# Patient Record
Sex: FEMALE | Employment: OTHER | URBAN - METROPOLITAN AREA
[De-identification: names, ages, dates, MRNs, and addresses within clinical notes are randomized per-mention and may not be internally consistent; named-entity substitution may affect disease eponyms.]

---

## 2018-01-27 ENCOUNTER — HOSPITAL ENCOUNTER (INPATIENT)
Age: 48
LOS: 3 days | Discharge: NURSING, REHAB OR LONG-TERM CARE | DRG: 133 | End: 2018-02-01
Attending: EMERGENCY MEDICINE | Admitting: INTERNAL MEDICINE
Payer: MEDICAID

## 2018-01-27 ENCOUNTER — APPOINTMENT (OUTPATIENT)
Dept: GENERAL RADIOLOGY | Age: 48
DRG: 133 | End: 2018-01-27
Payer: MEDICAID

## 2018-01-27 DIAGNOSIS — R06.89 HYPERCAPNEMIA: ICD-10-CM

## 2018-01-27 DIAGNOSIS — R06.00 DYSPNEA, UNSPECIFIED TYPE: ICD-10-CM

## 2018-01-27 DIAGNOSIS — I50.9 CONGESTIVE HEART FAILURE, UNSPECIFIED CONGESTIVE HEART FAILURE CHRONICITY, UNSPECIFIED CONGESTIVE HEART FAILURE TYPE: Primary | ICD-10-CM

## 2018-01-27 LAB
ALBUMIN SERPL-MCNC: 3.7 G/DL (ref 3.5–5.2)
ALBUMIN/GLOBULIN RATIO: ABNORMAL (ref 1–2.5)
ALLEN TEST: ABNORMAL
ALP BLD-CCNC: 51 U/L (ref 35–104)
ALT SERPL-CCNC: 13 U/L (ref 5–33)
ANION GAP SERPL CALCULATED.3IONS-SCNC: 8 MMOL/L (ref 9–17)
AST SERPL-CCNC: 16 U/L
BILIRUB SERPL-MCNC: 0.36 MG/DL (ref 0.3–1.2)
BNP INTERPRETATION: ABNORMAL
BUN BLDV-MCNC: 14 MG/DL (ref 6–20)
BUN/CREAT BLD: ABNORMAL (ref 9–20)
CALCIUM SERPL-MCNC: 9.4 MG/DL (ref 8.6–10.4)
CARBOXYHEMOGLOBIN: 3.1 % (ref 0–5)
CHLORIDE BLD-SCNC: 95 MMOL/L (ref 98–107)
CO2: 39 MMOL/L (ref 20–31)
CREAT SERPL-MCNC: 1.14 MG/DL (ref 0.5–0.9)
EKG ATRIAL RATE: 90 BPM
EKG P AXIS: 36 DEGREES
EKG P-R INTERVAL: 164 MS
EKG Q-T INTERVAL: 378 MS
EKG QRS DURATION: 98 MS
EKG QTC CALCULATION (BAZETT): 462 MS
EKG R AXIS: 62 DEGREES
EKG T AXIS: 69 DEGREES
EKG VENTRICULAR RATE: 90 BPM
FIO2: 50
GFR AFRICAN AMERICAN: >60 ML/MIN
GFR NON-AFRICAN AMERICAN: 52 ML/MIN
GFR SERPL CREATININE-BSD FRML MDRD: ABNORMAL ML/MIN/{1.73_M2}
GFR SERPL CREATININE-BSD FRML MDRD: ABNORMAL ML/MIN/{1.73_M2}
GLUCOSE BLD-MCNC: 103 MG/DL (ref 70–99)
HCO3 VENOUS: 41.9 MMOL/L (ref 24–30)
INR BLD: 1
LACTIC ACID: 0.8 MMOL/L (ref 0.5–2.2)
METHEMOGLOBIN: 0.6 % (ref 0–1.9)
MODE: ABNORMAL
NEGATIVE BASE EXCESS, VEN: ABNORMAL MMOL/L (ref 0–2)
NOTIFICATION TIME: ABNORMAL
NOTIFICATION: ABNORMAL
O2 DEVICE/FLOW/%: ABNORMAL
O2 SAT, VEN: 56.8 % (ref 60–85)
OXYHEMOGLOBIN: ABNORMAL % (ref 95–98)
PARTIAL THROMBOPLASTIN TIME: 25 SEC (ref 23–31)
PATIENT TEMP: 37
PCO2, VEN, TEMP ADJ: ABNORMAL MMHG (ref 39–55)
PCO2, VEN: 80 (ref 39–55)
PEEP/CPAP: ABNORMAL
PH VENOUS: 7.33 (ref 7.32–7.42)
PH, VEN, TEMP ADJ: ABNORMAL (ref 7.32–7.42)
PO2, VEN, TEMP ADJ: ABNORMAL MMHG (ref 30–50)
PO2, VEN: 34.3 (ref 30–50)
POSITIVE BASE EXCESS, VEN: 16 MMOL/L (ref 0–2)
POTASSIUM SERPL-SCNC: 4 MMOL/L (ref 3.7–5.3)
PRO-BNP: 342 PG/ML
PROTHROMBIN TIME: 10.4 SEC (ref 9.7–12)
PSV: ABNORMAL
PT. POSITION: ABNORMAL
RESPIRATORY RATE: 22
SAMPLE SITE: ABNORMAL
SET RATE: ABNORMAL
SODIUM BLD-SCNC: 142 MMOL/L (ref 135–144)
TEXT FOR RESPIRATORY: ABNORMAL
TOTAL HB: ABNORMAL G/DL (ref 12–16)
TOTAL PROTEIN: 9.5 G/DL (ref 6.4–8.3)
TOTAL RATE: ABNORMAL
TROPONIN INTERP: NORMAL
TROPONIN T: <0.03 NG/ML
VT: ABNORMAL

## 2018-01-27 PROCEDURE — 82800 BLOOD PH: CPT

## 2018-01-27 PROCEDURE — 6370000000 HC RX 637 (ALT 250 FOR IP): Performed by: EMERGENCY MEDICINE

## 2018-01-27 PROCEDURE — 85610 PROTHROMBIN TIME: CPT

## 2018-01-27 PROCEDURE — 36415 COLL VENOUS BLD VENIPUNCTURE: CPT

## 2018-01-27 PROCEDURE — 96374 THER/PROPH/DIAG INJ IV PUSH: CPT

## 2018-01-27 PROCEDURE — 80053 COMPREHEN METABOLIC PANEL: CPT

## 2018-01-27 PROCEDURE — 83605 ASSAY OF LACTIC ACID: CPT

## 2018-01-27 PROCEDURE — 83880 ASSAY OF NATRIURETIC PEPTIDE: CPT

## 2018-01-27 PROCEDURE — 71045 X-RAY EXAM CHEST 1 VIEW: CPT

## 2018-01-27 PROCEDURE — G0378 HOSPITAL OBSERVATION PER HR: HCPCS

## 2018-01-27 PROCEDURE — 94640 AIRWAY INHALATION TREATMENT: CPT

## 2018-01-27 PROCEDURE — 82805 BLOOD GASES W/O2 SATURATION: CPT

## 2018-01-27 PROCEDURE — 93005 ELECTROCARDIOGRAM TRACING: CPT

## 2018-01-27 PROCEDURE — 85730 THROMBOPLASTIN TIME PARTIAL: CPT

## 2018-01-27 PROCEDURE — 99285 EMERGENCY DEPT VISIT HI MDM: CPT

## 2018-01-27 PROCEDURE — 6360000002 HC RX W HCPCS: Performed by: EMERGENCY MEDICINE

## 2018-01-27 PROCEDURE — 94664 DEMO&/EVAL PT USE INHALER: CPT

## 2018-01-27 PROCEDURE — 84484 ASSAY OF TROPONIN QUANT: CPT

## 2018-01-27 PROCEDURE — 85025 COMPLETE CBC W/AUTO DIFF WBC: CPT

## 2018-01-27 PROCEDURE — 94762 N-INVAS EAR/PLS OXIMTRY CONT: CPT

## 2018-01-27 PROCEDURE — 2580000003 HC RX 258: Performed by: EMERGENCY MEDICINE

## 2018-01-27 PROCEDURE — 87493 C DIFF AMPLIFIED PROBE: CPT

## 2018-01-27 RX ORDER — AZITHROMYCIN 250 MG/1
500 TABLET, FILM COATED ORAL DAILY
Status: COMPLETED | OUTPATIENT
Start: 2018-01-28 | End: 2018-01-28

## 2018-01-27 RX ORDER — LORAZEPAM 1 MG/1
1 TABLET ORAL 3 TIMES DAILY PRN
Status: ON HOLD | COMMUNITY
End: 2018-01-31

## 2018-01-27 RX ORDER — GUAIFENESIN 600 MG/1
1200 TABLET, EXTENDED RELEASE ORAL 2 TIMES DAILY
COMMUNITY
End: 2018-11-17

## 2018-01-27 RX ORDER — IPRATROPIUM BROMIDE AND ALBUTEROL SULFATE 2.5; .5 MG/3ML; MG/3ML
1 SOLUTION RESPIRATORY (INHALATION)
Status: DISCONTINUED | OUTPATIENT
Start: 2018-01-28 | End: 2018-02-01 | Stop reason: HOSPADM

## 2018-01-27 RX ORDER — FERROUS SULFATE 325(65) MG
325 TABLET ORAL
COMMUNITY

## 2018-01-27 RX ORDER — CHLORHEXIDINE GLUCONATE 0.12 MG/ML
15 RINSE ORAL 3 TIMES DAILY
Status: ON HOLD | COMMUNITY
End: 2018-11-17 | Stop reason: ALTCHOICE

## 2018-01-27 RX ORDER — MORPHINE SULFATE 2 MG/ML
2 INJECTION, SOLUTION INTRAMUSCULAR; INTRAVENOUS
Status: DISCONTINUED | OUTPATIENT
Start: 2018-01-27 | End: 2018-01-29

## 2018-01-27 RX ORDER — OXYCODONE HYDROCHLORIDE AND ACETAMINOPHEN 5; 325 MG/1; MG/1
2 TABLET ORAL EVERY 6 HOURS PRN
Status: ON HOLD | COMMUNITY
End: 2018-01-31

## 2018-01-27 RX ORDER — NICOTINE 21 MG/24HR
1 PATCH, TRANSDERMAL 24 HOURS TRANSDERMAL DAILY PRN
Status: DISCONTINUED | OUTPATIENT
Start: 2018-01-27 | End: 2018-02-01 | Stop reason: HOSPADM

## 2018-01-27 RX ORDER — M-VIT,TX,IRON,MINS/CALC/FOLIC 27MG-0.4MG
1 TABLET ORAL
COMMUNITY

## 2018-01-27 RX ORDER — ONDANSETRON 2 MG/ML
4 INJECTION INTRAMUSCULAR; INTRAVENOUS EVERY 6 HOURS PRN
Status: DISCONTINUED | OUTPATIENT
Start: 2018-01-27 | End: 2018-02-01 | Stop reason: HOSPADM

## 2018-01-27 RX ORDER — SODIUM CHLORIDE 0.9 % (FLUSH) 0.9 %
10 SYRINGE (ML) INJECTION PRN
Status: DISCONTINUED | OUTPATIENT
Start: 2018-01-27 | End: 2018-01-27 | Stop reason: SDUPTHER

## 2018-01-27 RX ORDER — IPRATROPIUM BROMIDE AND ALBUTEROL SULFATE 2.5; .5 MG/3ML; MG/3ML
1 SOLUTION RESPIRATORY (INHALATION) EVERY 4 HOURS PRN
COMMUNITY

## 2018-01-27 RX ORDER — ALBUTEROL SULFATE 2.5 MG/3ML
2.5 SOLUTION RESPIRATORY (INHALATION)
Status: DISCONTINUED | OUTPATIENT
Start: 2018-01-27 | End: 2018-02-01 | Stop reason: HOSPADM

## 2018-01-27 RX ORDER — PANTOPRAZOLE SODIUM 40 MG/1
40 TABLET, DELAYED RELEASE ORAL DAILY
Status: ON HOLD | COMMUNITY
End: 2018-01-31 | Stop reason: HOSPADM

## 2018-01-27 RX ORDER — SODIUM CHLORIDE 0.9 % (FLUSH) 0.9 %
10 SYRINGE (ML) INJECTION EVERY 12 HOURS SCHEDULED
Status: DISCONTINUED | OUTPATIENT
Start: 2018-01-27 | End: 2018-01-27 | Stop reason: SDUPTHER

## 2018-01-27 RX ORDER — METHYLPREDNISOLONE SODIUM SUCCINATE 125 MG/2ML
80 INJECTION, POWDER, LYOPHILIZED, FOR SOLUTION INTRAMUSCULAR; INTRAVENOUS EVERY 8 HOURS
Status: DISCONTINUED | OUTPATIENT
Start: 2018-01-27 | End: 2018-01-28

## 2018-01-27 RX ORDER — ACETAMINOPHEN 325 MG/1
650 TABLET ORAL EVERY 6 HOURS PRN
COMMUNITY

## 2018-01-27 RX ORDER — FUROSEMIDE 80 MG
80 TABLET ORAL DAILY
Status: ON HOLD | COMMUNITY
End: 2018-01-31 | Stop reason: HOSPADM

## 2018-01-27 RX ORDER — AZITHROMYCIN 250 MG/1
250 TABLET, FILM COATED ORAL DAILY
Status: DISCONTINUED | OUTPATIENT
Start: 2018-01-29 | End: 2018-01-28

## 2018-01-27 RX ORDER — ACETAMINOPHEN 325 MG/1
650 TABLET ORAL EVERY 4 HOURS PRN
Status: DISCONTINUED | OUTPATIENT
Start: 2018-01-27 | End: 2018-02-01 | Stop reason: HOSPADM

## 2018-01-27 RX ORDER — MORPHINE SULFATE 4 MG/ML
4 INJECTION, SOLUTION INTRAMUSCULAR; INTRAVENOUS
Status: DISCONTINUED | OUTPATIENT
Start: 2018-01-27 | End: 2018-01-29

## 2018-01-27 RX ORDER — SODIUM CHLORIDE 0.9 % (FLUSH) 0.9 %
10 SYRINGE (ML) INJECTION PRN
Status: DISCONTINUED | OUTPATIENT
Start: 2018-01-27 | End: 2018-02-01 | Stop reason: HOSPADM

## 2018-01-27 RX ORDER — BISACODYL 10 MG
10 SUPPOSITORY, RECTAL RECTAL DAILY PRN
Status: DISCONTINUED | OUTPATIENT
Start: 2018-01-27 | End: 2018-02-01 | Stop reason: HOSPADM

## 2018-01-27 RX ORDER — SODIUM CHLORIDE 9 MG/ML
INJECTION, SOLUTION INTRAVENOUS CONTINUOUS
Status: DISCONTINUED | OUTPATIENT
Start: 2018-01-27 | End: 2018-01-28

## 2018-01-27 RX ORDER — LEVOTHYROXINE SODIUM 0.03 MG/1
25 TABLET ORAL DAILY
Status: ON HOLD | COMMUNITY
End: 2018-05-17 | Stop reason: HOSPADM

## 2018-01-27 RX ORDER — FAMOTIDINE 20 MG/1
20 TABLET, FILM COATED ORAL 2 TIMES DAILY
Status: DISCONTINUED | OUTPATIENT
Start: 2018-01-27 | End: 2018-02-01 | Stop reason: HOSPADM

## 2018-01-27 RX ORDER — ACETAMINOPHEN 325 MG/1
650 TABLET ORAL EVERY 4 HOURS PRN
Status: DISCONTINUED | OUTPATIENT
Start: 2018-01-27 | End: 2018-01-27 | Stop reason: SDUPTHER

## 2018-01-27 RX ORDER — SODIUM CHLORIDE 0.9 % (FLUSH) 0.9 %
10 SYRINGE (ML) INJECTION EVERY 12 HOURS SCHEDULED
Status: DISCONTINUED | OUTPATIENT
Start: 2018-01-27 | End: 2018-02-01 | Stop reason: HOSPADM

## 2018-01-27 RX ORDER — FUROSEMIDE 10 MG/ML
40 INJECTION INTRAMUSCULAR; INTRAVENOUS ONCE
Status: COMPLETED | OUTPATIENT
Start: 2018-01-27 | End: 2018-01-27

## 2018-01-27 RX ORDER — ASPIRIN 81 MG/1
324 TABLET, CHEWABLE ORAL ONCE
Status: COMPLETED | OUTPATIENT
Start: 2018-01-27 | End: 2018-01-27

## 2018-01-27 RX ADMIN — FUROSEMIDE 40 MG: 10 INJECTION, SOLUTION INTRAVENOUS at 17:03

## 2018-01-27 RX ADMIN — WATER 2.2 ML: 1 INJECTION INTRAMUSCULAR; INTRAVENOUS; SUBCUTANEOUS at 18:08

## 2018-01-27 RX ADMIN — ALBUTEROL SULFATE 2.5 MG: 2.5 SOLUTION RESPIRATORY (INHALATION) at 16:54

## 2018-01-27 RX ADMIN — ASPIRIN 81 MG 324 MG: 81 TABLET ORAL at 17:03

## 2018-01-27 RX ADMIN — ALTEPLASE 2 MG: 2.2 INJECTION, POWDER, LYOPHILIZED, FOR SOLUTION INTRAVENOUS at 18:08

## 2018-01-27 NOTE — ED PROVIDER NOTES
Notable for the following:     Glucose 103 (*)     CREATININE 1.14 (*)     Chloride 95 (*)     CO2 39 (*)     Anion Gap 8 (*)     Total Protein 9.5 (*)     GFR Non- 52 (*)     All other components within normal limits   C DIFF TOXIN B BY RT PCR   GRAM STAIN   RESPIRATORY CULTURE   LACTIC ACID   TROPONIN   APTT   PROTIME-INR   PREVIOUS SPECIMEN   CBC WITH AUTO DIFFERENTIAL   PREVIOUS SPECIMEN   BASIC METABOLIC PANEL W/ REFLEX TO MG FOR LOW K    CBC     EMERGENCY DEPARTMENT COURSE:     Vitals:    Vitals:    01/27/18 1354 01/27/18 1654 01/27/18 1832 01/27/18 2045   BP:   (!) 149/81 (!) 144/88   Pulse:   85 86   Resp: 22 18 18 18   Temp:   97.9 °F (36.6 °C) 98.4 °F (36.9 °C)   TempSrc:   Oral Oral   SpO2: 92% 97% 100% 93%   Weight:       Height:         The patient was given the following medications while in the emergency department:  Orders Placed This Encounter   Medications    DISCONTD: albuterol (PROVENTIL) nebulizer solution 2.5 mg    aspirin chewable tablet 324 mg    furosemide (LASIX) injection 40 mg    sodium chloride flush 0.9 % injection 10 mL    sodium chloride flush 0.9 % injection 10 mL    acetaminophen (TYLENOL) tablet 650 mg    DISCONTD: enoxaparin (LOVENOX) injection 40 mg    AND Linked Order Group     alteplase (CATHFLO) injection 2 mg     sterile water injection 2.2 mL    0.9 % sodium chloride infusion    DISCONTD: sodium chloride flush 0.9 % injection 10 mL    DISCONTD: sodium chloride flush 0.9 % injection 10 mL    DISCONTD: acetaminophen (TYLENOL) tablet 650 mg    OR Linked Order Group     morphine injection 2 mg     morphine (PF) injection 4 mg    magnesium hydroxide (MILK OF MAGNESIA) 400 MG/5ML suspension 30 mL    bisacodyl (DULCOLAX) suppository 10 mg    ondansetron (ZOFRAN) injection 4 mg    famotidine (PEPCID) tablet 20 mg    nicotine (NICODERM CQ) 21 MG/24HR 1 patch     If indicated/pateint smokes    enoxaparin (LOVENOX) injection 40 mg    albuterol (PROVENTIL) nebulizer solution 2.5 mg    ipratropium-albuterol (DUONEB) nebulizer solution 1 ampule    methylPREDNISolone sodium (SOLU-MEDROL) injection 80 mg    FOLLOWED BY Linked Order Group     azithromycin (ZITHROMAX) tablet 500 mg     azithromycin (ZITHROMAX) tablet 250 mg    cefTRIAXone (ROCEPHIN) 1 g in sterile water 10 mL IV syringe    DISCONTD: albuterol (PROVENTIL) nebulizer solution 2.5 mg     CONSULTS:  IP CONSULT TO INTERNAL MEDICINE  IP CONSULT TO INTERVENTIONAL RADIOLOGY  IP CONSULT TO PULMONOLOGY    FINAL IMPRESSION      1. Congestive heart failure, unspecified congestive heart failure chronicity, unspecified congestive heart failure type ([address]    2. Dyspnea, unspecified type    3. Hypercapnemia          DISPOSITION/PLAN   DISPOSITION Admitted 01/27/2018 05:11:29 PM      PATIENT REFERRED TO:  DO Malissa [address]   [facility] [address]  [phone]          DISCHARGE MEDICATIONS:  Current Discharge Medication List        Elías Rosenbaum MD  Attending Emergency Physician                     Elías Rosenbaum MD  01/27/18 8885

## 2018-01-28 ENCOUNTER — APPOINTMENT (OUTPATIENT)
Dept: GENERAL RADIOLOGY | Age: 48
DRG: 133 | End: 2018-01-28
Payer: MEDICAID

## 2018-01-28 PROBLEM — J96.21 ACUTE ON CHRONIC RESPIRATORY FAILURE WITH HYPOXIA AND HYPERCAPNIA (HCC): Status: ACTIVE | Noted: 2018-01-28

## 2018-01-28 PROBLEM — J96.22 ACUTE ON CHRONIC RESPIRATORY FAILURE WITH HYPOXIA AND HYPERCAPNIA (HCC): Status: ACTIVE | Noted: 2018-01-28

## 2018-01-28 LAB
ANION GAP SERPL CALCULATED.3IONS-SCNC: 9 MMOL/L (ref 9–17)
BUN BLDV-MCNC: 13 MG/DL (ref 6–20)
BUN/CREAT BLD: ABNORMAL (ref 9–20)
C DIFFICILE TOXINS, PCR: ABNORMAL
CALCIUM SERPL-MCNC: 9.2 MG/DL (ref 8.6–10.4)
CHLORIDE BLD-SCNC: 97 MMOL/L (ref 98–107)
CO2: 34 MMOL/L (ref 20–31)
CREAT SERPL-MCNC: 1.22 MG/DL (ref 0.5–0.9)
GFR AFRICAN AMERICAN: 58 ML/MIN
GFR NON-AFRICAN AMERICAN: 48 ML/MIN
GFR SERPL CREATININE-BSD FRML MDRD: ABNORMAL ML/MIN/{1.73_M2}
GFR SERPL CREATININE-BSD FRML MDRD: ABNORMAL ML/MIN/{1.73_M2}
GLUCOSE BLD-MCNC: 167 MG/DL (ref 70–99)
HCT VFR BLD CALC: 29.1 % (ref 36–46)
HEMOGLOBIN: 9 G/DL (ref 12–16)
MCH RBC QN AUTO: 26.6 PG (ref 26–34)
MCHC RBC AUTO-ENTMCNC: 30.8 G/DL (ref 31–37)
MCV RBC AUTO: 86.4 FL (ref 80–100)
NRBC AUTOMATED: ABNORMAL PER 100 WBC
PDW BLD-RTO: 17 % (ref 11.5–14.9)
PLATELET # BLD: 200 K/UL (ref 150–450)
PMV BLD AUTO: 8 FL (ref 6–12)
POTASSIUM SERPL-SCNC: 4.7 MMOL/L (ref 3.7–5.3)
RBC # BLD: 3.37 M/UL (ref 4–5.2)
SODIUM BLD-SCNC: 140 MMOL/L (ref 135–144)
SPECIMEN DESCRIPTION: ABNORMAL
WBC # BLD: 5.2 K/UL (ref 3.5–11)

## 2018-01-28 PROCEDURE — 6370000000 HC RX 637 (ALT 250 FOR IP): Performed by: INTERNAL MEDICINE

## 2018-01-28 PROCEDURE — 94002 VENT MGMT INPAT INIT DAY: CPT

## 2018-01-28 PROCEDURE — 99223 1ST HOSP IP/OBS HIGH 75: CPT | Performed by: INTERNAL MEDICINE

## 2018-01-28 PROCEDURE — 80048 BASIC METABOLIC PNL TOTAL CA: CPT

## 2018-01-28 PROCEDURE — 5A1945Z RESPIRATORY VENTILATION, 24-96 CONSECUTIVE HOURS: ICD-10-PCS | Performed by: INTERNAL MEDICINE

## 2018-01-28 PROCEDURE — 96376 TX/PRO/DX INJ SAME DRUG ADON: CPT

## 2018-01-28 PROCEDURE — 6360000002 HC RX W HCPCS: Performed by: INTERNAL MEDICINE

## 2018-01-28 PROCEDURE — 71045 X-RAY EXAM CHEST 1 VIEW: CPT

## 2018-01-28 PROCEDURE — G0378 HOSPITAL OBSERVATION PER HR: HCPCS

## 2018-01-28 PROCEDURE — 94640 AIRWAY INHALATION TREATMENT: CPT

## 2018-01-28 PROCEDURE — 2580000003 HC RX 258: Performed by: INTERNAL MEDICINE

## 2018-01-28 PROCEDURE — 94762 N-INVAS EAR/PLS OXIMTRY CONT: CPT

## 2018-01-28 PROCEDURE — 94003 VENT MGMT INPAT SUBQ DAY: CPT

## 2018-01-28 PROCEDURE — 36415 COLL VENOUS BLD VENIPUNCTURE: CPT

## 2018-01-28 PROCEDURE — 85027 COMPLETE CBC AUTOMATED: CPT

## 2018-01-28 PROCEDURE — 96375 TX/PRO/DX INJ NEW DRUG ADDON: CPT

## 2018-01-28 RX ORDER — FUROSEMIDE 40 MG/1
80 TABLET ORAL DAILY
Status: DISCONTINUED | OUTPATIENT
Start: 2018-01-28 | End: 2018-01-28 | Stop reason: SDUPTHER

## 2018-01-28 RX ORDER — FERROUS SULFATE 325(65) MG
325 TABLET ORAL
Status: DISCONTINUED | OUTPATIENT
Start: 2018-01-28 | End: 2018-02-01 | Stop reason: HOSPADM

## 2018-01-28 RX ORDER — IPRATROPIUM BROMIDE AND ALBUTEROL SULFATE 2.5; .5 MG/3ML; MG/3ML
1 SOLUTION RESPIRATORY (INHALATION) EVERY 4 HOURS PRN
Status: DISCONTINUED | OUTPATIENT
Start: 2018-01-28 | End: 2018-02-01 | Stop reason: HOSPADM

## 2018-01-28 RX ORDER — METHYLPREDNISOLONE SODIUM SUCCINATE 40 MG/ML
40 INJECTION, POWDER, LYOPHILIZED, FOR SOLUTION INTRAMUSCULAR; INTRAVENOUS EVERY 12 HOURS
Status: DISCONTINUED | OUTPATIENT
Start: 2018-01-29 | End: 2018-01-30

## 2018-01-28 RX ORDER — METRONIDAZOLE 500 MG/1
250 TABLET ORAL EVERY 6 HOURS SCHEDULED
Status: DISCONTINUED | OUTPATIENT
Start: 2018-01-28 | End: 2018-01-29 | Stop reason: DRUGHIGH

## 2018-01-28 RX ORDER — FUROSEMIDE 10 MG/ML
20 INJECTION INTRAMUSCULAR; INTRAVENOUS 2 TIMES DAILY
Status: DISCONTINUED | OUTPATIENT
Start: 2018-01-28 | End: 2018-01-30

## 2018-01-28 RX ORDER — LORAZEPAM 1 MG/1
1 TABLET ORAL 3 TIMES DAILY PRN
Status: DISCONTINUED | OUTPATIENT
Start: 2018-01-28 | End: 2018-02-01 | Stop reason: HOSPADM

## 2018-01-28 RX ORDER — OXYCODONE HYDROCHLORIDE AND ACETAMINOPHEN 5; 325 MG/1; MG/1
2 TABLET ORAL EVERY 6 HOURS PRN
Status: DISCONTINUED | OUTPATIENT
Start: 2018-01-28 | End: 2018-02-01 | Stop reason: HOSPADM

## 2018-01-28 RX ORDER — LEVOTHYROXINE SODIUM 0.1 MG/1
200 TABLET ORAL DAILY
Status: DISCONTINUED | OUTPATIENT
Start: 2018-01-28 | End: 2018-02-01 | Stop reason: HOSPADM

## 2018-01-28 RX ORDER — LEVOTHYROXINE SODIUM 0.03 MG/1
25 TABLET ORAL DAILY
Status: DISCONTINUED | OUTPATIENT
Start: 2018-01-28 | End: 2018-02-01 | Stop reason: HOSPADM

## 2018-01-28 RX ORDER — PANTOPRAZOLE SODIUM 40 MG/1
40 TABLET, DELAYED RELEASE ORAL DAILY
Status: DISCONTINUED | OUTPATIENT
Start: 2018-01-28 | End: 2018-02-01 | Stop reason: HOSPADM

## 2018-01-28 RX ORDER — FUROSEMIDE 10 MG/ML
40 INJECTION INTRAMUSCULAR; INTRAVENOUS 2 TIMES DAILY
Status: DISCONTINUED | OUTPATIENT
Start: 2018-01-28 | End: 2018-01-28

## 2018-01-28 RX ORDER — ACETAMINOPHEN 325 MG/1
650 TABLET ORAL EVERY 6 HOURS PRN
Status: DISCONTINUED | OUTPATIENT
Start: 2018-01-28 | End: 2018-01-28 | Stop reason: SDUPTHER

## 2018-01-28 RX ORDER — GUAIFENESIN 600 MG/1
1200 TABLET, EXTENDED RELEASE ORAL 2 TIMES DAILY
Status: DISCONTINUED | OUTPATIENT
Start: 2018-01-28 | End: 2018-02-01 | Stop reason: HOSPADM

## 2018-01-28 RX ADMIN — LORAZEPAM 1 MG: 1 TABLET ORAL at 13:34

## 2018-01-28 RX ADMIN — AZITHROMYCIN 500 MG: 250 TABLET, FILM COATED ORAL at 09:08

## 2018-01-28 RX ADMIN — FUROSEMIDE 20 MG: 10 INJECTION, SOLUTION INTRAVENOUS at 18:09

## 2018-01-28 RX ADMIN — PANTOPRAZOLE SODIUM 40 MG: 40 TABLET, DELAYED RELEASE ORAL at 09:08

## 2018-01-28 RX ADMIN — GUAIFENESIN 1200 MG: 600 TABLET, EXTENDED RELEASE ORAL at 20:18

## 2018-01-28 RX ADMIN — FUROSEMIDE 40 MG: 10 INJECTION, SOLUTION INTRAVENOUS at 09:09

## 2018-01-28 RX ADMIN — FERROUS SULFATE TAB 325 MG (65 MG ELEMENTAL FE) 325 MG: 325 (65 FE) TAB at 09:08

## 2018-01-28 RX ADMIN — CEFTRIAXONE SODIUM 1 G: 1 INJECTION, POWDER, FOR SOLUTION INTRAMUSCULAR; INTRAVENOUS at 01:56

## 2018-01-28 RX ADMIN — IPRATROPIUM BROMIDE AND ALBUTEROL SULFATE 1 AMPULE: .5; 3 SOLUTION RESPIRATORY (INHALATION) at 21:03

## 2018-01-28 RX ADMIN — METHYLPREDNISOLONE SODIUM SUCCINATE 80 MG: 125 INJECTION, POWDER, FOR SOLUTION INTRAMUSCULAR; INTRAVENOUS at 09:09

## 2018-01-28 RX ADMIN — Medication 10 ML: at 20:19

## 2018-01-28 RX ADMIN — LEVOTHYROXINE SODIUM 25 MCG: 25 TABLET ORAL at 09:12

## 2018-01-28 RX ADMIN — IPRATROPIUM BROMIDE AND ALBUTEROL SULFATE 3 ML: .5; 3 SOLUTION RESPIRATORY (INHALATION) at 15:58

## 2018-01-28 RX ADMIN — LORAZEPAM 1 MG: 1 TABLET ORAL at 23:55

## 2018-01-28 RX ADMIN — METHYLPREDNISOLONE SODIUM SUCCINATE 80 MG: 125 INJECTION, POWDER, FOR SOLUTION INTRAMUSCULAR; INTRAVENOUS at 13:34

## 2018-01-28 RX ADMIN — SODIUM CHLORIDE: 9 INJECTION, SOLUTION INTRAVENOUS at 01:56

## 2018-01-28 RX ADMIN — LEVOTHYROXINE SODIUM 200 MCG: 25 TABLET ORAL at 09:11

## 2018-01-28 RX ADMIN — METRONIDAZOLE 250 MG: 500 TABLET ORAL at 18:09

## 2018-01-28 RX ADMIN — FERROUS SULFATE TAB 325 MG (65 MG ELEMENTAL FE) 325 MG: 325 (65 FE) TAB at 13:12

## 2018-01-28 RX ADMIN — FAMOTIDINE 20 MG: 20 TABLET, FILM COATED ORAL at 20:18

## 2018-01-28 RX ADMIN — FERROUS SULFATE TAB 325 MG (65 MG ELEMENTAL FE) 325 MG: 325 (65 FE) TAB at 18:09

## 2018-01-28 RX ADMIN — GUAIFENESIN 1200 MG: 600 TABLET, EXTENDED RELEASE ORAL at 09:08

## 2018-01-28 RX ADMIN — IPRATROPIUM BROMIDE AND ALBUTEROL SULFATE 1 AMPULE: .5; 3 SOLUTION RESPIRATORY (INHALATION) at 11:46

## 2018-01-28 RX ADMIN — IPRATROPIUM BROMIDE AND ALBUTEROL SULFATE 1 AMPULE: .5; 3 SOLUTION RESPIRATORY (INHALATION) at 08:45

## 2018-01-28 RX ADMIN — METHYLPREDNISOLONE SODIUM SUCCINATE 80 MG: 125 INJECTION, POWDER, FOR SOLUTION INTRAMUSCULAR; INTRAVENOUS at 01:57

## 2018-01-28 ASSESSMENT — PAIN SCALES - GENERAL: PAINLEVEL_OUTOF10: 0

## 2018-01-28 NOTE — FLOWSHEET NOTE
01/28/18 0511   C-Difficile Admission Screening and Protocol   Admitted with diarrhea? Yes   Had at least 3 unformed stools in the past 24 hours? Yes   Have you had an unformed stool that conforms to the shape of the container? Yes   Is this an abnormal bowel pattern for you? No   Prior history of C-Difficile in last 3 months No   Antibiotic use in the past 6-8 weeks? No   Prior hospitalization or nursing home in the last month?  Yes  [facility] [facility])     Per ED stool was sent for C-Diff sample prior to admission to floor

## 2018-01-28 NOTE — CONSULTS
Current Facility-Administered Medications   Medication Dose Route Frequency Provider Last Rate Last Dose    ferrous sulfate tablet 325 mg  325 mg Oral TID  Carolina Barba MD   325 mg at 01/28/18 1312    guaiFENesin (MUCINEX) extended release tablet 1,200 mg  1,200 mg Oral BID Carolina Barba MD   1,200 mg at 01/28/18 0908    ipratropium-albuterol (DUONEB) nebulizer solution 3 mL  1 vial Inhalation Q4H PRN Carolina Barba MD   3 mL at 01/28/18 1558    levothyroxine (SYNTHROID) tablet 200 mcg  200 mcg Oral Daily Carolina Barba MD   200 mcg at 01/28/18 0911    levothyroxine (SYNTHROID) tablet 25 mcg  25 mcg Oral Daily Carolina Barba MD   25 mcg at 01/28/18 0912    LORazepam (ATIVAN) tablet 1 mg  1 mg Oral TID PRN Carolina Barba MD   1 mg at 01/28/18 1334    oxyCODONE-acetaminophen (PERCOCET) 5-325 MG per tablet 2 tablet  2 tablet Oral Q6H PRN Carolina Barba MD        pantoprazole (PROTONIX) tablet 40 mg  40 mg Oral Daily Carolina Barab MD   40 mg at 01/28/18 0908    furosemide (LASIX) injection 40 mg  40 mg Intravenous BID Carolina Barba MD   40 mg at 01/28/18 0909    sodium chloride flush 0.9 % injection 10 mL  10 mL Intravenous 2 times per day Carolina Barba MD        sodium chloride flush 0.9 % injection 10 mL  10 mL Intravenous PRN Carolina Barba MD        acetaminophen (TYLENOL) tablet 650 mg  650 mg Oral Q4H PRN Carolina Barba MD        morphine injection 2 mg  2 mg Intravenous Q2H PRN Carolina Barba MD        Or    morphine (PF) injection 4 mg  4 mg Intravenous Q2H PRN Carolina Barba MD        magnesium hydroxide (MILK OF MAGNESIA) 400 MG/5ML suspension 30 mL  30 mL Oral Daily PRN Carolina Barba MD        bisacodyl (DULCOLAX) suppository 10 mg  10 mg Rectal Daily PRN Carolina Barba MD        ondansetron (ZOFRAN) injection 4 mg  4 mg Intravenous Q6H PRN Baldev Bingham MD        famotidine (PEPCID) tablet 20 mg

## 2018-01-28 NOTE — H&P
Value Ref Range    Ventricular Rate 90 BPM    Atrial Rate 90 BPM    P-R Interval 164 ms    QRS Duration 98 ms    Q-T Interval 378 ms    QTc Calculation (Bazett) 462 ms    P Axis 36 degrees    R Axis 62 degrees    T Axis 69 degrees   Lactic Acid    Collection Time: 01/27/18  3:40 PM   Result Value Ref Range    Lactic Acid 0.8 0.5 - 2.2 mmol/L   Brain Natriuretic Peptide    Collection Time: 01/27/18  3:40 PM   Result Value Ref Range    Pro- (H) <300 pg/mL    BNP Interpretation         Comprehensive Metabolic Panel w/ Reflex to MG    Collection Time: 01/27/18  3:40 PM   Result Value Ref Range    Glucose 103 (H) 70 - 99 mg/dL    BUN 14 6 - 20 mg/dL    CREATININE 1.14 (H) 0.50 - 0.90 mg/dL    Bun/Cre Ratio NOT REPORTED 9 - 20    Calcium 9.4 8.6 - 10.4 mg/dL    Sodium 142 135 - 144 mmol/L    Potassium 4.0 3.7 - 5.3 mmol/L    Chloride 95 (L) 98 - 107 mmol/L    CO2 39 (H) 20 - 31 mmol/L    Anion Gap 8 (L) 9 - 17 mmol/L    Alkaline Phosphatase 51 35 - 104 U/L    ALT 13 5 - 33 U/L    AST 16 <32 U/L    Total Bilirubin 0.36 0.3 - 1.2 mg/dL    Total Protein 9.5 (H) 6.4 - 8.3 g/dL    Alb 3.7 3.5 - 5.2 g/dL    Albumin/Globulin Ratio NOT REPORTED 1.0 - 2.5    GFR Non-African American 52 (L) >60 mL/min    GFR African American >60 >60 mL/min    GFR Comment          GFR Staging NOT REPORTED    Troponin    Collection Time: 01/27/18  3:40 PM   Result Value Ref Range    Troponin T <0.03 <0.03 ng/mL    Troponin Interp         APTT    Collection Time: 01/27/18  3:40 PM   Result Value Ref Range    PTT 25.0 23.0 - 31.0 sec   PT    Collection Time: 01/27/18  3:40 PM   Result Value Ref Range    Protime 10.4 9.7 - 12.0 sec    INR 1.0    Blood Gas, Venous    Collection Time: 01/27/18  3:45 PM   Result Value Ref Range    pH, Arthur 7.328 7.320 - 7.420    pCO2, Arthur 80.0 (H) 39.0 - 55.0    pO2, Arthur 34.3 30.0 - 50.0    HCO3, Venous 41.9 (H) 24.0 - 30.0 mmol/L    Positive Base Excess, Arthur 16.0 (H) 0.0 - 2.0 mmol/L    Negative Base Excess, Arthur NOT

## 2018-01-28 NOTE — CONSULTS
[facility])            Consult        Date of Admission:  1/27/2018  Date of Consultation:  1/28/2018      PCP:  Graeme Limon DO      Reason for the consult:  Shortness of breath, rule out congestive heart failure    History of Present Illness:  Debbie Zaragoza is a 41 y.o. female  morbidly obese( 660 pounds), who came recently from [place] to [facility] nursing home for weight loss. After arriving at [facility] she started to have more shortness of breath she relates that that she did not have humidified air through her tracheostomy. Patient have severe sleep apnea and she has permanent tracheostomy. She denies any prior heart disease. She denies any history of hypertension or diabetes. PMH:   has a past medical history of Anemia; Disease of blood and blood forming organ; Hypothyroidism; Lymphedema; Respiratory failure ([address] and Tracheostomy present ([address] PSH:   has a past surgical history that includes tracheostomy. Allergies: Allergies   Allergen Reactions    Vancomycin Rash        Home Meds:    Prior to Admission medications    Medication Sig Start Date End Date Taking? Authorizing Provider   LORazepam (ATIVAN) 1 MG tablet Take 1 mg by mouth 3 times daily as needed for Anxiety. Yes Historical Provider, MD   oxyCODONE-acetaminophen (PERCOCET) 5-325 MG per tablet Take 2 tablets by mouth every 6 hours as needed for Pain.    Yes Historical Provider, MD   acetaminophen (TYLENOL) 325 MG tablet Take 650 mg by mouth every 6 hours as needed for Pain   Yes Historical Provider, MD   levothyroxine (SYNTHROID) 200 MCG tablet Take 200 mcg by mouth daily   Yes Historical Provider, MD   levothyroxine (SYNTHROID) 25 MCG tablet Take 25 mcg by mouth daily   Yes Historical Provider, MD   furosemide (LASIX) 80 MG tablet Take 80 mg by mouth daily   Yes Historical Provider, MD   pantoprazole (PROTONIX) 40 MG tablet Take 40 mg by mouth daily   Yes Historical Provider, MD

## 2018-01-28 NOTE — ED NOTES
Spoke with RN  and made aware pt has a patent IV line to Left shoulder. Also, RN made aware that cathflo was given to port on right chest and reassessment for line patency is due at 2015. Pt took all personal items.       Annita Leung RN  01/27/18 5313

## 2018-01-29 PROCEDURE — 6370000000 HC RX 637 (ALT 250 FOR IP): Performed by: INTERNAL MEDICINE

## 2018-01-29 PROCEDURE — 6360000002 HC RX W HCPCS: Performed by: INTERNAL MEDICINE

## 2018-01-29 PROCEDURE — 96376 TX/PRO/DX INJ SAME DRUG ADON: CPT

## 2018-01-29 PROCEDURE — 94640 AIRWAY INHALATION TREATMENT: CPT

## 2018-01-29 PROCEDURE — 94762 N-INVAS EAR/PLS OXIMTRY CONT: CPT

## 2018-01-29 PROCEDURE — 94003 VENT MGMT INPAT SUBQ DAY: CPT

## 2018-01-29 PROCEDURE — 2060000000 HC ICU INTERMEDIATE R&B

## 2018-01-29 PROCEDURE — 97110 THERAPEUTIC EXERCISES: CPT

## 2018-01-29 PROCEDURE — 2580000003 HC RX 258: Performed by: INTERNAL MEDICINE

## 2018-01-29 PROCEDURE — 99232 SBSQ HOSP IP/OBS MODERATE 35: CPT | Performed by: INTERNAL MEDICINE

## 2018-01-29 PROCEDURE — 97166 OT EVAL MOD COMPLEX 45 MIN: CPT

## 2018-01-29 RX ORDER — METRONIDAZOLE 500 MG/1
500 TABLET ORAL EVERY 8 HOURS SCHEDULED
Status: DISCONTINUED | OUTPATIENT
Start: 2018-01-29 | End: 2018-02-01 | Stop reason: HOSPADM

## 2018-01-29 RX ORDER — MORPHINE SULFATE 2 MG/ML
2 INJECTION, SOLUTION INTRAMUSCULAR; INTRAVENOUS
Status: DISCONTINUED | OUTPATIENT
Start: 2018-01-29 | End: 2018-02-01 | Stop reason: HOSPADM

## 2018-01-29 RX ORDER — MORPHINE SULFATE 2 MG/ML
4 INJECTION, SOLUTION INTRAMUSCULAR; INTRAVENOUS
Status: DISCONTINUED | OUTPATIENT
Start: 2018-01-29 | End: 2018-02-01 | Stop reason: HOSPADM

## 2018-01-29 RX ADMIN — PANTOPRAZOLE SODIUM 40 MG: 40 TABLET, DELAYED RELEASE ORAL at 09:53

## 2018-01-29 RX ADMIN — METRONIDAZOLE 250 MG: 500 TABLET ORAL at 01:56

## 2018-01-29 RX ADMIN — METHYLPREDNISOLONE SODIUM SUCCINATE 40 MG: 40 INJECTION, POWDER, FOR SOLUTION INTRAMUSCULAR; INTRAVENOUS at 13:41

## 2018-01-29 RX ADMIN — FUROSEMIDE 20 MG: 10 INJECTION, SOLUTION INTRAVENOUS at 18:07

## 2018-01-29 RX ADMIN — FERROUS SULFATE TAB 325 MG (65 MG ELEMENTAL FE) 325 MG: 325 (65 FE) TAB at 18:07

## 2018-01-29 RX ADMIN — IPRATROPIUM BROMIDE AND ALBUTEROL SULFATE 1 AMPULE: .5; 3 SOLUTION RESPIRATORY (INHALATION) at 11:00

## 2018-01-29 RX ADMIN — GUAIFENESIN 1200 MG: 600 TABLET, EXTENDED RELEASE ORAL at 09:53

## 2018-01-29 RX ADMIN — FERROUS SULFATE TAB 325 MG (65 MG ELEMENTAL FE) 325 MG: 325 (65 FE) TAB at 13:41

## 2018-01-29 RX ADMIN — Medication 10 ML: at 20:47

## 2018-01-29 RX ADMIN — IPRATROPIUM BROMIDE AND ALBUTEROL SULFATE 1 AMPULE: .5; 3 SOLUTION RESPIRATORY (INHALATION) at 07:39

## 2018-01-29 RX ADMIN — IPRATROPIUM BROMIDE AND ALBUTEROL SULFATE 1 AMPULE: .5; 3 SOLUTION RESPIRATORY (INHALATION) at 15:16

## 2018-01-29 RX ADMIN — FAMOTIDINE 20 MG: 20 TABLET, FILM COATED ORAL at 20:47

## 2018-01-29 RX ADMIN — FAMOTIDINE 20 MG: 20 TABLET, FILM COATED ORAL at 09:54

## 2018-01-29 RX ADMIN — FUROSEMIDE 20 MG: 10 INJECTION, SOLUTION INTRAVENOUS at 09:50

## 2018-01-29 RX ADMIN — GUAIFENESIN 1200 MG: 600 TABLET, EXTENDED RELEASE ORAL at 20:46

## 2018-01-29 RX ADMIN — LEVOTHYROXINE SODIUM 25 MCG: 25 TABLET ORAL at 09:50

## 2018-01-29 RX ADMIN — METRONIDAZOLE 500 MG: 500 TABLET ORAL at 13:41

## 2018-01-29 RX ADMIN — IPRATROPIUM BROMIDE AND ALBUTEROL SULFATE 1 AMPULE: .5; 3 SOLUTION RESPIRATORY (INHALATION) at 20:06

## 2018-01-29 RX ADMIN — Medication 10 ML: at 10:01

## 2018-01-29 RX ADMIN — ACETAMINOPHEN 650 MG: 325 TABLET, FILM COATED ORAL at 08:03

## 2018-01-29 RX ADMIN — METHYLPREDNISOLONE SODIUM SUCCINATE 40 MG: 40 INJECTION, POWDER, FOR SOLUTION INTRAMUSCULAR; INTRAVENOUS at 01:56

## 2018-01-29 RX ADMIN — FERROUS SULFATE TAB 325 MG (65 MG ELEMENTAL FE) 325 MG: 325 (65 FE) TAB at 09:54

## 2018-01-29 RX ADMIN — METRONIDAZOLE 500 MG: 500 TABLET ORAL at 20:47

## 2018-01-29 RX ADMIN — METRONIDAZOLE 500 MG: 500 TABLET ORAL at 09:54

## 2018-01-29 RX ADMIN — LEVOTHYROXINE SODIUM 200 MCG: 25 TABLET ORAL at 09:55

## 2018-01-29 RX ADMIN — Medication 10 ML: at 13:41

## 2018-01-29 ASSESSMENT — PAIN SCALES - GENERAL
PAINLEVEL_OUTOF10: 2
PAINLEVEL_OUTOF10: 2

## 2018-01-29 NOTE — PROGRESS NOTES
Patient resting quietly with eyes closed. Patient remains on nocturnal vent with oxygen saturation level at 100%. Patient is NSR to SB on telemonitor. No distress noted at this time. Continue to monitor.

## 2018-01-30 LAB
ABSOLUTE EOS #: 0 K/UL (ref 0–0.4)
ABSOLUTE IMMATURE GRANULOCYTE: ABNORMAL K/UL (ref 0–0.3)
ABSOLUTE LYMPH #: 0.8 K/UL (ref 1–4.8)
ABSOLUTE MONO #: 0.4 K/UL (ref 0.1–1.3)
ANION GAP SERPL CALCULATED.3IONS-SCNC: 9 MMOL/L (ref 9–17)
BASOPHILS # BLD: 0 % (ref 0–2)
BASOPHILS ABSOLUTE: 0 K/UL (ref 0–0.2)
BUN BLDV-MCNC: 24 MG/DL (ref 6–20)
BUN/CREAT BLD: ABNORMAL (ref 9–20)
CALCIUM SERPL-MCNC: 9 MG/DL (ref 8.6–10.4)
CHLORIDE BLD-SCNC: 95 MMOL/L (ref 98–107)
CO2: 36 MMOL/L (ref 20–31)
CREAT SERPL-MCNC: 1.22 MG/DL (ref 0.5–0.9)
DIFFERENTIAL TYPE: ABNORMAL
EOSINOPHILS RELATIVE PERCENT: 0 % (ref 0–4)
GFR AFRICAN AMERICAN: 58 ML/MIN
GFR NON-AFRICAN AMERICAN: 48 ML/MIN
GFR SERPL CREATININE-BSD FRML MDRD: ABNORMAL ML/MIN/{1.73_M2}
GFR SERPL CREATININE-BSD FRML MDRD: ABNORMAL ML/MIN/{1.73_M2}
GLUCOSE BLD-MCNC: 117 MG/DL (ref 70–99)
HCT VFR BLD CALC: 26 % (ref 36–46)
HEMOGLOBIN: 8.2 G/DL (ref 12–16)
IMMATURE GRANULOCYTES: ABNORMAL %
LV EF: 65 %
LVEF MODALITY: NORMAL
LYMPHOCYTES # BLD: 16 % (ref 24–44)
MCH RBC QN AUTO: 27.7 PG (ref 26–34)
MCHC RBC AUTO-ENTMCNC: 31.6 G/DL (ref 31–37)
MCV RBC AUTO: 87.6 FL (ref 80–100)
MONOCYTES # BLD: 7 % (ref 1–7)
NRBC AUTOMATED: ABNORMAL PER 100 WBC
PDW BLD-RTO: 17 % (ref 11.5–14.9)
PLATELET # BLD: 205 K/UL (ref 150–450)
PLATELET ESTIMATE: ABNORMAL
PMV BLD AUTO: 8 FL (ref 6–12)
POTASSIUM SERPL-SCNC: 4.3 MMOL/L (ref 3.7–5.3)
RBC # BLD: 2.97 M/UL (ref 4–5.2)
RBC # BLD: ABNORMAL 10*6/UL
SEG NEUTROPHILS: 77 % (ref 36–66)
SEGMENTED NEUTROPHILS ABSOLUTE COUNT: 4 K/UL (ref 1.3–9.1)
SODIUM BLD-SCNC: 140 MMOL/L (ref 135–144)
WBC # BLD: 5.2 K/UL (ref 3.5–11)
WBC # BLD: ABNORMAL 10*3/UL

## 2018-01-30 PROCEDURE — 94762 N-INVAS EAR/PLS OXIMTRY CONT: CPT

## 2018-01-30 PROCEDURE — 2060000000 HC ICU INTERMEDIATE R&B

## 2018-01-30 PROCEDURE — 6370000000 HC RX 637 (ALT 250 FOR IP): Performed by: INTERNAL MEDICINE

## 2018-01-30 PROCEDURE — C8929 TTE W OR WO FOL WCON,DOPPLER: HCPCS

## 2018-01-30 PROCEDURE — 97110 THERAPEUTIC EXERCISES: CPT

## 2018-01-30 PROCEDURE — 85025 COMPLETE CBC W/AUTO DIFF WBC: CPT

## 2018-01-30 PROCEDURE — 2500000003 HC RX 250 WO HCPCS: Performed by: INTERNAL MEDICINE

## 2018-01-30 PROCEDURE — 94003 VENT MGMT INPAT SUBQ DAY: CPT

## 2018-01-30 PROCEDURE — 6360000004 HC RX CONTRAST MEDICATION: Performed by: INTERNAL MEDICINE

## 2018-01-30 PROCEDURE — 87205 SMEAR GRAM STAIN: CPT

## 2018-01-30 PROCEDURE — 94640 AIRWAY INHALATION TREATMENT: CPT

## 2018-01-30 PROCEDURE — 97162 PT EVAL MOD COMPLEX 30 MIN: CPT

## 2018-01-30 PROCEDURE — 99233 SBSQ HOSP IP/OBS HIGH 50: CPT | Performed by: INTERNAL MEDICINE

## 2018-01-30 PROCEDURE — 6360000002 HC RX W HCPCS: Performed by: INTERNAL MEDICINE

## 2018-01-30 PROCEDURE — G8978 MOBILITY CURRENT STATUS: HCPCS

## 2018-01-30 PROCEDURE — G8979 MOBILITY GOAL STATUS: HCPCS

## 2018-01-30 PROCEDURE — 87070 CULTURE OTHR SPECIMN AEROBIC: CPT

## 2018-01-30 PROCEDURE — 36591 DRAW BLOOD OFF VENOUS DEVICE: CPT

## 2018-01-30 PROCEDURE — 2580000003 HC RX 258: Performed by: NURSE PRACTITIONER

## 2018-01-30 PROCEDURE — 80048 BASIC METABOLIC PNL TOTAL CA: CPT

## 2018-01-30 PROCEDURE — 87077 CULTURE AEROBIC IDENTIFY: CPT

## 2018-01-30 RX ORDER — HEPARIN SODIUM (PORCINE) LOCK FLUSH IV SOLN 100 UNIT/ML 100 UNIT/ML
300 SOLUTION INTRAVENOUS PRN
Status: DISCONTINUED | OUTPATIENT
Start: 2018-01-30 | End: 2018-02-01 | Stop reason: HOSPADM

## 2018-01-30 RX ORDER — SODIUM CHLORIDE 9 MG/ML
INJECTION, SOLUTION INTRAVENOUS CONTINUOUS
Status: DISCONTINUED | OUTPATIENT
Start: 2018-01-30 | End: 2018-02-01 | Stop reason: HOSPADM

## 2018-01-30 RX ORDER — BUMETANIDE 0.25 MG/ML
2 INJECTION, SOLUTION INTRAMUSCULAR; INTRAVENOUS 2 TIMES DAILY
Status: DISCONTINUED | OUTPATIENT
Start: 2018-01-30 | End: 2018-01-31

## 2018-01-30 RX ADMIN — FUROSEMIDE 20 MG: 10 INJECTION, SOLUTION INTRAVENOUS at 08:40

## 2018-01-30 RX ADMIN — FERROUS SULFATE TAB 325 MG (65 MG ELEMENTAL FE) 325 MG: 325 (65 FE) TAB at 08:41

## 2018-01-30 RX ADMIN — SODIUM CHLORIDE: 9 INJECTION, SOLUTION INTRAVENOUS at 04:36

## 2018-01-30 RX ADMIN — BUMETANIDE 2 MG: 0.25 INJECTION INTRAMUSCULAR; INTRAVENOUS at 15:27

## 2018-01-30 RX ADMIN — FERROUS SULFATE TAB 325 MG (65 MG ELEMENTAL FE) 325 MG: 325 (65 FE) TAB at 12:35

## 2018-01-30 RX ADMIN — METRONIDAZOLE 500 MG: 500 TABLET ORAL at 06:13

## 2018-01-30 RX ADMIN — METRONIDAZOLE 500 MG: 500 TABLET ORAL at 15:32

## 2018-01-30 RX ADMIN — LEVOTHYROXINE SODIUM 25 MCG: 25 TABLET ORAL at 06:15

## 2018-01-30 RX ADMIN — PANTOPRAZOLE SODIUM 40 MG: 40 TABLET, DELAYED RELEASE ORAL at 08:40

## 2018-01-30 RX ADMIN — IPRATROPIUM BROMIDE AND ALBUTEROL SULFATE 1 AMPULE: .5; 3 SOLUTION RESPIRATORY (INHALATION) at 07:21

## 2018-01-30 RX ADMIN — FERROUS SULFATE TAB 325 MG (65 MG ELEMENTAL FE) 325 MG: 325 (65 FE) TAB at 18:38

## 2018-01-30 RX ADMIN — IPRATROPIUM BROMIDE AND ALBUTEROL SULFATE 1 AMPULE: .5; 3 SOLUTION RESPIRATORY (INHALATION) at 15:50

## 2018-01-30 RX ADMIN — METHYLPREDNISOLONE SODIUM SUCCINATE 40 MG: 40 INJECTION, POWDER, FOR SOLUTION INTRAMUSCULAR; INTRAVENOUS at 04:35

## 2018-01-30 RX ADMIN — IPRATROPIUM BROMIDE AND ALBUTEROL SULFATE 1 AMPULE: .5; 3 SOLUTION RESPIRATORY (INHALATION) at 12:25

## 2018-01-30 RX ADMIN — PERFLUTREN 2.2 MG: 6.52 INJECTION, SUSPENSION INTRAVENOUS at 14:41

## 2018-01-30 RX ADMIN — GUAIFENESIN 1200 MG: 600 TABLET, EXTENDED RELEASE ORAL at 08:41

## 2018-01-30 RX ADMIN — IPRATROPIUM BROMIDE AND ALBUTEROL SULFATE 1 AMPULE: .5; 3 SOLUTION RESPIRATORY (INHALATION) at 20:46

## 2018-01-30 RX ADMIN — BUMETANIDE 2 MG: 0.25 INJECTION INTRAMUSCULAR; INTRAVENOUS at 21:59

## 2018-01-30 RX ADMIN — GUAIFENESIN 1200 MG: 600 TABLET, EXTENDED RELEASE ORAL at 21:59

## 2018-01-30 RX ADMIN — LEVOTHYROXINE SODIUM 200 MCG: 25 TABLET ORAL at 06:14

## 2018-01-30 RX ADMIN — METRONIDAZOLE 500 MG: 500 TABLET ORAL at 21:59

## 2018-01-30 ASSESSMENT — PAIN SCALES - GENERAL: PAINLEVEL_OUTOF10: 0

## 2018-01-30 NOTE — PROGRESS NOTES
[facility].    Date:   1/29/2018  Patient name:  Alexei Ruiz  Date of admission:  1/27/2018  1:26 PM  MRN:   [id number]  YOB: 1970    CC- sob     HPI-  Pt has been stable on trach   No loose stools + c diff on flagyl   Stopped iv antb     REVIEW OF SYSTEMS:    · General----negative for fatigue, weight loss  · GI negative for nausea and vomiting, no dysphagia       EXAM-  BP (!) 168/96   Pulse 83   Temp 97.9 °F (36.6 °C) (Oral)   Resp 18   Ht 5' 5\" (1.651 m)   Wt (!) 836 lb (379.2 kg)   LMP  (LMP Unknown)   SpO2 97%   Breastfeeding? No   .12 kg/m²      · General appearance: NAD conversant  · Trach site healthy  · Lungs: normal effort, clear to auscultation bilaterally,no wheeze. · Heart: regular rate and rhythm, S1, S2 normal, no murmur  · Abdomen: soft, non-tender; no masses, no organomegaly  · Extremities: no cyanosis, chronic edema no clubbing no synovitis      Laboratory Testing:  CBC:   Recent Labs      01/28/18   1036   WBC  5.2   HGB  9.0*   PLT  200     BMP:    Recent Labs      01/27/18   1540  01/28/18   1036   NA  142  140   K  4.0  4.7   CL  95*  97*   CO2  39*  34*   BUN  14  13   CREATININE  1.14*  1.22*   GLUCOSE  103*  167*         ASSESSMENT:    Patient Active Problem List   Diagnosis    Dyspnea    Acute on chronic respiratory failure with hypoxia and hypercapnia (HCC)       PLAN:  Chronic trach   Chronic resp failure   c diff colonisation ? Cont flagyl   Cont lasix   Check lytes at am     MD MANAN Alvarez [address], [address] [place] [address].    Phone [phone]   Fax: [phone]  Answering Service: [phone]

## 2018-01-30 NOTE — CARE COORDINATION
SW faxed the referral to [facility] so that they can obtain pre- cert for this patient to return to their facility. KEM will continue to follow.

## 2018-01-30 NOTE — PROGRESS NOTES
Provider, MD   guaiFENesin (MUCINEX) 600 MG extended release tablet Take 1,200 mg by mouth 2 times daily    Historical Provider, MD   ipratropium-albuterol (DUONEB) 0.5-2.5 (3) MG/3ML SOLN nebulizer solution Inhale 1 vial into the lungs every 4 hours as needed for Shortness of Breath    Historical Provider, MD   chlorhexidine (PERIDEX) 0.12 % solution Take 15 mLs by mouth 3 times daily    Historical Provider, MD   enoxaparin (LOVENOX) 40 MG/0.4ML injection Inject into the skin daily    Historical Provider, MD       ALLERGIES      Vancomycin    SOCIAL HISTORY       reports that she has never smoked. She has never used smokeless tobacco. She reports that she does not drink alcohol or use drugs. FAMILY HISTORY      family history is not on file. REVIEW OF SYSTEMS      · Constitutional: Negative for weight loss  · Eyes: Negative for visual changes, diplopia, scleral icterus. · ENT: Negative for Headaches, hearing loss, vertigo, mouth sores, sore throat. · Cardiovascular: ppoos for lightheadedness/orthostatic symptoms ,chest pain, dyspnea on exertion, palpitations or loss of consciousness. · Respiratory: Negative for cough or wheezing, sputum production, hemoptysis, pleuritic pain. pos sob  · Gastrointestinal: Negative for nausea/vomiting,pos diarrhea change in bowel habits, abdominal pain, dysphagia/appetite loss, hematemesis, blood in stools. · Genitourinary:Negative for change in bladder habits, dysuria, trouble voiding, hematuria. · Musculoskeletal: Negative for gait disturbance, weakness, joint complaints. · Integumentary: Negative for rash, pruritis. · Neurological: Negative for headache, dizziness, change in muscle strength, numbness/tingling, change in gait, balance, coordination,   · Psychiatric: negative for change in mood, affect, memory, mentation, behavior. · Endocrine: negative for temperature intolerance, excessive thirst, fluid intake, or urination, tremor.   · Hematologic/Lymphatic: pos for 9.5*   BILITOT  0.36   LABALBU  3.7     Pancreatic functions:  Recent Labs      01/27/18   1540   LACTA  0.8     S. Lactic Acid:   Recent Labs      01/27/18   1540   LACTA  0.8     Cardiac enzymes:No results for input(s): CKTOTAL, CKMB, CKMBINDEX, TROPONINI in the last 72 hours. BNP:No results for input(s): BNP in the last 72 hours. Lipid profile: No results for input(s): CHOL, TRIG, HDL, LDLCALC in the last 72 hours. Invalid input(s): LDL  Blood Gases: No results found for: PH, PCO2, PO2, HCO3, O2SAT  Thyroid functions: No results found for: TSH     Imaging/Diagonstics:      CXR: No acute cardiopulmonary findings. ASSESSMENT     Patient Active Problem List   Diagnosis    Dyspnea    Acute on chronic respiratory failure with hypoxia and hypercapnia (HCC)        c diff collitis    improving    On flagyl     overlap syndrome with chf     ckd    mils chanel   cr 1.22   on iv lASIX         PLAN         CONT IV FALGYL     FOLLOW CR     BIPAP         MD MANAN Ramírez [address], [address] [place] [address].    Phone [phone]   Fax: [phone]  Answering Service: [phone]

## 2018-01-30 NOTE — PROGRESS NOTES
demonstrate rolling  Patient Education: POC  Barriers to Learning: none  REQUIRES PT FOLLOW UP: Yes  Activity Tolerance  Activity Tolerance: Patient limited by fatigue;Patient limited by endurance  PT Equipment Recommendations  Equipment Needed: No     Discharge Recommendations:  [address] [facility])      Plan   Plan  Times per week: 3 x week/ 2 weeks  Times per day:  (3 x week/ 2 weeks)  Specific instructions for Next Treatment: 1-30-18 eval completed, A/AAROM x 3-5 reps bilateral LEs, O2 mask to trach, morbidly obese 770# 5'5\"  Current Treatment Recommendations: Strengthening, ROM, Safety Education & Training, Patient/Caregiver Education & Training, Positioning  Safety Devices  Type of devices: Call light within reach, Left in bed    G-Code  PT G-Codes  Functional Assessment Tool Used: [place] Functional Outcomes  Score: 0  Functional Limitation: Mobility: Walking and moving around  Mobility: Walking and Moving Around Current Status (): 100 percent impaired, limited or restricted  Mobility: Walking and Moving Around Goal Status ():  At least 80 percent but less than 100 percent impaired, limited or restricted  OutComes Score                                           AM-PAC Score             Goals  Short term goals  Time Frame for Short term goals: 3 x week/ 2 weeks  Short term goal 1: pt to complete exercise program A/AAROM bilateral LEs and AROM bilateral UEs completing x 10 reps  Short term goal 2: pt to tolerate 15-25 minutes of therapuetic exercise  Short term goal 3: pt to assist rolling in bed w/ max x 2 using UEs to assist  Patient Goals   Patient goals : return to [address]   Time In [facility]         Time Out 0935         Minutes [address], PT

## 2018-01-30 NOTE — PROGRESS NOTES
PULMONARY PROGRESS NOTE:    REASON FOR VISIT: resp failure, morbid obesity  Interval History:    Shortness of Breath: +  Cough: no  Sputum: no          Hemoptysis: no  Chest Pain: no  Fever: no                   Swelling Feet: no  Headache: no                                           Nausea, Emesis, Abdominal Pain: no  Diarrhea: no         Constipation: no    Events since last visit: none    PAST MEDICAL HISTORY:      Scheduled Meds:   metroNIDAZOLE  500 mg Oral 3 times per day    ferrous sulfate  325 mg Oral TID WC    guaiFENesin  1,200 mg Oral BID    levothyroxine  200 mcg Oral Daily    levothyroxine  25 mcg Oral Daily    pantoprazole  40 mg Oral Daily    methylPREDNISolone  40 mg Intravenous Q12H    furosemide  20 mg Intravenous BID    sodium chloride flush  10 mL Intravenous 2 times per day    famotidine  20 mg Oral BID    enoxaparin  40 mg Subcutaneous BID    ipratropium-albuterol  1 ampule Inhalation Q4H WA     Continuous Infusions:   PRN Meds:morphine **OR** morphine, ipratropium-albuterol, LORazepam, oxyCODONE-acetaminophen, sodium chloride flush, acetaminophen, magnesium hydroxide, bisacodyl, ondansetron, nicotine, albuterol        PHYSICAL EXAMINATION:  BP (!) 168/96   Pulse 83   Temp 97.9 °F (36.6 °C) (Oral)   Resp 20   Ht 5' 5\" (1.651 m)   Wt (!) 836 lb (379.2 kg)   LMP  (LMP Unknown)   SpO2 91%   Breastfeeding?  No   .12 kg/m²     General : Awake, alert, oriented to time, place, and person  Neck  supple, no lymphadenopathy, JVD not raised; trach site healthy  Heart  regular rhythm, S1 and S2 normal; no additional sounds heard  Lungs  Air Entry- fair bilaterally; breath sounds : vesicular;   rales/crackles - absent  Abdomen  soft, no tenderness  Upper Extremities  - no cyanosis, mottling; edema : absent  Lower Extremities: no cyanosis, mottling; edema : absent; chronic stasis +    Current Laboratory, Radiologic, Microbiologic, and Diagnostic studies reviewed  Data ReviewCBC:   Recent Labs      01/28/18   1036   WBC  5.2   RBC  3.37*   HGB  9.0*   HCT  29.1*   PLT  200     BMP:   Recent Labs      01/27/18   1540  01/28/18   1036   GLUCOSE  103*  167*   NA  142  140   K  4.0  4.7   BUN  14  13   CREATININE  1.14*  1.22*   CALCIUM  9.4  9.2     ABGs: No results for input(s): PHART, PO2ART, EQW9ZME, SLM3XXS, BEART, Y7KGFGRS, CJA9XJG in the last 72 hours. PT/INR:  No results found for: PTINR    ASSESSMENT / PLAN:  Morbid obesity - plans for ECF at [place]  Chronic trach / hypercapnic resp failure - NIPPV - has AVAPS at [facility] diuresis  This is a late note on patient seen earlier today.   Electronically signed by Mercy Valdovinos on 01/29/18

## 2018-01-30 NOTE — PLAN OF CARE
Problem: Falls - Risk of  Goal: Absence of falls  Outcome: Ongoing  The patient remained free from falls this shift, call light within reach, bed in locked and lowest position. Side rails up x2. Continue to monitor closely. Problem: Risk for Impaired Skin Integrity  Goal: Tissue integrity - skin and mucous membranes  Structural intactness and normal physiological function of skin and  mucous membranes. Outcome: Ongoing  Patient's skin integrity remains intact this shift. RN used barrier wipes for hygiene. Problem: Activity:  Goal: Fatigue will decrease  Fatigue will decrease   Outcome: Ongoing  Patient did not c/o fatigued this shift.

## 2018-01-31 PROCEDURE — 94640 AIRWAY INHALATION TREATMENT: CPT

## 2018-01-31 PROCEDURE — 6370000000 HC RX 637 (ALT 250 FOR IP): Performed by: INTERNAL MEDICINE

## 2018-01-31 PROCEDURE — 2500000003 HC RX 250 WO HCPCS: Performed by: INTERNAL MEDICINE

## 2018-01-31 PROCEDURE — 2060000000 HC ICU INTERMEDIATE R&B

## 2018-01-31 PROCEDURE — 6360000002 HC RX W HCPCS: Performed by: INTERNAL MEDICINE

## 2018-01-31 PROCEDURE — 94003 VENT MGMT INPAT SUBQ DAY: CPT

## 2018-01-31 PROCEDURE — 97110 THERAPEUTIC EXERCISES: CPT

## 2018-01-31 PROCEDURE — 94762 N-INVAS EAR/PLS OXIMTRY CONT: CPT

## 2018-01-31 PROCEDURE — 97535 SELF CARE MNGMENT TRAINING: CPT

## 2018-01-31 PROCEDURE — 99239 HOSP IP/OBS DSCHRG MGMT >30: CPT | Performed by: INTERNAL MEDICINE

## 2018-01-31 RX ORDER — LORAZEPAM 1 MG/1
1 TABLET ORAL 3 TIMES DAILY PRN
Qty: 30 TABLET | Refills: 0 | Status: SHIPPED | OUTPATIENT
Start: 2018-01-31 | End: 2018-02-07

## 2018-01-31 RX ORDER — OXYCODONE HYDROCHLORIDE AND ACETAMINOPHEN 5; 325 MG/1; MG/1
2 TABLET ORAL EVERY 6 HOURS PRN
Qty: 30 TABLET | Refills: 0 | Status: SHIPPED | OUTPATIENT
Start: 2018-01-31 | End: 2018-02-07

## 2018-01-31 RX ORDER — BUMETANIDE 2 MG/1
2 TABLET ORAL 2 TIMES DAILY
Qty: 30 TABLET | Refills: 3 | Status: ON HOLD | OUTPATIENT
Start: 2018-01-31 | End: 2018-05-17 | Stop reason: HOSPADM

## 2018-01-31 RX ORDER — BUMETANIDE 0.25 MG/ML
1 INJECTION, SOLUTION INTRAMUSCULAR; INTRAVENOUS ONCE
Status: COMPLETED | OUTPATIENT
Start: 2018-01-31 | End: 2018-01-31

## 2018-01-31 RX ORDER — METRONIDAZOLE 500 MG/1
500 TABLET ORAL EVERY 8 HOURS SCHEDULED
Qty: 30 TABLET | Refills: 0 | Status: SHIPPED | OUTPATIENT
Start: 2018-01-31 | End: 2018-02-10

## 2018-01-31 RX ORDER — BUMETANIDE 1 MG/1
2 TABLET ORAL 2 TIMES DAILY
Status: DISCONTINUED | OUTPATIENT
Start: 2018-01-31 | End: 2018-02-01 | Stop reason: HOSPADM

## 2018-01-31 RX ADMIN — METRONIDAZOLE 500 MG: 500 TABLET ORAL at 21:36

## 2018-01-31 RX ADMIN — BUMETANIDE 2 MG: 1 TABLET ORAL at 15:08

## 2018-01-31 RX ADMIN — METRONIDAZOLE 500 MG: 500 TABLET ORAL at 08:30

## 2018-01-31 RX ADMIN — LEVOTHYROXINE SODIUM 200 MCG: 25 TABLET ORAL at 08:29

## 2018-01-31 RX ADMIN — FERROUS SULFATE TAB 325 MG (65 MG ELEMENTAL FE) 325 MG: 325 (65 FE) TAB at 08:29

## 2018-01-31 RX ADMIN — IPRATROPIUM BROMIDE AND ALBUTEROL SULFATE 1 AMPULE: .5; 3 SOLUTION RESPIRATORY (INHALATION) at 20:21

## 2018-01-31 RX ADMIN — FERROUS SULFATE TAB 325 MG (65 MG ELEMENTAL FE) 325 MG: 325 (65 FE) TAB at 18:00

## 2018-01-31 RX ADMIN — FAMOTIDINE 20 MG: 20 TABLET, FILM COATED ORAL at 21:36

## 2018-01-31 RX ADMIN — IPRATROPIUM BROMIDE AND ALBUTEROL SULFATE 1 AMPULE: .5; 3 SOLUTION RESPIRATORY (INHALATION) at 16:27

## 2018-01-31 RX ADMIN — FERROUS SULFATE TAB 325 MG (65 MG ELEMENTAL FE) 325 MG: 325 (65 FE) TAB at 13:16

## 2018-01-31 RX ADMIN — METRONIDAZOLE 500 MG: 500 TABLET ORAL at 15:50

## 2018-01-31 RX ADMIN — LEVOTHYROXINE SODIUM 25 MCG: 25 TABLET ORAL at 08:39

## 2018-01-31 RX ADMIN — IPRATROPIUM BROMIDE AND ALBUTEROL SULFATE 1 AMPULE: .5; 3 SOLUTION RESPIRATORY (INHALATION) at 08:47

## 2018-01-31 RX ADMIN — IPRATROPIUM BROMIDE AND ALBUTEROL SULFATE 1 AMPULE: .5; 3 SOLUTION RESPIRATORY (INHALATION) at 12:00

## 2018-01-31 RX ADMIN — BUMETANIDE 2 MG: 1 TABLET ORAL at 21:36

## 2018-01-31 RX ADMIN — BUMETANIDE 2 MG: 0.25 INJECTION INTRAMUSCULAR; INTRAVENOUS at 08:41

## 2018-01-31 RX ADMIN — ENOXAPARIN SODIUM 40 MG: 40 INJECTION SUBCUTANEOUS at 08:30

## 2018-01-31 RX ADMIN — BUMETANIDE 1 MG: 0.25 INJECTION INTRAMUSCULAR; INTRAVENOUS at 18:08

## 2018-01-31 RX ADMIN — GUAIFENESIN 1200 MG: 600 TABLET, EXTENDED RELEASE ORAL at 08:28

## 2018-01-31 RX ADMIN — GUAIFENESIN 1200 MG: 600 TABLET, EXTENDED RELEASE ORAL at 21:36

## 2018-01-31 NOTE — CARE COORDINATION
ONGOING DISCHARGE PLAN:    Spoke with patient regarding discharge plan and patient confirms that plan is still to return to [facility]. LSW continues to follow for this, Awaiting precert, hopeful to get today. Per Cardiology Notes:  Impression/  1. Acute HFPEF, Pulmonary hypertension  2. LVH  3. Super morbid obesity  4. Chronic resp failure     Diuresis makes her feel better, however does not affect O2 requirement. Give bumex IV now and switch to PO. Okay to discharge from our [facility]    Per Pulmonary Notes:       ASSESSMENT / PLAN:  Morbid obesity - plans for ECF at [facility]  Chronic trach / hypercapnic resp failure - NIPPV - use in daytime as well;  - has AVAPS at [facility] diuresis  OK for winter freed from Pulmonary - discussed with primary          Will continue to follow for additional discharge needs.     Electronically signed by Cassondra Romberg, RN on 1/31/2018 at 2:13 PM

## 2018-01-31 NOTE — PROGRESS NOTES
memory, mentation, behavior. · Endocrine: negative for temperature intolerance, excessive thirst, fluid intake, or urination, tremor. · Hematologic/Lymphatic: pos for abnormal bruising or bleeding, blood clots, swollen lymph nodes. · Allergic/Immunologic: negative for nasal congestion, pruritis, hives. PHYSICAL EXAM      /74   Pulse 66   Temp 97.4 °F (36.3 °C) (Axillary)   Resp 18   Ht 5' 5\" (1.651 m)   Wt (!) 770 lb 1.6 oz (349.3 kg)   LMP  (LMP Unknown)   SpO2 100%   Breastfeeding? No   .15 kg/m²      · General appearance: well nourished  · HEENT: Head: Normocephalic, no lesions, without obvious abnormality. · Lungs: clear to auscultation bilaterally  · Heart: regular rate and rhythm, S1, S2 normal, no murmur, click, rub or gallop  · Abdomen: soft, non-tender; bowel sounds normal; no masses,  no organomegaly  · Extremities: extremities normal, atraumatic, no cyanosis or edema  · Neurological: Gait normal. Reflexes normal and symmetric. Sensation grossly normal  · Skin - no rash, no lump   · Eye no icterus no redness  · Psych-normal affect   · NEURO-no limb weakness  No facial droop  · Lymphatic system-no lymphadenopathy no splenomegaly     DIAGNOSTICS      Laboratory Testing:  CBC:   Recent Labs      01/30/18   0618   WBC  5.2   HGB  8.2*   PLT  205     BMP:    Recent Labs      01/28/18   1036  01/30/18   0618   NA  140  140   K  4.7  4.3   CL  97*  95*   CO2  34*  36*   BUN  13  24*   CREATININE  1.22*  1.22*   GLUCOSE  167*  117*     S. Calcium:  Recent Labs      01/30/18 0618   CALCIUM  9.0     S. Ionized Calcium:No results for input(s): IONCA in the last 72 hours. S. Magnesium:No results for input(s): MG in the last 72 hours. S. Phosphorus:No results for input(s): PHOS in the last 72 hours. S. Glucose:No results for input(s): POCGLU in the last 72 hours. Glycosylated hemoglobin A1C: No results for input(s): LABA1C in the last 72 hours.   INR:   No results for input(s): INR in

## 2018-01-31 NOTE — DISCHARGE SUMMARY
MULTIVITAMIN-MINERALS) tablet  Take 1 tablet by mouth daily (with breakfast)             oxyCODONE-acetaminophen (PERCOCET) 5-325 MG per tablet  Take 2 tablets by mouth every 6 hours as needed for Pain for up to 7 days. DISPOSITION AND FOLLOW-UP     Disposition:  ecf Condition: Stable     Diet:  Regular diet     Activity: As tolerated     Follow-up:   with Fabiana Tinajero DO,    Discharge time spent on pt and paperworki more than [address]NAN [address]    Phone [phone]   Fax: [phone]  Answering Service: [phone]

## 2018-01-31 NOTE — PROGRESS NOTES
Pt taken off ventilator trach cuff deflated passe jaylen valve placed on pt along with CAT all kem well spo2 100% RR 16

## 2018-01-31 NOTE — PROGRESS NOTES
Diagnostic studies reviewed  Data ReviewCBC:   Recent Labs      01/28/18   1036  01/30/18   0618   WBC  5.2  5.2   RBC  3.37*  2.97*   HGB  9.0*  8.2*   HCT  29.1*  26.0*   PLT  200  205     BMP:   Recent Labs      01/28/18   1036  01/30/18   0618   GLUCOSE  167*  117*   NA  140  140   K  4.7  4.3   BUN  13  24*   CREATININE  1.22*  1.22*   CALCIUM  9.2  9.0     ABGs: No results for input(s): PHART, PO2ART, QMG7DKO, FXK3EZG, BEART, F0KIGFQC, MMN8DTO in the last 72 hours.    PT/INR:  No results found for: PTINR    ASSESSMENT / PLAN:  Morbid obesity - plans for E[facility] at [facility]  Chronic trach / hypercapnic resp failure - NIPPV - use in daytime as well;  - has AVAPS at [facility] diuresis  OK for winter freed from Pulmonary - discussed with primary    Plan of care discussed with Dr Jose Rafael Foster  Electronically signed by Claudia Girard on 01/31/18

## 2018-02-01 VITALS
HEIGHT: 65 IN | RESPIRATION RATE: 18 BRPM | BODY MASS INDEX: 48.82 KG/M2 | TEMPERATURE: 97.3 F | DIASTOLIC BLOOD PRESSURE: 68 MMHG | HEART RATE: 72 BPM | OXYGEN SATURATION: 100 % | SYSTOLIC BLOOD PRESSURE: 125 MMHG | WEIGHT: 293 LBS

## 2018-02-01 PROCEDURE — 6360000002 HC RX W HCPCS: Performed by: NURSE PRACTITIONER

## 2018-02-01 PROCEDURE — 6370000000 HC RX 637 (ALT 250 FOR IP): Performed by: INTERNAL MEDICINE

## 2018-02-01 PROCEDURE — 94003 VENT MGMT INPAT SUBQ DAY: CPT

## 2018-02-01 PROCEDURE — 94762 N-INVAS EAR/PLS OXIMTRY CONT: CPT

## 2018-02-01 PROCEDURE — 94640 AIRWAY INHALATION TREATMENT: CPT

## 2018-02-01 RX ADMIN — GUAIFENESIN 1200 MG: 600 TABLET, EXTENDED RELEASE ORAL at 08:39

## 2018-02-01 RX ADMIN — METRONIDAZOLE 500 MG: 500 TABLET ORAL at 06:40

## 2018-02-01 RX ADMIN — HEPARIN 300 UNITS: 100 SYRINGE at 13:09

## 2018-02-01 RX ADMIN — LEVOTHYROXINE SODIUM 200 MCG: 25 TABLET ORAL at 06:40

## 2018-02-01 RX ADMIN — IPRATROPIUM BROMIDE AND ALBUTEROL SULFATE 1 AMPULE: .5; 3 SOLUTION RESPIRATORY (INHALATION) at 07:39

## 2018-02-01 RX ADMIN — PANTOPRAZOLE SODIUM 40 MG: 40 TABLET, DELAYED RELEASE ORAL at 08:39

## 2018-02-01 RX ADMIN — LEVOTHYROXINE SODIUM 25 MCG: 25 TABLET ORAL at 06:40

## 2018-02-01 RX ADMIN — FERROUS SULFATE TAB 325 MG (65 MG ELEMENTAL FE) 325 MG: 325 (65 FE) TAB at 08:39

## 2018-02-01 ASSESSMENT — PAIN SCALES - GENERAL: PAINLEVEL_OUTOF10: 0

## 2018-02-01 ASSESSMENT — PULMONARY FUNCTION TESTS: PIF_VALUE: 29

## 2018-02-03 LAB
CULTURE: ABNORMAL
DIRECT EXAM: ABNORMAL
Lab: ABNORMAL
SPECIMEN DESCRIPTION: ABNORMAL
SPECIMEN DESCRIPTION: ABNORMAL
STATUS: ABNORMAL

## 2018-05-14 ENCOUNTER — APPOINTMENT (OUTPATIENT)
Dept: GENERAL RADIOLOGY | Age: 48
DRG: 133 | End: 2018-05-14
Payer: MEDICAID

## 2018-05-14 ENCOUNTER — HOSPITAL ENCOUNTER (INPATIENT)
Age: 48
LOS: 3 days | Discharge: NURSING, REHAB OR LONG-TERM CARE | DRG: 133 | End: 2018-05-18
Attending: EMERGENCY MEDICINE | Admitting: INTERNAL MEDICINE
Payer: MEDICAID

## 2018-05-14 DIAGNOSIS — Z95.828 PORT CATHETER IN PLACE: ICD-10-CM

## 2018-05-14 DIAGNOSIS — J18.9 PNEUMONIA DUE TO ORGANISM: Primary | ICD-10-CM

## 2018-05-14 DIAGNOSIS — K02.9 DENTAL CARIES: ICD-10-CM

## 2018-05-14 DIAGNOSIS — I50.9 CONGESTIVE HEART FAILURE, UNSPECIFIED CONGESTIVE HEART FAILURE CHRONICITY, UNSPECIFIED CONGESTIVE HEART FAILURE TYPE: ICD-10-CM

## 2018-05-14 PROBLEM — J40 BRONCHITIS: Status: ACTIVE | Noted: 2018-05-14

## 2018-05-14 PROBLEM — J96.21 ACUTE ON CHRONIC RESPIRATORY FAILURE WITH HYPOXIA (HCC): Status: ACTIVE | Noted: 2018-05-14

## 2018-05-14 PROBLEM — J96.00 ACUTE RESPIRATORY FAILURE (HCC): Status: ACTIVE | Noted: 2018-01-28

## 2018-05-14 PROBLEM — E03.9 HYPOTHYROIDISM: Status: ACTIVE | Noted: 2018-05-14

## 2018-05-14 PROBLEM — R06.02 SHORTNESS OF BREATH: Status: ACTIVE | Noted: 2018-05-14

## 2018-05-14 LAB
ABSOLUTE EOS #: 0.1 K/UL (ref 0–0.4)
ABSOLUTE IMMATURE GRANULOCYTE: ABNORMAL K/UL (ref 0–0.3)
ABSOLUTE LYMPH #: 1.2 K/UL (ref 1–4.8)
ABSOLUTE MONO #: 0.3 K/UL (ref 0.1–1.3)
ANION GAP SERPL CALCULATED.3IONS-SCNC: 10 MMOL/L (ref 9–17)
BASOPHILS # BLD: 0 % (ref 0–2)
BASOPHILS ABSOLUTE: 0 K/UL (ref 0–0.2)
BNP INTERPRETATION: NORMAL
BUN BLDV-MCNC: 34 MG/DL (ref 6–20)
BUN/CREAT BLD: ABNORMAL (ref 9–20)
CALCIUM SERPL-MCNC: 8.9 MG/DL (ref 8.6–10.4)
CHLORIDE BLD-SCNC: 97 MMOL/L (ref 98–107)
CO2: 34 MMOL/L (ref 20–31)
CREAT SERPL-MCNC: 1.25 MG/DL (ref 0.5–0.9)
DIFFERENTIAL TYPE: ABNORMAL
EOSINOPHILS RELATIVE PERCENT: 3 % (ref 0–4)
GFR AFRICAN AMERICAN: 56 ML/MIN
GFR NON-AFRICAN AMERICAN: 46 ML/MIN
GFR SERPL CREATININE-BSD FRML MDRD: ABNORMAL ML/MIN/{1.73_M2}
GFR SERPL CREATININE-BSD FRML MDRD: ABNORMAL ML/MIN/{1.73_M2}
GLUCOSE BLD-MCNC: 106 MG/DL (ref 70–99)
HCT VFR BLD CALC: 29.8 % (ref 36–46)
HEMOGLOBIN: 9.3 G/DL (ref 12–16)
IMMATURE GRANULOCYTES: ABNORMAL %
LYMPHOCYTES # BLD: 26 % (ref 24–44)
MCH RBC QN AUTO: 27.9 PG (ref 26–34)
MCHC RBC AUTO-ENTMCNC: 31.2 G/DL (ref 31–37)
MCV RBC AUTO: 89.1 FL (ref 80–100)
MONOCYTES # BLD: 7 % (ref 1–7)
MYOGLOBIN: 53 NG/ML (ref 25–58)
NRBC AUTOMATED: ABNORMAL PER 100 WBC
PDW BLD-RTO: 16.6 % (ref 11.5–14.9)
PLATELET # BLD: 176 K/UL (ref 150–450)
PLATELET ESTIMATE: ABNORMAL
PMV BLD AUTO: 7.9 FL (ref 6–12)
POTASSIUM SERPL-SCNC: 4 MMOL/L (ref 3.7–5.3)
PRO-BNP: 139 PG/ML
PROCALCITONIN: 0.05 NG/ML
RBC # BLD: 3.34 M/UL (ref 4–5.2)
RBC # BLD: ABNORMAL 10*6/UL
SEG NEUTROPHILS: 64 % (ref 36–66)
SEGMENTED NEUTROPHILS ABSOLUTE COUNT: 2.9 K/UL (ref 1.3–9.1)
SODIUM BLD-SCNC: 141 MMOL/L (ref 135–144)
TROPONIN INTERP: NORMAL
TROPONIN T: <0.03 NG/ML
WBC # BLD: 4.6 K/UL (ref 3.5–11)
WBC # BLD: ABNORMAL 10*3/UL

## 2018-05-14 PROCEDURE — 94664 DEMO&/EVAL PT USE INHALER: CPT

## 2018-05-14 PROCEDURE — 83874 ASSAY OF MYOGLOBIN: CPT

## 2018-05-14 PROCEDURE — 2580000003 HC RX 258: Performed by: STUDENT IN AN ORGANIZED HEALTH CARE EDUCATION/TRAINING PROGRAM

## 2018-05-14 PROCEDURE — 80048 BASIC METABOLIC PNL TOTAL CA: CPT

## 2018-05-14 PROCEDURE — 87205 SMEAR GRAM STAIN: CPT

## 2018-05-14 PROCEDURE — 94762 N-INVAS EAR/PLS OXIMTRY CONT: CPT

## 2018-05-14 PROCEDURE — 99285 EMERGENCY DEPT VISIT HI MDM: CPT

## 2018-05-14 PROCEDURE — S0028 INJECTION, FAMOTIDINE, 20 MG: HCPCS | Performed by: STUDENT IN AN ORGANIZED HEALTH CARE EDUCATION/TRAINING PROGRAM

## 2018-05-14 PROCEDURE — 94640 AIRWAY INHALATION TREATMENT: CPT

## 2018-05-14 PROCEDURE — 71045 X-RAY EXAM CHEST 1 VIEW: CPT

## 2018-05-14 PROCEDURE — 85025 COMPLETE CBC W/AUTO DIFF WBC: CPT

## 2018-05-14 PROCEDURE — 5A1935Z RESPIRATORY VENTILATION, LESS THAN 24 CONSECUTIVE HOURS: ICD-10-PCS | Performed by: INTERNAL MEDICINE

## 2018-05-14 PROCEDURE — 87149 DNA/RNA DIRECT PROBE: CPT

## 2018-05-14 PROCEDURE — G0378 HOSPITAL OBSERVATION PER HR: HCPCS

## 2018-05-14 PROCEDURE — 96375 TX/PRO/DX INJ NEW DRUG ADDON: CPT

## 2018-05-14 PROCEDURE — 83880 ASSAY OF NATRIURETIC PEPTIDE: CPT

## 2018-05-14 PROCEDURE — 6370000000 HC RX 637 (ALT 250 FOR IP): Performed by: STUDENT IN AN ORGANIZED HEALTH CARE EDUCATION/TRAINING PROGRAM

## 2018-05-14 PROCEDURE — 6370000000 HC RX 637 (ALT 250 FOR IP): Performed by: EMERGENCY MEDICINE

## 2018-05-14 PROCEDURE — 6360000002 HC RX W HCPCS: Performed by: STUDENT IN AN ORGANIZED HEALTH CARE EDUCATION/TRAINING PROGRAM

## 2018-05-14 PROCEDURE — 6360000002 HC RX W HCPCS: Performed by: EMERGENCY MEDICINE

## 2018-05-14 PROCEDURE — 84484 ASSAY OF TROPONIN QUANT: CPT

## 2018-05-14 PROCEDURE — 2580000003 HC RX 258: Performed by: EMERGENCY MEDICINE

## 2018-05-14 PROCEDURE — 99291 CRITICAL CARE FIRST HOUR: CPT | Performed by: INTERNAL MEDICINE

## 2018-05-14 PROCEDURE — 94002 VENT MGMT INPAT INIT DAY: CPT

## 2018-05-14 PROCEDURE — 2500000003 HC RX 250 WO HCPCS: Performed by: STUDENT IN AN ORGANIZED HEALTH CARE EDUCATION/TRAINING PROGRAM

## 2018-05-14 PROCEDURE — 36415 COLL VENOUS BLD VENIPUNCTURE: CPT

## 2018-05-14 PROCEDURE — 94761 N-INVAS EAR/PLS OXIMETRY MLT: CPT

## 2018-05-14 PROCEDURE — 96365 THER/PROPH/DIAG IV INF INIT: CPT

## 2018-05-14 PROCEDURE — 84145 PROCALCITONIN (PCT): CPT

## 2018-05-14 PROCEDURE — 86403 PARTICLE AGGLUT ANTBDY SCRN: CPT

## 2018-05-14 PROCEDURE — 87040 BLOOD CULTURE FOR BACTERIA: CPT

## 2018-05-14 RX ORDER — METHYLPREDNISOLONE SODIUM SUCCINATE 125 MG/2ML
INJECTION, POWDER, LYOPHILIZED, FOR SOLUTION INTRAMUSCULAR; INTRAVENOUS
Status: DISCONTINUED
Start: 2018-05-14 | End: 2018-05-15

## 2018-05-14 RX ORDER — LEVOTHYROXINE SODIUM 0.03 MG/1
25 TABLET ORAL DAILY
Status: DISCONTINUED | OUTPATIENT
Start: 2018-05-14 | End: 2018-05-19 | Stop reason: HOSPADM

## 2018-05-14 RX ORDER — ONDANSETRON 2 MG/ML
4 INJECTION INTRAMUSCULAR; INTRAVENOUS EVERY 6 HOURS PRN
Status: DISCONTINUED | OUTPATIENT
Start: 2018-05-14 | End: 2018-05-19 | Stop reason: HOSPADM

## 2018-05-14 RX ORDER — DIPHENHYDRAMINE HYDROCHLORIDE 50 MG/ML
50 INJECTION INTRAMUSCULAR; INTRAVENOUS ONCE
Status: COMPLETED | OUTPATIENT
Start: 2018-05-14 | End: 2018-05-14

## 2018-05-14 RX ORDER — IPRATROPIUM BROMIDE AND ALBUTEROL SULFATE 2.5; .5 MG/3ML; MG/3ML
1 SOLUTION RESPIRATORY (INHALATION) ONCE
Status: COMPLETED | OUTPATIENT
Start: 2018-05-14 | End: 2018-05-14

## 2018-05-14 RX ORDER — DOCUSATE SODIUM 100 MG/1
100 CAPSULE, LIQUID FILLED ORAL 2 TIMES DAILY PRN
Status: DISCONTINUED | OUTPATIENT
Start: 2018-05-14 | End: 2018-05-19 | Stop reason: HOSPADM

## 2018-05-14 RX ORDER — FUROSEMIDE 10 MG/ML
40 INJECTION INTRAMUSCULAR; INTRAVENOUS ONCE
Status: DISCONTINUED | OUTPATIENT
Start: 2018-05-14 | End: 2018-05-15

## 2018-05-14 RX ORDER — POTASSIUM CHLORIDE 7.45 MG/ML
10 INJECTION INTRAVENOUS PRN
Status: DISCONTINUED | OUTPATIENT
Start: 2018-05-14 | End: 2018-05-19 | Stop reason: HOSPADM

## 2018-05-14 RX ORDER — DIPHENHYDRAMINE HYDROCHLORIDE 50 MG/ML
25 INJECTION INTRAMUSCULAR; INTRAVENOUS EVERY 6 HOURS PRN
Status: DISCONTINUED | OUTPATIENT
Start: 2018-05-14 | End: 2018-05-19 | Stop reason: HOSPADM

## 2018-05-14 RX ORDER — POTASSIUM CHLORIDE 20MEQ/15ML
40 LIQUID (ML) ORAL PRN
Status: DISCONTINUED | OUTPATIENT
Start: 2018-05-14 | End: 2018-05-19 | Stop reason: HOSPADM

## 2018-05-14 RX ORDER — POTASSIUM CHLORIDE 20 MEQ/1
40 TABLET, EXTENDED RELEASE ORAL PRN
Status: DISCONTINUED | OUTPATIENT
Start: 2018-05-14 | End: 2018-05-19 | Stop reason: HOSPADM

## 2018-05-14 RX ORDER — ONDANSETRON 2 MG/ML
4 INJECTION INTRAMUSCULAR; INTRAVENOUS ONCE
Status: COMPLETED | OUTPATIENT
Start: 2018-05-14 | End: 2018-05-14

## 2018-05-14 RX ORDER — SODIUM CHLORIDE 0.9 % (FLUSH) 0.9 %
10 SYRINGE (ML) INJECTION EVERY 12 HOURS SCHEDULED
Status: DISCONTINUED | OUTPATIENT
Start: 2018-05-14 | End: 2018-05-19 | Stop reason: HOSPADM

## 2018-05-14 RX ORDER — SODIUM CHLORIDE 0.9 % (FLUSH) 0.9 %
10 SYRINGE (ML) INJECTION PRN
Status: DISCONTINUED | OUTPATIENT
Start: 2018-05-14 | End: 2018-05-19 | Stop reason: HOSPADM

## 2018-05-14 RX ORDER — BUMETANIDE 1 MG/1
2 TABLET ORAL 2 TIMES DAILY
Status: DISCONTINUED | OUTPATIENT
Start: 2018-05-14 | End: 2018-05-19 | Stop reason: HOSPADM

## 2018-05-14 RX ORDER — METHYLPREDNISOLONE SODIUM SUCCINATE 125 MG/2ML
125 INJECTION, POWDER, LYOPHILIZED, FOR SOLUTION INTRAMUSCULAR; INTRAVENOUS ONCE
Status: COMPLETED | OUTPATIENT
Start: 2018-05-14 | End: 2018-05-14

## 2018-05-14 RX ORDER — DIPHENHYDRAMINE HYDROCHLORIDE 50 MG/ML
INJECTION INTRAMUSCULAR; INTRAVENOUS
Status: DISCONTINUED
Start: 2018-05-14 | End: 2018-05-15

## 2018-05-14 RX ORDER — GUAIFENESIN 600 MG/1
1200 TABLET, EXTENDED RELEASE ORAL 2 TIMES DAILY
Status: DISCONTINUED | OUTPATIENT
Start: 2018-05-14 | End: 2018-05-19 | Stop reason: HOSPADM

## 2018-05-14 RX ORDER — CHLORHEXIDINE GLUCONATE 0.12 MG/ML
15 RINSE ORAL 3 TIMES DAILY
Status: DISCONTINUED | OUTPATIENT
Start: 2018-05-14 | End: 2018-05-19 | Stop reason: HOSPADM

## 2018-05-14 RX ORDER — LEVOTHYROXINE SODIUM 0.1 MG/1
200 TABLET ORAL DAILY
Status: DISCONTINUED | OUTPATIENT
Start: 2018-05-14 | End: 2018-05-19 | Stop reason: HOSPADM

## 2018-05-14 RX ORDER — IPRATROPIUM BROMIDE AND ALBUTEROL SULFATE 2.5; .5 MG/3ML; MG/3ML
1 SOLUTION RESPIRATORY (INHALATION) EVERY 4 HOURS PRN
Status: DISCONTINUED | OUTPATIENT
Start: 2018-05-14 | End: 2018-05-19 | Stop reason: HOSPADM

## 2018-05-14 RX ADMIN — METHYLPREDNISOLONE SODIUM SUCCINATE 125 MG: 125 INJECTION, POWDER, FOR SOLUTION INTRAMUSCULAR; INTRAVENOUS at 14:35

## 2018-05-14 RX ADMIN — ONDANSETRON 4 MG: 2 INJECTION INTRAMUSCULAR; INTRAVENOUS at 14:21

## 2018-05-14 RX ADMIN — FAMOTIDINE 20 MG: 10 INJECTION INTRAVENOUS at 19:55

## 2018-05-14 RX ADMIN — PIPERACILLIN SODIUM AND TAZOBACTAM SODIUM 3.38 G: 3; .375 INJECTION, POWDER, LYOPHILIZED, FOR SOLUTION INTRAVENOUS at 14:17

## 2018-05-14 RX ADMIN — IPRATROPIUM BROMIDE AND ALBUTEROL SULFATE 1 AMPULE: .5; 3 SOLUTION RESPIRATORY (INHALATION) at 10:50

## 2018-05-14 RX ADMIN — GUAIFENESIN 1200 MG: 600 TABLET, EXTENDED RELEASE ORAL at 19:55

## 2018-05-14 RX ADMIN — IPRATROPIUM BROMIDE AND ALBUTEROL SULFATE 3 ML: .5; 3 SOLUTION RESPIRATORY (INHALATION) at 23:40

## 2018-05-14 RX ADMIN — Medication 10 ML: at 19:55

## 2018-05-14 RX ADMIN — CEFEPIME 2 G: 2 INJECTION, POWDER, FOR SOLUTION INTRAVENOUS at 19:55

## 2018-05-14 RX ADMIN — BUMETANIDE 2 MG: 1 TABLET ORAL at 18:33

## 2018-05-14 RX ADMIN — DIPHENHYDRAMINE HYDROCHLORIDE 50 MG: 50 INJECTION, SOLUTION INTRAMUSCULAR; INTRAVENOUS at 14:27

## 2018-05-14 ASSESSMENT — ENCOUNTER SYMPTOMS
COUGH: 1
VOMITING: 0
CONSTIPATION: 0
WHEEZING: 1
ABDOMINAL PAIN: 0
SINUS PRESSURE: 0
CHEST TIGHTNESS: 1
SORE THROAT: 0
NAUSEA: 0
SHORTNESS OF BREATH: 1
CONSTIPATION: 1
ABDOMINAL PAIN: 1
HEARTBURN: 0
BLURRED VISION: 0
BACK PAIN: 0
EYE REDNESS: 0
COLOR CHANGE: 0
SPUTUM PRODUCTION: 1
BLOOD IN STOOL: 0
FACIAL SWELLING: 0
RHINORRHEA: 0
EYE DISCHARGE: 0
EYE PAIN: 0
DIARRHEA: 0
TROUBLE SWALLOWING: 0

## 2018-05-14 ASSESSMENT — PAIN DESCRIPTION - PAIN TYPE: TYPE: ACUTE PAIN

## 2018-05-14 ASSESSMENT — PAIN DESCRIPTION - ORIENTATION: ORIENTATION: RIGHT;LEFT;LOWER

## 2018-05-14 ASSESSMENT — PAIN DESCRIPTION - LOCATION: LOCATION: BACK

## 2018-05-14 ASSESSMENT — PAIN SCALES - GENERAL
PAINLEVEL_OUTOF10: 0
PAINLEVEL_OUTOF10: 3

## 2018-05-14 ASSESSMENT — PAIN DESCRIPTION - DESCRIPTORS: DESCRIPTORS: ACHING;DISCOMFORT

## 2018-05-14 ASSESSMENT — PULMONARY FUNCTION TESTS: PIF_VALUE: 29

## 2018-05-14 ASSESSMENT — PAIN DESCRIPTION - ONSET: ONSET: GRADUAL

## 2018-05-15 ENCOUNTER — APPOINTMENT (OUTPATIENT)
Dept: GENERAL RADIOLOGY | Age: 48
DRG: 133 | End: 2018-05-15
Payer: MEDICAID

## 2018-05-15 LAB
ABSOLUTE EOS #: 0 K/UL (ref 0–0.4)
ABSOLUTE IMMATURE GRANULOCYTE: ABNORMAL K/UL (ref 0–0.3)
ABSOLUTE LYMPH #: 0.7 K/UL (ref 1–4.8)
ABSOLUTE MONO #: 0.1 K/UL (ref 0.1–1.3)
ANION GAP SERPL CALCULATED.3IONS-SCNC: 11 MMOL/L (ref 9–17)
BASOPHILS # BLD: 1 % (ref 0–2)
BASOPHILS ABSOLUTE: 0 K/UL (ref 0–0.2)
BILIRUBIN URINE: NEGATIVE
BUN BLDV-MCNC: 32 MG/DL (ref 6–20)
BUN/CREAT BLD: ABNORMAL (ref 9–20)
CALCIUM SERPL-MCNC: 9.1 MG/DL (ref 8.6–10.4)
CHLORIDE BLD-SCNC: 99 MMOL/L (ref 98–107)
CO2: 32 MMOL/L (ref 20–31)
COLOR: YELLOW
COMMENT UA: NORMAL
CREAT SERPL-MCNC: 1.29 MG/DL (ref 0.5–0.9)
DIFFERENTIAL TYPE: ABNORMAL
EOSINOPHILS RELATIVE PERCENT: 0 % (ref 0–4)
GFR AFRICAN AMERICAN: 54 ML/MIN
GFR NON-AFRICAN AMERICAN: 45 ML/MIN
GFR SERPL CREATININE-BSD FRML MDRD: ABNORMAL ML/MIN/{1.73_M2}
GFR SERPL CREATININE-BSD FRML MDRD: ABNORMAL ML/MIN/{1.73_M2}
GLUCOSE BLD-MCNC: 188 MG/DL (ref 70–99)
GLUCOSE URINE: NEGATIVE
HCT VFR BLD CALC: 29.8 % (ref 36–46)
HEMOGLOBIN: 9.6 G/DL (ref 12–16)
IMMATURE GRANULOCYTES: ABNORMAL %
KETONES, URINE: NEGATIVE
LEUKOCYTE ESTERASE, URINE: NEGATIVE
LYMPHOCYTES # BLD: 14 % (ref 24–44)
MCH RBC QN AUTO: 28.5 PG (ref 26–34)
MCHC RBC AUTO-ENTMCNC: 32.4 G/DL (ref 31–37)
MCV RBC AUTO: 87.9 FL (ref 80–100)
MONOCYTES # BLD: 2 % (ref 1–7)
MYOGLOBIN: 40 NG/ML (ref 25–58)
NITRITE, URINE: NEGATIVE
NRBC AUTOMATED: ABNORMAL PER 100 WBC
PDW BLD-RTO: 16.5 % (ref 11.5–14.9)
PH UA: 6 (ref 5–8)
PLATELET # BLD: 190 K/UL (ref 150–450)
PLATELET ESTIMATE: ABNORMAL
PMV BLD AUTO: 8.6 FL (ref 6–12)
POTASSIUM SERPL-SCNC: 4.6 MMOL/L (ref 3.7–5.3)
PROTEIN UA: NEGATIVE
RBC # BLD: 3.39 M/UL (ref 4–5.2)
RBC # BLD: ABNORMAL 10*6/UL
SEG NEUTROPHILS: 83 % (ref 36–66)
SEGMENTED NEUTROPHILS ABSOLUTE COUNT: 4.3 K/UL (ref 1.3–9.1)
SODIUM BLD-SCNC: 142 MMOL/L (ref 135–144)
SPECIFIC GRAVITY UA: 1.01 (ref 1–1.03)
TROPONIN INTERP: NORMAL
TROPONIN T: <0.03 NG/ML
TSH SERPL DL<=0.05 MIU/L-ACNC: 3.97 MIU/L (ref 0.3–5)
TURBIDITY: CLEAR
URINE HGB: NEGATIVE
UROBILINOGEN, URINE: NORMAL
WBC # BLD: 5.2 K/UL (ref 3.5–11)
WBC # BLD: ABNORMAL 10*3/UL

## 2018-05-15 PROCEDURE — 87077 CULTURE AEROBIC IDENTIFY: CPT

## 2018-05-15 PROCEDURE — 51701 INSERT BLADDER CATHETER: CPT

## 2018-05-15 PROCEDURE — 6370000000 HC RX 637 (ALT 250 FOR IP): Performed by: STUDENT IN AN ORGANIZED HEALTH CARE EDUCATION/TRAINING PROGRAM

## 2018-05-15 PROCEDURE — G8979 MOBILITY GOAL STATUS: HCPCS

## 2018-05-15 PROCEDURE — 2500000003 HC RX 250 WO HCPCS: Performed by: STUDENT IN AN ORGANIZED HEALTH CARE EDUCATION/TRAINING PROGRAM

## 2018-05-15 PROCEDURE — 94640 AIRWAY INHALATION TREATMENT: CPT

## 2018-05-15 PROCEDURE — 6360000002 HC RX W HCPCS: Performed by: FAMILY MEDICINE

## 2018-05-15 PROCEDURE — 87205 SMEAR GRAM STAIN: CPT

## 2018-05-15 PROCEDURE — 97161 PT EVAL LOW COMPLEX 20 MIN: CPT

## 2018-05-15 PROCEDURE — 96376 TX/PRO/DX INJ SAME DRUG ADON: CPT

## 2018-05-15 PROCEDURE — 87186 SC STD MICRODIL/AGAR DIL: CPT

## 2018-05-15 PROCEDURE — G8988 SELF CARE GOAL STATUS: HCPCS

## 2018-05-15 PROCEDURE — 2060000000 HC ICU INTERMEDIATE R&B

## 2018-05-15 PROCEDURE — 97110 THERAPEUTIC EXERCISES: CPT

## 2018-05-15 PROCEDURE — 6360000002 HC RX W HCPCS: Performed by: INTERNAL MEDICINE

## 2018-05-15 PROCEDURE — 94003 VENT MGMT INPAT SUBQ DAY: CPT

## 2018-05-15 PROCEDURE — 96366 THER/PROPH/DIAG IV INF ADDON: CPT

## 2018-05-15 PROCEDURE — 84484 ASSAY OF TROPONIN QUANT: CPT

## 2018-05-15 PROCEDURE — G8987 SELF CARE CURRENT STATUS: HCPCS

## 2018-05-15 PROCEDURE — 36415 COLL VENOUS BLD VENIPUNCTURE: CPT

## 2018-05-15 PROCEDURE — G8978 MOBILITY CURRENT STATUS: HCPCS

## 2018-05-15 PROCEDURE — 80048 BASIC METABOLIC PNL TOTAL CA: CPT

## 2018-05-15 PROCEDURE — 87070 CULTURE OTHR SPECIMN AEROBIC: CPT

## 2018-05-15 PROCEDURE — 96375 TX/PRO/DX INJ NEW DRUG ADDON: CPT

## 2018-05-15 PROCEDURE — 6360000002 HC RX W HCPCS: Performed by: STUDENT IN AN ORGANIZED HEALTH CARE EDUCATION/TRAINING PROGRAM

## 2018-05-15 PROCEDURE — 83874 ASSAY OF MYOGLOBIN: CPT

## 2018-05-15 PROCEDURE — 84443 ASSAY THYROID STIM HORMONE: CPT

## 2018-05-15 PROCEDURE — 81003 URINALYSIS AUTO W/O SCOPE: CPT

## 2018-05-15 PROCEDURE — 94762 N-INVAS EAR/PLS OXIMTRY CONT: CPT

## 2018-05-15 PROCEDURE — 71045 X-RAY EXAM CHEST 1 VIEW: CPT

## 2018-05-15 PROCEDURE — 97165 OT EVAL LOW COMPLEX 30 MIN: CPT

## 2018-05-15 PROCEDURE — 2700000000 HC OXYGEN THERAPY PER DAY

## 2018-05-15 PROCEDURE — 2580000003 HC RX 258: Performed by: STUDENT IN AN ORGANIZED HEALTH CARE EDUCATION/TRAINING PROGRAM

## 2018-05-15 PROCEDURE — 85025 COMPLETE CBC W/AUTO DIFF WBC: CPT

## 2018-05-15 PROCEDURE — S0028 INJECTION, FAMOTIDINE, 20 MG: HCPCS | Performed by: STUDENT IN AN ORGANIZED HEALTH CARE EDUCATION/TRAINING PROGRAM

## 2018-05-15 PROCEDURE — 96367 TX/PROPH/DG ADDL SEQ IV INF: CPT

## 2018-05-15 RX ORDER — FUROSEMIDE 10 MG/ML
80 INJECTION INTRAMUSCULAR; INTRAVENOUS ONCE
Status: COMPLETED | OUTPATIENT
Start: 2018-05-15 | End: 2018-05-15

## 2018-05-15 RX ORDER — LINEZOLID 2 MG/ML
600 INJECTION, SOLUTION INTRAVENOUS EVERY 12 HOURS
Status: DISCONTINUED | OUTPATIENT
Start: 2018-05-15 | End: 2018-05-17

## 2018-05-15 RX ORDER — ACETAMINOPHEN 500 MG
1000 TABLET ORAL EVERY 6 HOURS PRN
Status: DISCONTINUED | OUTPATIENT
Start: 2018-05-15 | End: 2018-05-19 | Stop reason: HOSPADM

## 2018-05-15 RX ORDER — FAMOTIDINE 20 MG/1
20 TABLET, FILM COATED ORAL 2 TIMES DAILY
Status: DISCONTINUED | OUTPATIENT
Start: 2018-05-15 | End: 2018-05-19 | Stop reason: HOSPADM

## 2018-05-15 RX ADMIN — CEFEPIME 2 G: 2 INJECTION, POWDER, FOR SOLUTION INTRAVENOUS at 04:41

## 2018-05-15 RX ADMIN — IPRATROPIUM BROMIDE AND ALBUTEROL SULFATE 3 ML: .5; 3 SOLUTION RESPIRATORY (INHALATION) at 12:09

## 2018-05-15 RX ADMIN — BUMETANIDE 2 MG: 1 TABLET ORAL at 18:26

## 2018-05-15 RX ADMIN — LEVOTHYROXINE SODIUM 25 MCG: 25 TABLET ORAL at 09:23

## 2018-05-15 RX ADMIN — Medication 15 ML: at 14:55

## 2018-05-15 RX ADMIN — FAMOTIDINE 20 MG: 20 TABLET ORAL at 13:27

## 2018-05-15 RX ADMIN — Medication 10 ML: at 09:24

## 2018-05-15 RX ADMIN — CEFEPIME 2 G: 2 INJECTION, POWDER, FOR SOLUTION INTRAVENOUS at 23:22

## 2018-05-15 RX ADMIN — GUAIFENESIN 1200 MG: 600 TABLET, EXTENDED RELEASE ORAL at 22:04

## 2018-05-15 RX ADMIN — Medication 15 ML: at 09:37

## 2018-05-15 RX ADMIN — LEVOTHYROXINE SODIUM 200 MCG: 100 TABLET ORAL at 09:24

## 2018-05-15 RX ADMIN — ACETAMINOPHEN 1000 MG: 500 TABLET, FILM COATED ORAL at 16:30

## 2018-05-15 RX ADMIN — LINEZOLID 600 MG: 600 INJECTION, SOLUTION INTRAVENOUS at 11:49

## 2018-05-15 RX ADMIN — LINEZOLID 600 MG: 600 INJECTION, SOLUTION INTRAVENOUS at 22:05

## 2018-05-15 RX ADMIN — FUROSEMIDE 80 MG: 10 INJECTION, SOLUTION INTRAVENOUS at 09:24

## 2018-05-15 RX ADMIN — GUAIFENESIN 1200 MG: 600 TABLET, EXTENDED RELEASE ORAL at 09:22

## 2018-05-15 RX ADMIN — IPRATROPIUM BROMIDE AND ALBUTEROL SULFATE 3 ML: .5; 3 SOLUTION RESPIRATORY (INHALATION) at 21:21

## 2018-05-15 RX ADMIN — FAMOTIDINE 20 MG: 20 TABLET ORAL at 22:04

## 2018-05-15 RX ADMIN — BUMETANIDE 2 MG: 1 TABLET ORAL at 09:23

## 2018-05-15 RX ADMIN — CEFEPIME 2 G: 2 INJECTION, POWDER, FOR SOLUTION INTRAVENOUS at 14:52

## 2018-05-15 RX ADMIN — IPRATROPIUM BROMIDE AND ALBUTEROL SULFATE 3 ML: .5; 3 SOLUTION RESPIRATORY (INHALATION) at 06:59

## 2018-05-15 ASSESSMENT — PULMONARY FUNCTION TESTS
PIF_VALUE: 26
PIF_VALUE: 9

## 2018-05-15 ASSESSMENT — PAIN DESCRIPTION - ONSET
ONSET: ON-GOING
ONSET: ON-GOING

## 2018-05-15 ASSESSMENT — ENCOUNTER SYMPTOMS
SHORTNESS OF BREATH: 1
DOUBLE VISION: 0
BLURRED VISION: 0
WHEEZING: 1
DIARRHEA: 0
VOMITING: 0
BACK PAIN: 0
NAUSEA: 0
SORE THROAT: 0
COUGH: 1
ABDOMINAL PAIN: 0
SPUTUM PRODUCTION: 1
CONSTIPATION: 0

## 2018-05-15 ASSESSMENT — PAIN DESCRIPTION - ORIENTATION
ORIENTATION: RIGHT
ORIENTATION: RIGHT;LEFT;LOWER

## 2018-05-15 ASSESSMENT — PAIN SCALES - GENERAL
PAINLEVEL_OUTOF10: 7
PAINLEVEL_OUTOF10: 10
PAINLEVEL_OUTOF10: 10
PAINLEVEL_OUTOF10: 3

## 2018-05-15 ASSESSMENT — PAIN DESCRIPTION - LOCATION
LOCATION: BACK
LOCATION: ABDOMEN

## 2018-05-15 ASSESSMENT — PAIN DESCRIPTION - PAIN TYPE
TYPE: ACUTE PAIN
TYPE: ACUTE PAIN

## 2018-05-15 ASSESSMENT — PAIN DESCRIPTION - DESCRIPTORS
DESCRIPTORS: ACHING
DESCRIPTORS: ACHING;DISCOMFORT

## 2018-05-16 PROBLEM — J96.01 ACUTE RESPIRATORY FAILURE WITH HYPOXIA AND HYPERCAPNIA (HCC): Status: ACTIVE | Noted: 2018-05-14

## 2018-05-16 PROBLEM — J96.02 ACUTE RESPIRATORY FAILURE WITH HYPOXIA AND HYPERCAPNIA (HCC): Status: ACTIVE | Noted: 2018-05-14

## 2018-05-16 PROBLEM — R06.00 DYSPNEA: Status: RESOLVED | Noted: 2018-01-27 | Resolved: 2018-05-16

## 2018-05-16 PROBLEM — J96.00 ACUTE RESPIRATORY FAILURE (HCC): Status: RESOLVED | Noted: 2018-01-28 | Resolved: 2018-05-16

## 2018-05-16 LAB
ABSOLUTE EOS #: 0 K/UL (ref 0–0.4)
ABSOLUTE IMMATURE GRANULOCYTE: ABNORMAL K/UL (ref 0–0.3)
ABSOLUTE LYMPH #: 1.2 K/UL (ref 1–4.8)
ABSOLUTE MONO #: 0.4 K/UL (ref 0.1–1.3)
ANION GAP SERPL CALCULATED.3IONS-SCNC: 8 MMOL/L (ref 9–17)
BASOPHILS # BLD: 0 % (ref 0–2)
BASOPHILS ABSOLUTE: 0 K/UL (ref 0–0.2)
BUN BLDV-MCNC: 34 MG/DL (ref 6–20)
BUN/CREAT BLD: ABNORMAL (ref 9–20)
CALCIUM SERPL-MCNC: 9.2 MG/DL (ref 8.6–10.4)
CHLORIDE BLD-SCNC: 96 MMOL/L (ref 98–107)
CO2: 38 MMOL/L (ref 20–31)
CREAT SERPL-MCNC: 1.59 MG/DL (ref 0.5–0.9)
DIFFERENTIAL TYPE: ABNORMAL
EOSINOPHILS RELATIVE PERCENT: 1 % (ref 0–4)
GFR AFRICAN AMERICAN: 43 ML/MIN
GFR NON-AFRICAN AMERICAN: 35 ML/MIN
GFR SERPL CREATININE-BSD FRML MDRD: ABNORMAL ML/MIN/{1.73_M2}
GFR SERPL CREATININE-BSD FRML MDRD: ABNORMAL ML/MIN/{1.73_M2}
GLUCOSE BLD-MCNC: 121 MG/DL (ref 70–99)
HCT VFR BLD CALC: 24.7 % (ref 36–46)
HEMOGLOBIN: 7.9 G/DL (ref 12–16)
IMMATURE GRANULOCYTES: ABNORMAL %
LYMPHOCYTES # BLD: 18 % (ref 24–44)
MCH RBC QN AUTO: 28.8 PG (ref 26–34)
MCHC RBC AUTO-ENTMCNC: 31.8 G/DL (ref 31–37)
MCV RBC AUTO: 90.5 FL (ref 80–100)
MONOCYTES # BLD: 7 % (ref 1–7)
NRBC AUTOMATED: ABNORMAL PER 100 WBC
PDW BLD-RTO: 16.5 % (ref 11.5–14.9)
PLATELET # BLD: 207 K/UL (ref 150–450)
PLATELET ESTIMATE: ABNORMAL
PMV BLD AUTO: 8.4 FL (ref 6–12)
POTASSIUM SERPL-SCNC: 4.1 MMOL/L (ref 3.7–5.3)
RBC # BLD: 2.73 M/UL (ref 4–5.2)
RBC # BLD: ABNORMAL 10*6/UL
SEG NEUTROPHILS: 74 % (ref 36–66)
SEGMENTED NEUTROPHILS ABSOLUTE COUNT: 4.9 K/UL (ref 1.3–9.1)
SODIUM BLD-SCNC: 142 MMOL/L (ref 135–144)
WBC # BLD: 6.6 K/UL (ref 3.5–11)
WBC # BLD: ABNORMAL 10*3/UL

## 2018-05-16 PROCEDURE — 85025 COMPLETE CBC W/AUTO DIFF WBC: CPT

## 2018-05-16 PROCEDURE — 94762 N-INVAS EAR/PLS OXIMTRY CONT: CPT

## 2018-05-16 PROCEDURE — 6370000000 HC RX 637 (ALT 250 FOR IP): Performed by: STUDENT IN AN ORGANIZED HEALTH CARE EDUCATION/TRAINING PROGRAM

## 2018-05-16 PROCEDURE — 80048 BASIC METABOLIC PNL TOTAL CA: CPT

## 2018-05-16 PROCEDURE — 36415 COLL VENOUS BLD VENIPUNCTURE: CPT

## 2018-05-16 PROCEDURE — 94640 AIRWAY INHALATION TREATMENT: CPT

## 2018-05-16 PROCEDURE — 94003 VENT MGMT INPAT SUBQ DAY: CPT

## 2018-05-16 PROCEDURE — 2580000003 HC RX 258: Performed by: FAMILY MEDICINE

## 2018-05-16 PROCEDURE — 97110 THERAPEUTIC EXERCISES: CPT

## 2018-05-16 PROCEDURE — 6360000002 HC RX W HCPCS: Performed by: FAMILY MEDICINE

## 2018-05-16 PROCEDURE — 6360000002 HC RX W HCPCS: Performed by: STUDENT IN AN ORGANIZED HEALTH CARE EDUCATION/TRAINING PROGRAM

## 2018-05-16 PROCEDURE — 99233 SBSQ HOSP IP/OBS HIGH 50: CPT | Performed by: INTERNAL MEDICINE

## 2018-05-16 PROCEDURE — 87040 BLOOD CULTURE FOR BACTERIA: CPT

## 2018-05-16 PROCEDURE — 2060000000 HC ICU INTERMEDIATE R&B

## 2018-05-16 PROCEDURE — 2580000003 HC RX 258: Performed by: STUDENT IN AN ORGANIZED HEALTH CARE EDUCATION/TRAINING PROGRAM

## 2018-05-16 RX ORDER — M-VIT,TX,IRON,MINS/CALC/FOLIC 27MG-0.4MG
1 TABLET ORAL
Status: DISCONTINUED | OUTPATIENT
Start: 2018-05-16 | End: 2018-05-19 | Stop reason: HOSPADM

## 2018-05-16 RX ORDER — FERROUS SULFATE 325(65) MG
325 TABLET ORAL
Status: DISCONTINUED | OUTPATIENT
Start: 2018-05-16 | End: 2018-05-19 | Stop reason: HOSPADM

## 2018-05-16 RX ORDER — SODIUM CHLORIDE 450 MG/100ML
INJECTION, SOLUTION INTRAVENOUS CONTINUOUS
Status: DISCONTINUED | OUTPATIENT
Start: 2018-05-16 | End: 2018-05-19 | Stop reason: HOSPADM

## 2018-05-16 RX ADMIN — LINEZOLID 600 MG: 600 INJECTION, SOLUTION INTRAVENOUS at 10:11

## 2018-05-16 RX ADMIN — FERROUS SULFATE TAB 325 MG (65 MG ELEMENTAL FE) 325 MG: 325 (65 FE) TAB at 10:10

## 2018-05-16 RX ADMIN — BENZOCAINE: 100 GEL TOPICAL at 09:41

## 2018-05-16 RX ADMIN — Medication 10 ML: at 09:04

## 2018-05-16 RX ADMIN — GUAIFENESIN 1200 MG: 600 TABLET, EXTENDED RELEASE ORAL at 21:36

## 2018-05-16 RX ADMIN — FERROUS SULFATE TAB 325 MG (65 MG ELEMENTAL FE) 325 MG: 325 (65 FE) TAB at 12:23

## 2018-05-16 RX ADMIN — ACETAMINOPHEN 1000 MG: 500 TABLET, FILM COATED ORAL at 18:03

## 2018-05-16 RX ADMIN — BUMETANIDE 2 MG: 1 TABLET ORAL at 10:09

## 2018-05-16 RX ADMIN — GUAIFENESIN 1200 MG: 600 TABLET, EXTENDED RELEASE ORAL at 10:10

## 2018-05-16 RX ADMIN — BUMETANIDE 2 MG: 1 TABLET ORAL at 17:02

## 2018-05-16 RX ADMIN — CEFEPIME 2 G: 2 INJECTION, POWDER, FOR SOLUTION INTRAVENOUS at 14:31

## 2018-05-16 RX ADMIN — CEFEPIME 2 G: 2 INJECTION, POWDER, FOR SOLUTION INTRAVENOUS at 06:16

## 2018-05-16 RX ADMIN — Medication 15 ML: at 16:59

## 2018-05-16 RX ADMIN — IPRATROPIUM BROMIDE AND ALBUTEROL SULFATE 3 ML: .5; 3 SOLUTION RESPIRATORY (INHALATION) at 21:14

## 2018-05-16 RX ADMIN — LEVOTHYROXINE SODIUM 25 MCG: 25 TABLET ORAL at 08:06

## 2018-05-16 RX ADMIN — FERROUS SULFATE TAB 325 MG (65 MG ELEMENTAL FE) 325 MG: 325 (65 FE) TAB at 17:02

## 2018-05-16 RX ADMIN — FAMOTIDINE 20 MG: 20 TABLET ORAL at 10:09

## 2018-05-16 RX ADMIN — FAMOTIDINE 20 MG: 20 TABLET ORAL at 21:36

## 2018-05-16 RX ADMIN — MULTIPLE VITAMINS W/ MINERALS TAB 1 TABLET: TAB at 10:10

## 2018-05-16 RX ADMIN — Medication 15 ML: at 08:16

## 2018-05-16 RX ADMIN — ACETAMINOPHEN 1000 MG: 500 TABLET, FILM COATED ORAL at 10:10

## 2018-05-16 RX ADMIN — SODIUM CHLORIDE: 4.5 INJECTION, SOLUTION INTRAVENOUS at 14:24

## 2018-05-16 RX ADMIN — ACETAMINOPHEN 1000 MG: 500 TABLET, FILM COATED ORAL at 01:09

## 2018-05-16 RX ADMIN — LEVOTHYROXINE SODIUM 200 MCG: 100 TABLET ORAL at 08:06

## 2018-05-16 RX ADMIN — IPRATROPIUM BROMIDE AND ALBUTEROL SULFATE 3 ML: .5; 3 SOLUTION RESPIRATORY (INHALATION) at 09:22

## 2018-05-16 ASSESSMENT — PAIN DESCRIPTION - DESCRIPTORS
DESCRIPTORS: ACHING;DISCOMFORT

## 2018-05-16 ASSESSMENT — PAIN DESCRIPTION - ONSET
ONSET: ON-GOING
ONSET: ON-GOING

## 2018-05-16 ASSESSMENT — PAIN SCALES - GENERAL
PAINLEVEL_OUTOF10: 10
PAINLEVEL_OUTOF10: 3
PAINLEVEL_OUTOF10: 10
PAINLEVEL_OUTOF10: 2
PAINLEVEL_OUTOF10: 10
PAINLEVEL_OUTOF10: 10

## 2018-05-16 ASSESSMENT — PAIN DESCRIPTION - LOCATION
LOCATION: TEETH
LOCATION: MOUTH
LOCATION: TEETH
LOCATION: TEETH
LOCATION: MOUTH

## 2018-05-16 ASSESSMENT — PAIN DESCRIPTION - ORIENTATION
ORIENTATION: LEFT

## 2018-05-16 ASSESSMENT — PAIN DESCRIPTION - PAIN TYPE
TYPE: ACUTE PAIN

## 2018-05-16 ASSESSMENT — ENCOUNTER SYMPTOMS
BLURRED VISION: 0
COUGH: 1
VOMITING: 0
ABDOMINAL PAIN: 0
WHEEZING: 1
SHORTNESS OF BREATH: 1
DIARRHEA: 0
SORE THROAT: 0
DOUBLE VISION: 0
SPUTUM PRODUCTION: 1
CONSTIPATION: 0
NAUSEA: 0
BACK PAIN: 0

## 2018-05-16 ASSESSMENT — PAIN DESCRIPTION - FREQUENCY
FREQUENCY: CONTINUOUS

## 2018-05-16 ASSESSMENT — PULMONARY FUNCTION TESTS: PIF_VALUE: 17

## 2018-05-17 ENCOUNTER — APPOINTMENT (OUTPATIENT)
Dept: GENERAL RADIOLOGY | Age: 48
DRG: 133 | End: 2018-05-17
Payer: MEDICAID

## 2018-05-17 LAB
ABSOLUTE EOS #: 0.1 K/UL (ref 0–0.4)
ABSOLUTE IMMATURE GRANULOCYTE: ABNORMAL K/UL (ref 0–0.3)
ABSOLUTE LYMPH #: 1.3 K/UL (ref 1–4.8)
ABSOLUTE MONO #: 0.4 K/UL (ref 0.1–1.3)
ANION GAP SERPL CALCULATED.3IONS-SCNC: 6 MMOL/L (ref 9–17)
BASOPHILS # BLD: 1 % (ref 0–2)
BASOPHILS ABSOLUTE: 0 K/UL (ref 0–0.2)
BUN BLDV-MCNC: 35 MG/DL (ref 6–20)
BUN/CREAT BLD: ABNORMAL (ref 9–20)
CALCIUM SERPL-MCNC: 8.8 MG/DL (ref 8.6–10.4)
CHLORIDE BLD-SCNC: 98 MMOL/L (ref 98–107)
CO2: 38 MMOL/L (ref 20–31)
CREAT SERPL-MCNC: 1.38 MG/DL (ref 0.5–0.9)
CULTURE: ABNORMAL
DIFFERENTIAL TYPE: ABNORMAL
EOSINOPHILS RELATIVE PERCENT: 2 % (ref 0–4)
GFR AFRICAN AMERICAN: 50 ML/MIN
GFR NON-AFRICAN AMERICAN: 41 ML/MIN
GFR SERPL CREATININE-BSD FRML MDRD: ABNORMAL ML/MIN/{1.73_M2}
GFR SERPL CREATININE-BSD FRML MDRD: ABNORMAL ML/MIN/{1.73_M2}
GLUCOSE BLD-MCNC: 112 MG/DL (ref 70–99)
HCT VFR BLD CALC: 28.7 % (ref 36–46)
HEMOGLOBIN: 9.3 G/DL (ref 12–16)
IMMATURE GRANULOCYTES: ABNORMAL %
LYMPHOCYTES # BLD: 27 % (ref 24–44)
Lab: ABNORMAL
MCH RBC QN AUTO: 28.6 PG (ref 26–34)
MCHC RBC AUTO-ENTMCNC: 32.6 G/DL (ref 31–37)
MCV RBC AUTO: 87.8 FL (ref 80–100)
MONOCYTES # BLD: 9 % (ref 1–7)
NRBC AUTOMATED: ABNORMAL PER 100 WBC
PDW BLD-RTO: 16.7 % (ref 11.5–14.9)
PLATELET # BLD: 171 K/UL (ref 150–450)
PLATELET ESTIMATE: ABNORMAL
PMV BLD AUTO: 8.3 FL (ref 6–12)
POTASSIUM SERPL-SCNC: 4.5 MMOL/L (ref 3.7–5.3)
RBC # BLD: 3.27 M/UL (ref 4–5.2)
RBC # BLD: ABNORMAL 10*6/UL
SEG NEUTROPHILS: 61 % (ref 36–66)
SEGMENTED NEUTROPHILS ABSOLUTE COUNT: 3.1 K/UL (ref 1.3–9.1)
SODIUM BLD-SCNC: 142 MMOL/L (ref 135–144)
SPECIMEN DESCRIPTION: ABNORMAL
STATUS: ABNORMAL
STATUS: ABNORMAL
WBC # BLD: 5 K/UL (ref 3.5–11)
WBC # BLD: ABNORMAL 10*3/UL

## 2018-05-17 PROCEDURE — 97110 THERAPEUTIC EXERCISES: CPT

## 2018-05-17 PROCEDURE — 2060000000 HC ICU INTERMEDIATE R&B

## 2018-05-17 PROCEDURE — 6370000000 HC RX 637 (ALT 250 FOR IP): Performed by: STUDENT IN AN ORGANIZED HEALTH CARE EDUCATION/TRAINING PROGRAM

## 2018-05-17 PROCEDURE — 99239 HOSP IP/OBS DSCHRG MGMT >30: CPT | Performed by: INTERNAL MEDICINE

## 2018-05-17 PROCEDURE — 94640 AIRWAY INHALATION TREATMENT: CPT

## 2018-05-17 PROCEDURE — 80048 BASIC METABOLIC PNL TOTAL CA: CPT

## 2018-05-17 PROCEDURE — 3209999900 FLUORO FOR SURGICAL PROCEDURES

## 2018-05-17 PROCEDURE — 6360000002 HC RX W HCPCS: Performed by: FAMILY MEDICINE

## 2018-05-17 PROCEDURE — 2580000003 HC RX 258: Performed by: STUDENT IN AN ORGANIZED HEALTH CARE EDUCATION/TRAINING PROGRAM

## 2018-05-17 PROCEDURE — 6360000002 HC RX W HCPCS: Performed by: RADIOLOGY

## 2018-05-17 PROCEDURE — 94003 VENT MGMT INPAT SUBQ DAY: CPT

## 2018-05-17 PROCEDURE — 36415 COLL VENOUS BLD VENIPUNCTURE: CPT

## 2018-05-17 PROCEDURE — 76000 FLUOROSCOPY <1 HR PHYS/QHP: CPT | Performed by: RADIOLOGY

## 2018-05-17 PROCEDURE — 85025 COMPLETE CBC W/AUTO DIFF WBC: CPT

## 2018-05-17 PROCEDURE — 94762 N-INVAS EAR/PLS OXIMTRY CONT: CPT

## 2018-05-17 RX ORDER — CIPROFLOXACIN 500 MG/1
500 TABLET, FILM COATED ORAL 2 TIMES DAILY
Qty: 14 TABLET | Refills: 0 | Status: SHIPPED | OUTPATIENT
Start: 2018-05-17 | End: 2018-05-24

## 2018-05-17 RX ORDER — CIPROFLOXACIN 500 MG/1
500 TABLET, FILM COATED ORAL EVERY 12 HOURS SCHEDULED
Status: DISCONTINUED | OUTPATIENT
Start: 2018-05-17 | End: 2018-05-19 | Stop reason: HOSPADM

## 2018-05-17 RX ORDER — BUMETANIDE 2 MG/1
2 TABLET ORAL 2 TIMES DAILY
Qty: 30 TABLET | Refills: 3 | Status: SHIPPED | OUTPATIENT
Start: 2018-05-17 | End: 2019-05-08

## 2018-05-17 RX ADMIN — MULTIPLE VITAMINS W/ MINERALS TAB 1 TABLET: TAB at 08:23

## 2018-05-17 RX ADMIN — LEVOTHYROXINE SODIUM 25 MCG: 25 TABLET ORAL at 06:23

## 2018-05-17 RX ADMIN — LEVOTHYROXINE SODIUM 200 MCG: 100 TABLET ORAL at 06:23

## 2018-05-17 RX ADMIN — ACETAMINOPHEN 1000 MG: 500 TABLET, FILM COATED ORAL at 22:37

## 2018-05-17 RX ADMIN — ACETAMINOPHEN 1000 MG: 500 TABLET, FILM COATED ORAL at 16:42

## 2018-05-17 RX ADMIN — BUMETANIDE 2 MG: 1 TABLET ORAL at 08:22

## 2018-05-17 RX ADMIN — ALTEPLASE 2 MG: 2.2 INJECTION, POWDER, LYOPHILIZED, FOR SOLUTION INTRAVENOUS at 11:25

## 2018-05-17 RX ADMIN — FERROUS SULFATE TAB 325 MG (65 MG ELEMENTAL FE) 325 MG: 325 (65 FE) TAB at 08:22

## 2018-05-17 RX ADMIN — IPRATROPIUM BROMIDE AND ALBUTEROL SULFATE 3 ML: .5; 3 SOLUTION RESPIRATORY (INHALATION) at 20:58

## 2018-05-17 RX ADMIN — FAMOTIDINE 20 MG: 20 TABLET ORAL at 08:22

## 2018-05-17 RX ADMIN — Medication 10 ML: at 00:11

## 2018-05-17 RX ADMIN — IPRATROPIUM BROMIDE AND ALBUTEROL SULFATE 3 ML: .5; 3 SOLUTION RESPIRATORY (INHALATION) at 14:58

## 2018-05-17 RX ADMIN — FERROUS SULFATE TAB 325 MG (65 MG ELEMENTAL FE) 325 MG: 325 (65 FE) TAB at 18:31

## 2018-05-17 RX ADMIN — CIPROFLOXACIN 500 MG: 500 TABLET, FILM COATED ORAL at 21:14

## 2018-05-17 RX ADMIN — GUAIFENESIN 1200 MG: 600 TABLET, EXTENDED RELEASE ORAL at 08:22

## 2018-05-17 RX ADMIN — GUAIFENESIN 1200 MG: 600 TABLET, EXTENDED RELEASE ORAL at 21:14

## 2018-05-17 RX ADMIN — BUMETANIDE 2 MG: 1 TABLET ORAL at 18:31

## 2018-05-17 RX ADMIN — FAMOTIDINE 20 MG: 20 TABLET ORAL at 21:14

## 2018-05-17 ASSESSMENT — PAIN DESCRIPTION - LOCATION
LOCATION: TEETH

## 2018-05-17 ASSESSMENT — PAIN DESCRIPTION - ORIENTATION
ORIENTATION: LEFT

## 2018-05-17 ASSESSMENT — ENCOUNTER SYMPTOMS
ABDOMINAL PAIN: 0
COUGH: 1
BLURRED VISION: 0
VOMITING: 0
SHORTNESS OF BREATH: 1
WHEEZING: 1
DIARRHEA: 0
BACK PAIN: 0
SPUTUM PRODUCTION: 1
NAUSEA: 0
DOUBLE VISION: 0
CONSTIPATION: 0
SORE THROAT: 0

## 2018-05-17 ASSESSMENT — PAIN DESCRIPTION - PAIN TYPE
TYPE: ACUTE PAIN

## 2018-05-17 ASSESSMENT — PAIN SCALES - GENERAL
PAINLEVEL_OUTOF10: 7
PAINLEVEL_OUTOF10: 10
PAINLEVEL_OUTOF10: 0
PAINLEVEL_OUTOF10: 8
PAINLEVEL_OUTOF10: 10
PAINLEVEL_OUTOF10: 0

## 2018-05-18 VITALS
SYSTOLIC BLOOD PRESSURE: 118 MMHG | TEMPERATURE: 97.7 F | OXYGEN SATURATION: 100 % | HEIGHT: 65 IN | RESPIRATION RATE: 20 BRPM | DIASTOLIC BLOOD PRESSURE: 54 MMHG | HEART RATE: 89 BPM | BODY MASS INDEX: 48.82 KG/M2 | WEIGHT: 293 LBS

## 2018-05-18 LAB
ABSOLUTE EOS #: 0.2 K/UL (ref 0–0.4)
ABSOLUTE IMMATURE GRANULOCYTE: ABNORMAL K/UL (ref 0–0.3)
ABSOLUTE LYMPH #: 1.6 K/UL (ref 1–4.8)
ABSOLUTE MONO #: 0.5 K/UL (ref 0.1–1.3)
ANION GAP SERPL CALCULATED.3IONS-SCNC: 16 MMOL/L (ref 9–17)
BASOPHILS # BLD: 1 % (ref 0–2)
BASOPHILS ABSOLUTE: 0 K/UL (ref 0–0.2)
BUN BLDV-MCNC: 33 MG/DL (ref 6–20)
BUN/CREAT BLD: ABNORMAL (ref 9–20)
CALCIUM SERPL-MCNC: 9 MG/DL (ref 8.6–10.4)
CHLORIDE BLD-SCNC: 95 MMOL/L (ref 98–107)
CO2: 30 MMOL/L (ref 20–31)
CREAT SERPL-MCNC: 1.19 MG/DL (ref 0.5–0.9)
DIFFERENTIAL TYPE: ABNORMAL
EOSINOPHILS RELATIVE PERCENT: 3 % (ref 0–4)
GFR AFRICAN AMERICAN: 59 ML/MIN
GFR NON-AFRICAN AMERICAN: 49 ML/MIN
GFR SERPL CREATININE-BSD FRML MDRD: ABNORMAL ML/MIN/{1.73_M2}
GFR SERPL CREATININE-BSD FRML MDRD: ABNORMAL ML/MIN/{1.73_M2}
GLUCOSE BLD-MCNC: 118 MG/DL (ref 70–99)
HCT VFR BLD CALC: 27.6 % (ref 36–46)
HEMOGLOBIN: 9 G/DL (ref 12–16)
IMMATURE GRANULOCYTES: ABNORMAL %
LYMPHOCYTES # BLD: 31 % (ref 24–44)
MCH RBC QN AUTO: 28.5 PG (ref 26–34)
MCHC RBC AUTO-ENTMCNC: 32.6 G/DL (ref 31–37)
MCV RBC AUTO: 87.4 FL (ref 80–100)
MONOCYTES # BLD: 10 % (ref 1–7)
NRBC AUTOMATED: ABNORMAL PER 100 WBC
PDW BLD-RTO: 17 % (ref 11.5–14.9)
PLATELET # BLD: 177 K/UL (ref 150–450)
PLATELET ESTIMATE: ABNORMAL
PMV BLD AUTO: 8.6 FL (ref 6–12)
POTASSIUM SERPL-SCNC: 4.6 MMOL/L (ref 3.7–5.3)
RBC # BLD: 3.16 M/UL (ref 4–5.2)
RBC # BLD: ABNORMAL 10*6/UL
SEG NEUTROPHILS: 55 % (ref 36–66)
SEGMENTED NEUTROPHILS ABSOLUTE COUNT: 2.8 K/UL (ref 1.3–9.1)
SODIUM BLD-SCNC: 141 MMOL/L (ref 135–144)
WBC # BLD: 5.1 K/UL (ref 3.5–11)
WBC # BLD: ABNORMAL 10*3/UL

## 2018-05-18 PROCEDURE — 94003 VENT MGMT INPAT SUBQ DAY: CPT

## 2018-05-18 PROCEDURE — 85025 COMPLETE CBC W/AUTO DIFF WBC: CPT

## 2018-05-18 PROCEDURE — 6370000000 HC RX 637 (ALT 250 FOR IP): Performed by: STUDENT IN AN ORGANIZED HEALTH CARE EDUCATION/TRAINING PROGRAM

## 2018-05-18 PROCEDURE — 94640 AIRWAY INHALATION TREATMENT: CPT

## 2018-05-18 PROCEDURE — 36415 COLL VENOUS BLD VENIPUNCTURE: CPT

## 2018-05-18 PROCEDURE — 80048 BASIC METABOLIC PNL TOTAL CA: CPT

## 2018-05-18 PROCEDURE — 94762 N-INVAS EAR/PLS OXIMTRY CONT: CPT

## 2018-05-18 PROCEDURE — 99239 HOSP IP/OBS DSCHRG MGMT >30: CPT | Performed by: INTERNAL MEDICINE

## 2018-05-18 PROCEDURE — 97110 THERAPEUTIC EXERCISES: CPT

## 2018-05-18 RX ADMIN — CIPROFLOXACIN 500 MG: 500 TABLET, FILM COATED ORAL at 08:18

## 2018-05-18 RX ADMIN — ACETAMINOPHEN 1000 MG: 500 TABLET, FILM COATED ORAL at 06:54

## 2018-05-18 RX ADMIN — MULTIPLE VITAMINS W/ MINERALS TAB 1 TABLET: TAB at 08:19

## 2018-05-18 RX ADMIN — GUAIFENESIN 1200 MG: 600 TABLET, EXTENDED RELEASE ORAL at 08:19

## 2018-05-18 RX ADMIN — FERROUS SULFATE TAB 325 MG (65 MG ELEMENTAL FE) 325 MG: 325 (65 FE) TAB at 08:19

## 2018-05-18 RX ADMIN — FAMOTIDINE 20 MG: 20 TABLET ORAL at 20:07

## 2018-05-18 RX ADMIN — CIPROFLOXACIN 500 MG: 500 TABLET, FILM COATED ORAL at 20:07

## 2018-05-18 RX ADMIN — FERROUS SULFATE TAB 325 MG (65 MG ELEMENTAL FE) 325 MG: 325 (65 FE) TAB at 13:01

## 2018-05-18 RX ADMIN — FERROUS SULFATE TAB 325 MG (65 MG ELEMENTAL FE) 325 MG: 325 (65 FE) TAB at 17:11

## 2018-05-18 RX ADMIN — LEVOTHYROXINE SODIUM 25 MCG: 25 TABLET ORAL at 06:55

## 2018-05-18 RX ADMIN — IPRATROPIUM BROMIDE AND ALBUTEROL SULFATE 3 ML: .5; 3 SOLUTION RESPIRATORY (INHALATION) at 07:00

## 2018-05-18 RX ADMIN — LEVOTHYROXINE SODIUM 200 MCG: 100 TABLET ORAL at 06:54

## 2018-05-18 RX ADMIN — BUMETANIDE 2 MG: 1 TABLET ORAL at 17:11

## 2018-05-18 RX ADMIN — ACETAMINOPHEN 1000 MG: 500 TABLET, FILM COATED ORAL at 20:11

## 2018-05-18 RX ADMIN — ACETAMINOPHEN 1000 MG: 500 TABLET, FILM COATED ORAL at 13:00

## 2018-05-18 RX ADMIN — FAMOTIDINE 20 MG: 20 TABLET ORAL at 08:18

## 2018-05-18 RX ADMIN — GUAIFENESIN 1200 MG: 600 TABLET, EXTENDED RELEASE ORAL at 20:07

## 2018-05-18 RX ADMIN — BUMETANIDE 2 MG: 1 TABLET ORAL at 08:19

## 2018-05-18 ASSESSMENT — PULMONARY FUNCTION TESTS: PIF_VALUE: 21

## 2018-05-18 ASSESSMENT — PAIN SCALES - GENERAL
PAINLEVEL_OUTOF10: 7
PAINLEVEL_OUTOF10: 10
PAINLEVEL_OUTOF10: 7
PAINLEVEL_OUTOF10: 8
PAINLEVEL_OUTOF10: 6

## 2018-05-19 LAB
CULTURE: ABNORMAL
DIRECT EXAM: ABNORMAL
Lab: ABNORMAL
ORGANISM: ABNORMAL
ORGANISM: ABNORMAL
SPECIMEN DESCRIPTION: ABNORMAL
SPECIMEN DESCRIPTION: ABNORMAL
STATUS: ABNORMAL

## 2018-05-22 LAB
CULTURE: NORMAL
CULTURE: NORMAL
Lab: NORMAL
SPECIMEN DESCRIPTION: NORMAL
SPECIMEN DESCRIPTION: NORMAL
STATUS: NORMAL

## 2018-11-17 ENCOUNTER — HOSPITAL ENCOUNTER (INPATIENT)
Age: 48
LOS: 12 days | Discharge: HOME | DRG: 720 | End: 2018-11-29
Attending: EMERGENCY MEDICINE | Admitting: INTERNAL MEDICINE
Payer: MEDICAID

## 2018-11-17 ENCOUNTER — APPOINTMENT (OUTPATIENT)
Dept: GENERAL RADIOLOGY | Age: 48
DRG: 720 | End: 2018-11-17
Payer: MEDICAID

## 2018-11-17 DIAGNOSIS — R07.9 CHEST PAIN, UNSPECIFIED TYPE: Primary | ICD-10-CM

## 2018-11-17 DIAGNOSIS — N17.9 ACUTE RENAL FAILURE, UNSPECIFIED ACUTE RENAL FAILURE TYPE (HCC): ICD-10-CM

## 2018-11-17 DIAGNOSIS — T14.8XXA WOUND OF SKIN: ICD-10-CM

## 2018-11-17 DIAGNOSIS — I50.9 ACUTE ON CHRONIC CONGESTIVE HEART FAILURE, UNSPECIFIED HEART FAILURE TYPE (HCC): ICD-10-CM

## 2018-11-17 LAB
-: ABNORMAL
ABSOLUTE BANDS #: 4.04 K/UL (ref 0–1)
ABSOLUTE EOS #: 0 K/UL (ref 0–0.4)
ABSOLUTE IMMATURE GRANULOCYTE: ABNORMAL K/UL (ref 0–0.3)
ABSOLUTE LYMPH #: 1.2 K/UL (ref 1–4.8)
ABSOLUTE MONO #: 0.52 K/UL (ref 0.1–1.3)
ALBUMIN SERPL-MCNC: 3 G/DL (ref 3.5–5.2)
ALBUMIN/GLOBULIN RATIO: ABNORMAL (ref 1–2.5)
ALP BLD-CCNC: 116 U/L (ref 35–104)
ALT SERPL-CCNC: 36 U/L (ref 5–33)
AMORPHOUS: ABNORMAL
ANION GAP SERPL CALCULATED.3IONS-SCNC: 15 MMOL/L (ref 9–17)
AST SERPL-CCNC: 24 U/L
BACTERIA: ABNORMAL
BANDS: 47 % (ref 0–10)
BASOPHILS # BLD: 0 % (ref 0–2)
BASOPHILS ABSOLUTE: 0 K/UL (ref 0–0.2)
BILIRUB SERPL-MCNC: 1.01 MG/DL (ref 0.3–1.2)
BILIRUBIN URINE: ABNORMAL
BUN BLDV-MCNC: 52 MG/DL (ref 6–20)
BUN/CREAT BLD: ABNORMAL (ref 9–20)
CALCIUM SERPL-MCNC: 9.4 MG/DL (ref 8.6–10.4)
CASTS UA: ABNORMAL /LPF
CHLORIDE BLD-SCNC: 93 MMOL/L (ref 98–107)
CO2: 26 MMOL/L (ref 20–31)
COLOR: ABNORMAL
COMMENT UA: ABNORMAL
CREAT SERPL-MCNC: 2.9 MG/DL (ref 0.5–0.9)
CRYSTALS, UA: ABNORMAL /HPF
DIFFERENTIAL TYPE: ABNORMAL
EKG ATRIAL RATE: 111 BPM
EKG P AXIS: 46 DEGREES
EKG P-R INTERVAL: 168 MS
EKG Q-T INTERVAL: 328 MS
EKG QRS DURATION: 104 MS
EKG QTC CALCULATION (BAZETT): 446 MS
EKG R AXIS: 22 DEGREES
EKG T AXIS: 30 DEGREES
EKG VENTRICULAR RATE: 111 BPM
EOSINOPHILS RELATIVE PERCENT: 0 % (ref 0–4)
EPITHELIAL CELLS UA: ABNORMAL /HPF
GFR AFRICAN AMERICAN: 21 ML/MIN
GFR NON-AFRICAN AMERICAN: 17 ML/MIN
GFR SERPL CREATININE-BSD FRML MDRD: ABNORMAL ML/MIN/{1.73_M2}
GFR SERPL CREATININE-BSD FRML MDRD: ABNORMAL ML/MIN/{1.73_M2}
GLUCOSE BLD-MCNC: 120 MG/DL (ref 70–99)
GLUCOSE URINE: NEGATIVE
HCT VFR BLD CALC: 28.5 % (ref 36–46)
HEMOGLOBIN: 9.3 G/DL (ref 12–16)
IMMATURE GRANULOCYTES: ABNORMAL %
KETONES, URINE: ABNORMAL
LACTIC ACID: 2 MMOL/L (ref 0.5–2.2)
LEUKOCYTE ESTERASE, URINE: ABNORMAL
LYMPHOCYTES # BLD: 14 % (ref 24–44)
MCH RBC QN AUTO: 29.3 PG (ref 26–34)
MCHC RBC AUTO-ENTMCNC: 32.8 G/DL (ref 31–37)
MCV RBC AUTO: 89.2 FL (ref 80–100)
METAMYELOCYTES ABSOLUTE COUNT: 0.26 K/UL
METAMYELOCYTES: 3 %
MONOCYTES # BLD: 6 % (ref 1–7)
MORPHOLOGY: ABNORMAL
MUCUS: ABNORMAL
MYELOCYTES ABSOLUTE COUNT: 0.09 K/UL
MYELOCYTES: 1 %
NITRITE, URINE: NEGATIVE
NRBC AUTOMATED: ABNORMAL PER 100 WBC
OTHER OBSERVATIONS UA: ABNORMAL
PARTIAL THROMBOPLASTIN TIME: 37.3 SEC (ref 23–31)
PDW BLD-RTO: 16.6 % (ref 11.5–14.9)
PH UA: 5 (ref 5–8)
PLATELET # BLD: 112 K/UL (ref 150–450)
PLATELET ESTIMATE: ABNORMAL
PMV BLD AUTO: 9.1 FL (ref 6–12)
POTASSIUM SERPL-SCNC: 4.4 MMOL/L (ref 3.7–5.3)
PROTEIN UA: NEGATIVE
RBC # BLD: 3.19 M/UL (ref 4–5.2)
RBC # BLD: ABNORMAL 10*6/UL
RBC UA: ABNORMAL /HPF
RENAL EPITHELIAL, UA: ABNORMAL /HPF
SEG NEUTROPHILS: 29 % (ref 36–66)
SEGMENTED NEUTROPHILS ABSOLUTE COUNT: 2.49 K/UL (ref 1.3–9.1)
SODIUM BLD-SCNC: 134 MMOL/L (ref 135–144)
SPECIFIC GRAVITY UA: 1.02 (ref 1–1.03)
TOTAL PROTEIN: 8.2 G/DL (ref 6.4–8.3)
TRICHOMONAS: ABNORMAL
TROPONIN INTERP: NORMAL
TROPONIN INTERP: NORMAL
TROPONIN T: <0.03 NG/ML
TROPONIN T: <0.03 NG/ML
TSH SERPL DL<=0.05 MIU/L-ACNC: 3.55 MIU/L (ref 0.3–5)
TURBIDITY: ABNORMAL
URINE HGB: ABNORMAL
UROBILINOGEN, URINE: NORMAL
WBC # BLD: 8.6 K/UL (ref 3.5–11)
WBC # BLD: ABNORMAL 10*3/UL
WBC UA: ABNORMAL /HPF
YEAST: ABNORMAL

## 2018-11-17 PROCEDURE — 51702 INSERT TEMP BLADDER CATH: CPT

## 2018-11-17 PROCEDURE — 87086 URINE CULTURE/COLONY COUNT: CPT

## 2018-11-17 PROCEDURE — 85025 COMPLETE CBC W/AUTO DIFF WBC: CPT

## 2018-11-17 PROCEDURE — 84443 ASSAY THYROID STIM HORMONE: CPT

## 2018-11-17 PROCEDURE — 6360000002 HC RX W HCPCS: Performed by: EMERGENCY MEDICINE

## 2018-11-17 PROCEDURE — 71045 X-RAY EXAM CHEST 1 VIEW: CPT

## 2018-11-17 PROCEDURE — 94640 AIRWAY INHALATION TREATMENT: CPT

## 2018-11-17 PROCEDURE — 5A1955Z RESPIRATORY VENTILATION, GREATER THAN 96 CONSECUTIVE HOURS: ICD-10-PCS | Performed by: INTERNAL MEDICINE

## 2018-11-17 PROCEDURE — 2700000000 HC OXYGEN THERAPY PER DAY

## 2018-11-17 PROCEDURE — 84484 ASSAY OF TROPONIN QUANT: CPT

## 2018-11-17 PROCEDURE — 83605 ASSAY OF LACTIC ACID: CPT

## 2018-11-17 PROCEDURE — 36415 COLL VENOUS BLD VENIPUNCTURE: CPT

## 2018-11-17 PROCEDURE — 2060000000 HC ICU INTERMEDIATE R&B

## 2018-11-17 PROCEDURE — 96374 THER/PROPH/DIAG INJ IV PUSH: CPT

## 2018-11-17 PROCEDURE — 99285 EMERGENCY DEPT VISIT HI MDM: CPT

## 2018-11-17 PROCEDURE — 80053 COMPREHEN METABOLIC PANEL: CPT

## 2018-11-17 PROCEDURE — 81001 URINALYSIS AUTO W/SCOPE: CPT

## 2018-11-17 PROCEDURE — 85730 THROMBOPLASTIN TIME PARTIAL: CPT

## 2018-11-17 PROCEDURE — 94664 DEMO&/EVAL PT USE INHALER: CPT

## 2018-11-17 PROCEDURE — 6370000000 HC RX 637 (ALT 250 FOR IP): Performed by: EMERGENCY MEDICINE

## 2018-11-17 PROCEDURE — 94762 N-INVAS EAR/PLS OXIMTRY CONT: CPT

## 2018-11-17 PROCEDURE — 05HY33Z INSERTION OF INFUSION DEVICE INTO UPPER VEIN, PERCUTANEOUS APPROACH: ICD-10-PCS | Performed by: RADIOLOGY

## 2018-11-17 RX ORDER — HEPARIN SODIUM 1000 [USP'U]/ML
10000 INJECTION, SOLUTION INTRAVENOUS; SUBCUTANEOUS PRN
Status: DISCONTINUED | OUTPATIENT
Start: 2018-11-17 | End: 2018-11-17 | Stop reason: ALTCHOICE

## 2018-11-17 RX ORDER — HEPARIN SODIUM 10000 [USP'U]/100ML
18 INJECTION, SOLUTION INTRAVENOUS CONTINUOUS
Status: DISCONTINUED | OUTPATIENT
Start: 2018-11-17 | End: 2018-11-17 | Stop reason: ALTCHOICE

## 2018-11-17 RX ORDER — LEVOFLOXACIN 500 MG/1
500 TABLET, FILM COATED ORAL DAILY
Status: ON HOLD | COMMUNITY
End: 2018-11-17 | Stop reason: ALTCHOICE

## 2018-11-17 RX ORDER — SODIUM CHLORIDE 0.9 % (FLUSH) 0.9 %
10 SYRINGE (ML) INJECTION EVERY 12 HOURS SCHEDULED
Status: DISCONTINUED | OUTPATIENT
Start: 2018-11-17 | End: 2018-11-30 | Stop reason: HOSPADM

## 2018-11-17 RX ORDER — DOXYCYCLINE HYCLATE 100 MG
100 TABLET ORAL 2 TIMES DAILY
Status: ON HOLD | COMMUNITY
End: 2018-11-29 | Stop reason: HOSPADM

## 2018-11-17 RX ORDER — FUROSEMIDE 40 MG/1
40 TABLET ORAL DAILY
Status: ON HOLD | COMMUNITY
End: 2018-11-29 | Stop reason: HOSPADM

## 2018-11-17 RX ORDER — HEPARIN SODIUM 1000 [USP'U]/ML
10000 INJECTION, SOLUTION INTRAVENOUS; SUBCUTANEOUS ONCE
Status: COMPLETED | OUTPATIENT
Start: 2018-11-17 | End: 2018-11-17

## 2018-11-17 RX ORDER — ACETAMINOPHEN 325 MG/1
650 TABLET ORAL EVERY 4 HOURS PRN
Status: DISCONTINUED | OUTPATIENT
Start: 2018-11-17 | End: 2018-11-30 | Stop reason: HOSPADM

## 2018-11-17 RX ORDER — IBUPROFEN 800 MG/1
800 TABLET ORAL EVERY 8 HOURS PRN
Status: ON HOLD | COMMUNITY
End: 2018-11-29 | Stop reason: HOSPADM

## 2018-11-17 RX ORDER — BENZONATATE 100 MG/1
100 CAPSULE ORAL 2 TIMES DAILY PRN
COMMUNITY

## 2018-11-17 RX ORDER — HEPARIN SODIUM 1000 [USP'U]/ML
10000 INJECTION, SOLUTION INTRAVENOUS; SUBCUTANEOUS PRN
Status: DISCONTINUED | OUTPATIENT
Start: 2018-11-17 | End: 2018-11-19

## 2018-11-17 RX ORDER — FUROSEMIDE 10 MG/ML
40 INJECTION INTRAMUSCULAR; INTRAVENOUS ONCE
Status: COMPLETED | OUTPATIENT
Start: 2018-11-17 | End: 2018-11-17

## 2018-11-17 RX ORDER — HEPARIN SODIUM 10000 [USP'U]/100ML
7.6 INJECTION, SOLUTION INTRAVENOUS CONTINUOUS
Status: DISCONTINUED | OUTPATIENT
Start: 2018-11-17 | End: 2018-11-19

## 2018-11-17 RX ORDER — SODIUM CHLORIDE 0.9 % (FLUSH) 0.9 %
10 SYRINGE (ML) INJECTION PRN
Status: DISCONTINUED | OUTPATIENT
Start: 2018-11-17 | End: 2018-11-30 | Stop reason: HOSPADM

## 2018-11-17 RX ORDER — IPRATROPIUM BROMIDE AND ALBUTEROL SULFATE 2.5; .5 MG/3ML; MG/3ML
1 SOLUTION RESPIRATORY (INHALATION) PRN
Status: DISCONTINUED | OUTPATIENT
Start: 2018-11-17 | End: 2018-11-30 | Stop reason: HOSPADM

## 2018-11-17 RX ORDER — LEVOFLOXACIN 5 MG/ML
500 INJECTION, SOLUTION INTRAVENOUS EVERY 24 HOURS
Status: DISCONTINUED | OUTPATIENT
Start: 2018-11-17 | End: 2018-11-19

## 2018-11-17 RX ORDER — HEPARIN SODIUM 1000 [USP'U]/ML
5000 INJECTION, SOLUTION INTRAVENOUS; SUBCUTANEOUS PRN
Status: DISCONTINUED | OUTPATIENT
Start: 2018-11-17 | End: 2018-11-19

## 2018-11-17 RX ORDER — HEPARIN SODIUM 1000 [USP'U]/ML
80 INJECTION, SOLUTION INTRAVENOUS; SUBCUTANEOUS ONCE
Status: DISCONTINUED | OUTPATIENT
Start: 2018-11-17 | End: 2018-11-17 | Stop reason: ALTCHOICE

## 2018-11-17 RX ADMIN — LEVOFLOXACIN 500 MG: 5 INJECTION, SOLUTION INTRAVENOUS at 17:38

## 2018-11-17 RX ADMIN — FUROSEMIDE 40 MG: 10 INJECTION, SOLUTION INTRAMUSCULAR; INTRAVENOUS at 17:38

## 2018-11-17 RX ADMIN — HEPARIN SODIUM AND DEXTROSE 7.6 UNITS/KG/HR: 10000; 5 INJECTION INTRAVENOUS at 19:00

## 2018-11-17 RX ADMIN — HEPARIN SODIUM 10000 UNITS: 1000 INJECTION INTRAVENOUS; SUBCUTANEOUS at 18:59

## 2018-11-17 RX ADMIN — IPRATROPIUM BROMIDE AND ALBUTEROL SULFATE 1 AMPULE: .5; 3 SOLUTION RESPIRATORY (INHALATION) at 15:56

## 2018-11-17 ASSESSMENT — PAIN SCALES - GENERAL: PAINLEVEL_OUTOF10: 10

## 2018-11-17 ASSESSMENT — PAIN DESCRIPTION - DESCRIPTORS: DESCRIPTORS: PRESSURE;ACHING

## 2018-11-17 ASSESSMENT — ENCOUNTER SYMPTOMS
SHORTNESS OF BREATH: 1
NAUSEA: 0
COUGH: 1
VOMITING: 0
DIARRHEA: 0

## 2018-11-17 ASSESSMENT — PAIN DESCRIPTION - LOCATION: LOCATION: LEG;ABDOMEN;CHEST

## 2018-11-17 ASSESSMENT — PAIN DESCRIPTION - PAIN TYPE: TYPE: CHRONIC PAIN

## 2018-11-17 NOTE — ED NOTES
Call to give report to floor, unable to give report, doesn't know who nurse is.      Joseph Queen RN  11/17/18 8205

## 2018-11-18 LAB
CULTURE: NORMAL
D-DIMER QUANTITATIVE: 0.52 MG/L FEU
Lab: NORMAL
PARTIAL THROMBOPLASTIN TIME: 38.7 SEC (ref 23–31)
PARTIAL THROMBOPLASTIN TIME: 44 SEC (ref 23–31)
PARTIAL THROMBOPLASTIN TIME: 47.8 SEC (ref 23–31)
PARTIAL THROMBOPLASTIN TIME: 55.5 SEC (ref 23–31)
SPECIMEN DESCRIPTION: NORMAL
STATUS: NORMAL

## 2018-11-18 PROCEDURE — 94002 VENT MGMT INPAT INIT DAY: CPT

## 2018-11-18 PROCEDURE — 6370000000 HC RX 637 (ALT 250 FOR IP): Performed by: EMERGENCY MEDICINE

## 2018-11-18 PROCEDURE — 6360000002 HC RX W HCPCS: Performed by: EMERGENCY MEDICINE

## 2018-11-18 PROCEDURE — 85730 THROMBOPLASTIN TIME PARTIAL: CPT

## 2018-11-18 PROCEDURE — 2580000003 HC RX 258: Performed by: INTERNAL MEDICINE

## 2018-11-18 PROCEDURE — 2060000000 HC ICU INTERMEDIATE R&B

## 2018-11-18 PROCEDURE — 6370000000 HC RX 637 (ALT 250 FOR IP): Performed by: INTERNAL MEDICINE

## 2018-11-18 PROCEDURE — 31502 CHANGE OF WINDPIPE AIRWAY: CPT

## 2018-11-18 PROCEDURE — 94640 AIRWAY INHALATION TREATMENT: CPT

## 2018-11-18 PROCEDURE — 85379 FIBRIN DEGRADATION QUANT: CPT

## 2018-11-18 PROCEDURE — 94762 N-INVAS EAR/PLS OXIMTRY CONT: CPT

## 2018-11-18 PROCEDURE — 2700000000 HC OXYGEN THERAPY PER DAY

## 2018-11-18 PROCEDURE — 94003 VENT MGMT INPAT SUBQ DAY: CPT

## 2018-11-18 PROCEDURE — 36415 COLL VENOUS BLD VENIPUNCTURE: CPT

## 2018-11-18 PROCEDURE — 99223 1ST HOSP IP/OBS HIGH 75: CPT | Performed by: INTERNAL MEDICINE

## 2018-11-18 RX ORDER — 0.9 % SODIUM CHLORIDE 0.9 %
1000 INTRAVENOUS SOLUTION INTRAVENOUS ONCE
Status: COMPLETED | OUTPATIENT
Start: 2018-11-18 | End: 2018-11-18

## 2018-11-18 RX ORDER — IPRATROPIUM BROMIDE AND ALBUTEROL SULFATE 2.5; .5 MG/3ML; MG/3ML
1 SOLUTION RESPIRATORY (INHALATION) EVERY 4 HOURS PRN
Status: DISCONTINUED | OUTPATIENT
Start: 2018-11-18 | End: 2018-11-30 | Stop reason: HOSPADM

## 2018-11-18 RX ORDER — BENZONATATE 100 MG/1
100 CAPSULE ORAL 2 TIMES DAILY PRN
Status: DISCONTINUED | OUTPATIENT
Start: 2018-11-18 | End: 2018-11-30 | Stop reason: HOSPADM

## 2018-11-18 RX ORDER — LEVOTHYROXINE SODIUM 0.12 MG/1
250 TABLET ORAL DAILY
Status: DISCONTINUED | OUTPATIENT
Start: 2018-11-18 | End: 2018-11-30 | Stop reason: HOSPADM

## 2018-11-18 RX ORDER — M-VIT,TX,IRON,MINS/CALC/FOLIC 27MG-0.4MG
1 TABLET ORAL
Status: DISCONTINUED | OUTPATIENT
Start: 2018-11-18 | End: 2018-11-30 | Stop reason: HOSPADM

## 2018-11-18 RX ORDER — FERROUS SULFATE 325(65) MG
325 TABLET ORAL
Status: DISCONTINUED | OUTPATIENT
Start: 2018-11-18 | End: 2018-11-30 | Stop reason: HOSPADM

## 2018-11-18 RX ADMIN — HEPARIN SODIUM AND DEXTROSE 13.7 UNITS/KG/HR: 10000; 5 INJECTION INTRAVENOUS at 15:57

## 2018-11-18 RX ADMIN — HEPARIN SODIUM 5000 UNITS: 1000 INJECTION INTRAVENOUS; SUBCUTANEOUS at 02:26

## 2018-11-18 RX ADMIN — HEPARIN SODIUM AND DEXTROSE 7.78 UNITS/KG/HR: 10000; 5 INJECTION INTRAVENOUS at 02:21

## 2018-11-18 RX ADMIN — ACETAMINOPHEN 650 MG: 325 TABLET, FILM COATED ORAL at 08:49

## 2018-11-18 RX ADMIN — LEVOTHYROXINE SODIUM 250 MCG: 125 TABLET ORAL at 08:47

## 2018-11-18 RX ADMIN — FERROUS SULFATE TAB 325 MG (65 MG ELEMENTAL FE) 325 MG: 325 (65 FE) TAB at 17:27

## 2018-11-18 RX ADMIN — IPRATROPIUM BROMIDE AND ALBUTEROL SULFATE 1 AMPULE: .5; 3 SOLUTION RESPIRATORY (INHALATION) at 08:29

## 2018-11-18 RX ADMIN — HEPARIN SODIUM 5000 UNITS: 1000 INJECTION INTRAVENOUS; SUBCUTANEOUS at 09:23

## 2018-11-18 RX ADMIN — SODIUM CHLORIDE 1000 ML: 9 INJECTION, SOLUTION INTRAVENOUS at 01:53

## 2018-11-18 RX ADMIN — HEPARIN SODIUM 5000 UNITS: 1000 INJECTION INTRAVENOUS; SUBCUTANEOUS at 15:56

## 2018-11-18 RX ADMIN — MULTIPLE VITAMINS W/ MINERALS TAB 1 TABLET: TAB at 08:47

## 2018-11-18 RX ADMIN — HEPARIN SODIUM AND DEXTROSE 11.7 UNITS/KG/HR: 10000; 5 INJECTION INTRAVENOUS at 11:38

## 2018-11-18 RX ADMIN — LEVOFLOXACIN 500 MG: 5 INJECTION, SOLUTION INTRAVENOUS at 17:27

## 2018-11-18 RX ADMIN — FERROUS SULFATE TAB 325 MG (65 MG ELEMENTAL FE) 325 MG: 325 (65 FE) TAB at 08:47

## 2018-11-18 ASSESSMENT — PAIN SCALES - GENERAL
PAINLEVEL_OUTOF10: 8
PAINLEVEL_OUTOF10: 5

## 2018-11-18 ASSESSMENT — PAIN DESCRIPTION - LOCATION
LOCATION: HEAD
LOCATION: HEAD

## 2018-11-18 ASSESSMENT — PULMONARY FUNCTION TESTS
PIF_VALUE: 15
PIF_VALUE: 14
PIF_VALUE: 14

## 2018-11-18 ASSESSMENT — PAIN DESCRIPTION - PAIN TYPE
TYPE: ACUTE PAIN
TYPE: ACUTE PAIN

## 2018-11-18 NOTE — H&P
<0.50 mg/L U   APTT    Collection Time: 11/18/18  3:33 PM   Result Value Ref Range    PTT 55.5 (H) 23.0 - 31.0 sec       Recent Labs      11/18/18   1533   11/17/18   1540   HGB   --    --   9.3*   HCT   --    --   28.5*   WBC   --    --   8.6   MCV   --    --   89.2   NA   --    --   134*   K   --    --   4.4   CL   --    --   93*   CO2   --    --   26   BUN   --    --   52*   CREATININE   --    --   2.90*   GLUCOSE   --    --   120*   APTT  55.5*   < >   --    AST   --    --   24   ALT   --    --   36*   LABALBU   --    --   3.0*    < > = values in this interval not displayed. Hematology:  Recent Labs      11/17/18   1540  11/18/18   1245   WBC  8.6   --    RBC  3.19*   --    HGB  9.3*   --    HCT  28.5*   --    MCV  89.2   --    MCH  29.3   --    MCHC  32.8   --    RDW  16.6*   --    PLT  112*   --    MPV  9.1   --    DDIMER   --   0.52*     Chemistry:  Recent Labs      11/17/18   1540  11/17/18   1815   NA  134*   --    K  4.4   --    CL  93*   --    CO2  26   --    GLUCOSE  120*   --    BUN  52*   --    CREATININE  2.90*   --    ANIONGAP  15   --    LABGLOM  17*   --    GFRAA  21*   --    CALCIUM  9.4   --    TROPONINT  <0.03  <0.03     Recent Labs      11/17/18   1540   PROT  8.2   LABALBU  3.0*   TSH  3.55   AST  24   ALT  36*   ALKPHOS  116*   BILITOT  1.01       Imaging/Diagnostics:       Xr Chest Portable    Result Date: 11/17/2018  EXAMINATION: SINGLE XRAY VIEW OF THE CHEST 11/17/2018 2:42 pm COMPARISON: 05/15/2018, 612 hours HISTORY: ORDERING SYSTEM PROVIDED HISTORY: Chest Pain TECHNOLOGIST PROVIDED HISTORY: Chest Pain Ordering Physician Provided Reason for Exam: Chest pain. Pt weighs 612 LBS Acuity: Unknown Type of Exam: Unknown 57-year-old female with chest pain FINDINGS: AP portable view of the chest. Cardiac monitor leads overlie the chest. Right internal jugular approach Port-A-Cath distal tip overlying the lower SVC.  Rectangular radiodensities projecting over the left upper quadrant likely

## 2018-11-18 NOTE — PROGRESS NOTES
Admitted to room 2117 from ER per bed. Oriented to room and call light. Vitals and assessment completed. No distress noted. [facility]  DVT Prophylaxis and Vaccine Status  Work List  Mandatory for all patients      Patient must be on both Chemical prophylaxis and Mechanical prophylaxis.  If chemical/mechanical prophylaxis is not ordered, the physician must document a reason for not using prophylaxis     Chemical Prophylaxis  Is patient on chemical prophylaxis: Yes  If no chemical prophylaxis Is a order in for No Chemical VTE prophylaxisNo  If no was the physician notified not applicable      Mechanical Prophylaxis  Is patient on mechanical prophylaxis, intermittent pneumatic compression device: Yes  If no was the physician notified not applicable        Pneumonia Vaccine  Vaccine indicated:  Not indicated  If indicated was the vaccine given: not applicable    Influenza Vaccine (applicable from October through March):  Vaccine indicated: Vaccination was ordered  If indicated was the vaccine given: not applicable    Patient Education  Education completed on DVT prophylaxis: yes

## 2018-11-18 NOTE — PROGRESS NOTES
Found pt on vent at previously documented settings. Switched to [employer] per pt request. Pt disconnected vent, deflated cuff, and put her passy jaylen valve on her self in my presence. Remains on continuous pulse oximetry, vent on standby at bedside.  40% 8LPM.

## 2018-11-18 NOTE — FLOWSHEET NOTE
11/18/18 1439   Encounter Summary   Services provided to: Patient   Referral/Consult From: Rounding   Continue Visiting (11/18/18)   Complexity of Encounter Low   Length of Encounter 15 minutes   Spiritual Assessment Completed Yes   Spiritual/Synagogue   Type Spiritual support   Assessment Calm; Approachable   Intervention Active listening;Prayer;Provided reading materials/devotional materials;Sustaining presence/ Ministry of presence   Outcome Receptive;Engaged in conversation;Expressed gratitude

## 2018-11-19 ENCOUNTER — APPOINTMENT (OUTPATIENT)
Dept: INTERVENTIONAL RADIOLOGY/VASCULAR | Age: 48
DRG: 720 | End: 2018-11-19
Payer: MEDICAID

## 2018-11-19 LAB
ANION GAP SERPL CALCULATED.3IONS-SCNC: 11 MMOL/L (ref 9–17)
BUN BLDV-MCNC: 57 MG/DL (ref 6–20)
BUN/CREAT BLD: ABNORMAL (ref 9–20)
CALCIUM SERPL-MCNC: 9.6 MG/DL (ref 8.6–10.4)
CHLORIDE BLD-SCNC: 98 MMOL/L (ref 98–107)
CO2: 29 MMOL/L (ref 20–31)
CREAT SERPL-MCNC: 1.8 MG/DL (ref 0.5–0.9)
GFR AFRICAN AMERICAN: 37 ML/MIN
GFR NON-AFRICAN AMERICAN: 30 ML/MIN
GFR SERPL CREATININE-BSD FRML MDRD: ABNORMAL ML/MIN/{1.73_M2}
GFR SERPL CREATININE-BSD FRML MDRD: ABNORMAL ML/MIN/{1.73_M2}
GLUCOSE BLD-MCNC: 134 MG/DL (ref 70–99)
HCT VFR BLD CALC: 28.8 % (ref 36–46)
HEMOGLOBIN: 8.9 G/DL (ref 12–16)
MCH RBC QN AUTO: 28.6 PG (ref 26–34)
MCHC RBC AUTO-ENTMCNC: 30.8 G/DL (ref 31–37)
MCV RBC AUTO: 93 FL (ref 80–100)
NRBC AUTOMATED: ABNORMAL PER 100 WBC
PDW BLD-RTO: 17.1 % (ref 11.5–14.9)
PLATELET # BLD: 136 K/UL (ref 150–450)
PLATELET # BLD: 138 K/UL (ref 150–450)
PMV BLD AUTO: 9.3 FL (ref 6–12)
POTASSIUM SERPL-SCNC: 3.9 MMOL/L (ref 3.7–5.3)
RBC # BLD: 3.1 M/UL (ref 4–5.2)
SODIUM BLD-SCNC: 138 MMOL/L (ref 135–144)
WBC # BLD: 15 K/UL (ref 3.5–11)

## 2018-11-19 PROCEDURE — 99233 SBSQ HOSP IP/OBS HIGH 50: CPT | Performed by: INTERNAL MEDICINE

## 2018-11-19 PROCEDURE — 80048 BASIC METABOLIC PNL TOTAL CA: CPT

## 2018-11-19 PROCEDURE — 94640 AIRWAY INHALATION TREATMENT: CPT

## 2018-11-19 PROCEDURE — 6370000000 HC RX 637 (ALT 250 FOR IP): Performed by: INTERNAL MEDICINE

## 2018-11-19 PROCEDURE — 85049 AUTOMATED PLATELET COUNT: CPT

## 2018-11-19 PROCEDURE — 2700000000 HC OXYGEN THERAPY PER DAY

## 2018-11-19 PROCEDURE — 31720 CLEARANCE OF AIRWAYS: CPT

## 2018-11-19 PROCEDURE — 94664 DEMO&/EVAL PT USE INHALER: CPT

## 2018-11-19 PROCEDURE — 94003 VENT MGMT INPAT SUBQ DAY: CPT

## 2018-11-19 PROCEDURE — 85027 COMPLETE CBC AUTOMATED: CPT

## 2018-11-19 PROCEDURE — 94762 N-INVAS EAR/PLS OXIMTRY CONT: CPT

## 2018-11-19 PROCEDURE — 93005 ELECTROCARDIOGRAM TRACING: CPT

## 2018-11-19 PROCEDURE — 2060000000 HC ICU INTERMEDIATE R&B

## 2018-11-19 PROCEDURE — 36415 COLL VENOUS BLD VENIPUNCTURE: CPT

## 2018-11-19 RX ORDER — LEVOFLOXACIN 500 MG/1
500 TABLET, FILM COATED ORAL DAILY
Status: DISCONTINUED | OUTPATIENT
Start: 2018-11-19 | End: 2018-11-21

## 2018-11-19 RX ADMIN — FERROUS SULFATE TAB 325 MG (65 MG ELEMENTAL FE) 325 MG: 325 (65 FE) TAB at 11:30

## 2018-11-19 RX ADMIN — LEVOFLOXACIN 500 MG: 500 TABLET, FILM COATED ORAL at 16:55

## 2018-11-19 RX ADMIN — IPRATROPIUM BROMIDE AND ALBUTEROL SULFATE 3 ML: .5; 3 SOLUTION RESPIRATORY (INHALATION) at 12:15

## 2018-11-19 RX ADMIN — FERROUS SULFATE TAB 325 MG (65 MG ELEMENTAL FE) 325 MG: 325 (65 FE) TAB at 08:37

## 2018-11-19 RX ADMIN — MULTIPLE VITAMINS W/ MINERALS TAB 1 TABLET: TAB at 08:38

## 2018-11-19 RX ADMIN — LEVOTHYROXINE SODIUM 250 MCG: 125 TABLET ORAL at 08:38

## 2018-11-19 RX ADMIN — FERROUS SULFATE TAB 325 MG (65 MG ELEMENTAL FE) 325 MG: 325 (65 FE) TAB at 16:55

## 2018-11-19 ASSESSMENT — PULMONARY FUNCTION TESTS: PIF_VALUE: 14

## 2018-11-19 NOTE — PROGRESS NOTES
History and Physical Service  [facility]    Progress note              Date:   11/19/2018  Patient name:  Louis [address]  MRN:   [id number]  Account:  [de-identified]  YOB: 1970  PCP:    Taryn Caldwell DO  Code Status:    Full Code    Chief Complaint:     Dyspnea and cough    History Obtained From:     patient    History of Present Illness: The patient is a 57 y.o. Non-/non  female who presents With dyspnea and cough for last 2-3 days patient is severe morbid obesity made over 700 pounds loss in her pounds in last 1 year history of right breast cancer with mastectomy admitted with the dyspnea some pleuritic pain no hemoptysis no palpitation no history of DVT or PE   It is very restricted patient is bedbound because of morbid obesity         Past Medical History:     Past Medical History:   Diagnosis Date    Anemia     Disease of blood and blood forming organ     Hypothyroidism     Lymphedema     Chronic    [facility] failure ([facility]     Tracheostomy present ([facility]         Past Surgical History:     Past Surgical History:   Procedure Laterality Date    TRACHEOSTOMY          Medications Prior to Admission:     Prior to Admission medications    Medication Sig Start Date End Date Taking?  Authorizing Provider   furosemide (LASIX) 40 MG tablet Take 40 mg by mouth daily   Yes Historical Provider, MD   ibuprofen (ADVIL;MOTRIN) 800 MG tablet Take 800 mg by mouth every 8 hours as needed for Pain   Yes Historical Provider, MD   doxycycline hyclate (VIBRA-TABS) 100 MG tablet Take 100 mg by mouth 2 times daily For 10 days   Yes Historical Provider, MD   bumetanide (BUMEX) 2 MG tablet Take 1 tablet by mouth 2 times daily 5/17/18  Yes Araseli Torrez MD   acetaminophen (TYLENOL) 325 MG tablet Take 650 mg by mouth every 6 hours as needed for Pain   Yes Historical Provider, MD   levothyroxine (SYNTHROID) 200 MCG tablet Take 250 mcg by mouth daily    Yes

## 2018-11-19 NOTE — PROGRESS NOTES
Nutrition Assessment    Type and Reason for Visit: Positive Nutrition Screen (pressure ulcer non-healing wound)    Nutrition Recommendations: Continue diet as ordered. Nutrition Assessment: Patient nutritionally compromised as evidenced by morbid obesity due to excessive energy intake and high-intake of calorically-dense foods. At risk for ongoing compromise due to diagnosis of cancer, recent congestive heart failure, and non-compliance with therapeutic diet. Patient requested to discontinue therapeutic diet. Malnutrition Assessment:  · Malnutrition Status: At risk for malnutrition  · Context: Acute illness or injury  · Findings of the 6 clinical characteristics of malnutrition (Minimum of 2 out of 6 clinical characteristics is required to make the diagnosis of moderate or severe Protein Calorie Malnutrition based on AND/ASPEN Guidelines):  1. Weight Loss-No significant weight loss,    2. Fat Loss-No significant subcutaneous fat loss,    3. Muscle Loss-No significant muscle mass loss,    4. Fluid Accumulation-Severe fluid accumulation,    5.  Strength-Not measured    Nutrition Risk Level: Moderate    Nutrient Needs:  · Estimated Daily Total Kcal: 7780-1722  · Estimated Daily Protein (g): 129-155    Nutrition Diagnosis:   · Problem: Inadequate oral intake  · Etiology: related to Cardiac dysfunction     Signs and symptoms:  as evidenced by Intake 0-25%, Localized or generalized fluid accumulation    Objective Information:  · Nutrition-Focused Physical Findings: Edema: +3 non-pitting RUE & LUE, LLE; +4 non-pitting RLE.  Multiple weeping and bleeding wounds  · Wound Type: Multiple (weeping and bleeding)  · Current Nutrition Therapies:  · Oral Diet Orders: Cardiac   · Oral Diet intake: 1-25%  · Oral Nutrition Supplement (ONS) Orders: None  · Anthropometric Measures:  · Ht: 5' 5\" (165.1 cm)   · Current Body Wt: 626 lb 6.4 oz (284.1 kg)  · Admission Body Wt: 626 lb 6.4 oz (284.1 kg)  · Usual Body Wt: 628 lb (284.9 kg)  · Ideal Body Wt: 125 lb (56.7 kg), % Ideal Body 501%  · Adjusted Body Wt: 285 lb (129.3 kg), body weight adjusted for    · BMI Classification: BMI > or equal to 40.0 Obese Class III    Nutrition Interventions:   Continue current diet  Continued Inpatient Monitoring    Nutrition Evaluation:   · Evaluation: Goals set   · Goals: PO intake % of meals to meet estimated nutrition needs.      · Monitoring: Meal Intake, Diet Tolerance, Wound Healing, Weight, Pertinent Labs, Monitor Hemodynamic Status      Telma Evans RD, LD  Office phone [phone]

## 2018-11-19 NOTE — PLAN OF CARE
Problem: Nutrition  Goal: Optimal nutrition therapy  Outcome: Ongoing  Nutrition Problem: Inadequate oral intake  Intervention: Food and/or Nutrient Delivery: Continue current diet  Nutritional Goals: PO intake % of meals to meet estimated nutrition needs.

## 2018-11-20 PROBLEM — E03.9 HYPOTHYROIDISM: Chronic | Status: ACTIVE | Noted: 2018-05-14

## 2018-11-20 PROBLEM — Z93.0 TRACHEOSTOMY PRESENT (HCC): Chronic | Status: ACTIVE | Noted: 2018-11-20

## 2018-11-20 PROBLEM — J96.02 ACUTE RESPIRATORY FAILURE WITH HYPOXIA AND HYPERCAPNIA (HCC): Chronic | Status: ACTIVE | Noted: 2018-05-14

## 2018-11-20 PROBLEM — R06.02 SHORTNESS OF BREATH: Chronic | Status: ACTIVE | Noted: 2018-05-14

## 2018-11-20 PROBLEM — J96.10 CHRONIC RESPIRATORY FAILURE (HCC): Chronic | Status: ACTIVE | Noted: 2018-11-20

## 2018-11-20 PROBLEM — I89.0 LYMPHEDEMA: Chronic | Status: ACTIVE | Noted: 2018-11-20

## 2018-11-20 PROBLEM — J96.01 ACUTE RESPIRATORY FAILURE WITH HYPOXIA AND HYPERCAPNIA (HCC): Chronic | Status: ACTIVE | Noted: 2018-05-14

## 2018-11-20 LAB
ANION GAP SERPL CALCULATED.3IONS-SCNC: 10 MMOL/L (ref 9–17)
BUN BLDV-MCNC: 56 MG/DL (ref 6–20)
BUN/CREAT BLD: ABNORMAL (ref 9–20)
CALCIUM SERPL-MCNC: 9.4 MG/DL (ref 8.6–10.4)
CHLORIDE BLD-SCNC: 99 MMOL/L (ref 98–107)
CO2: 30 MMOL/L (ref 20–31)
CREAT SERPL-MCNC: 1.66 MG/DL (ref 0.5–0.9)
GFR AFRICAN AMERICAN: 40 ML/MIN
GFR NON-AFRICAN AMERICAN: 33 ML/MIN
GFR SERPL CREATININE-BSD FRML MDRD: ABNORMAL ML/MIN/{1.73_M2}
GFR SERPL CREATININE-BSD FRML MDRD: ABNORMAL ML/MIN/{1.73_M2}
GLUCOSE BLD-MCNC: 128 MG/DL (ref 70–99)
HCT VFR BLD CALC: 25.5 % (ref 36–46)
HEMOGLOBIN: 8 G/DL (ref 12–16)
MCH RBC QN AUTO: 28.7 PG (ref 26–34)
MCHC RBC AUTO-ENTMCNC: 31.5 G/DL (ref 31–37)
MCV RBC AUTO: 91.3 FL (ref 80–100)
NRBC AUTOMATED: ABNORMAL PER 100 WBC
PDW BLD-RTO: 17 % (ref 11.5–14.9)
PLATELET # BLD: 144 K/UL (ref 150–450)
PMV BLD AUTO: 8.8 FL (ref 6–12)
POTASSIUM SERPL-SCNC: 3.8 MMOL/L (ref 3.7–5.3)
RBC # BLD: 2.79 M/UL (ref 4–5.2)
SODIUM BLD-SCNC: 139 MMOL/L (ref 135–144)
WBC # BLD: 17.1 K/UL (ref 3.5–11)

## 2018-11-20 PROCEDURE — 2060000000 HC ICU INTERMEDIATE R&B

## 2018-11-20 PROCEDURE — 6370000000 HC RX 637 (ALT 250 FOR IP): Performed by: INTERNAL MEDICINE

## 2018-11-20 PROCEDURE — 85027 COMPLETE CBC AUTOMATED: CPT

## 2018-11-20 PROCEDURE — 94762 N-INVAS EAR/PLS OXIMTRY CONT: CPT

## 2018-11-20 PROCEDURE — 80048 BASIC METABOLIC PNL TOTAL CA: CPT

## 2018-11-20 PROCEDURE — 99233 SBSQ HOSP IP/OBS HIGH 50: CPT | Performed by: INTERNAL MEDICINE

## 2018-11-20 PROCEDURE — 36415 COLL VENOUS BLD VENIPUNCTURE: CPT

## 2018-11-20 PROCEDURE — 94640 AIRWAY INHALATION TREATMENT: CPT

## 2018-11-20 PROCEDURE — 2700000000 HC OXYGEN THERAPY PER DAY

## 2018-11-20 RX ORDER — HEPARIN SODIUM 1000 [USP'U]/ML
10000 INJECTION, SOLUTION INTRAVENOUS; SUBCUTANEOUS PRN
Status: DISCONTINUED | OUTPATIENT
Start: 2018-11-20 | End: 2018-11-20

## 2018-11-20 RX ORDER — FUROSEMIDE 10 MG/ML
40 INJECTION INTRAMUSCULAR; INTRAVENOUS DAILY
Status: DISCONTINUED | OUTPATIENT
Start: 2018-11-21 | End: 2018-11-22

## 2018-11-20 RX ORDER — HEPARIN SODIUM 1000 [USP'U]/ML
5000 INJECTION, SOLUTION INTRAVENOUS; SUBCUTANEOUS PRN
Status: DISCONTINUED | OUTPATIENT
Start: 2018-11-20 | End: 2018-11-20

## 2018-11-20 RX ORDER — HEPARIN SODIUM 10000 [USP'U]/100ML
7.6 INJECTION, SOLUTION INTRAVENOUS CONTINUOUS
Status: DISCONTINUED | OUTPATIENT
Start: 2018-11-20 | End: 2018-11-20

## 2018-11-20 RX ADMIN — IPRATROPIUM BROMIDE AND ALBUTEROL SULFATE 3 ML: .5; 3 SOLUTION RESPIRATORY (INHALATION) at 16:05

## 2018-11-20 RX ADMIN — FERROUS SULFATE TAB 325 MG (65 MG ELEMENTAL FE) 325 MG: 325 (65 FE) TAB at 09:41

## 2018-11-20 RX ADMIN — MULTIPLE VITAMINS W/ MINERALS TAB 1 TABLET: TAB at 09:41

## 2018-11-20 RX ADMIN — APIXABAN 10 MG: 5 TABLET, FILM COATED ORAL at 20:15

## 2018-11-20 RX ADMIN — LEVOTHYROXINE SODIUM 250 MCG: 125 TABLET ORAL at 09:41

## 2018-11-20 RX ADMIN — FERROUS SULFATE TAB 325 MG (65 MG ELEMENTAL FE) 325 MG: 325 (65 FE) TAB at 11:48

## 2018-11-20 RX ADMIN — ACETAMINOPHEN 650 MG: 325 TABLET, FILM COATED ORAL at 20:22

## 2018-11-20 RX ADMIN — IPRATROPIUM BROMIDE AND ALBUTEROL SULFATE 3 ML: .5; 3 SOLUTION RESPIRATORY (INHALATION) at 10:57

## 2018-11-20 RX ADMIN — LEVOFLOXACIN 500 MG: 500 TABLET, FILM COATED ORAL at 09:41

## 2018-11-20 RX ADMIN — APIXABAN 10 MG: 5 TABLET, FILM COATED ORAL at 15:47

## 2018-11-20 RX ADMIN — ACETAMINOPHEN 650 MG: 325 TABLET, FILM COATED ORAL at 09:41

## 2018-11-20 RX ADMIN — FERROUS SULFATE TAB 325 MG (65 MG ELEMENTAL FE) 325 MG: 325 (65 FE) TAB at 15:46

## 2018-11-20 ASSESSMENT — PAIN DESCRIPTION - PAIN TYPE
TYPE: ACUTE PAIN
TYPE: ACUTE PAIN

## 2018-11-20 ASSESSMENT — PAIN SCALES - GENERAL
PAINLEVEL_OUTOF10: 8
PAINLEVEL_OUTOF10: 10
PAINLEVEL_OUTOF10: 10
PAINLEVEL_OUTOF10: 8

## 2018-11-20 ASSESSMENT — PAIN DESCRIPTION - ORIENTATION
ORIENTATION: LEFT
ORIENTATION: MID

## 2018-11-20 ASSESSMENT — PAIN DESCRIPTION - FREQUENCY: FREQUENCY: CONTINUOUS

## 2018-11-20 ASSESSMENT — PULMONARY FUNCTION TESTS
PIF_VALUE: 22
PIF_VALUE: 15
PIF_VALUE: 17

## 2018-11-20 ASSESSMENT — PAIN DESCRIPTION - DESCRIPTORS: DESCRIPTORS: PRESSURE

## 2018-11-20 ASSESSMENT — PAIN DESCRIPTION - LOCATION
LOCATION: LEG
LOCATION: CHEST

## 2018-11-20 ASSESSMENT — PAIN DESCRIPTION - ONSET: ONSET: ON-GOING

## 2018-11-20 NOTE — CONSULTS
anxiety  Breast ROS: No prior breast abnormalities or lumps  Respiratory ROS: +SOB, Pneumoniae,+Cough, or Pulmonary Embolism History  Cardiovascular ROS: No Chest Pain with Exertion, Palpitations, Syncope, Edema, Arrhythmia  Gastrointestinal ROS: No Indigestion, Heartburn, Nausea, vomiting, Diarrhea, Constipation,or Bowel Changes; No Bloody Stools or melena  Genito-Urinary ROS: No Dysuria, Hematuria or Nocturia.  No Urinary Incontinence or Vaginal Discharge  Musculoskeletal ROS: No Arthralgia, Arthritis,Gout,Osteoporosis or Rheumatism  Neurological ROS: No CVA, Migraines, Epilepsy, Seizure Hx, or Limb Weakness  Dermatological ROS: No Rash, Itching, Hives, Mole Changes or Cancer     PHYSICAL EXAM:    VITALS:  BP (!) 94/55   Pulse 103   Temp 99.1 °F (37.3 °C) (Axillary)   Resp 18   Ht 5' 5\" (1.651 m)   Wt (!) 626 lb 6.4 oz (284.1 kg)   LMP  (LMP Unknown)   SpO2 100%   .24 kg/m²   INTAKE/OUTPUT:     Intake/Output Summary (Last 24 hours) at 11/20/18 1021  Last data filed at 11/20/18 0730   Gross per 24 hour   Intake              480 ml   Output             2575 ml   Net            -2095 ml       CONSTITUTIONAL:  awake, alert, not distressed tracheostomy in place and super morbidly obese  Head: moon facies/atraumatic, full face of makeup complete with fake eyelashes  Eyes: PERRL, Sclera non icteric  Mouth: dentition is good, mucous membranes moist and pink  NECK:  supple, symmetrical, trachea midline, no lymphadenopathy, no thyroid nodules, no carotid bruits  LUNGS:  CTA bilaterally, no ronchi, rales, or wheezes, no intercostal muscle retractions or accessory muscle use  CARDIOVASCULAR:  regular rate and rhythm and No Murmur, rub,  Or gallops  ABDOMEN: soft,  Super morbidly obese, No surgical scars, present,  non distended, unable to assess hepato-organomegaly  no guarding present, no masses and no hernias  MUSCULOSKELETAL:  Significant diminished strength and range of motion, massive lymphedema of the left upper quadrant likely   external to the patient.       Tracheostomy tube distal tip overlying the mid trachea approximately 3.8 cm   above the level of the susan.       Stable moderate cardiomegaly.  Bilateral parahilar and lower zone predominant   airspace opacities.  No sizable pleural effusions.  No obvious pneumothorax   or mediastinal shift within the limitations of this exam.  Visualized osseous   structures remain unchanged.  Surgical clips project over the right axillary   region.           Impression   1. Stable moderate cardiomegaly with bilateral parahilar and lower zone   predominant airspace disease.  Findings most likely represent mild pulmonary   edema.  Underlying infiltrate not excluded.  Follow-up recommended to   document resolution. 2. Tracheostomy tube and right internal jugular approach Port-A-Cath as above.               ASSESSMENT   Active Problems:    Shortness of breath    Hypothyroidism    Acute congestive heart failure (HCC)    BMI 70 and over, adult ([address]    Chronic respiratory failure ([address]    Tracheostomy present ([address]    Lymphedema  Resolved Problems:    * No resolved hospital problems. *      PLAN    1. Calcium alginate dressing to weeping lymphedema on the dependent portion of the right chest wall  2. Left breast screening mammogram could be attempted as an outpatient if the patient is able to sit up on a gurney. The breast is too large to fit on a single exposure plate and would need to be tiled to see the whole breast.  3. Will sign off, thanks for the consult , call if any further questions.         Electronically signed by Ledy Dave MD  on 11/20/2018 at 10:21 AM

## 2018-11-20 NOTE — PLAN OF CARE
Problem: Falls - Risk of:  Goal: Will remain free from falls  Will remain free from falls   Outcome: Met This Shift  The patient remained free from falls this shift, call light within reach, bed in locked and lowest position. Side rails up x2. Continue to monitor closely. Problem: Risk for Impaired Skin Integrity  Goal: Tissue integrity - skin and mucous membranes  Structural intactness and normal physiological function of skin and  mucous membranes. Outcome: Met This Shift  Pt has wound on right side of body covered with abd and algenate. Pt has blisters on LLE. Problem: Gas Exchange - Impaired:  Goal: Levels of oxygenation will improve  Levels of oxygenation will improve   Outcome: Met This Shift  Pt has trach. On mask during the day and vent at night.  sats are good 92-96%

## 2018-11-20 NOTE — PROGRESS NOTES
Air Loss   [x] Pressure Redistribution  [] Fluid Immersion  [x] Bariatric  [] Total Pressure Relief  [] Other:     Current Diet: DIET GENERAL;  Dietician consult:  Yes    Discharge Plan:  Placement for patient upon discharge: skilled nursing    Patient appropriate for Outpatient [address]: No    Referrals:  []   [] [facility]  [] Supplies  [] Other    Patient/Caregiver Teaching:  Level of patient/caregiver understanding able to:   [] Indicates understanding       [x] Needs reinforcement  [] Unsuccessful      [] Verbal Understanding  [] Demonstrated understanding       [] No evidence of learning  [] Refused teaching         [] N/A       Electronically signed by HOLA Gordillo - CNP, CWOCN on 11/20/2018 at 9:49 AM

## 2018-11-20 NOTE — CARE COORDINATION
ONGOING DISCHARGE PLAN:    Spoke with patient regarding discharge plan and patient confirms that plan is still to return to [facility]. Pt. Is Trach dependent, Vent at night. Pulmonary continues to follow. Remains on Po Levaquin, WBC 17.1. Cr 1.66, Nephro on board. Per Nursing, pt. Was having some chest pressure today, Pt. Received breathing TX & suction w/ some improvement. Will continue to follow for additional discharge needs.     Electronically signed by Corie You RN on 11/20/2018 at 12:11 PM

## 2018-11-21 ENCOUNTER — APPOINTMENT (OUTPATIENT)
Dept: GENERAL RADIOLOGY | Age: 48
DRG: 720 | End: 2018-11-21
Payer: MEDICAID

## 2018-11-21 ENCOUNTER — APPOINTMENT (OUTPATIENT)
Dept: INTERVENTIONAL RADIOLOGY/VASCULAR | Age: 48
DRG: 720 | End: 2018-11-21
Payer: MEDICAID

## 2018-11-21 PROBLEM — L03.116 CELLULITIS OF LEFT LOWER EXTREMITY: Status: ACTIVE | Noted: 2018-11-21

## 2018-11-21 LAB
ANION GAP SERPL CALCULATED.3IONS-SCNC: 12 MMOL/L (ref 9–17)
BUN BLDV-MCNC: 54 MG/DL (ref 6–20)
BUN/CREAT BLD: ABNORMAL (ref 9–20)
C-REACTIVE PROTEIN: 418.6 MG/L (ref 0–5)
CALCIUM SERPL-MCNC: 9.4 MG/DL (ref 8.6–10.4)
CHLORIDE BLD-SCNC: 98 MMOL/L (ref 98–107)
CO2: 29 MMOL/L (ref 20–31)
COMPLEMENT C3: 204 MG/DL (ref 90–180)
COMPLEMENT C4: 69 MG/DL (ref 10–40)
CREAT SERPL-MCNC: 1.52 MG/DL (ref 0.5–0.9)
FREE KAPPA/LAMBDA RATIO: 2.19 (ref 0.26–1.65)
GFR AFRICAN AMERICAN: 45 ML/MIN
GFR NON-AFRICAN AMERICAN: 37 ML/MIN
GFR SERPL CREATININE-BSD FRML MDRD: ABNORMAL ML/MIN/{1.73_M2}
GFR SERPL CREATININE-BSD FRML MDRD: ABNORMAL ML/MIN/{1.73_M2}
GLUCOSE BLD-MCNC: 153 MG/DL (ref 70–99)
HCT VFR BLD CALC: 26.9 % (ref 36–46)
HEMOGLOBIN: 8.1 G/DL (ref 12–16)
KAPPA FREE LIGHT CHAINS QNT: 15.22 MG/DL (ref 0.37–1.94)
LACTATE DEHYDROGENASE: 166 U/L (ref 135–214)
LAMBDA FREE LIGHT CHAINS QNT: 6.94 MG/DL (ref 0.57–2.63)
MCH RBC QN AUTO: 28 PG (ref 26–34)
MCHC RBC AUTO-ENTMCNC: 30.1 G/DL (ref 31–37)
MCV RBC AUTO: 93.1 FL (ref 80–100)
NRBC AUTOMATED: ABNORMAL PER 100 WBC
PDW BLD-RTO: 17.7 % (ref 11.5–14.9)
PLATELET # BLD: 166 K/UL (ref 150–450)
PMV BLD AUTO: 8.9 FL (ref 6–12)
POTASSIUM SERPL-SCNC: 3.7 MMOL/L (ref 3.7–5.3)
PROCALCITONIN: 3.91 NG/ML
RBC # BLD: 2.89 M/UL (ref 4–5.2)
SODIUM BLD-SCNC: 139 MMOL/L (ref 135–144)
URIC ACID: 11.4 MG/DL (ref 2.4–5.7)
WBC # BLD: 18.7 K/UL (ref 3.5–11)

## 2018-11-21 PROCEDURE — 94760 N-INVAS EAR/PLS OXIMETRY 1: CPT

## 2018-11-21 PROCEDURE — 2060000000 HC ICU INTERMEDIATE R&B

## 2018-11-21 PROCEDURE — 86403 PARTICLE AGGLUT ANTBDY SCRN: CPT

## 2018-11-21 PROCEDURE — 94640 AIRWAY INHALATION TREATMENT: CPT

## 2018-11-21 PROCEDURE — 6370000000 HC RX 637 (ALT 250 FOR IP): Performed by: INTERNAL MEDICINE

## 2018-11-21 PROCEDURE — 6360000002 HC RX W HCPCS: Performed by: INTERNAL MEDICINE

## 2018-11-21 PROCEDURE — 84550 ASSAY OF BLOOD/URIC ACID: CPT

## 2018-11-21 PROCEDURE — 86140 C-REACTIVE PROTEIN: CPT

## 2018-11-21 PROCEDURE — 2700000000 HC OXYGEN THERAPY PER DAY

## 2018-11-21 PROCEDURE — 99233 SBSQ HOSP IP/OBS HIGH 50: CPT | Performed by: INTERNAL MEDICINE

## 2018-11-21 PROCEDURE — 87205 SMEAR GRAM STAIN: CPT

## 2018-11-21 PROCEDURE — 86162 COMPLEMENT TOTAL (CH50): CPT

## 2018-11-21 PROCEDURE — 85027 COMPLETE CBC AUTOMATED: CPT

## 2018-11-21 PROCEDURE — 80048 BASIC METABOLIC PNL TOTAL CA: CPT

## 2018-11-21 PROCEDURE — 94002 VENT MGMT INPAT INIT DAY: CPT

## 2018-11-21 PROCEDURE — 84165 PROTEIN E-PHORESIS SERUM: CPT

## 2018-11-21 PROCEDURE — 71045 X-RAY EXAM CHEST 1 VIEW: CPT

## 2018-11-21 PROCEDURE — 86038 ANTINUCLEAR ANTIBODIES: CPT

## 2018-11-21 PROCEDURE — 84155 ASSAY OF PROTEIN SERUM: CPT

## 2018-11-21 PROCEDURE — 6370000000 HC RX 637 (ALT 250 FOR IP): Performed by: NURSE PRACTITIONER

## 2018-11-21 PROCEDURE — 87040 BLOOD CULTURE FOR BACTERIA: CPT

## 2018-11-21 PROCEDURE — 36569 INSJ PICC 5 YR+ W/O IMAGING: CPT | Performed by: RADIOLOGY

## 2018-11-21 PROCEDURE — 36415 COLL VENOUS BLD VENIPUNCTURE: CPT

## 2018-11-21 PROCEDURE — 87149 DNA/RNA DIRECT PROBE: CPT

## 2018-11-21 PROCEDURE — 84145 PROCALCITONIN (PCT): CPT

## 2018-11-21 PROCEDURE — 2580000003 HC RX 258: Performed by: INTERNAL MEDICINE

## 2018-11-21 PROCEDURE — 2709999900 IR FLUORO GUIDED CVA DEVICE PLACEMENT

## 2018-11-21 PROCEDURE — 86160 COMPLEMENT ANTIGEN: CPT

## 2018-11-21 PROCEDURE — 83615 LACTATE (LD) (LDH) ENZYME: CPT

## 2018-11-21 PROCEDURE — 76937 US GUIDE VASCULAR ACCESS: CPT | Performed by: RADIOLOGY

## 2018-11-21 PROCEDURE — 99254 IP/OBS CNSLTJ NEW/EST MOD 60: CPT | Performed by: INTERNAL MEDICINE

## 2018-11-21 PROCEDURE — 83883 ASSAY NEPHELOMETRY NOT SPEC: CPT

## 2018-11-21 RX ORDER — DOXYCYCLINE 100 MG/1
100 CAPSULE ORAL EVERY 12 HOURS SCHEDULED
Status: DISCONTINUED | OUTPATIENT
Start: 2018-11-21 | End: 2018-11-22

## 2018-11-21 RX ORDER — LINEZOLID 2 MG/ML
600 INJECTION, SOLUTION INTRAVENOUS EVERY 12 HOURS
Status: DISCONTINUED | OUTPATIENT
Start: 2018-11-21 | End: 2018-11-21

## 2018-11-21 RX ORDER — PREDNISONE 20 MG/1
20 TABLET ORAL DAILY
Status: DISCONTINUED | OUTPATIENT
Start: 2018-11-21 | End: 2018-11-30 | Stop reason: HOSPADM

## 2018-11-21 RX ORDER — LINEZOLID 600 MG/1
600 TABLET, FILM COATED ORAL EVERY 12 HOURS SCHEDULED
Status: DISCONTINUED | OUTPATIENT
Start: 2018-11-21 | End: 2018-11-30 | Stop reason: HOSPADM

## 2018-11-21 RX ADMIN — DOXYCYCLINE 100 MG: 100 CAPSULE ORAL at 21:39

## 2018-11-21 RX ADMIN — PREDNISONE 20 MG: 20 TABLET ORAL at 19:06

## 2018-11-21 RX ADMIN — LEVOFLOXACIN 500 MG: 500 TABLET, FILM COATED ORAL at 09:25

## 2018-11-21 RX ADMIN — FUROSEMIDE 40 MG: 10 INJECTION, SOLUTION INTRAMUSCULAR; INTRAVENOUS at 15:56

## 2018-11-21 RX ADMIN — FERROUS SULFATE TAB 325 MG (65 MG ELEMENTAL FE) 325 MG: 325 (65 FE) TAB at 15:56

## 2018-11-21 RX ADMIN — LINEZOLID 600 MG: 600 TABLET, FILM COATED ORAL at 21:40

## 2018-11-21 RX ADMIN — FERROUS SULFATE TAB 325 MG (65 MG ELEMENTAL FE) 325 MG: 325 (65 FE) TAB at 09:27

## 2018-11-21 RX ADMIN — APIXABAN 10 MG: 5 TABLET, FILM COATED ORAL at 21:40

## 2018-11-21 RX ADMIN — MULTIPLE VITAMINS W/ MINERALS TAB 1 TABLET: TAB at 09:25

## 2018-11-21 RX ADMIN — Medication 10 ML: at 21:49

## 2018-11-21 RX ADMIN — IPRATROPIUM BROMIDE AND ALBUTEROL SULFATE 3 ML: .5; 3 SOLUTION RESPIRATORY (INHALATION) at 07:50

## 2018-11-21 ASSESSMENT — ENCOUNTER SYMPTOMS
VOMITING: 0
COUGH: 1
DIARRHEA: 0
WHEEZING: 0
SHORTNESS OF BREATH: 1
NAUSEA: 0
ALLERGIC/IMMUNOLOGIC NEGATIVE: 1
ANAL BLEEDING: 0
EYES NEGATIVE: 1
ABDOMINAL PAIN: 0
CONSTIPATION: 0

## 2018-11-21 ASSESSMENT — PULMONARY FUNCTION TESTS: PIF_VALUE: 16

## 2018-11-21 ASSESSMENT — PAIN SCALES - GENERAL: PAINLEVEL_OUTOF10: 0

## 2018-11-21 NOTE — CONSULTS
encounter. Current Outpatient Prescriptions on File Prior to Encounter   Medication Sig Dispense Refill    bumetanide (BUMEX) 2 MG tablet Take 1 tablet by mouth 2 times daily 30 tablet 3    acetaminophen (TYLENOL) 325 MG tablet Take 650 mg by mouth every 6 hours as needed for Pain      levothyroxine (SYNTHROID) 200 MCG tablet Take 250 mcg by mouth daily       ferrous sulfate 325 (65 Fe) MG tablet Take 325 mg by mouth 3 times daily (with meals)      Multiple Vitamins-Minerals (THERAPEUTIC MULTIVITAMIN-MINERALS) tablet Take 1 tablet by mouth daily (with breakfast)      ipratropium-albuterol (DUONEB) 0.5-2.5 (3) MG/3ML SOLN nebulizer solution Inhale 1 vial into the lungs every 4 hours as needed for Shortness of Breath         Patient denies ASA, NSAID use. Allergies  Allergies   Allergen Reactions    Zosyn [Piperacillin-Tazobactam In Dex]     Vancomycin Rash        Social   Social History   Substance Use Topics    Smoking status: Never Smoker    Smokeless tobacco: Never Used    Alcohol use No       MARITAL STATUS:     OCCUPATION:       Patient currently lives   PSYCH HISTORY:  Depression No  Anxiety No  Suicide No       Family History   Problem Relation Age of Onset    Breast Cancer Paternal Aunt     Breast Cancer Paternal Cousin 43    Breast Cancer Paternal Cousin 37    Breast Cancer Paternal Cousin 46          Review of Systems  Review of Systems   Constitutional: Positive for activity change, appetite change, chills, diaphoresis, fever and unexpected weight change. HENT: Negative. Eyes: Negative. Respiratory: Positive for cough and shortness of breath. Negative for wheezing. Cardiovascular: Negative for chest pain. Gastrointestinal: Negative for abdominal pain, anal bleeding, constipation, diarrhea, nausea and vomiting. Endocrine: Negative. Genitourinary: Negative for difficulty urinating, dysuria, frequency and urgency. Musculoskeletal: Negative.     Skin:        LLE PROTIME, INR in the last 72 hours. No results for input(s): PTT in the last 72 hours. No results for input(s): OCCULTBLD in the last 72 hours. No results for input(s): GLUMET in the last 72 hours. Imaging Studies:                           All appropriate imaging studies and reports reviewed: Yes    CXR 11/21           Impression   1. Expected positions of medical support devices. 2. Similar findings most suggestive of moderate pulmonary vascular   congestion.  Superimposed atelectasis and/or pneumonia is not excluded. Continued imaging follow-up is recommended. Assessment:     Active Problems:    Shortness of breath    Hypothyroidism    Acute congestive heart failure (HCC)    BMI 70 and over, adult (HCC)    Chronic respiratory failure (HCC)    Tracheostomy present ([facility]    Lymphedema    Cellulitis of left lower extremity  Resolved Problems:    * No resolved hospital problems. *    Left LE Cellulitis   Possible pneumonia     Recommendations:   Pan culture  History of allergy to Vancomycin and Zosyn  Will start Zyvox  Will check CRP and trend to monitor response to therapy        Thank you for allowing me to participate in the care of your patient. Please feel free to contact me with any questions or concerns. Zaire Nance MD  PGY-2 FM Resident   Attending Physician Statement  I have discussed the care of the patient, including pertinent history and exam findings,  with the resident. I have reviewed the key elements of all parts of the encounter with the resident. I agree with the assessment, plan and orders as documented by the resident. Left leg /right chest wall cellulitis      IV Zyvox    Monitor CBC ,Pro calcitonin and renal function   Blood cultures .   [place]

## 2018-11-21 NOTE — PROGRESS NOTES
PULMONARY PROGRESS NOTE:    Interval History: resp failure, dyspnea, morbid obesity    Shortness of Breath: yes  Cough: yes  Sputum: clear  Hemoptysis: no  Chest Pain: tightness   Fever: yes  Swelling Feet: chronic  Headache:   Nausea, Emesis, Abdominal Pain:   Diarrhea:   Constipation:     Events since last visit:     PAST MEDICAL HISTORY:    Smoking:     PHYSICAL EXAMINATION:  tmax 100.2  General : Awake, alert, oriented to time, place, and person  Neck - supple, no lymphadenopathy, JVD not raised  Heart - regular rhythm, S1 and S2 normal; no additional sounds heard  Lungs - Air Entry- fair bilaterally; breath sounds : diminished, 97% on TC 28%  Abdomen - soft, no tenderness  Upper Extremities  - no cyanosis, mottling; edema : absent  Lower Extremities: no cyanosis, mottling; edema : lymphedema, erythema    Current Laboratory, Radiologic, Microbiologic, and Diagnostic studies reviewed    ASSESSMENT / PLAN:     chronic resp failure - nocturnal vent  Dyspnea - prob related to tracheo-bronchitis  Elevated d dimer - on eliquis  Hx breast cancer / chest wall mass - lymphedema - no biopsy planned  Morbid obesity  Wound care seeing for LLE cellulitis   Fever, leukocytosis, Procalcitonin 3.91  Send sputum, check CXR  Add prednisone, doxy    Plan of care discussed with Dr Dwight Davidson, APRN - CNP

## 2018-11-21 NOTE — PROGRESS NOTES
twice daily   We will consider starting allopurinol 100 mg twice daily-Pharmacy to assess for any drug interaction  Check urine uric acid and urine creatinine  ID consulted  Okay for a midline placement      Kavitha Issa

## 2018-11-21 NOTE — CONSULTS
pleuritic chest pain, wheezing,SOB  Abdomen:  No abdominal pain, nausea, vomiting, diarrhea, melena, dysphagia hematemesis,constipation, abdominal bloating, flank pain  Neuro:  No CVA, TIA or seizure like activity. Skin:   No rashes, no itching. :   No hematuria, no pyuria, no dysuria, no flank pain. Extremities:  2+swelling or joint pains. Objective:  CURRENT TEMPERATURE:  Temp: 100.2 °F (37.9 °C)  MAXIMUM TEMPERATURE OVER 24HRS:  Temp (24hrs), Av.8 °F (37.1 °C), Min:98.1 °F (36.7 °C), Max:100.2 °F (37.9 °C)    CURRENT RESPIRATORY RATE:  Resp: 20  CURRENT PULSE:  Pulse: 108  CURRENT BLOOD PRESSURE:  BP: 133/64  24HR BLOOD PRESSURE RANGE:  Systolic (88LYW), TGW:770 , Min:90 , AZC:240   ; Diastolic (98TAO), IYP:95, Min:45, Max:64    24HR INTAKE/OUTPUT:    Intake/Output Summary (Last 24 hours) at 18  Last data filed at 18 1928   Gross per 24 hour   Intake              480 ml   Output             2425 ml   Net            -1945 ml     Patient Vitals for the past 96 hrs (Last 3 readings):   Weight   18 2225 (!) 626 lb 6.4 oz (284.1 kg)   18 1433 (!) 612 lb (277.6 kg)         Physical Exam:  GENERAL APPEARANCE: Alert and cooperative, and appears to be in no acute distress. HEAD: normocephalic  EYES: PERRL, EOMI. Not pale, anicteric   NOSE:  No nasal discharge. THROAT:  Oral cavity and pharynx normal. Moist  NECK: Neck supple, non-tender without lymphadenopathy, masses or thyromegaly. JVD-neg  CARDIAC: Normal S1 and S2. No S3, S4 or murmurs. Rhythm is regular. LUNGS:no crackles, diminished breath sounds. ABD-Soft non distended, BS+ Non tender no organomegally  BACK: Examination of the spine reveals  no spinal deformity, without tenderness,   MUSKULOSKELETAL: Adequately aligned spine. No joint erythema or tenderness. EXTREMITIES: 2+ edema. Peripheral pulses intact.    NEURO:Alert oriented x 3 ,power 5/5 in all extremities      Labs:   CBC:  Recent Labs      18   0514

## 2018-11-21 NOTE — PLAN OF CARE
Problem: Falls - Risk of:  Goal: Will remain free from falls  Will remain free from falls   Outcome: Met This Shift  No falls experienced during shift. Patient uses call light appropriately. Fall sign posted. Patient checked hourly. Problem: Risk for Impaired Skin Integrity  Goal: Tissue integrity - skin and mucous membranes  Structural intactness and normal physiological function of skin and  mucous membranes. Outcome: Ongoing  Patient has skin tear and weeping where blisters are located on left lower leg. Patient has weeping on right hip area. Problem: Gas Exchange - Impaired:  Goal: Levels of oxygenation will improve  Levels of oxygenation will improve   Outcome: Met This Shift  Patient receiving adequate oxygenation from mask on trach and ventilator at night. No signs of respiratory distress.

## 2018-11-21 NOTE — PROGRESS NOTES
RN spoke with Dr. Bernice Mayorga in regards to new consult. Dr. Bernice Mayorga stated she will be there shortly and will decide on antibiotics then . RN let Yo Escobedo in IR know that we will decide on PICC line vs. Midline after.

## 2018-11-21 NOTE — PROGRESS NOTES
urination, hot or cold intolerance  MUSCULOSKELETAL:  negative joint pains, muscle aches, swelling of joints  NEUROLOGICAL:  negative for headaches, dizziness, lightheadedness, numbness, pain, tingling extremities      Physical Exam:   /67   Pulse 104   Temp 98.4 °F (36.9 °C) (Oral)   Resp 18   Ht 5' 5\" (1.651 m)   Wt (!) 626 lb 6.4 oz (284.1 kg)   LMP  (LMP Unknown)   SpO2 100%   .24 kg/m²   No results for input(s): POCGLU in the last 72 hours. General Appearance: Morbid obesity  Mental status: oriented to person, place, and time with normal affect  Head:  normocephalic, atraumatic. Eye: no icterus, redness, pupils equal and reactive, extraocular eye movements intact, conjunctiva clear  Ear: normal external ear, no discharge, hearing intact  Nose:  no drainage noted  Mouth: mucous membranes moist  Neck: supple, no carotid bruits, thyroid not palpable  Lungs: Bilateral equal air entry, clear to ausculation, no wheezing, rales or rhonchi, normal effort  Cardiovascular: normal rate, regular rhythm, no murmur, gallop, rub.   Abdomen: Soft, nontender, nondistended, normal bowel sounds, no hepatomegaly or splenomegaly  Neurologic: There are no new focal motor or sensory deficits, normal muscle tone and bulk, no abnormal sensation, normal speech, cranial nerves II through XII grossly intact  She is bedbound and unable to check the gait  Skin: Diffuse nodular growth in the right middle axillary region suspicious for malignancy  Extremities:  Bilateral lower extremity diffuse obesity with status is dermatitis  Investigations:      Laboratory Testing:  Recent Results (from the past 24 hour(s))   Basic Metabolic Prof    Collection Time: 11/21/18  5:03 AM   Result Value Ref Range    Glucose 153 (H) 70 - 99 mg/dL    BUN 54 (H) 6 - 20 mg/dL    CREATININE 1.52 (H) 0.50 - 0.90 mg/dL    Bun/Cre Ratio NOT REPORTED 9 - 20    Calcium 9.4 8.6 - 10.4 mg/dL    Sodium 139 135 - 144 mmol/L    Potassium 3.7 3.7 - 5.3

## 2018-11-22 LAB
ANION GAP SERPL CALCULATED.3IONS-SCNC: 10 MMOL/L (ref 9–17)
BUN BLDV-MCNC: 51 MG/DL (ref 6–20)
BUN/CREAT BLD: ABNORMAL (ref 9–20)
CALCIUM SERPL-MCNC: 9.3 MG/DL (ref 8.6–10.4)
CHLORIDE BLD-SCNC: 98 MMOL/L (ref 98–107)
CO2: 30 MMOL/L (ref 20–31)
CREAT SERPL-MCNC: 1.38 MG/DL (ref 0.5–0.9)
GFR AFRICAN AMERICAN: 50 ML/MIN
GFR NON-AFRICAN AMERICAN: 41 ML/MIN
GFR SERPL CREATININE-BSD FRML MDRD: ABNORMAL ML/MIN/{1.73_M2}
GFR SERPL CREATININE-BSD FRML MDRD: ABNORMAL ML/MIN/{1.73_M2}
GLUCOSE BLD-MCNC: 160 MG/DL (ref 70–99)
HCT VFR BLD CALC: 27.2 % (ref 36–46)
HEMOGLOBIN: 8.5 G/DL (ref 12–16)
MCH RBC QN AUTO: 28.8 PG (ref 26–34)
MCHC RBC AUTO-ENTMCNC: 31.1 G/DL (ref 31–37)
MCV RBC AUTO: 92.6 FL (ref 80–100)
NRBC AUTOMATED: ABNORMAL PER 100 WBC
PDW BLD-RTO: 17.4 % (ref 11.5–14.9)
PLATELET # BLD: 198 K/UL (ref 150–450)
PMV BLD AUTO: 8.9 FL (ref 6–12)
POTASSIUM SERPL-SCNC: 4.4 MMOL/L (ref 3.7–5.3)
RBC # BLD: 2.93 M/UL (ref 4–5.2)
SODIUM BLD-SCNC: 138 MMOL/L (ref 135–144)
WBC # BLD: 20.7 K/UL (ref 3.5–11)

## 2018-11-22 PROCEDURE — 6370000000 HC RX 637 (ALT 250 FOR IP): Performed by: INTERNAL MEDICINE

## 2018-11-22 PROCEDURE — 2060000000 HC ICU INTERMEDIATE R&B

## 2018-11-22 PROCEDURE — 2700000000 HC OXYGEN THERAPY PER DAY

## 2018-11-22 PROCEDURE — 94640 AIRWAY INHALATION TREATMENT: CPT

## 2018-11-22 PROCEDURE — 6360000002 HC RX W HCPCS: Performed by: INTERNAL MEDICINE

## 2018-11-22 PROCEDURE — 94761 N-INVAS EAR/PLS OXIMETRY MLT: CPT

## 2018-11-22 PROCEDURE — 6370000000 HC RX 637 (ALT 250 FOR IP): Performed by: NURSE PRACTITIONER

## 2018-11-22 PROCEDURE — 80048 BASIC METABOLIC PNL TOTAL CA: CPT

## 2018-11-22 PROCEDURE — 99232 SBSQ HOSP IP/OBS MODERATE 35: CPT | Performed by: INTERNAL MEDICINE

## 2018-11-22 PROCEDURE — 85027 COMPLETE CBC AUTOMATED: CPT

## 2018-11-22 PROCEDURE — 2580000003 HC RX 258: Performed by: INTERNAL MEDICINE

## 2018-11-22 PROCEDURE — 36592 COLLECT BLOOD FROM PICC: CPT

## 2018-11-22 PROCEDURE — 94003 VENT MGMT INPAT SUBQ DAY: CPT

## 2018-11-22 RX ORDER — LEVOFLOXACIN 5 MG/ML
750 INJECTION, SOLUTION INTRAVENOUS EVERY 24 HOURS
Status: DISCONTINUED | OUTPATIENT
Start: 2018-11-23 | End: 2018-11-27

## 2018-11-22 RX ORDER — FUROSEMIDE 10 MG/ML
40 INJECTION INTRAMUSCULAR; INTRAVENOUS 2 TIMES DAILY
Status: DISCONTINUED | OUTPATIENT
Start: 2018-11-22 | End: 2018-11-27

## 2018-11-22 RX ADMIN — FERROUS SULFATE TAB 325 MG (65 MG ELEMENTAL FE) 325 MG: 325 (65 FE) TAB at 18:46

## 2018-11-22 RX ADMIN — FUROSEMIDE 40 MG: 10 INJECTION, SOLUTION INTRAMUSCULAR; INTRAVENOUS at 10:37

## 2018-11-22 RX ADMIN — DOXYCYCLINE 100 MG: 100 CAPSULE ORAL at 10:36

## 2018-11-22 RX ADMIN — APIXABAN 10 MG: 5 TABLET, FILM COATED ORAL at 10:36

## 2018-11-22 RX ADMIN — FUROSEMIDE 40 MG: 10 INJECTION, SOLUTION INTRAMUSCULAR; INTRAVENOUS at 18:46

## 2018-11-22 RX ADMIN — DOXYCYCLINE 100 MG: 100 CAPSULE ORAL at 22:00

## 2018-11-22 RX ADMIN — MULTIPLE VITAMINS W/ MINERALS TAB 1 TABLET: TAB at 10:37

## 2018-11-22 RX ADMIN — Medication 10 ML: at 18:46

## 2018-11-22 RX ADMIN — Medication 10 ML: at 10:37

## 2018-11-22 RX ADMIN — PREDNISONE 20 MG: 20 TABLET ORAL at 10:37

## 2018-11-22 RX ADMIN — LINEZOLID 600 MG: 600 TABLET, FILM COATED ORAL at 22:00

## 2018-11-22 RX ADMIN — Medication 10 ML: at 22:01

## 2018-11-22 RX ADMIN — IPRATROPIUM BROMIDE AND ALBUTEROL SULFATE 3 ML: .5; 3 SOLUTION RESPIRATORY (INHALATION) at 07:48

## 2018-11-22 RX ADMIN — APIXABAN 10 MG: 5 TABLET, FILM COATED ORAL at 22:00

## 2018-11-22 RX ADMIN — LINEZOLID 600 MG: 600 TABLET, FILM COATED ORAL at 10:37

## 2018-11-22 RX ADMIN — FERROUS SULFATE TAB 325 MG (65 MG ELEMENTAL FE) 325 MG: 325 (65 FE) TAB at 10:36

## 2018-11-22 ASSESSMENT — PULMONARY FUNCTION TESTS: PIF_VALUE: 16

## 2018-11-22 NOTE — PROGRESS NOTES
Historical Provider, MD   levothyroxine (SYNTHROID) 200 MCG tablet Take 250 mcg by mouth daily    Yes Historical Provider, MD   ferrous sulfate 325 (65 Fe) MG tablet Take 325 mg by mouth 3 times daily (with meals)   Yes Historical Provider, MD   Multiple Vitamins-Minerals (THERAPEUTIC MULTIVITAMIN-MINERALS) tablet Take 1 tablet by mouth daily (with breakfast)   Yes Historical Provider, MD   benzonatate (TESSALON) 100 MG capsule Take 100 mg by mouth 2 times daily as needed for Cough    Historical Provider, MD   ipratropium-albuterol (DUONEB) 0.5-2.5 (3) MG/3ML SOLN nebulizer solution Inhale 1 vial into the lungs every 4 hours as needed for Shortness of Breath    Historical Provider, MD        Allergies:     Zosyn [piperacillin-tazobactam in dex] and Vancomycin    Social History:     Tobacco:    reports that she has never smoked. She has never used smokeless tobacco.  Alcohol:      reports that she does not drink alcohol. Drug Use:  reports that she does not use drugs. Family History:     Family History   Problem Relation Age of Onset    Breast Cancer Paternal Aunt     Breast Cancer Paternal Cousin 43    Breast Cancer Paternal Cousin 37    Breast Cancer Paternal Cousin 46       Review of Systems:     Positive and Negative as described in HPI. CONSTITUTIONAL:  negative for fevers, chills, sweats, fatigue, weight loss  HEENT:  negative for vision, hearing changes, runny nose, throat pain  RESPIRATORY:  Positive for dyspnea cough CARDIOVASCULAR:  negative for chest pain, palpitations.   GASTROINTESTINAL:  negative for nausea, vomiting, diarrhea, constipation, change in bowel habits, abdominal pain   GENITOURINARY:  negative for difficulty of urination, burning with urination, frequency   INTEGUMENT:  negative for rash, skin lesions, easy bruising   HEMATOLOGIC/LYMPHATIC:  negative for swelling/edema   ALLERGIC/IMMUNOLOGIC:  negative for urticaria , itching  ENDOCRINE:  negative increase in drinking, increase in urination, hot or cold intolerance  MUSCULOSKELETAL:  negative joint pains, muscle aches, swelling of joints  NEUROLOGICAL:  negative for headaches, dizziness, lightheadedness, numbness, pain, tingling extremities      Physical Exam:   /70   Pulse 85   Temp 97.5 °F (36.4 °C) (Axillary)   Resp 16   Ht 5' 5\" (1.651 m)   Wt (!) 626 lb 6.4 oz (284.1 kg)   LMP  (LMP Unknown)   SpO2 96%   .24 kg/m²   No results for input(s): POCGLU in the last 72 hours. General Appearance: Morbid obesity  Mental status: oriented to person, place, and time with normal affect  Head:  normocephalic, atraumatic. Eye: no icterus, redness, pupils equal and reactive, extraocular eye movements intact, conjunctiva clear  Ear: normal external ear, no discharge, hearing intact  Nose:  no drainage noted  Mouth: mucous membranes moist  Neck: supple, no carotid bruits, thyroid not palpable  Lungs: Bilateral equal air entry, clear to ausculation, no wheezing, rales or rhonchi, normal effort  Cardiovascular: normal rate, regular rhythm, no murmur, gallop, rub. Abdomen: Soft, nontender, nondistended, normal bowel sounds, no hepatomegaly or splenomegaly  Neurologic: There are no new focal motor or sensory deficits, normal muscle tone and bulk, no abnormal sensation, normal speech, cranial nerves II through XII grossly intact  She is bedbound and unable to check the gait  Skin: Diffuse nodular growth in the right middle axillary region suspicious for malignancy  Extremities:  Bilateral lower extremity diffuse obesity with status is dermatitis  Investigations:      Laboratory Testing:  Recent Results (from the past 24 hour(s))   CULTURE BLOOD #2    Collection Time: 11/21/18  6:38 PM   Result Value Ref Range    Specimen Description . BLOOD NO RED PURPLE 2.5ML LEFT WRIST     Special Requests NOT REPORTED     Culture NO GROWTH 10 HOURS     Status Pending    Basic Metabolic Prof    Collection Time: 11/22/18  4:47 AM   Result Value Ref

## 2018-11-22 NOTE — PROGRESS NOTES
HGB  8.0*  8.1*  8.5*   HCT  25.5*  26.9*  27.2*   PLT  144*  166  198     BMP:   Recent Labs      11/20/18   0542  11/21/18   0503  11/22/18   0447   GLUCOSE  128*  153*  160*   NA  139  139  138   K  3.8  3.7  4.4   BUN  56*  54*  51*   CREATININE  1.66*  1.52*  1.38*   CALCIUM  9.4  9.4  9.3     ABGs: No results for input(s): PHART, PO2ART, IWJ8PYC, UOP8VKC, BEART, S4FKTFGI, DTB9KBR in the last 72 hours.    PT/INR:  No results found for: PTINR    ASSESSMENT / PLAN:    chronic resp failure - nocturnal vent  Dyspnea - prob related to tracheo-bronchitis  Elevated d dimer - on eliquis  Hx breast cancer / chest wall mass - lymphedema - no biopsy planned  Morbid obesity  Wound care seeing for LLE cellulitis   Fever, leukocytosis, Procalcitonin 3.91  Send sputum, check CXR - 11/21 - Pulm venous congestion  On prednisone, zyvox, doxy    Electronically signed by Blas Burdick on 11/22/18

## 2018-11-22 NOTE — PLAN OF CARE
Problem: Falls - Risk of:  Goal: Will remain free from falls  Will remain free from falls   Outcome: Met This Shift  No falls this shift. Pt on bed rest.  Bed at lowest setting. Call light within reach. Goal: Absence of physical injury  Absence of physical injury   Outcome: Met This Shift      Problem: Risk for Impaired Skin Integrity  Goal: Tissue integrity - skin and mucous membranes  Structural intactness and normal physiological function of skin and  mucous membranes. Outcome: Ongoing  Pt repositioned as tolerated. Multiple areas of weeping, excoriation and tears. Use of disposable bed pads to keep dry. Barrier cream used as requested for skin folds. Pt able to assist when tolerated. Problem: Gas Exchange - Impaired:  Goal: Levels of oxygenation will improve  Levels of oxygenation will improve   Outcome: Ongoing  Pt uses trach mask during the day. Use of vent at bedtime. O2 sat 100%.

## 2018-11-22 NOTE — CARE COORDINATION
ONGOING DISCHARGE PLAN:    Spoke with patient regarding discharge plan and patient confirms that plan is still to discharge to home  Per PCP  On zyvox  No fever       Will continue to follow for additional discharge needs.     Electronically signed by Tanna Mon RN on 11/22/2018 at 12:54 PM

## 2018-11-22 NOTE — PROGRESS NOTES
Nephrology Progress Note    Subjective/   57y.o. year old female who we are seeing in consultation for LILIAN. This is a 57 y.o. female resident of [id number] [place] [address] with history of HTN, CHF, hx of right breast cancer, Obesity, hypoventilation syndrome and chronic respiratory failure S/P trach  on ventilator who presented with flu like illness, cough and was noted to have elevated d dimer.  The patient states that she's been sick for the last week. Danielle Sias says that she started out with chills and then a productive cough.  She says that she has developed a retrosternal chest pressure like pain present for the last 3 days with worsening SOB. She is bed bound and non ambulatory. She is empirically being tx with apixaban for possible DVT/PE-she is technically not able to get chest CT scan or venous dopplers of LE . She presented with BUN/creatinine of 52/2.9 mg/dl with potassium of 4.4 -she had been on lasix/bumex at the [facility] as well as well as ibuprofen. She indicates poor oral intake but denies  nausea, vomiting, diarrhea , flank pain or abdominal pain. She has draining secretions from under right chest wall  and WBC is elevated. Pt denies any history of  prolonged NSAID use. Patient denies dysuria, gross hematuria, flank pain, nocturia, urgency, passing frothy urine or urinary incontinence. There has been no recent exposure to IV contrast.   Her UA showed few uric acid crystals 2-5 wbc and 2-5 RBC. Interval history:  Pt denies  of breath fever chest pain.   She is on vent via trach  Serum creatinine is improving at 1.3   She is on  IV Lasix 40 mg daily  Pt on linezolid and doxycycline for left LE cellulitis possibly pneumonia  Objective/     Vitals:    11/21/18 2126 11/22/18 0011 11/22/18 0731 11/22/18 0750   BP: 111/63 117/68 116/70    Pulse: 107 90 85    Resp: 20 18 16    Temp: 98.9 °F (37.2 °C) 98.6 °F (37 °C) 97.5 °F (36.4 °C)    TempSrc: Axillary Oral Axillary    SpO2: 100% 100% 100% 96%   Weight:

## 2018-11-23 LAB
ANION GAP SERPL CALCULATED.3IONS-SCNC: 9 MMOL/L (ref 9–17)
ANTI-NUCLEAR ANTIBODY (ANA): NEGATIVE
BUN BLDV-MCNC: 51 MG/DL (ref 6–20)
BUN/CREAT BLD: ABNORMAL (ref 9–20)
C-REACTIVE PROTEIN: 148.6 MG/L (ref 0–5)
CALCIUM SERPL-MCNC: 9.3 MG/DL (ref 8.6–10.4)
CHLORIDE BLD-SCNC: 96 MMOL/L (ref 98–107)
CO2: 32 MMOL/L (ref 20–31)
COMPLEMENT TOTAL (CH50): 244 CAE UNITS (ref 60–144)
CREAT SERPL-MCNC: 1.25 MG/DL (ref 0.5–0.9)
CULTURE: ABNORMAL
GFR AFRICAN AMERICAN: 56 ML/MIN
GFR NON-AFRICAN AMERICAN: 46 ML/MIN
GFR SERPL CREATININE-BSD FRML MDRD: ABNORMAL ML/MIN/{1.73_M2}
GFR SERPL CREATININE-BSD FRML MDRD: ABNORMAL ML/MIN/{1.73_M2}
GLUCOSE BLD-MCNC: 112 MG/DL (ref 70–99)
HCT VFR BLD CALC: 27.1 % (ref 36–46)
HEMOGLOBIN: 8.3 G/DL (ref 12–16)
Lab: ABNORMAL
MCH RBC QN AUTO: 28.5 PG (ref 26–34)
MCHC RBC AUTO-ENTMCNC: 30.7 G/DL (ref 31–37)
MCV RBC AUTO: 93 FL (ref 80–100)
NRBC AUTOMATED: ABNORMAL PER 100 WBC
PDW BLD-RTO: 17.8 % (ref 11.5–14.9)
PLATELET # BLD: 215 K/UL (ref 150–450)
PMV BLD AUTO: 8.9 FL (ref 6–12)
POTASSIUM SERPL-SCNC: 4.2 MMOL/L (ref 3.7–5.3)
RBC # BLD: 2.92 M/UL (ref 4–5.2)
SODIUM BLD-SCNC: 137 MMOL/L (ref 135–144)
SPECIMEN DESCRIPTION: ABNORMAL
STATUS: ABNORMAL
WBC # BLD: 16.8 K/UL (ref 3.5–11)

## 2018-11-23 PROCEDURE — 6370000000 HC RX 637 (ALT 250 FOR IP): Performed by: INTERNAL MEDICINE

## 2018-11-23 PROCEDURE — 6360000002 HC RX W HCPCS: Performed by: INTERNAL MEDICINE

## 2018-11-23 PROCEDURE — 86140 C-REACTIVE PROTEIN: CPT

## 2018-11-23 PROCEDURE — 99232 SBSQ HOSP IP/OBS MODERATE 35: CPT | Performed by: INTERNAL MEDICINE

## 2018-11-23 PROCEDURE — 6370000000 HC RX 637 (ALT 250 FOR IP): Performed by: NURSE PRACTITIONER

## 2018-11-23 PROCEDURE — 2060000000 HC ICU INTERMEDIATE R&B

## 2018-11-23 PROCEDURE — 2580000003 HC RX 258: Performed by: INTERNAL MEDICINE

## 2018-11-23 PROCEDURE — 94003 VENT MGMT INPAT SUBQ DAY: CPT

## 2018-11-23 PROCEDURE — 80048 BASIC METABOLIC PNL TOTAL CA: CPT

## 2018-11-23 PROCEDURE — 85027 COMPLETE CBC AUTOMATED: CPT

## 2018-11-23 PROCEDURE — 87040 BLOOD CULTURE FOR BACTERIA: CPT

## 2018-11-23 RX ADMIN — Medication 10 ML: at 21:00

## 2018-11-23 RX ADMIN — FUROSEMIDE 40 MG: 10 INJECTION, SOLUTION INTRAMUSCULAR; INTRAVENOUS at 21:06

## 2018-11-23 RX ADMIN — APIXABAN 10 MG: 5 TABLET, FILM COATED ORAL at 20:11

## 2018-11-23 RX ADMIN — FUROSEMIDE 40 MG: 10 INJECTION, SOLUTION INTRAMUSCULAR; INTRAVENOUS at 09:49

## 2018-11-23 RX ADMIN — LEVOTHYROXINE SODIUM 250 MCG: 125 TABLET ORAL at 09:49

## 2018-11-23 RX ADMIN — PREDNISONE 20 MG: 20 TABLET ORAL at 09:49

## 2018-11-23 RX ADMIN — Medication 10 ML: at 09:49

## 2018-11-23 RX ADMIN — FERROUS SULFATE TAB 325 MG (65 MG ELEMENTAL FE) 325 MG: 325 (65 FE) TAB at 09:49

## 2018-11-23 RX ADMIN — LINEZOLID 600 MG: 600 TABLET, FILM COATED ORAL at 09:49

## 2018-11-23 RX ADMIN — APIXABAN 10 MG: 5 TABLET, FILM COATED ORAL at 09:49

## 2018-11-23 RX ADMIN — MULTIPLE VITAMINS W/ MINERALS TAB 1 TABLET: TAB at 09:29

## 2018-11-23 RX ADMIN — LINEZOLID 600 MG: 600 TABLET, FILM COATED ORAL at 20:11

## 2018-11-23 RX ADMIN — FERROUS SULFATE TAB 325 MG (65 MG ELEMENTAL FE) 325 MG: 325 (65 FE) TAB at 15:00

## 2018-11-23 RX ADMIN — FERROUS SULFATE TAB 325 MG (65 MG ELEMENTAL FE) 325 MG: 325 (65 FE) TAB at 20:11

## 2018-11-23 RX ADMIN — LEVOFLOXACIN 750 MG: 5 INJECTION, SOLUTION INTRAVENOUS at 01:57

## 2018-11-23 ASSESSMENT — ENCOUNTER SYMPTOMS
WHEEZING: 0
EYES NEGATIVE: 1
DIARRHEA: 0
SHORTNESS OF BREATH: 1
ALLERGIC/IMMUNOLOGIC NEGATIVE: 1
ABDOMINAL PAIN: 0
NAUSEA: 0
CONSTIPATION: 0
ANAL BLEEDING: 0
VOMITING: 0
COUGH: 1

## 2018-11-23 NOTE — PROGRESS NOTES
Pt asked nurse if bed changing could be done later in the a.m. Because she was too tired to help out. RN ok with pt request. Will relay to day time RN.

## 2018-11-23 NOTE — PROGRESS NOTES
Infectious disease Consult Note      Patient: Henry Morgan  : 1970  Acct#:  [id number]     Date:  2018    Subjective:       History of Present Illness  Patient is a 57 y.o.  female admitted with Acute congestive heart failure, unspecified heart failure type ([facility] [I50.9] who is seen in consult for cellulitis, possible pneumonia. Patient reports that last week she was eating lunch  In the cafeteria at [facility] where she resides and suddenly became diaphoretic, febrile, experiencing chill and was asked to be taken to her room, she states that for the next several days she continued to have severe diaphoresis, requiring multiple fans air-conditioning and open windows. She also reports that over the same time. She has been having cough and has been experiencing shortness of breath. Patient reports that approximately 2 days after the onset of her symptoms she noticed that her left lower extremity began to become red and then subsequently developed multiple blisters/wounds over her leg. She reports that she has had episodes of cellulitis in the past.  She reports that she has an allergy to both vancomycin and Zosyn she is unclear on the type of reaction that she had she believes that the vancomycin caused her to become red, bruised and have GI distress. Patient reports that she continues to have subjective fevers, chills, diaphoresis. Review of systems is negative except as mentioned above.     Interval History: 18  No acute events overnight  Afebrile  Leukocytosis trending down   SOB/cough improving    1/2 Bcx positive for G+ cocci in pairs and chains    Past Medical History:   Diagnosis Date    Anemia     Disease of blood and blood forming organ     History of breast cancer     History of chemotherapy     Hypothyroidism     Lymphedema     Chronic    Respiratory failure ([facility]     Tracheostomy present [facility])       Past Surgical History:   Procedure urinating, dysuria, frequency and urgency. Musculoskeletal: Positive for arthralgias. Skin:        LLE erythema and edema    Allergic/Immunologic: Negative. Neurological: Negative for dizziness, weakness, light-headedness and headaches. Hematological: Negative. Psychiatric/Behavioral: Negative. Tolerating antibiotics. Physical Exam  /71   Pulse 90   Temp 97.3 °F (36.3 °C) (Tympanic)   Resp 18   Ht 5' 5\" (1.651 m)   Wt (!) 626 lb 6.4 oz (284.1 kg)   LMP  (LMP Unknown)   SpO2 100%   .24 kg/m²           General Appearance: alert and oriented to person, place and time, well-developed and well-nourished, in no acute distress  Skin: LLE cellulitis with multiple blisters   Head: normocephalic and atraumatic  Eyes: pupils equal, round, and reactive to light, extraocular eye movements intact, conjunctivae normal  ENT: hearing grossly normal bilaterally  Neck: neck supple and non tender without mass, no thyromegaly or thyroid nodules, no cervical lymphadenopathy   Pulmonary/Chest: Tracheostomy.  Breath sounds diminished due to body habitus   Cardiovascular: normal rate, regular rhythm, normal S1 and S2, no murmurs, rubs, clicks or gallops, distal pulses intact, no carotid bruits  Abdomen: soft, non-tender, non-distended, normal bowel sounds, no masses or organomegaly  Extremities: Right LE edematous, erythematous   Musculoskeletal: normal range of motion, no joint swelling, deformity or tenderness  Neurologic: no cranial nerve deficit and muscle strength normal    Data Review:    Recent Labs      11/21/18   0503  11/22/18   0447  11/23/18   0802   WBC  18.7*  20.7*  16.8*   HGB  8.1*  8.5*  8.3*   HCT  26.9*  27.2*  27.1*   MCV  93.1  92.6  93.0   PLT  166  198  215     Recent Labs      11/21/18   0503  11/22/18   0447  11/23/18   0802   NA  139  138  137   K  3.7  4.4  4.2   CL  98  98  96*   CO2  29  30  32*   BUN  54*  51*  51*   CREATININE  1.52*  1.38*  1.25*     No results for input(s): AST, ALT, ALB, BILIDIR, BILITOT, ALKPHOS in the last 72 hours. No results for input(s): LIPASE, AMYLASE in the last 72 hours. No results for input(s): PROTIME, INR in the last 72 hours. No results for input(s): PTT in the last 72 hours. No results for input(s): OCCULTBLD in the last 72 hours. No results for input(s): GLUMET in the last 72 hours. Imaging Studies:                           All appropriate imaging studies and reports reviewed: Yes                 Assessment:     Active Problems:    Shortness of breath    Hypothyroidism    Acute congestive heart failure (HCC)    BMI 70 and over, adult (HCC)    Chronic respiratory failure (HCC)    Tracheostomy present ([facility]    Lymphedema    Cellulitis of left lower extremity    Chest pain    Cellulitis  Resolved Problems:    * No resolved hospital problems. *    Pt reports allergy to vancomycin and zosyn     Recommendations:   Continue Zyvox for cellulitis  Continue Levaquin for now for possible pneumonia   Await Bcx ID and sensitivities, 1/2 positive for G+ cocci in pairs/chains   F/u repeat blood cultures       Thank you for allowing me to participate in the care of your patient. Please feel free to contact me with any questions or concerns. Hussain Gutierrez MD  PGY-2 FM Resident   Attending Physician Statement  I have discussed the care of the patient, including pertinent history and exam findings,  with the resident. I have reviewed the key elements of all parts of the encounter with the resident. I agree with the assessment, plan and orders as documented by the resident.     Julissa Fowler

## 2018-11-23 NOTE — PROGRESS NOTES
Nephrology Progress Note    Subjective/   57y.o. year old female who we are seeing in consultation for LILIAN. This is a 57 y.o. female resident of [id number] [place] [address] with history of HTN, CHF, hx of right breast cancer, Obesity, hypoventilation syndrome and chronic respiratory failure S/P trach  on ventilator who presented with flu like illness, cough and was noted to have elevated d dimer.  The patient states that she's been sick for the last week. Debi Murcia says that she started out with chills and then a productive cough.  She says that she has developed a retrosternal chest pressure like pain present for the last 3 days with worsening SOB. She is bed bound and non ambulatory. She is empirically being tx with apixaban for possible DVT/PE-she is technically not able to get chest CT scan or venous dopplers of LE . She presented with BUN/creatinine of 52/2.9 mg/dl with potassium of 4.4 -she had been on lasix/bumex at the [facility] as well as well as ibuprofen. She indicates poor oral intake but denies  nausea, vomiting, diarrhea , flank pain or abdominal pain. She has draining secretions from under right chest wall  and WBC is elevated. Pt denies any history of  prolonged NSAID use. Patient denies dysuria, gross hematuria, flank pain, nocturia, urgency, passing frothy urine or urinary incontinence. There has been no recent exposure to IV contrast.   Her UA showed few uric acid crystals 2-5 wbc and 2-5 RBC. Interval history: Patient was seen and examined today and breathing is improved. She has chronic respiratory failure with tracheostomy and is on IV Lasix 40 mg twice daily. Pt on linezolid and Levaquin for left LE cellulitis possibly pneumonia.     Objective/     Vitals:    11/22/18 1945 11/22/18 2358 11/23/18 0500 11/23/18 0741   BP: 136/73 112/66  121/71   Pulse: 92 91  90   Resp: 18 20 18 18   Temp: 97.8 °F (36.6 °C) 97.9 °F (36.6 °C)  97.3 °F (36.3 °C)   TempSrc: Axillary Oral  Tympanic   SpO2: 98% 98% 100% 100%

## 2018-11-23 NOTE — PROGRESS NOTES
urination, hot or cold intolerance  MUSCULOSKELETAL:  negative joint pains, muscle aches, swelling of joints  NEUROLOGICAL:  negative for headaches, dizziness, lightheadedness, numbness, pain, tingling extremities      Physical Exam:   /71   Pulse 90   Temp 97.3 °F (36.3 °C) (Tympanic)   Resp 18   Ht 5' 5\" (1.651 m)   Wt (!) 626 lb 6.4 oz (284.1 kg)   LMP  (LMP Unknown)   SpO2 100%   .24 kg/m²   No results for input(s): POCGLU in the last 72 hours. General Appearance: Morbid obesity  Mental status: oriented to person, place, and time with normal affect  Head:  normocephalic, atraumatic. Eye: no icterus, redness, pupils equal and reactive, extraocular eye movements intact, conjunctiva clear  Ear: normal external ear, no discharge, hearing intact  Nose:  no drainage noted  Mouth: mucous membranes moist  Neck: supple, no carotid bruits, thyroid not palpable  Lungs: Bilateral equal air entry, clear to ausculation, no wheezing, rales or rhonchi, normal effort  Cardiovascular: normal rate, regular rhythm, no murmur, gallop, rub.   Abdomen: Soft, nontender, nondistended, normal bowel sounds, no hepatomegaly or splenomegaly  Neurologic: There are no new focal motor or sensory deficits, normal muscle tone and bulk, no abnormal sensation, normal speech, cranial nerves II through XII grossly intact  She is bedbound and unable to check the gait  Skin: Diffuse nodular growth in the right middle axillary region suspicious for malignancy  Extremities:  Bilateral lower extremity diffuse obesity with status is dermatitis  Investigations:      Laboratory Testing:  Recent Results (from the past 24 hour(s))   Basic Metabolic Prof    Collection Time: 11/23/18  8:02 AM   Result Value Ref Range    Glucose 112 (H) 70 - 99 mg/dL    BUN 51 (H) 6 - 20 mg/dL    CREATININE 1.25 (H) 0.50 - 0.90 mg/dL    Bun/Cre Ratio NOT REPORTED 9 - 20    Calcium 9.3 8.6 - 10.4 mg/dL    Sodium 137 135 - 144 mmol/L    Potassium 4.2 3.7 - 5.3 mmol/L    Chloride 96 (L) 98 - 107 mmol/L    CO2 32 (H) 20 - 31 mmol/L    Anion Gap 9 9 - 17 mmol/L    GFR Non-African American 46 (L) >60 mL/min    GFR  56 (L) >60 mL/min    GFR Comment          GFR Staging NOT REPORTED    CBC    Collection Time: 11/23/18  8:02 AM   Result Value Ref Range    WBC 16.8 (H) 3.5 - 11.0 k/uL    RBC 2.92 (L) 4.0 - 5.2 m/uL    Hemoglobin 8.3 (L) 12.0 - 16.0 g/dL    Hematocrit 27.1 (L) 36 - 46 %    MCV 93.0 80 - 100 fL    MCH 28.5 26 - 34 pg    MCHC 30.7 (L) 31 - 37 g/dL    RDW 17.8 (H) 11.5 - 14.9 %    Platelets 620 981 - 114 k/uL    MPV 8.9 6.0 - 12.0 fL    NRBC Automated NOT REPORTED per 100 WBC       Recent Labs      11/23/18   0802   11/18/18   2127   11/17/18   1540   HGB  8.3*   < >   --    --   9.3*   HCT  27.1*   < >   --    --   28.5*   WBC  16.8*   < >   --    --   8.6   MCV  93.0   < >   --    --   89.2   NA  137   < >   --    --   134*   K  4.2   < >   --    --   4.4   CL  96*   < >   --    --   93*   CO2  32*   < >   --    --   26   BUN  51*   < >   --    --   52*   CREATININE  1.25*   < >   --    --   2.90*   GLUCOSE  112*   < >   --    --   120*   APTT   --    --   38.7*   < >   --    AST   --    --    --    --   24   ALT   --    --    --    --   36*   LABALBU   --    --    --    --   3.0*    < > = values in this interval not displayed.        Hematology:  Recent Labs      11/21/18   0503  11/22/18   0447  11/23/18   0802   WBC  18.7*  20.7*  16.8*   RBC  2.89*  2.93*  2.92*   HGB  8.1*  8.5*  8.3*   HCT  26.9*  27.2*  27.1*   MCV  93.1  92.6  93.0   MCH  28.0  28.8  28.5   MCHC  30.1*  31.1  30.7*   RDW  17.7*  17.4*  17.8*   PLT  166  198  215   MPV  8.9  8.9  8.9   CRP  418.6*   --    --      Chemistry:  Recent Labs      11/21/18   0503  11/22/18   0447  11/23/18   0802   NA  139  138  137   K  3.7  4.4  4.2   CL  98  98  96*   CO2  29  30  32*   GLUCOSE  153*  160*  112*   BUN  54*  51*  51*   CREATININE  1.52*  1.38*  1.25*   ANIONGAP  12  10  9 LABGLOM  37*  41*  46*   GFRAA  45*  50*  56*   CALCIUM  9.4  9.3  9.3     Recent Labs      11/21/18   0503   PROT  6.9   LDH  166   URICACID  11.4*       Imaging/Diagnostics:       Xr Chest Portable    Result Date: 11/17/2018  EXAMINATION: SINGLE XRAY VIEW OF THE CHEST 11/17/2018 2:42 pm COMPARISON: 05/15/2018, 612 hours HISTORY: ORDERING SYSTEM PROVIDED HISTORY: Chest Pain TECHNOLOGIST PROVIDED HISTORY: Chest Pain Ordering Physician Provided Reason for Exam: Chest pain. Pt weighs 612 LBS Acuity: Unknown Type of Exam: Unknown 80-year-old female with chest pain FINDINGS: AP portable view of the chest. Cardiac monitor leads overlie the chest. Right internal jugular approach Port-A-Cath distal tip overlying the lower SVC. Rectangular radiodensities projecting over the left upper quadrant likely external to the patient. Tracheostomy tube distal tip overlying the mid trachea approximately 3.8 cm above the level of the susan. Stable moderate cardiomegaly. Bilateral parahilar and lower zone predominant airspace opacities. No sizable pleural effusions. No obvious pneumothorax or mediastinal shift within the limitations of this exam.  Visualized osseous structures remain unchanged. Surgical clips project over the right axillary region. 1. Stable moderate cardiomegaly with bilateral parahilar and lower zone predominant airspace disease. Findings most likely represent mild pulmonary edema. Underlying infiltrate not excluded. Follow-up recommended to document resolution. 2. Tracheostomy tube and right internal jugular approach Port-A-Cath as above. Impressions :      1. Active Problems:    Shortness of breath    Hypothyroidism    Acute congestive heart failure (HCC)    BMI 70 and over, adult (HCC)    Chronic respiratory failure (HCC)    Tracheostomy present ([facility]    Lymphedema    Cellulitis of left lower extremity    Chest pain    Cellulitis  Resolved Problems:    * No resolved hospital problems.

## 2018-11-23 NOTE — FLOWSHEET NOTE
Patient was feeling much better today and spirits have improved. Writer gave patient an Muslim devotional book as patient is avid reader. 11/23/18 1205   Encounter Summary   Services provided to: Patient   Referral/Consult From: [address] [place] [address] Street Children;/;Parent   Continue Visiting (11-23-18)   Complexity of Encounter Low   Length of Encounter 15 minutes   Spiritual Assessment Completed Yes   Routine   Type Follow up   Spiritual/Muslim   Type Spiritual support   Assessment Calm; Approachable   Intervention Active listening;Provided reading materials/devotional materials;Sustaining presence/ Ministry of presence   Outcome Expressed gratitude;Engaged in conversation;Coping

## 2018-11-23 NOTE — PROGRESS NOTES
weight adjusted for  obesity  · BMI Classification: BMI > or equal to 40.0 Obese Class III    Nutrition Interventions:   Continue current diet  Continued Inpatient Monitoring    Nutrition Evaluation:   · Evaluation: Progressing toward goals   · Goals: PO intake % of meals to meet estimated nutrition needs. · Monitoring: Meal Intake, Diet Tolerance, Wound Healing, Weight, Pertinent Labs, Monitor Hemodynamic Status    JEWEL Garcia R.D.   Clinical Dietitian  Pager: [phone]

## 2018-11-24 LAB
ANION GAP SERPL CALCULATED.3IONS-SCNC: 9 MMOL/L (ref 9–17)
BUN BLDV-MCNC: 48 MG/DL (ref 6–20)
BUN/CREAT BLD: ABNORMAL (ref 9–20)
C-REACTIVE PROTEIN: 84.7 MG/L (ref 0–5)
CALCIUM SERPL-MCNC: 9 MG/DL (ref 8.6–10.4)
CHLORIDE BLD-SCNC: 99 MMOL/L (ref 98–107)
CO2: 31 MMOL/L (ref 20–31)
CREAT SERPL-MCNC: 1.21 MG/DL (ref 0.5–0.9)
GFR AFRICAN AMERICAN: 58 ML/MIN
GFR NON-AFRICAN AMERICAN: 48 ML/MIN
GFR SERPL CREATININE-BSD FRML MDRD: ABNORMAL ML/MIN/{1.73_M2}
GFR SERPL CREATININE-BSD FRML MDRD: ABNORMAL ML/MIN/{1.73_M2}
GLUCOSE BLD-MCNC: 158 MG/DL (ref 70–99)
HCT VFR BLD CALC: 27.2 % (ref 36–46)
HEMOGLOBIN: 8.4 G/DL (ref 12–16)
MCH RBC QN AUTO: 28.8 PG (ref 26–34)
MCHC RBC AUTO-ENTMCNC: 31 G/DL (ref 31–37)
MCV RBC AUTO: 93 FL (ref 80–100)
NRBC AUTOMATED: ABNORMAL PER 100 WBC
PDW BLD-RTO: 17.1 % (ref 11.5–14.9)
PLATELET # BLD: 237 K/UL (ref 150–450)
PMV BLD AUTO: 8 FL (ref 6–12)
POTASSIUM SERPL-SCNC: 4.9 MMOL/L (ref 3.7–5.3)
RBC # BLD: 2.93 M/UL (ref 4–5.2)
SODIUM BLD-SCNC: 139 MMOL/L (ref 135–144)
URIC ACID: 10.7 MG/DL (ref 2.4–5.7)
WBC # BLD: 14.3 K/UL (ref 3.5–11)

## 2018-11-24 PROCEDURE — 6370000000 HC RX 637 (ALT 250 FOR IP): Performed by: INTERNAL MEDICINE

## 2018-11-24 PROCEDURE — 6360000002 HC RX W HCPCS: Performed by: INTERNAL MEDICINE

## 2018-11-24 PROCEDURE — 80048 BASIC METABOLIC PNL TOTAL CA: CPT

## 2018-11-24 PROCEDURE — 94761 N-INVAS EAR/PLS OXIMETRY MLT: CPT

## 2018-11-24 PROCEDURE — 2700000000 HC OXYGEN THERAPY PER DAY

## 2018-11-24 PROCEDURE — 2060000000 HC ICU INTERMEDIATE R&B

## 2018-11-24 PROCEDURE — 6370000000 HC RX 637 (ALT 250 FOR IP): Performed by: NURSE PRACTITIONER

## 2018-11-24 PROCEDURE — 94003 VENT MGMT INPAT SUBQ DAY: CPT

## 2018-11-24 PROCEDURE — 99239 HOSP IP/OBS DSCHRG MGMT >30: CPT | Performed by: INTERNAL MEDICINE

## 2018-11-24 PROCEDURE — 84550 ASSAY OF BLOOD/URIC ACID: CPT

## 2018-11-24 PROCEDURE — 85027 COMPLETE CBC AUTOMATED: CPT

## 2018-11-24 PROCEDURE — 86140 C-REACTIVE PROTEIN: CPT

## 2018-11-24 PROCEDURE — 94640 AIRWAY INHALATION TREATMENT: CPT

## 2018-11-24 PROCEDURE — 31720 CLEARANCE OF AIRWAYS: CPT

## 2018-11-24 PROCEDURE — 94760 N-INVAS EAR/PLS OXIMETRY 1: CPT

## 2018-11-24 PROCEDURE — 99232 SBSQ HOSP IP/OBS MODERATE 35: CPT | Performed by: INTERNAL MEDICINE

## 2018-11-24 PROCEDURE — 2580000003 HC RX 258: Performed by: INTERNAL MEDICINE

## 2018-11-24 PROCEDURE — 31502 CHANGE OF WINDPIPE AIRWAY: CPT

## 2018-11-24 RX ADMIN — LINEZOLID 600 MG: 600 TABLET, FILM COATED ORAL at 21:18

## 2018-11-24 RX ADMIN — MULTIPLE VITAMINS W/ MINERALS TAB 1 TABLET: TAB at 10:03

## 2018-11-24 RX ADMIN — LINEZOLID 600 MG: 600 TABLET, FILM COATED ORAL at 10:02

## 2018-11-24 RX ADMIN — FUROSEMIDE 40 MG: 10 INJECTION, SOLUTION INTRAMUSCULAR; INTRAVENOUS at 18:25

## 2018-11-24 RX ADMIN — IPRATROPIUM BROMIDE AND ALBUTEROL SULFATE 3 ML: .5; 3 SOLUTION RESPIRATORY (INHALATION) at 07:14

## 2018-11-24 RX ADMIN — Medication 10 ML: at 10:08

## 2018-11-24 RX ADMIN — APIXABAN 10 MG: 5 TABLET, FILM COATED ORAL at 21:18

## 2018-11-24 RX ADMIN — LEVOFLOXACIN 750 MG: 5 INJECTION, SOLUTION INTRAVENOUS at 02:31

## 2018-11-24 RX ADMIN — FERROUS SULFATE TAB 325 MG (65 MG ELEMENTAL FE) 325 MG: 325 (65 FE) TAB at 18:25

## 2018-11-24 RX ADMIN — LEVOTHYROXINE SODIUM 250 MCG: 125 TABLET ORAL at 10:02

## 2018-11-24 RX ADMIN — Medication 10 ML: at 21:18

## 2018-11-24 RX ADMIN — APIXABAN 10 MG: 5 TABLET, FILM COATED ORAL at 10:02

## 2018-11-24 RX ADMIN — FUROSEMIDE 40 MG: 10 INJECTION, SOLUTION INTRAMUSCULAR; INTRAVENOUS at 10:03

## 2018-11-24 RX ADMIN — PREDNISONE 20 MG: 20 TABLET ORAL at 10:02

## 2018-11-24 ASSESSMENT — PAIN SCALES - GENERAL: PAINLEVEL_OUTOF10: 0

## 2018-11-24 ASSESSMENT — ENCOUNTER SYMPTOMS
SHORTNESS OF BREATH: 0
EYE DISCHARGE: 0
EYE ITCHING: 0
DIARRHEA: 0
COLOR CHANGE: 1
ABDOMINAL PAIN: 0
SORE THROAT: 0

## 2018-11-24 NOTE — PROGRESS NOTES
Pts leg cleaned with soap and water. purewick changed. Dressing on rt side changed. Partial bed change done per pt request. Pt wants full bed change done in afternoon. Pt educated on needing to stay dry.

## 2018-11-24 NOTE — PROGRESS NOTES
Historical Provider, MD   levothyroxine (SYNTHROID) 200 MCG tablet Take 250 mcg by mouth daily    Yes Historical Provider, MD   ferrous sulfate 325 (65 Fe) MG tablet Take 325 mg by mouth 3 times daily (with meals)   Yes Historical Provider, MD   Multiple Vitamins-Minerals (THERAPEUTIC MULTIVITAMIN-MINERALS) tablet Take 1 tablet by mouth daily (with breakfast)   Yes Historical Provider, MD   benzonatate (TESSALON) 100 MG capsule Take 100 mg by mouth 2 times daily as needed for Cough    Historical Provider, MD   ipratropium-albuterol (DUONEB) 0.5-2.5 (3) MG/3ML SOLN nebulizer solution Inhale 1 vial into the lungs every 4 hours as needed for Shortness of Breath    Historical Provider, MD        Allergies:     Zosyn [piperacillin-tazobactam in dex] and Vancomycin    Social History:     Tobacco:    reports that she has never smoked. She has never used smokeless tobacco.  Alcohol:      reports that she does not drink alcohol. Drug Use:  reports that she does not use drugs. Family History:     Family History   Problem Relation Age of Onset    Breast Cancer Paternal Aunt     Breast Cancer Paternal Cousin 43    Breast Cancer Paternal Cousin 37    Breast Cancer Paternal Cousin 46       Review of Systems:     Positive and Negative as described in HPI. CONSTITUTIONAL:  negative for fevers, chills, sweats, fatigue, weight loss  HEENT:  negative for vision, hearing changes, runny nose, throat pain  RESPIRATORY:  Positive for dyspnea cough CARDIOVASCULAR:  negative for chest pain, palpitations.   GASTROINTESTINAL:  negative for nausea, vomiting, diarrhea, constipation, change in bowel habits, abdominal pain   GENITOURINARY:  negative for difficulty of urination, burning with urination, frequency   INTEGUMENT:  negative for rash, skin lesions, easy bruising   HEMATOLOGIC/LYMPHATIC:  negative for swelling/edema   ALLERGIC/IMMUNOLOGIC:  negative for urticaria , itching  ENDOCRINE:  negative increase in drinking, increase in mmol/L    Chloride 99 98 - 107 mmol/L    CO2 31 20 - 31 mmol/L    Anion Gap 9 9 - 17 mmol/L    GFR Non-African American 48 (L) >60 mL/min    GFR  58 (L) >60 mL/min    GFR Comment          GFR Staging NOT REPORTED    CBC    Collection Time: 11/24/18  4:54 AM   Result Value Ref Range    WBC 14.3 (H) 3.5 - 11.0 k/uL    RBC 2.93 (L) 4.0 - 5.2 m/uL    Hemoglobin 8.4 (L) 12.0 - 16.0 g/dL    Hematocrit 27.2 (L) 36 - 46 %    MCV 93.0 80 - 100 fL    MCH 28.8 26 - 34 pg    MCHC 31.0 31 - 37 g/dL    RDW 17.1 (H) 11.5 - 14.9 %    Platelets 080 832 - 690 k/uL    MPV 8.0 6.0 - 12.0 fL    NRBC Automated NOT REPORTED per 100 WBC   C-Reactive Protein    Collection Time: 11/24/18  4:54 AM   Result Value Ref Range    CRP 84.7 (H) 0.0 - 5.0 mg/L       Recent Labs      11/24/18   0454 11/18/18 2127   11/17/18   1540   HGB  8.4*   < >   --    --   9.3*   HCT  27.2*   < >   --    --   28.5*   WBC  14.3*   < >   --    --   8.6   MCV  93.0   < >   --    --   89.2   NA  139   < >   --    --   134*   K  4.9   < >   --    --   4.4   CL  99   < >   --    --   93*   CO2  31   < >   --    --   26   BUN  48*   < >   --    --   52*   CREATININE  1.21*   < >   --    --   2.90*   GLUCOSE  158*   < >   --    --   120*   APTT   --    --   38.7*   < >   --    AST   --    --    --    --   24   ALT   --    --    --    --   36*   LABALBU   --    --    --    --   3.0*    < > = values in this interval not displayed.        Hematology:  Recent Labs      11/22/18   0447  11/23/18   0802  11/24/18 0454   WBC  20.7*  16.8*  14.3*   RBC  2.93*  2.92*  2.93*   HGB  8.5*  8.3*  8.4*   HCT  27.2*  27.1*  27.2*   MCV  92.6  93.0  93.0   MCH  28.8  28.5  28.8   MCHC  31.1  30.7*  31.0   RDW  17.4*  17.8*  17.1*   PLT  198  215  237   MPV  8.9  8.9  8.0   CRP   --   148.6*  84.7*     Chemistry:  Recent Labs      11/22/18   0447  11/23/18   0802  11/24/18   0454   NA  138  137  139   K  4.4  4.2  4.9   CL  98  96*  99   CO2  30  32*  31   GLUCOSE 160*  112*  158*   BUN  51*  51*  48*   CREATININE  1.38*  1.25*  1.21*   ANIONGAP  10  9  9   LABGLOM  41*  46*  48*   GFRAA  50*  56*  58*   CALCIUM  9.3  9.3  9.0     No results for input(s): PROT, LABALBU, LABA1C, C4EPSCZ, R3TIDXR, FT4, TSH, AST, ALT, LDH, GGT, ALKPHOS, LABGGT, BILITOT, BILIDIR, AMMONIA, AMYLASE, LIPASE, LACTATE, CHOL, HDL, LDLCHOLESTEROL, CHOLHDLRATIO, TRIG, VLDL, [id number], PHENYTOIN, PHENYF, URICACID, POCGLU in the last 72 hours. Imaging/Diagnostics:       Xr Chest Portable    Result Date: 11/17/2018  EXAMINATION: SINGLE XRAY VIEW OF THE CHEST 11/17/2018 2:42 pm COMPARISON: 05/15/2018, 612 hours HISTORY: ORDERING SYSTEM PROVIDED HISTORY: Chest Pain TECHNOLOGIST PROVIDED HISTORY: Chest Pain Ordering Physician Provided Reason for Exam: Chest pain. Pt weighs 612 LBS Acuity: Unknown Type of Exam: Unknown 82-year-old female with chest pain FINDINGS: AP portable view of the chest. Cardiac monitor leads overlie the chest. Right internal jugular approach Port-A-Cath distal tip overlying the lower SVC. Rectangular radiodensities projecting over the left upper quadrant likely external to the patient. Tracheostomy tube distal tip overlying the mid trachea approximately 3.8 cm above the level of the susan. Stable moderate cardiomegaly. Bilateral parahilar and lower zone predominant airspace opacities. No sizable pleural effusions. No obvious pneumothorax or mediastinal shift within the limitations of this exam.  Visualized osseous structures remain unchanged. Surgical clips project over the right axillary region. 1. Stable moderate cardiomegaly with bilateral parahilar and lower zone predominant airspace disease. Findings most likely represent mild pulmonary edema. Underlying infiltrate not excluded. Follow-up recommended to document resolution. 2. Tracheostomy tube and right internal jugular approach Port-A-Cath as above. Impressions :      1.  Active Problems:    Shortness of

## 2018-11-24 NOTE — PROGRESS NOTES
extremity which progressed to multiple blisters over her leg. She has had episodes of cellulitis in the past and reports allergies to vanco and Zosyn. Interval [id number]   Afebrile. Leukocytosis trending down. Multiple intact, few broken blisters noted to R foot, traveling up right leg to hip. No warmth, underlying erythema. Midline clean. In antecube. Will need to monitor for infiltration. No chills. Eating, drinking well. Summary of relevant labs:  Labs:  CRP: 418.6-84.7  WBC: 8.6-17.1-18.7-20.7-14.3     Midline placed in IR  Micro:   Urine cx-neg   BC 1/2 + Strep B   BC x 2- neg. So far        Imagin/21 CXR     FINDINGS:   Tracheostomy identified, stable.  Right chest port catheter extends to the   superior atrial caval junction.       There is stable enlargement of cardiac silhouette.  There is similar   prominence and indistinctness of the pulmonary vascular markings throughout   the lungs.  No focal airspace consolidation, sizeable pleural effusion or   pneumothorax.  Right axillary clips identified. Visualized osseous structures   appear intact and grossly unremarkable, given the non dedicated imaging.           Impression   1. Expected positions of medical support devices. 2. Similar findings most suggestive of moderate pulmonary vascular   congestion.  Superimposed atelectasis and/or pneumonia is not excluded. Continued imaging follow-up is recommended           I have personally reviewed the past medical history, past surgical history, medications, social history, and family history, and I haveupdated the database accordingly.   Past Medical History:     Past Medical History:   Diagnosis Date    Anemia     Disease of blood and blood forming organ     History of breast cancer     History of chemotherapy     Hypothyroidism     Lymphedema     Chronic    Respiratory failure (HCC)     Tracheostomy present [facility])        Past Surgical  History:     Past Surgical History:   Procedure Laterality Date    MASTECTOMY, MODIFIED RADICAL Right 2013    TRACHEOSTOMY         Medications:      influenza virus vaccine  0.5 mL Intramuscular Once    furosemide  40 mg Intravenous BID    levofloxacin  750 mg Intravenous Q24H    linezolid  600 mg Oral 2 times per day    predniSONE  20 mg Oral Daily    apixaban  10 mg Oral BID    [START ON 11/27/2018] apixaban  5 mg Oral BID    ferrous sulfate  325 mg Oral TID WC    levothyroxine  250 mcg Oral Daily    therapeutic multivitamin-minerals  1 tablet Oral Daily with breakfast    sodium chloride flush  10 mL Intravenous 2 times per day       Social History:     Social History     Social History    Marital status: Single     Spouse name: N/A    Number of children: N/A    Years of education: N/A     Occupational History    Not on file. Social History Main Topics    Smoking status: Never Smoker    Smokeless tobacco: Never Used    Alcohol use No    Drug use: No    Sexual activity: Not Currently     Other Topics Concern    Not on file     Social History Narrative    Lives at [facility] [address]        Family History:     Family History   Problem Relation Age of Onset    Breast Cancer Paternal Aunt     Breast Cancer Paternal Cousin 43    Breast Cancer Paternal Cousin 37    Breast Cancer Paternal Cousin 46        Allergies:   Zosyn [piperacillin-tazobactam in dex] and Vancomycin     Review of Systems:     Review of Systems   Constitutional: Negative for activity change, appetite change and chills. HENT: Negative for congestion and sore throat. Eyes: Negative for discharge and itching. Respiratory: Negative for shortness of breath. Chronic trach. Capped. On trach collar at 35%   Cardiovascular: Positive for leg swelling. Negative for chest pain. Gastrointestinal: Negative for abdominal pain and diarrhea. Endocrine: Negative for cold intolerance and heat intolerance.    Genitourinary: Negative for and thought content normal.         Medical Decision Making:   I have independently reviewed/ordered the following labs:    CBC with Differential: Recent Labs      11/23/18   0802  11/24/18   0454   WBC  16.8*  14.3*   HGB  8.3*  8.4*   HCT  27.1*  27.2*   PLT  215  237     BMP:  Recent Labs      11/23/18   0802  11/24/18   0454   NA  137  139   K  4.2  4.9   CL  96*  99   CO2  32*  31   BUN  51*  48*   CREATININE  1.25*  1.21*     Hepatic Function Panel: No results for input(s): PROT, LABALBU, BILIDIR, IBILI, BILITOT, ALKPHOS, ALT, AST in the last 72 hours. No results for input(s): RPR in the last 72 hours. No results for input(s): HIV in the last 72 hours. No results for input(s): BC in the last 72 hours. Lab Results   Component Value Date    CREATININE 1.21 11/24/2018    GLUCOSE 158 11/24/2018       Detailed results: Thank you for allowing us to participate in the care of this patient. Please call with questions. This note is created with the assistance of a speech recognition program.  While intending to generate adocument that actually reflects the content of the visit, the document can still have some errors including those of syntax and sound a like substitutions which may escape proof reading. It such instances, actual meaningcan be extrapolated by contextual diversion. HOLA Rodriguez - [facility]  Office: [phone]    I have discussed the care of the patient, including pertinent history and exam findings,  with the CNP, I have seen and examined the patient and the key elements of all parts of the encounter have been performed by me. I agree with the assessment, plan and orders as documented by the CNP.     Shahrzad Goff, Infectious Diseases

## 2018-11-24 NOTE — CARE COORDINATION
ONGOING DISCHARGE PLAN:    LSW following for d/c back to [facility]. Pt remains on IV Lasix 40mg BID, IV Levaquin, PO Zyvox, and PO Prednisone. Will continue to follow for additional d/c needs.     Electronically signed by Hamzah De La Torre RN on 11/24/2018 at 2:00 PM

## 2018-11-24 NOTE — FLOWSHEET NOTE
11/24/18 1820   Encounter Summary   Services provided to: Patient   Referral/Consult From: Monty   Continue Visiting (11-24-18)   Complexity of Encounter Low   Length of Encounter 15 minutes   Routine   Type Follow up   Assessment Calm; Approachable   Intervention Active listening;[place];Explored feelings, thoughts, concerns;Sustaining presence/ Ministry of presence   Outcome Comfort;Expressed gratitude;Expressed feelings/needs/concerns;Encouraged   Spiritual/Samaritan   Type Spiritual support

## 2018-11-25 ENCOUNTER — APPOINTMENT (OUTPATIENT)
Dept: GENERAL RADIOLOGY | Age: 48
DRG: 720 | End: 2018-11-25
Payer: MEDICAID

## 2018-11-25 LAB
ABSOLUTE BANDS #: 1.54 K/UL (ref 0–1)
ABSOLUTE EOS #: 0 K/UL (ref 0–0.4)
ABSOLUTE IMMATURE GRANULOCYTE: ABNORMAL K/UL (ref 0–0.3)
ABSOLUTE LYMPH #: 2.82 K/UL (ref 1–4.8)
ABSOLUTE MONO #: 0.38 K/UL (ref 0.1–1.3)
ANION GAP SERPL CALCULATED.3IONS-SCNC: 8 MMOL/L (ref 9–17)
BANDS: 12 % (ref 0–10)
BASOPHILS # BLD: 0 % (ref 0–2)
BASOPHILS ABSOLUTE: 0 K/UL (ref 0–0.2)
BUN BLDV-MCNC: 46 MG/DL (ref 6–20)
BUN/CREAT BLD: ABNORMAL (ref 9–20)
CALCIUM SERPL-MCNC: 8.8 MG/DL (ref 8.6–10.4)
CHLORIDE BLD-SCNC: 99 MMOL/L (ref 98–107)
CO2: 32 MMOL/L (ref 20–31)
CREAT SERPL-MCNC: 1.19 MG/DL (ref 0.5–0.9)
DIFFERENTIAL TYPE: ABNORMAL
EOSINOPHILS RELATIVE PERCENT: 0 % (ref 0–4)
GFR AFRICAN AMERICAN: 59 ML/MIN
GFR NON-AFRICAN AMERICAN: 49 ML/MIN
GFR SERPL CREATININE-BSD FRML MDRD: ABNORMAL ML/MIN/{1.73_M2}
GFR SERPL CREATININE-BSD FRML MDRD: ABNORMAL ML/MIN/{1.73_M2}
GLUCOSE BLD-MCNC: 170 MG/DL (ref 70–99)
HCT VFR BLD CALC: 26.4 % (ref 36–46)
HEMOGLOBIN: 8.2 G/DL (ref 12–16)
IMMATURE GRANULOCYTES: ABNORMAL %
LYMPHOCYTES # BLD: 22 % (ref 24–44)
MCH RBC QN AUTO: 29.1 PG (ref 26–34)
MCHC RBC AUTO-ENTMCNC: 31.1 G/DL (ref 31–37)
MCV RBC AUTO: 93.4 FL (ref 80–100)
METAMYELOCYTES ABSOLUTE COUNT: 0.38 K/UL
METAMYELOCYTES: 3 %
MONOCYTES # BLD: 3 % (ref 1–7)
MORPHOLOGY: ABNORMAL
MYELOCYTES ABSOLUTE COUNT: 0.13 K/UL
MYELOCYTES: 1 %
NRBC AUTOMATED: ABNORMAL PER 100 WBC
PDW BLD-RTO: 17 % (ref 11.5–14.9)
PLATELET # BLD: 271 K/UL (ref 150–450)
PLATELET ESTIMATE: ABNORMAL
PMV BLD AUTO: 8.3 FL (ref 6–12)
POTASSIUM SERPL-SCNC: 4.9 MMOL/L (ref 3.7–5.3)
RBC # BLD: 2.83 M/UL (ref 4–5.2)
RBC # BLD: ABNORMAL 10*6/UL
SEG NEUTROPHILS: 59 % (ref 36–66)
SEGMENTED NEUTROPHILS ABSOLUTE COUNT: 7.55 K/UL (ref 1.3–9.1)
SODIUM BLD-SCNC: 139 MMOL/L (ref 135–144)
WBC # BLD: 12.8 K/UL (ref 3.5–11)
WBC # BLD: ABNORMAL 10*3/UL

## 2018-11-25 PROCEDURE — 6370000000 HC RX 637 (ALT 250 FOR IP): Performed by: INTERNAL MEDICINE

## 2018-11-25 PROCEDURE — 80048 BASIC METABOLIC PNL TOTAL CA: CPT

## 2018-11-25 PROCEDURE — 6360000002 HC RX W HCPCS: Performed by: INTERNAL MEDICINE

## 2018-11-25 PROCEDURE — 85025 COMPLETE CBC W/AUTO DIFF WBC: CPT

## 2018-11-25 PROCEDURE — 2580000003 HC RX 258: Performed by: INTERNAL MEDICINE

## 2018-11-25 PROCEDURE — 6370000000 HC RX 637 (ALT 250 FOR IP): Performed by: NURSE PRACTITIONER

## 2018-11-25 PROCEDURE — 94761 N-INVAS EAR/PLS OXIMETRY MLT: CPT

## 2018-11-25 PROCEDURE — 36592 COLLECT BLOOD FROM PICC: CPT

## 2018-11-25 PROCEDURE — 71045 X-RAY EXAM CHEST 1 VIEW: CPT

## 2018-11-25 PROCEDURE — 99232 SBSQ HOSP IP/OBS MODERATE 35: CPT | Performed by: INTERNAL MEDICINE

## 2018-11-25 PROCEDURE — 2700000000 HC OXYGEN THERAPY PER DAY

## 2018-11-25 PROCEDURE — 94760 N-INVAS EAR/PLS OXIMETRY 1: CPT

## 2018-11-25 PROCEDURE — 2060000000 HC ICU INTERMEDIATE R&B

## 2018-11-25 RX ORDER — ALLOPURINOL 100 MG/1
100 TABLET ORAL DAILY
Status: DISCONTINUED | OUTPATIENT
Start: 2018-11-25 | End: 2018-11-30 | Stop reason: HOSPADM

## 2018-11-25 RX ORDER — DIPHENHYDRAMINE HYDROCHLORIDE 50 MG/ML
25 INJECTION INTRAMUSCULAR; INTRAVENOUS EVERY 8 HOURS PRN
Status: DISCONTINUED | OUTPATIENT
Start: 2018-11-25 | End: 2018-11-25

## 2018-11-25 RX ORDER — DIPHENHYDRAMINE HCL 25 MG
25 TABLET ORAL EVERY 8 HOURS PRN
Status: DISCONTINUED | OUTPATIENT
Start: 2018-11-25 | End: 2018-11-30 | Stop reason: HOSPADM

## 2018-11-25 RX ORDER — FLUCONAZOLE 100 MG/1
150 TABLET ORAL DAILY
Status: COMPLETED | OUTPATIENT
Start: 2018-11-25 | End: 2018-11-26

## 2018-11-25 RX ADMIN — LINEZOLID 600 MG: 600 TABLET, FILM COATED ORAL at 22:03

## 2018-11-25 RX ADMIN — LEVOTHYROXINE SODIUM 250 MCG: 125 TABLET ORAL at 10:37

## 2018-11-25 RX ADMIN — ALLOPURINOL 100 MG: 100 TABLET ORAL at 17:59

## 2018-11-25 RX ADMIN — APIXABAN 10 MG: 5 TABLET, FILM COATED ORAL at 10:37

## 2018-11-25 RX ADMIN — ALTEPLASE 1 MG: 2.2 INJECTION, POWDER, LYOPHILIZED, FOR SOLUTION INTRAVENOUS at 22:04

## 2018-11-25 RX ADMIN — FERROUS SULFATE TAB 325 MG (65 MG ELEMENTAL FE) 325 MG: 325 (65 FE) TAB at 14:20

## 2018-11-25 RX ADMIN — FERROUS SULFATE TAB 325 MG (65 MG ELEMENTAL FE) 325 MG: 325 (65 FE) TAB at 17:59

## 2018-11-25 RX ADMIN — LINEZOLID 600 MG: 600 TABLET, FILM COATED ORAL at 10:37

## 2018-11-25 RX ADMIN — FUROSEMIDE 40 MG: 10 INJECTION, SOLUTION INTRAMUSCULAR; INTRAVENOUS at 10:37

## 2018-11-25 RX ADMIN — PREDNISONE 20 MG: 20 TABLET ORAL at 10:37

## 2018-11-25 RX ADMIN — Medication 10 ML: at 10:38

## 2018-11-25 RX ADMIN — FLUCONAZOLE 150 MG: 100 TABLET ORAL at 23:11

## 2018-11-25 RX ADMIN — Medication 10 ML: at 17:55

## 2018-11-25 RX ADMIN — LEVOFLOXACIN 750 MG: 5 INJECTION, SOLUTION INTRAVENOUS at 01:58

## 2018-11-25 RX ADMIN — MULTIPLE VITAMINS W/ MINERALS TAB 1 TABLET: TAB at 10:37

## 2018-11-25 RX ADMIN — APIXABAN 10 MG: 5 TABLET, FILM COATED ORAL at 22:02

## 2018-11-25 NOTE — PROGRESS NOTES
osseous structures remain unchanged. Surgical clips project over the right axillary region. 1. Stable moderate cardiomegaly with bilateral parahilar and lower zone predominant airspace disease. Findings most likely represent mild pulmonary edema. Underlying infiltrate not excluded. Follow-up recommended to document resolution. 2. Tracheostomy tube and right internal jugular approach Port-A-Cath as above. Impressions :      1. Active Problems:    Shortness of breath    Hypothyroidism    Acute congestive heart failure (HCC)    BMI 70 and over, adult (HCC)    Chronic respiratory failure (HCC)    Tracheostomy present ([address]    Lymphedema    Cellulitis of left lower extremity    Chest pain    Cellulitis    Left leg cellulitis    Bandemia    Septicemia due to group B Streptococcus (HCC)    CRP elevated  Resolved Problems:    * No resolved hospital problems. *        2.  has a past medical history of Anemia; Disease of blood and blood forming organ; History of breast cancer; History of chemotherapy; Hypothyroidism; Lymphedema; Respiratory failure ([address] and Tracheostomy present ([address]      Plans:     Sever morbid obese  Has trach for obesity hypoventilatin syndrome  Hx of right breast cancser with mastectomy  nodulear skin right side of breast  lucio get sug const for possible skin bx  Pt does have risk factor for PE  Morbid obese, poor mobility and ca breast  Heparin drip  D dimer  pulm consult  Acute on chron resp failure  Nov 20  chanel   eliquis  No diuretics  nephro consult  Nov 21  Fever  100.2  Wbc is up  procal is up  Id input  broadspetrum abx if id agrees  eliquis  Mid line   Acute on chronic diastilic chf exacerbation tretd with iv diuretics  Nov 25  On zyvox  No fever  idinpt noted for abx  cotinue current tt            Current Facility-Administered Medications   Medication Dose Route Frequency Provider Last Rate Last Dose    influenza quadrivalent split vaccine (FLUZONE;FLUARIX;FLULAVAL;AFLURIA)

## 2018-11-25 NOTE — CARE COORDINATION
ONGOING DISCHARGE PLAN:    LSW continues to follow for discharge to [facility]. Pt remains on IV Lasix 40mg BID, po Zyvox and po steroids. Will continue to follow for additional d/c needs.     Electronically signed by Salma Aponte RN on 11/25/2018 at 2:45 PM

## 2018-11-26 LAB
ALBUMIN (CALCULATED): 2.3 G/DL (ref 3.2–5.2)
ALBUMIN PERCENT: 33 % (ref 45–65)
ALPHA 1 PERCENT: 7 % (ref 3–6)
ALPHA 2 PERCENT: 18 % (ref 6–13)
ALPHA-1-GLOBULIN: 0.5 G/DL (ref 0.1–0.4)
ALPHA-2-GLOBULIN: 1.2 G/DL (ref 0.5–0.9)
ANION GAP SERPL CALCULATED.3IONS-SCNC: 9 MMOL/L (ref 9–17)
BETA GLOBULIN: 1 G/DL (ref 0.5–1.1)
BETA PERCENT: 14 % (ref 11–19)
BUN BLDV-MCNC: 43 MG/DL (ref 6–20)
BUN/CREAT BLD: ABNORMAL (ref 9–20)
CALCIUM SERPL-MCNC: 8.8 MG/DL (ref 8.6–10.4)
CHLORIDE BLD-SCNC: 98 MMOL/L (ref 98–107)
CO2: 32 MMOL/L (ref 20–31)
CREAT SERPL-MCNC: 1.21 MG/DL (ref 0.5–0.9)
GAMMA GLOBULIN %: 29 % (ref 9–20)
GAMMA GLOBULIN: 2 G/DL (ref 0.5–1.5)
GFR AFRICAN AMERICAN: 58 ML/MIN
GFR NON-AFRICAN AMERICAN: 48 ML/MIN
GFR SERPL CREATININE-BSD FRML MDRD: ABNORMAL ML/MIN/{1.73_M2}
GFR SERPL CREATININE-BSD FRML MDRD: ABNORMAL ML/MIN/{1.73_M2}
GLUCOSE BLD-MCNC: 129 MG/DL (ref 70–99)
HCT VFR BLD CALC: 25 % (ref 36–46)
HEMOGLOBIN: 7.9 G/DL (ref 12–16)
MCH RBC QN AUTO: 29.1 PG (ref 26–34)
MCHC RBC AUTO-ENTMCNC: 31.6 G/DL (ref 31–37)
MCV RBC AUTO: 92 FL (ref 80–100)
NRBC AUTOMATED: ABNORMAL PER 100 WBC
PATHOLOGIST: ABNORMAL
PDW BLD-RTO: 16.6 % (ref 11.5–14.9)
PLATELET # BLD: 272 K/UL (ref 150–450)
PMV BLD AUTO: 7.6 FL (ref 6–12)
POTASSIUM SERPL-SCNC: 5.5 MMOL/L (ref 3.7–5.3)
POTASSIUM SERPL-SCNC: 5.6 MMOL/L (ref 3.7–5.3)
POTASSIUM SERPL-SCNC: 6.2 MMOL/L (ref 3.7–5.3)
PROTEIN ELECTROPHORESIS, SERUM: ABNORMAL
RBC # BLD: 2.72 M/UL (ref 4–5.2)
SODIUM BLD-SCNC: 139 MMOL/L (ref 135–144)
TOTAL PROT. SUM,%: 101 % (ref 98–102)
TOTAL PROT. SUM: 7 G/DL (ref 6.3–8.2)
TOTAL PROTEIN: 6.9 G/DL (ref 6.4–8.3)
WBC # BLD: 9.9 K/UL (ref 3.5–11)

## 2018-11-26 PROCEDURE — 6360000002 HC RX W HCPCS: Performed by: INTERNAL MEDICINE

## 2018-11-26 PROCEDURE — 6370000000 HC RX 637 (ALT 250 FOR IP): Performed by: INTERNAL MEDICINE

## 2018-11-26 PROCEDURE — 94762 N-INVAS EAR/PLS OXIMTRY CONT: CPT

## 2018-11-26 PROCEDURE — 6370000000 HC RX 637 (ALT 250 FOR IP): Performed by: NURSE PRACTITIONER

## 2018-11-26 PROCEDURE — 99232 SBSQ HOSP IP/OBS MODERATE 35: CPT | Performed by: INTERNAL MEDICINE

## 2018-11-26 PROCEDURE — 80048 BASIC METABOLIC PNL TOTAL CA: CPT

## 2018-11-26 PROCEDURE — 2580000003 HC RX 258: Performed by: INTERNAL MEDICINE

## 2018-11-26 PROCEDURE — 84132 ASSAY OF SERUM POTASSIUM: CPT

## 2018-11-26 PROCEDURE — 36415 COLL VENOUS BLD VENIPUNCTURE: CPT

## 2018-11-26 PROCEDURE — 85027 COMPLETE CBC AUTOMATED: CPT

## 2018-11-26 PROCEDURE — 2700000000 HC OXYGEN THERAPY PER DAY

## 2018-11-26 PROCEDURE — 2060000000 HC ICU INTERMEDIATE R&B

## 2018-11-26 PROCEDURE — 51798 US URINE CAPACITY MEASURE: CPT

## 2018-11-26 PROCEDURE — 94003 VENT MGMT INPAT SUBQ DAY: CPT

## 2018-11-26 RX ORDER — SODIUM POLYSTYRENE SULFONATE 15 G/60ML
15 SUSPENSION ORAL; RECTAL ONCE
Status: COMPLETED | OUTPATIENT
Start: 2018-11-26 | End: 2018-11-26

## 2018-11-26 RX ORDER — DEXTROSE MONOHYDRATE 25 G/50ML
25 INJECTION, SOLUTION INTRAVENOUS ONCE
Status: COMPLETED | OUTPATIENT
Start: 2018-11-26 | End: 2018-11-26

## 2018-11-26 RX ADMIN — FUROSEMIDE 40 MG: 10 INJECTION, SOLUTION INTRAMUSCULAR; INTRAVENOUS at 18:08

## 2018-11-26 RX ADMIN — APIXABAN 10 MG: 5 TABLET, FILM COATED ORAL at 22:04

## 2018-11-26 RX ADMIN — FERROUS SULFATE TAB 325 MG (65 MG ELEMENTAL FE) 325 MG: 325 (65 FE) TAB at 12:05

## 2018-11-26 RX ADMIN — MICONAZOLE NITRATE 1 APPLICATOR: 20 CREAM VAGINAL at 22:06

## 2018-11-26 RX ADMIN — FERROUS SULFATE TAB 325 MG (65 MG ELEMENTAL FE) 325 MG: 325 (65 FE) TAB at 18:08

## 2018-11-26 RX ADMIN — LINEZOLID 600 MG: 600 TABLET, FILM COATED ORAL at 10:07

## 2018-11-26 RX ADMIN — FUROSEMIDE 40 MG: 10 INJECTION, SOLUTION INTRAMUSCULAR; INTRAVENOUS at 10:07

## 2018-11-26 RX ADMIN — SODIUM POLYSTYRENE SULFONATE 15 G: 15 SUSPENSION ORAL; RECTAL at 10:07

## 2018-11-26 RX ADMIN — DEXTROSE MONOHYDRATE 25 G: 25 INJECTION, SOLUTION INTRAVENOUS at 22:19

## 2018-11-26 RX ADMIN — ALLOPURINOL 100 MG: 100 TABLET ORAL at 10:06

## 2018-11-26 RX ADMIN — MULTIPLE VITAMINS W/ MINERALS TAB 1 TABLET: TAB at 10:06

## 2018-11-26 RX ADMIN — INSULIN HUMAN 10 UNITS: 100 INJECTION, SOLUTION PARENTERAL at 22:18

## 2018-11-26 RX ADMIN — FLUCONAZOLE 150 MG: 100 TABLET ORAL at 10:05

## 2018-11-26 RX ADMIN — DIPHENHYDRAMINE HCL 25 MG: 25 TABLET ORAL at 00:21

## 2018-11-26 RX ADMIN — LEVOFLOXACIN 750 MG: 5 INJECTION, SOLUTION INTRAVENOUS at 01:52

## 2018-11-26 RX ADMIN — LINEZOLID 600 MG: 600 TABLET, FILM COATED ORAL at 22:04

## 2018-11-26 RX ADMIN — LEVOTHYROXINE SODIUM 250 MCG: 125 TABLET ORAL at 10:05

## 2018-11-26 RX ADMIN — MICONAZOLE NITRATE 1 APPLICATOR: 20 CREAM VAGINAL at 00:22

## 2018-11-26 RX ADMIN — Medication 10 ML: at 22:06

## 2018-11-26 RX ADMIN — DEXTROSE MONOHYDRATE 25 G: 25 INJECTION, SOLUTION INTRAVENOUS at 15:09

## 2018-11-26 RX ADMIN — PATIROMER 8.4 G: 8.4 POWDER, FOR SUSPENSION ORAL at 22:19

## 2018-11-26 RX ADMIN — PREDNISONE 20 MG: 20 TABLET ORAL at 10:07

## 2018-11-26 RX ADMIN — INSULIN HUMAN 10 UNITS: 100 INJECTION, SOLUTION PARENTERAL at 15:09

## 2018-11-26 RX ADMIN — Medication 10 ML: at 00:00

## 2018-11-26 RX ADMIN — APIXABAN 10 MG: 5 TABLET, FILM COATED ORAL at 10:07

## 2018-11-26 ASSESSMENT — ENCOUNTER SYMPTOMS
ABDOMINAL PAIN: 0
COLOR CHANGE: 0
DIARRHEA: 0
EYE DISCHARGE: 0
EYE ITCHING: 0
SORE THROAT: 0

## 2018-11-26 NOTE — FLOWSHEET NOTE
Patient spoke of the people God has brought into her life as she lives away from her family. 11/26/18 1153   Encounter Summary   Services provided to: Patient   Referral/Consult From: [address] [place] [address] Parent; Children;Family members   Continue Visiting (11-26-18)   Complexity of Encounter Low   Length of Encounter 15 minutes   Spiritual Assessment Completed Yes   Routine   Type Follow up   Assessment Calm; Approachable   Intervention Active listening;Explored feelings, thoughts, concerns; Discussed meaning/purpose;Discussed relationship with God   Outcome Expressed gratitude;Engaged in conversation;Coping; Hopeful   Spiritual/Nondenominational   Type Spiritual support

## 2018-11-26 NOTE — PROGRESS NOTES
use No       ALLERGIES    Allergies   Allergen Reactions    Zosyn [Piperacillin-Tazobactam In Dex] Shortness Of Breath     tolerated cefepime 6/2018    Vancomycin Swelling     Lip swelling and redness and itching         MEDICATIONS    No current facility-administered medications on file prior to encounter.       Current Outpatient Prescriptions on File Prior to Encounter   Medication Sig Dispense Refill    bumetanide (BUMEX) 2 MG tablet Take 1 tablet by mouth 2 times daily 30 tablet 3    acetaminophen (TYLENOL) 325 MG tablet Take 650 mg by mouth every 6 hours as needed for Pain      levothyroxine (SYNTHROID) 200 MCG tablet Take 250 mcg by mouth daily       ferrous sulfate 325 (65 Fe) MG tablet Take 325 mg by mouth 3 times daily (with meals)      Multiple Vitamins-Minerals (THERAPEUTIC MULTIVITAMIN-MINERALS) tablet Take 1 tablet by mouth daily (with breakfast)      ipratropium-albuterol (DUONEB) 0.5-2.5 (3) MG/3ML SOLN nebulizer solution Inhale 1 vial into the lungs every 4 hours as needed for Shortness of Breath         Objective    /65   Pulse 73   Temp 97.5 °F (36.4 °C) (Axillary)   Resp 18   Ht 5' 5\" (1.651 m)   Wt (!) 626 lb 6.4 oz (284.1 kg)   LMP  (LMP Unknown)   SpO2 97%   .24 kg/m²     LABS:  WBC:    Lab Results   Component Value Date    WBC 9.9 11/26/2018     H/H:    Lab Results   Component Value Date    HGB 7.9 11/26/2018    HCT 25.0 11/26/2018     PTT:    Lab Results   Component Value Date    APTT 38.7 11/18/2018   [APTT}  PT/INR:    Lab Results   Component Value Date    PROTIME 10.4 01/27/2018    INR 1.0 01/27/2018     HgBA1c:  No results found for: LABA1C    Assessment   Eder Risk Score: Eder Scale Score: 15    Patient Active Problem List   Diagnosis Code    Shortness of breath R06.02    Hypothyroidism E03.9    Bronchitis J40    Acute respiratory failure with hypoxia and hypercapnia (HCC) J96.01, J96.02    Class 3 obesity due to excess calories with serious

## 2018-11-26 NOTE — PROGRESS NOTES
input(s): PROT, LABALBU, BILIDIR, IBILI, BILITOT, ALKPHOS, ALT, AST in the last 72 hours. No results for input(s): RPR in the last 72 hours. No results for input(s): HIV in the last 72 hours. No results for input(s): BC in the last 72 hours. Lab Results   Component Value Date    CREATININE 1.21 11/26/2018    GLUCOSE 129 11/26/2018       Detailed results: Thank you for allowing us to participate in the care of this patient. Please call with questions. This note is created with the assistance of a speech recognition program.  While intending to generate adocument that actually reflects the content of the visit, the document can still have some errors including those of syntax and sound a like substitutions which may escape proof reading. It such instances, actual meaningcan be extrapolated by contextual diversion.     George Kemp MD  Office: [phone]

## 2018-11-26 NOTE — PLAN OF CARE
Problem: Falls - Risk of:  Goal: Will remain free from falls  Will remain free from falls   Outcome: Met This Shift  Pt assessed as a fall risk this shift. Remains free from falls and accidental injury at this time. Fall precautions in place, including falling star sign and fall risk band on pt. Floor free from obstacles, and bed is locked and in lowest position. Adequate lighting provided. Pt encouraged to call before getting OOB for any need. Bed alarm is not activated as pt does not attempt to get OOB; she is on BR. Will continue to monitor needs during hourly rounding, and reinforce education on use of call light. Problem: Risk for Impaired Skin Integrity  Goal: Tissue integrity - skin and mucous membranes  Structural intactness and normal physiological function of skin and  mucous membranes. Outcome: Met This Shift  Pt is on an alternating pressure relief, bariatric mattress. Staff continues to reinforce measures to keep skin clean and free of moisture. Dressings have been replaced and wicking pads are utilized under pt to keep moisture away. Problem: Gas Exchange - Impaired:  Goal: Levels of oxygenation will improve  Levels of oxygenation will improve   Outcome: Ongoing  Pt has not had any issue with oxygenation today. Trach O2 mask has been utilized all day. Pt continues to use vent at night.

## 2018-11-26 NOTE — PROGRESS NOTES
History and Physical Service  [facility] Internal Medicine    Progress note              Date:   11/26/2018  Patient name:  Angela Meredith  MRN:   [id number]  Account:  [de-identified]  YOB: 1970  PCP:    David Costello DO  Code Status:    Full Code    Chief Complaint:     Dyspnea and cough    History Obtained From:     patient    History of Present Illness: The patient is a 57 y.o. Non-/non  female who presents With dyspnea and cough for last 2-3 days patient is severe morbid obesity made over 700 pounds loss in her pounds in last 1 year history of right breast cancer with mastectomy admitted with the dyspnea some pleuritic pain no hemoptysis no palpitation no history of DVT or PE   It is very restricted patient is bedbound because of morbid obesity         Past Medical History:     Past Medical History:   Diagnosis Date    Anemia     Disease of blood and blood forming organ     History of breast cancer     History of chemotherapy     Hypothyroidism     Lymphedema     Chronic    Res[facility] [address]     Tracheostomy present ([facility]         Past Surgical History:     Past Surgical History:   Procedure Laterality Date    MASTECTOMY, MODIFIED RADICAL Right 2013    TRACHEOSTOMY          Medications Prior to Admission:     Prior to Admission medications    Medication Sig Start Date End Date Taking?  Authorizing Provider   furosemide (LASIX) 40 MG tablet Take 40 mg by mouth daily   Yes Historical Provider, MD   ibuprofen (ADVIL;MOTRIN) 800 MG tablet Take 800 mg by mouth every 8 hours as needed for Pain   Yes Historical Provider, MD   doxycycline hyclate (VIBRA-TABS) 100 MG tablet Take 100 mg by mouth 2 times daily For 10 days   Yes Historical Provider, MD   bumetanide (BUMEX) 2 MG tablet Take 1 tablet by mouth 2 times daily 5/17/18  Yes Clarence Ventura MD   acetaminophen (TYLENOL) 325 MG tablet Take 650 mg by mouth every 6 hours as needed for Pain   Yes

## 2018-11-27 LAB
ANION GAP SERPL CALCULATED.3IONS-SCNC: 8 MMOL/L (ref 9–17)
BUN BLDV-MCNC: 40 MG/DL (ref 6–20)
BUN/CREAT BLD: ABNORMAL (ref 9–20)
C-REACTIVE PROTEIN: 16.6 MG/L (ref 0–5)
CALCIUM SERPL-MCNC: 9 MG/DL (ref 8.6–10.4)
CHLORIDE BLD-SCNC: 98 MMOL/L (ref 98–107)
CO2: 32 MMOL/L (ref 20–31)
CREAT SERPL-MCNC: 1.06 MG/DL (ref 0.5–0.9)
CULTURE: NORMAL
GFR AFRICAN AMERICAN: >60 ML/MIN
GFR NON-AFRICAN AMERICAN: 56 ML/MIN
GFR SERPL CREATININE-BSD FRML MDRD: ABNORMAL ML/MIN/{1.73_M2}
GFR SERPL CREATININE-BSD FRML MDRD: ABNORMAL ML/MIN/{1.73_M2}
GLUCOSE BLD-MCNC: 193 MG/DL (ref 70–99)
HCT VFR BLD CALC: 27.5 % (ref 36–46)
HEMOGLOBIN: 8.5 G/DL (ref 12–16)
Lab: NORMAL
MCH RBC QN AUTO: 28.8 PG (ref 26–34)
MCHC RBC AUTO-ENTMCNC: 30.8 G/DL (ref 31–37)
MCV RBC AUTO: 93.4 FL (ref 80–100)
NRBC AUTOMATED: ABNORMAL PER 100 WBC
PDW BLD-RTO: 16.5 % (ref 11.5–14.9)
PLATELET # BLD: 305 K/UL (ref 150–450)
PMV BLD AUTO: 8.1 FL (ref 6–12)
POTASSIUM SERPL-SCNC: 5.5 MMOL/L (ref 3.7–5.3)
POTASSIUM SERPL-SCNC: 6.1 MMOL/L (ref 3.7–5.3)
RBC # BLD: 2.95 M/UL (ref 4–5.2)
SODIUM BLD-SCNC: 138 MMOL/L (ref 135–144)
SPECIMEN DESCRIPTION: NORMAL
STATUS: NORMAL
WBC # BLD: 8.9 K/UL (ref 3.5–11)

## 2018-11-27 PROCEDURE — 85027 COMPLETE CBC AUTOMATED: CPT

## 2018-11-27 PROCEDURE — 31720 CLEARANCE OF AIRWAYS: CPT

## 2018-11-27 PROCEDURE — 6370000000 HC RX 637 (ALT 250 FOR IP): Performed by: INTERNAL MEDICINE

## 2018-11-27 PROCEDURE — 84132 ASSAY OF SERUM POTASSIUM: CPT

## 2018-11-27 PROCEDURE — 99232 SBSQ HOSP IP/OBS MODERATE 35: CPT | Performed by: INTERNAL MEDICINE

## 2018-11-27 PROCEDURE — 86140 C-REACTIVE PROTEIN: CPT

## 2018-11-27 PROCEDURE — 6360000002 HC RX W HCPCS: Performed by: INTERNAL MEDICINE

## 2018-11-27 PROCEDURE — 94770 HC ETCO2 MONITOR DAILY: CPT

## 2018-11-27 PROCEDURE — 36415 COLL VENOUS BLD VENIPUNCTURE: CPT

## 2018-11-27 PROCEDURE — 2580000003 HC RX 258: Performed by: INTERNAL MEDICINE

## 2018-11-27 PROCEDURE — 94003 VENT MGMT INPAT SUBQ DAY: CPT

## 2018-11-27 PROCEDURE — 2060000000 HC ICU INTERMEDIATE R&B

## 2018-11-27 PROCEDURE — 6370000000 HC RX 637 (ALT 250 FOR IP): Performed by: NURSE PRACTITIONER

## 2018-11-27 PROCEDURE — 80048 BASIC METABOLIC PNL TOTAL CA: CPT

## 2018-11-27 RX ORDER — LANOLIN ALCOHOL/MO/W.PET/CERES
CREAM (GRAM) TOPICAL 2 TIMES DAILY
Status: DISCONTINUED | OUTPATIENT
Start: 2018-11-27 | End: 2018-11-30 | Stop reason: HOSPADM

## 2018-11-27 RX ORDER — LEVOFLOXACIN 500 MG/1
500 TABLET, FILM COATED ORAL DAILY
Status: DISCONTINUED | OUTPATIENT
Start: 2018-11-27 | End: 2018-11-28

## 2018-11-27 RX ORDER — DEXTROSE MONOHYDRATE 25 G/50ML
25 INJECTION, SOLUTION INTRAVENOUS PRN
Status: DISCONTINUED | OUTPATIENT
Start: 2018-11-27 | End: 2018-11-30 | Stop reason: HOSPADM

## 2018-11-27 RX ORDER — FUROSEMIDE 10 MG/ML
80 INJECTION INTRAMUSCULAR; INTRAVENOUS 2 TIMES DAILY
Status: DISCONTINUED | OUTPATIENT
Start: 2018-11-27 | End: 2018-11-30 | Stop reason: HOSPADM

## 2018-11-27 RX ORDER — SODIUM POLYSTYRENE SULFONATE 15 G/60ML
45 SUSPENSION ORAL; RECTAL ONCE
Status: COMPLETED | OUTPATIENT
Start: 2018-11-27 | End: 2018-11-27

## 2018-11-27 RX ORDER — PREDNISONE 20 MG/1
20 TABLET ORAL DAILY
Qty: 5 TABLET | Refills: 0 | Status: CANCELLED | OUTPATIENT
Start: 2018-11-27 | End: 2018-12-02

## 2018-11-27 RX ORDER — CALCIUM GLUCONATE 94 MG/ML
1 INJECTION, SOLUTION INTRAVENOUS ONCE
Status: DISCONTINUED | OUTPATIENT
Start: 2018-11-27 | End: 2018-11-27

## 2018-11-27 RX ADMIN — DARBEPOETIN ALFA 60 MCG: 60 INJECTION, SOLUTION INTRAVENOUS; SUBCUTANEOUS at 16:39

## 2018-11-27 RX ADMIN — FERROUS SULFATE TAB 325 MG (65 MG ELEMENTAL FE) 325 MG: 325 (65 FE) TAB at 12:53

## 2018-11-27 RX ADMIN — ALLOPURINOL 100 MG: 100 TABLET ORAL at 09:51

## 2018-11-27 RX ADMIN — LINEZOLID 600 MG: 600 TABLET, FILM COATED ORAL at 22:48

## 2018-11-27 RX ADMIN — CALCIUM GLUCONATE 1 G: 98 INJECTION, SOLUTION INTRAVENOUS at 23:51

## 2018-11-27 RX ADMIN — APIXABAN 5 MG: 5 TABLET, FILM COATED ORAL at 09:51

## 2018-11-27 RX ADMIN — LEVOFLOXACIN 500 MG: 500 TABLET, FILM COATED ORAL at 22:49

## 2018-11-27 RX ADMIN — LEVOTHYROXINE SODIUM 250 MCG: 125 TABLET ORAL at 09:50

## 2018-11-27 RX ADMIN — MICONAZOLE NITRATE 1 APPLICATOR: 20 CREAM VAGINAL at 23:50

## 2018-11-27 RX ADMIN — LEVOFLOXACIN 750 MG: 5 INJECTION, SOLUTION INTRAVENOUS at 02:40

## 2018-11-27 RX ADMIN — PREDNISONE 20 MG: 20 TABLET ORAL at 09:50

## 2018-11-27 RX ADMIN — MULTIPLE VITAMINS W/ MINERALS TAB 1 TABLET: TAB at 09:51

## 2018-11-27 RX ADMIN — APIXABAN 5 MG: 5 TABLET, FILM COATED ORAL at 22:49

## 2018-11-27 RX ADMIN — FERROUS SULFATE TAB 325 MG (65 MG ELEMENTAL FE) 325 MG: 325 (65 FE) TAB at 17:24

## 2018-11-27 RX ADMIN — DEXTROSE MONOHYDRATE 25 G: 25 INJECTION, SOLUTION INTRAVENOUS at 23:51

## 2018-11-27 RX ADMIN — FUROSEMIDE 80 MG: 10 INJECTION, SOLUTION INTRAMUSCULAR; INTRAVENOUS at 10:18

## 2018-11-27 RX ADMIN — PATIROMER 8.4 G: 8.4 POWDER, FOR SUSPENSION ORAL at 10:06

## 2018-11-27 RX ADMIN — LINEZOLID 600 MG: 600 TABLET, FILM COATED ORAL at 09:51

## 2018-11-27 RX ADMIN — FUROSEMIDE 80 MG: 10 INJECTION, SOLUTION INTRAMUSCULAR; INTRAVENOUS at 17:24

## 2018-11-27 RX ADMIN — SODIUM POLYSTYRENE SULFONATE 45 G: 15 SUSPENSION ORAL; RECTAL at 23:58

## 2018-11-27 RX ADMIN — INSULIN HUMAN 10 UNITS: 100 INJECTION, SOLUTION PARENTERAL at 23:51

## 2018-11-27 ASSESSMENT — ENCOUNTER SYMPTOMS
DIARRHEA: 0
COLOR CHANGE: 0
EYE ITCHING: 0
SORE THROAT: 0
EYE DISCHARGE: 0
ABDOMINAL PAIN: 0

## 2018-11-27 NOTE — PROGRESS NOTES
[facility] Nurse  Consult Note       NAME:  [address] RECORD NUMBER:  [id number]  AGE: 57 y.o. GENDER: female  : 1970  TODAY'S DATE:  2018    Subjective   Reason for WOCN Evaluation and Assessment: lymphatic changes to right breast with drainage, blistering and weeping to left lower leg      Mayank Coyle is a 57 y.o. female referred by:   [] Physician  [] Nursing  [] Other:       Wound Identification:  Wound Type: venous and lymphedema  Contributing Factors: edema, venous stasis, lymphedema, chronic pressure, decreased mobility, shear force, obesity and decreased tissue oxygenation     Wound History: 57year old female with a history of right breast cancer with mastectomy. She has skin changes to the right breast area that have been evaluated by general surgery. She also has scattered intact and non intact blisters, edema, pain, and drainage to the left lower leg. : states pain in left leg has decreased. States drainage to right breast is more manageable.  States feeling better overall  : assisted with bath, right breast area with less drainage, left leg with serous drainage      Patient Goal of Care:  [] Wound Healing  [] Odor Control  [] Palliative Care  [] Pain Control   [] Other:         PAST MEDICAL HISTORY        Diagnosis Date    Anemia     Disease of blood and blood forming organ     History of breast cancer     History of chemotherapy     Hypothyroidism     Lymphedema     Chronic    Respiratory failure ([address]     Tracheostomy present ([address]        PAST SURGICAL HISTORY    Past Surgical History:   Procedure Laterality Date    MASTECTOMY, MODIFIED RADICAL Right 2013    TRACHEOSTOMY         FAMILY HISTORY    Family History   Problem Relation Age of Onset    Breast Cancer Paternal Aunt     Breast Cancer Paternal Cousin 43    Breast Cancer Paternal Cousin 37    Breast Cancer Paternal Cousin 46       SOCIAL HISTORY    Social History

## 2018-11-27 NOTE — FLOWSHEET NOTE
11/27/18 0911   Provider Notification   Reason for Communication Review case   Provider Name Dr. Melisa Henson   Provider Notification Physician   Method of Communication Face to face   Response See orders   Notification Time 0911     Increased lasix to 80 IV BID

## 2018-11-27 NOTE — PROGRESS NOTES
Nephrology Progress Note    Subjective: This is a 57 y.[facility]. [facility] [address] [place] [place] [address] of HTN, CHF, hx of right breast cancer, Obesity, hypoventilation syndrome and chronic respiratory failure S/P trach  on ventilator who presented with flu like illness, cough and was noted to have elevated d dimer.  The patient states that she's been sick for the last week. Reed Campbell says that she started out with chills and then a productive cough.  She says that she has developed a retrosternal chest pressure like pain present for the last 3 days with worsening SOB. She is bed bound and non ambulatory. She is empirically being tx with apixaban for possible DVT/PE-she is technically not able to get chest CT scan or venous dopplers of LE . She presented with BUN/creatinine of 52/2.9 mg/dl with potassium of 4.4 -she had been on lasix/bumex at the [facility] as well as well as ibuprofen. She indicates poor oral intake but denies nausea, vomiting, diarrhea , flank pain or abdominal pain. Her UA showed few uric acid crystals 2-5 wbc and 2-5 RBC. Interval history: She was seen and examined today and still feels bloated but does not have increased shortness of breath at rest.  She is receiving Lasix 40 mg twice daily. She has had recurrent hyperkalemia over the last 24 hours.     Objective/     Vitals:    11/26/18 1930 11/27/18 0008 11/27/18 0023 11/27/18 0615   BP:   135/67    Pulse:   69    Resp: 18 12 16    Temp:   97.3 °F (36.3 °C)    TempSrc:   Axillary    SpO2: 98% 97% 92%    Weight:    (!) 626 lb 1.7 oz (284 kg)   Height:         24HR INTAKE/OUTPUT:      Intake/Output Summary (Last 24 hours) at 11/27/18 1227  Last data filed at 11/27/18 2236   Gross per 24 hour   Intake             1570 ml   Output             2050 ml   Net             -480 ml     Patient Vitals for the past 96 hrs (Last 3 readings):   Weight   11/27/18 0615 (!) 626 lb 1.7 oz (284 kg)       Constitutional:  Alert, awake, no apparent

## 2018-11-27 NOTE — PROGRESS NOTES
Evaluation:   · Evaluation: Progressing toward goals   · Goals: PO intake % of meals to meet estimated nutrition needs. · Monitoring: Meal Intake, Diet Tolerance, Wound Healing, Weight, Pertinent Labs, Monitor Hemodynamic Status      GEOFFREY Giron R.D..   Clinical Dietitian  Pager: [phone]

## 2018-11-27 NOTE — PROGRESS NOTES
Writer was present during care provided by Steve Mensah RN, and has reviewed and agrees with documentation pertaining to same.

## 2018-11-27 NOTE — PROGRESS NOTES
PROT, LABALBU, BILIDIR, IBILI, BILITOT, ALKPHOS, ALT, AST in the last 72 hours. No results for input(s): RPR in the last 72 hours. No results for input(s): HIV in the last 72 hours. No results for input(s): BC in the last 72 hours. Lab Results   Component Value Date    CREATININE 1.06 11/27/2018    GLUCOSE 193 11/27/2018       Detailed results: Thank you for allowing us to participate in the care of this patient. Please call with questions. This note is created with the assistance of a speech recognition program.  While intending to generate adocument that actually reflects the content of the visit, the document can still have some errors including those of syntax and sound a like substitutions which may escape proof reading. It such instances, actual meaningcan be extrapolated by contextual diversion.     Dominique Ramon MD  Office: [phone]

## 2018-11-27 NOTE — PROGRESS NOTES
urination, hot or cold intolerance  MUSCULOSKELETAL:  negative joint pains, muscle aches, swelling of joints  NEUROLOGICAL:  negative for headaches, dizziness, lightheadedness, numbness, pain, tingling extremities      Physical Exam:   /67   Pulse 69   Temp 97.3 °F (36.3 °C) (Axillary)   Resp 16   Ht 5' 5\" (1.651 m)   Wt (!) 626 lb 1.7 oz (284 kg)   LMP  (LMP Unknown)   SpO2 92%   .19 kg/m²   No results for input(s): POCGLU in the last 72 hours. General Appearance: Morbid obesity  Mental status: oriented to person, place, and time with normal affect  Head:  normocephalic, atraumatic. Eye: no icterus, redness, pupils equal and reactive, extraocular eye movements intact, conjunctiva clear  Ear: normal external ear, no discharge, hearing intact  Nose:  no drainage noted  Mouth: mucous membranes moist  Neck: supple, no carotid bruits, thyroid not palpable  Lungs: Bilateral equal air entry, clear to ausculation, no wheezing, rales or rhonchi, normal effort  Cardiovascular: normal rate, regular rhythm, no murmur, gallop, rub.   Abdomen: Soft, nontender, nondistended, normal bowel sounds, no hepatomegaly or splenomegaly  Neurologic: There are no new focal motor or sensory deficits, normal muscle tone and bulk, no abnormal sensation, normal speech, cranial nerves II through XII grossly intact  She is bedbound and unable to check the gait  Skin: Diffuse nodular growth in the right middle axillary region suspicious for malignancy  Extremities:  Bilateral lower extremity diffuse obesity with status is dermatitis  Investigations:      Laboratory Testing:  Recent Results (from the past 24 hour(s))   K (Potassium)    Collection Time: 11/26/18 12:40 PM   Result Value Ref Range    Potassium 5.6 (H) 3.7 - 5.3 mmol/L   K (Potassium)    Collection Time: 11/26/18  8:51 PM   Result Value Ref Range    Potassium 6.2 (HH) 3.7 - 5.3 mmol/L   Basic Metabolic Prof    Collection Time: 11/27/18  4:59 AM   Result Value MD Anil   10 mL at 11/25/18 1755    acetaminophen (TYLENOL) tablet 650 mg  650 mg Oral Q4H PRN Tom Hadley MD   650 mg at 11/20/18 2022          oTm Hadley MD  11/27/2018  9:13 AM

## 2018-11-28 LAB
ANION GAP SERPL CALCULATED.3IONS-SCNC: 8 MMOL/L (ref 9–17)
BUN BLDV-MCNC: 38 MG/DL (ref 6–20)
BUN/CREAT BLD: ABNORMAL (ref 9–20)
CALCIUM SERPL-MCNC: 9 MG/DL (ref 8.6–10.4)
CHLORIDE BLD-SCNC: 96 MMOL/L (ref 98–107)
CO2: 33 MMOL/L (ref 20–31)
CREAT SERPL-MCNC: 1.16 MG/DL (ref 0.5–0.9)
GFR AFRICAN AMERICAN: >60 ML/MIN
GFR NON-AFRICAN AMERICAN: 50 ML/MIN
GFR SERPL CREATININE-BSD FRML MDRD: ABNORMAL ML/MIN/{1.73_M2}
GFR SERPL CREATININE-BSD FRML MDRD: ABNORMAL ML/MIN/{1.73_M2}
GLUCOSE BLD-MCNC: 175 MG/DL (ref 70–99)
HCT VFR BLD CALC: 26.8 % (ref 36–46)
HEMOGLOBIN: 8.5 G/DL (ref 12–16)
MCH RBC QN AUTO: 29.1 PG (ref 26–34)
MCHC RBC AUTO-ENTMCNC: 31.8 G/DL (ref 31–37)
MCV RBC AUTO: 91.5 FL (ref 80–100)
NRBC AUTOMATED: ABNORMAL PER 100 WBC
PDW BLD-RTO: 16.5 % (ref 11.5–14.9)
PLATELET # BLD: 298 K/UL (ref 150–450)
PMV BLD AUTO: 8.1 FL (ref 6–12)
POTASSIUM SERPL-SCNC: 5.5 MMOL/L (ref 3.7–5.3)
POTASSIUM SERPL-SCNC: 5.6 MMOL/L (ref 3.7–5.3)
POTASSIUM SERPL-SCNC: 5.9 MMOL/L (ref 3.7–5.3)
RBC # BLD: 2.93 M/UL (ref 4–5.2)
SODIUM BLD-SCNC: 137 MMOL/L (ref 135–144)
WBC # BLD: 8 K/UL (ref 3.5–11)

## 2018-11-28 PROCEDURE — 6370000000 HC RX 637 (ALT 250 FOR IP): Performed by: INTERNAL MEDICINE

## 2018-11-28 PROCEDURE — 6360000002 HC RX W HCPCS: Performed by: INTERNAL MEDICINE

## 2018-11-28 PROCEDURE — 2580000003 HC RX 258: Performed by: INTERNAL MEDICINE

## 2018-11-28 PROCEDURE — 2700000000 HC OXYGEN THERAPY PER DAY

## 2018-11-28 PROCEDURE — 80048 BASIC METABOLIC PNL TOTAL CA: CPT

## 2018-11-28 PROCEDURE — 6370000000 HC RX 637 (ALT 250 FOR IP): Performed by: NURSE PRACTITIONER

## 2018-11-28 PROCEDURE — 85027 COMPLETE CBC AUTOMATED: CPT

## 2018-11-28 PROCEDURE — 84132 ASSAY OF SERUM POTASSIUM: CPT

## 2018-11-28 PROCEDURE — 2060000000 HC ICU INTERMEDIATE R&B

## 2018-11-28 PROCEDURE — 99232 SBSQ HOSP IP/OBS MODERATE 35: CPT | Performed by: INTERNAL MEDICINE

## 2018-11-28 PROCEDURE — 36415 COLL VENOUS BLD VENIPUNCTURE: CPT

## 2018-11-28 PROCEDURE — 94003 VENT MGMT INPAT SUBQ DAY: CPT

## 2018-11-28 RX ORDER — SODIUM POLYSTYRENE SULFONATE 15 G/60ML
45 SUSPENSION ORAL; RECTAL ONCE
Status: COMPLETED | OUTPATIENT
Start: 2018-11-28 | End: 2018-11-28

## 2018-11-28 RX ORDER — SODIUM POLYSTYRENE SULFONATE 15 G/60ML
30 SUSPENSION ORAL; RECTAL ONCE
Status: COMPLETED | OUTPATIENT
Start: 2018-11-28 | End: 2018-11-28

## 2018-11-28 RX ADMIN — LEVOFLOXACIN 500 MG: 500 TABLET, FILM COATED ORAL at 08:34

## 2018-11-28 RX ADMIN — FERROUS SULFATE TAB 325 MG (65 MG ELEMENTAL FE) 325 MG: 325 (65 FE) TAB at 17:25

## 2018-11-28 RX ADMIN — MULTIPLE VITAMINS W/ MINERALS TAB 1 TABLET: TAB at 08:35

## 2018-11-28 RX ADMIN — LINEZOLID 600 MG: 600 TABLET, FILM COATED ORAL at 23:19

## 2018-11-28 RX ADMIN — SODIUM POLYSTYRENE SULFONATE 30 G: 15 SUSPENSION ORAL; RECTAL at 08:36

## 2018-11-28 RX ADMIN — PREDNISONE 20 MG: 20 TABLET ORAL at 08:34

## 2018-11-28 RX ADMIN — LINEZOLID 600 MG: 600 TABLET, FILM COATED ORAL at 08:34

## 2018-11-28 RX ADMIN — FERROUS SULFATE TAB 325 MG (65 MG ELEMENTAL FE) 325 MG: 325 (65 FE) TAB at 08:34

## 2018-11-28 RX ADMIN — APIXABAN 5 MG: 5 TABLET, FILM COATED ORAL at 23:19

## 2018-11-28 RX ADMIN — CALCIUM GLUCONATE 1 G: 98 INJECTION, SOLUTION INTRAVENOUS at 00:43

## 2018-11-28 RX ADMIN — LEVOTHYROXINE SODIUM 250 MCG: 125 TABLET ORAL at 08:34

## 2018-11-28 RX ADMIN — SODIUM POLYSTYRENE SULFONATE 45 G: 15 SUSPENSION ORAL; RECTAL at 17:25

## 2018-11-28 RX ADMIN — Medication: at 23:19

## 2018-11-28 RX ADMIN — Medication 10 ML: at 08:36

## 2018-11-28 RX ADMIN — APIXABAN 5 MG: 5 TABLET, FILM COATED ORAL at 08:35

## 2018-11-28 RX ADMIN — Medication: at 00:43

## 2018-11-28 RX ADMIN — ALLOPURINOL 100 MG: 100 TABLET ORAL at 08:34

## 2018-11-28 RX ADMIN — FUROSEMIDE 80 MG: 10 INJECTION, SOLUTION INTRAMUSCULAR; INTRAVENOUS at 08:35

## 2018-11-28 RX ADMIN — FERROUS SULFATE TAB 325 MG (65 MG ELEMENTAL FE) 325 MG: 325 (65 FE) TAB at 12:41

## 2018-11-28 RX ADMIN — FUROSEMIDE 80 MG: 10 INJECTION, SOLUTION INTRAMUSCULAR; INTRAVENOUS at 17:25

## 2018-11-28 ASSESSMENT — ENCOUNTER SYMPTOMS
DIARRHEA: 0
EYE ITCHING: 0
SORE THROAT: 0
COLOR CHANGE: 0
EYE DISCHARGE: 0
ABDOMINAL PAIN: 0

## 2018-11-28 NOTE — PROGRESS NOTES
Perfect serve message to Dr. Corbin Pinedo:  [phone] ICU From: [facility]cas[facility] RE: Lianna Gonzalez RM: [id number] pt is receiving Levaquin 500 PO daily, and there is a concern that this medication is causing hyperkalemia. Is there a different atb we can give her?  She is also on PO zyvox

## 2018-11-28 NOTE — PROGRESS NOTES
per day    predniSONE  20 mg Oral Daily    apixaban  5 mg Oral BID    ferrous sulfate  325 mg Oral TID     levothyroxine  250 mcg Oral Daily    therapeutic multivitamin-minerals  1 tablet Oral Daily with breakfast    sodium chloride flush  10 mL Intravenous 2 times per day       Social History:     Social History     Social History    Marital status: Single     Spouse name: N/A    Number of children: N/A    Years of education: N/A     Occupational History    Not on file. Social History Main Topics    Smoking status: Never Smoker    Smokeless tobacco: Never Used    Alcohol use No    Drug use: No    Sexual activity: Not Currently     Other Topics Concern    Not on file     Social History Narrative    [facility] [facility]        Family History:     Family History   Problem Relation Age of Onset    Breast Cancer Paternal Aunt     Breast Cancer Paternal Cousin 43    Breast Cancer Paternal Cousin 37    Breast Cancer Paternal Cousin [id number]        Allergies:   Zosyn [piperacillin-tazobactam in dex] and Vancomycin     Review of Systems:     Review of Systems   Constitutional: Negative for activity change, appetite change and chills. HENT: Negative for congestion and sore throat. Eyes: Negative for discharge and itching. Cardiovascular: Negative for chest pain and leg swelling. Gastrointestinal: Negative for abdominal pain and diarrhea. Endocrine: Negative for cold intolerance and heat intolerance. Genitourinary: Negative for difficulty urinating, dysuria and vaginal discharge. External catheter   Musculoskeletal: Negative for arthralgias and myalgias. Skin: Negative for color change and wound. Left leg blisters ,decreased erythema      Allergic/Immunologic: Negative for environmental allergies and food allergies. Neurological: Negative for light-headedness and headaches. Hematological: Does not bruise/bleed easily.    Psychiatric/Behavioral: Negative for agitation and

## 2018-11-28 NOTE — PROGRESS NOTES
History and Physical Service  [facility] Internal Medicine    Progress note              Date:   11/28/2018  Patient name:  Nyasia Preston  MRN:   [id number]  Account:  [de-identified]  YOB: 1970  PCP:    Cris Hatch DO  Code Status:    Full Code    Chief Complaint:     Dyspnea and cough    History Obtained From:     patient    History of Present Illness: The patient is a 57 y.o. Non-/non  female who presents With dyspnea and cough for last 2-3 days patient is severe morbid obesity made over 700 pounds loss in her pounds in last 1 year history of right breast cancer with mastectomy admitted with the dyspnea some pleuritic pain no hemoptysis no palpitation no history of DVT or PE   It is very restricted patient is bedbound because of morbid obesity         Past Medical History:     Past Medical History:   Diagnosis Date    Anemia     Disease of blood and blood forming organ     History of breast cancer     History of chemotherapy     Hypothyroidism     Lymphedema     Chronic    Res[facility] [address]     Tracheostomy present ([facility]         Past Surgical History:     Past Surgical History:   Procedure Laterality Date    MASTECTOMY, MODIFIED RADICAL Right 2013    TRACHEOSTOMY          Medications Prior to Admission:     Prior to Admission medications    Medication Sig Start Date End Date Taking?  Authorizing Provider   furosemide (LASIX) 40 MG tablet Take 40 mg by mouth daily   Yes Historical Provider, MD   ibuprofen (ADVIL;MOTRIN) 800 MG tablet Take 800 mg by mouth every 8 hours as needed for Pain   Yes Historical Provider, MD   doxycycline hyclate (VIBRA-TABS) 100 MG tablet Take 100 mg by mouth 2 times daily For 10 days   Yes Historical Provider, MD   bumetanide (BUMEX) 2 MG tablet Take 1 tablet by mouth 2 times daily 5/17/18  Yes India Nice MD   acetaminophen (TYLENOL) 325 MG tablet Take 650 mg by mouth every 6 hours as needed for Pain   Yes Historical Provider, MD   levothyroxine (SYNTHROID) 200 MCG tablet Take 250 mcg by mouth daily    Yes Historical Provider, MD   ferrous sulfate 325 (65 Fe) MG tablet Take 325 mg by mouth 3 times daily (with meals)   Yes Historical Provider, MD   Multiple Vitamins-Minerals (THERAPEUTIC MULTIVITAMIN-MINERALS) tablet Take 1 tablet by mouth daily (with breakfast)   Yes Historical Provider, MD   benzonatate (TESSALON) 100 MG capsule Take 100 mg by mouth 2 times daily as needed for Cough    Historical Provider, MD   ipratropium-albuterol (DUONEB) 0.5-2.5 (3) MG/3ML SOLN nebulizer solution Inhale 1 vial into the lungs every 4 hours as needed for Shortness of Breath    Historical Provider, MD        Allergies:     Zosyn [piperacillin-tazobactam in dex] and Vancomycin    Social History:     Tobacco:    reports that she has never smoked. She has never used smokeless tobacco.  Alcohol:      reports that she does not drink alcohol. Drug Use:  reports that she does not use drugs. Family History:     Family History   Problem Relation Age of Onset    Breast Cancer Paternal Aunt     Breast Cancer Paternal Cousin 43    Breast Cancer Paternal Cousin 37    Breast Cancer Paternal Cousin 46       Review of Systems:     Positive and Negative as described in HPI. CONSTITUTIONAL:  negative for fevers, chills, sweats, fatigue, weight loss  HEENT:  negative for vision, hearing changes, runny nose, throat pain  RESPIRATORY:  Positive for dyspnea cough CARDIOVASCULAR:  negative for chest pain, palpitations.   GASTROINTESTINAL:  negative for nausea, vomiting, diarrhea, constipation, change in bowel habits, abdominal pain   GENITOURINARY:  negative for difficulty of urination, burning with urination, frequency   INTEGUMENT:  negative for rash, skin lesions, easy bruising   HEMATOLOGIC/LYMPHATIC:  negative for swelling/edema   ALLERGIC/IMMUNOLOGIC:  negative for urticaria , itching  ENDOCRINE:  negative increase in drinking, increase in CL  98   --   98   --   96*   CO2  32*   --   32*   --   33*   GLUCOSE  129*   --   193*   --   175*   BUN  43*   --   40*   --   38*   CREATININE  1.21*   --   1.06*   --   1.16*   ANIONGAP  9   --   8*   --   8*   LABGLOM  48*   --   56*   --   50*   GFRAA  58*   --   >60   --   >60   CALCIUM  8.8   --   9.0   --   9.0    < > = values in this interval not displayed. No results for input(s): PROT, LABALBU, LABA1C, S3JAGRJ, T2UFQQZ, FT4, TSH, AST, ALT, LDH, GGT, ALKPHOS, LABGGT, BILITOT, BILIDIR, AMMONIA, AMYLASE, LIPASE, LACTATE, CHOL, HDL, LDLCHOLESTEROL, CHOLHDLRATIO, TRIG, VLDL, EQG89EQ, PHENYTOIN, PHENYF, URICACID, POCGLU in the last 72 hours. Imaging/Diagnostics:       Xr Chest Portable    Result Date: 11/17/2018  EXAMINATION: SINGLE XRAY VIEW OF THE CHEST 11/17/2018 2:42 pm COMPARISON: 05/15/2018, 612 hours HISTORY: ORDERING SYSTEM PROVIDED HISTORY: Chest Pain TECHNOLOGIST PROVIDED HISTORY: Chest Pain Ordering Physician Provided Reason for Exam: Chest pain. Pt weighs 612 LBS Acuity: Unknown Type of Exam: Unknown 37-year-old female with chest pain FINDINGS: AP portable view of the chest. Cardiac monitor leads overlie the chest. Right internal jugular approach Port-A-Cath distal tip overlying the lower SVC. Rectangular radiodensities projecting over the left upper quadrant likely external to the patient. Tracheostomy tube distal tip overlying the mid trachea approximately 3.8 cm above the level of the susan. Stable moderate cardiomegaly. Bilateral parahilar and lower zone predominant airspace opacities. No sizable pleural effusions. No obvious pneumothorax or mediastinal shift within the limitations of this exam.  Visualized osseous structures remain unchanged. Surgical clips project over the right axillary region. 1. Stable moderate cardiomegaly with bilateral parahilar and lower zone predominant airspace disease. Findings most likely represent mild pulmonary edema.   Underlying infiltrate Soto Brumfield MD   10 mL at 11/28/18 0836    sodium chloride flush 0.9 % injection 10 mL  10 mL Intravenous PRN Soto Brumfield MD   10 mL at 11/25/18 1755    acetaminophen (TYLENOL) tablet 650 mg  650 mg Oral Q4H PRN Soto Brumfield MD   650 mg at 11/20/18 2022          Soto Brumfield MD  11/28/2018  9:46 AM

## 2018-11-28 NOTE — FLOWSHEET NOTE
Patient's EKG showed taller, peaked Twaves and had history of Hyperkalemia. CNP was contacted, and CNP ordered potassium levels to be drawn.

## 2018-11-28 NOTE — PLAN OF CARE
Problem: Risk for Impaired Skin Integrity  Goal: Tissue integrity - skin and mucous membranes  Structural intactness and normal physiological function of skin and  mucous membranes. Outcome: Ongoing  Skin assessment complete. Waffle mattress in place. Area kept free from moisture. Proper nourishment and fluids encouraged, as appropriate. Will continue to monitor for additional needs and changes in skin breakdown. Problem: Gas Exchange - Impaired:  Goal: Levels of oxygenation will improve  Levels of oxygenation will improve   Outcome: Ongoing  Patients respiratory status stable at this time. No signs or symptoms of distress noted. respirations non-labored and regular; will continue to monitor respiration/oxygenation.           Problem: Nutrition  Goal: Optimal nutrition therapy  Outcome: Ongoing

## 2018-11-29 VITALS
TEMPERATURE: 97.8 F | RESPIRATION RATE: 16 BRPM | HEIGHT: 65 IN | BODY MASS INDEX: 48.82 KG/M2 | HEART RATE: 82 BPM | OXYGEN SATURATION: 100 % | DIASTOLIC BLOOD PRESSURE: 67 MMHG | SYSTOLIC BLOOD PRESSURE: 124 MMHG | WEIGHT: 293 LBS

## 2018-11-29 LAB
ANION GAP SERPL CALCULATED.3IONS-SCNC: 6 MMOL/L (ref 9–17)
BUN BLDV-MCNC: 39 MG/DL (ref 6–20)
BUN/CREAT BLD: ABNORMAL (ref 9–20)
CALCIUM SERPL-MCNC: 8.7 MG/DL (ref 8.6–10.4)
CHLORIDE BLD-SCNC: 97 MMOL/L (ref 98–107)
CO2: 35 MMOL/L (ref 20–31)
CREAT SERPL-MCNC: 1.1 MG/DL (ref 0.5–0.9)
CULTURE: NORMAL
CULTURE: NORMAL
GFR AFRICAN AMERICAN: >60 ML/MIN
GFR NON-AFRICAN AMERICAN: 53 ML/MIN
GFR SERPL CREATININE-BSD FRML MDRD: ABNORMAL ML/MIN/{1.73_M2}
GFR SERPL CREATININE-BSD FRML MDRD: ABNORMAL ML/MIN/{1.73_M2}
GLUCOSE BLD-MCNC: 169 MG/DL (ref 70–99)
HCT VFR BLD CALC: 27 % (ref 36–46)
HEMOGLOBIN: 8.5 G/DL (ref 12–16)
Lab: NORMAL
Lab: NORMAL
MCH RBC QN AUTO: 29.5 PG (ref 26–34)
MCHC RBC AUTO-ENTMCNC: 31.6 G/DL (ref 31–37)
MCV RBC AUTO: 93.2 FL (ref 80–100)
NRBC AUTOMATED: ABNORMAL PER 100 WBC
PDW BLD-RTO: 16.5 % (ref 11.5–14.9)
PLATELET # BLD: 300 K/UL (ref 150–450)
PMV BLD AUTO: 7.7 FL (ref 6–12)
POTASSIUM SERPL-SCNC: 4.9 MMOL/L (ref 3.7–5.3)
RBC # BLD: 2.89 M/UL (ref 4–5.2)
SODIUM BLD-SCNC: 138 MMOL/L (ref 135–144)
SPECIMEN DESCRIPTION: NORMAL
SPECIMEN DESCRIPTION: NORMAL
STATUS: NORMAL
STATUS: NORMAL
WBC # BLD: 6.3 K/UL (ref 3.5–11)

## 2018-11-29 PROCEDURE — 80048 BASIC METABOLIC PNL TOTAL CA: CPT

## 2018-11-29 PROCEDURE — 36415 COLL VENOUS BLD VENIPUNCTURE: CPT

## 2018-11-29 PROCEDURE — 6360000002 HC RX W HCPCS: Performed by: INTERNAL MEDICINE

## 2018-11-29 PROCEDURE — 6370000000 HC RX 637 (ALT 250 FOR IP): Performed by: INTERNAL MEDICINE

## 2018-11-29 PROCEDURE — 94760 N-INVAS EAR/PLS OXIMETRY 1: CPT

## 2018-11-29 PROCEDURE — 85027 COMPLETE CBC AUTOMATED: CPT

## 2018-11-29 PROCEDURE — 99239 HOSP IP/OBS DSCHRG MGMT >30: CPT | Performed by: INTERNAL MEDICINE

## 2018-11-29 PROCEDURE — 6370000000 HC RX 637 (ALT 250 FOR IP): Performed by: NURSE PRACTITIONER

## 2018-11-29 PROCEDURE — 2580000003 HC RX 258: Performed by: INTERNAL MEDICINE

## 2018-11-29 PROCEDURE — 99231 SBSQ HOSP IP/OBS SF/LOW 25: CPT | Performed by: INTERNAL MEDICINE

## 2018-11-29 PROCEDURE — 2060000000 HC ICU INTERMEDIATE R&B

## 2018-11-29 RX ORDER — LANOLIN ALCOHOL/MO/W.PET/CERES
CREAM (GRAM) TOPICAL 2 TIMES DAILY
Qty: 1 CONTAINER | Refills: 0 | Status: SHIPPED | OUTPATIENT
Start: 2018-11-29 | End: 2019-10-30

## 2018-11-29 RX ORDER — LINEZOLID 600 MG/1
600 TABLET, FILM COATED ORAL EVERY 12 HOURS SCHEDULED
Qty: 8 TABLET | Refills: 0 | Status: SHIPPED | OUTPATIENT
Start: 2018-11-29 | End: 2018-12-03

## 2018-11-29 RX ADMIN — Medication 10 ML: at 22:06

## 2018-11-29 RX ADMIN — LINEZOLID 600 MG: 600 TABLET, FILM COATED ORAL at 08:57

## 2018-11-29 RX ADMIN — MULTIPLE VITAMINS W/ MINERALS TAB 1 TABLET: TAB at 08:57

## 2018-11-29 RX ADMIN — FERROUS SULFATE TAB 325 MG (65 MG ELEMENTAL FE) 325 MG: 325 (65 FE) TAB at 18:20

## 2018-11-29 RX ADMIN — APIXABAN 5 MG: 5 TABLET, FILM COATED ORAL at 22:04

## 2018-11-29 RX ADMIN — LINEZOLID 600 MG: 600 TABLET, FILM COATED ORAL at 22:05

## 2018-11-29 RX ADMIN — FERROUS SULFATE TAB 325 MG (65 MG ELEMENTAL FE) 325 MG: 325 (65 FE) TAB at 13:49

## 2018-11-29 RX ADMIN — ALLOPURINOL 100 MG: 100 TABLET ORAL at 08:57

## 2018-11-29 RX ADMIN — APIXABAN 5 MG: 5 TABLET, FILM COATED ORAL at 08:57

## 2018-11-29 RX ADMIN — FUROSEMIDE 80 MG: 10 INJECTION, SOLUTION INTRAMUSCULAR; INTRAVENOUS at 18:20

## 2018-11-29 RX ADMIN — LEVOTHYROXINE SODIUM 250 MCG: 125 TABLET ORAL at 07:10

## 2018-11-29 RX ADMIN — FERROUS SULFATE TAB 325 MG (65 MG ELEMENTAL FE) 325 MG: 325 (65 FE) TAB at 08:57

## 2018-11-29 RX ADMIN — PREDNISONE 20 MG: 20 TABLET ORAL at 08:57

## 2018-11-29 ASSESSMENT — ENCOUNTER SYMPTOMS
SORE THROAT: 0
ABDOMINAL PAIN: 0
COLOR CHANGE: 0
EYE ITCHING: 0
EYE DISCHARGE: 0
DIARRHEA: 0

## 2018-11-29 ASSESSMENT — PAIN SCALES - GENERAL: PAINLEVEL_OUTOF10: 5

## 2018-11-29 NOTE — CARE COORDINATION
KEM had set patient up for transportation back to [facility] yesterday but [facility] could not do the transport until today at 4:00pm.  KEM had then called Nicole and set up transport for yesterday and there was a time of 5:00pm.  Before KEM cold cancel the transport that was set for this date, the DC had gotten cancelled. KEM kept the transport time for this date in case the patient were to get DC'd tomorrow. KME learned on this date that the patient was being [address] on this date. Therefore, the patient is scheduled to be picked up at 4:00pm via stretcher with O2 by [facility]. KEM faxed the DC orders to the facility and followed up via phone with Tri Rivas in admissions. KEM provided the number for report to this patient's nurse. ([phone]).

## 2018-11-29 NOTE — DISCHARGE SUMMARY
[facility] Internal Medicine    Discharge Summary     Patient ID: Angela Meredith  :  1970   MRN: [id number]     ACCOUNT:  [de-identified]   Patient's PCP: David Costello DO  Admit Date: 2018   Discharge Date: 2018    Length of Stay: 12  Code Status:  Full Code  Admitting Physician: Tom Hadley MD  Discharge Physician: Tom Hadley MD     Active Discharge Diagnoses:     Primary Problem  <principal problem not specified>      [place] Problems    Diagnosis Date Noted    Left leg cellulitis [J39.305]     Bandemia [D72.825]     Septicemia due to group B Streptococcus ([address] [A40.1]     CRP elevated [R79.82]     Cellulitis of left lower extremity [L03.116] 2018    Chest pain [R07.9]     Cellulitis [L03.90]     BMI 70 and over, adult ([address] [Z68.45] 2018    Chronic respiratory failure ([address] [J96.10] 2018    Tracheostomy present ([address] [Z93.0] 2018    Lymphedema [I89.0] 2018    Acute congestive heart failure ([address] [I50.9] 2018    Hypothyroidism [E03.9] 2018    Shortness of breath [R06.02] 2018       Admission Condition:  fair     Discharged Condition: fair    Hospital Stay:     Hospital Course:  Angela Meredith is a 57 y.o. female who was admitted for the management of <principal problem not specified> , presented to ER with Wound Check;  Chest Pain; and Shortness of Breath  conor rmorbid obese  From [facility]  Who has tracheosotmy for [facility] and [facility]  admittedwith dyspnea  trachemobronchtis was suspected   Low probablity of pe clinically  Although at high risk  Due to body habitus cta and vq not done  But decision to put on low dose eliquis   Dr Tere Gleason agreed  Pt agreed to take understand the risk factors  Had bacteriamia grp b strep  Treated with  levaquin for a week  zyvox for left lower ext blisters and cellutis  cosut input for id DR LI and dr Miguel Nichols for chanel  Hyperkalemia dueto levaquin          Significant therapeutic interventions:     Significant Diagnostic Studies:   Labs / Micro:        ,     Radiology:    Jose Angel Landa Both Device Placement    Result Date: 11/21/2018  PROCEDURE: ULTRASOUND GUIDED VASCULAR ACCESS. MIDLINE CATHETER PLACEMENT 11/21/2018. HISTORY: ORDERING SYSTEM PROVIDED HISTORY: IV abx TECHNOLOGIST PROVIDED HISTORY: IV abx Okay for midline per Dr. Kareem Landry, DIFFICULT VENOUS ACCESS, SEDATION: None FLUOROSCOPY DOSE AND TYPE OR TIME AND EXPOSURES: None TECHNIQUE AND FINDINGS: This procedure was performed by the radiology nurse supervised by Dr. Brodie Osborn. Informed consent was obtained after a detailed explanation of the procedure including risks, benefits, and alternatives. All elements of maximal sterile barrier technique were utilized. The left arm was prepped and draped in sterile fashion using maximum sterile barrier technique. Local anesthesia was achieved with lidocaine. A micropuncture needle was used to access the left brachial vein using ultrasound guidance. An ultrasound image demonstrating patency of the vein with needle tip located within it. An image was obtained and stored in PACs. A 0.018 guidewire was used to place a peel-a-way sheath. 5 Kiswahili 15 cm single-lumen midline catheter was placed through the peel-away sheath. The catheter flushed easily and there was a good blood return. The catheter was secured to the skin. The patient tolerated the procedure well and there were no immediate complications.      Successful ultrasound guided midline catheter placement     Xr Chest Portable    Result Date: 11/25/2018  EXAMINATION: SINGLE XRAY VIEW OF THE CHEST 11/25/2018 7:22 am COMPARISON: 11/21/2018 HISTORY: ORDERING SYSTEM PROVIDED HISTORY: infiltrate TECHNOLOGIST PROVIDED HISTORY: infiltrate Ordering Physician Provided Reason for Exam: infiltrate Acuity: Acute Type of Exam: Subsequent/Follow-up

## 2018-11-30 NOTE — PROGRESS NOTES
Pt departed from unit via stretcher with paramedics. Belongings given to patient (4 bags). Vitals stable prior to transport (see flow sheet). No s/s of distress from pt.

## 2019-02-13 ENCOUNTER — OFFICE VISIT (OUTPATIENT)
Dept: INFECTIOUS DISEASES | Age: 49
End: 2019-02-13
Payer: MEDICAID

## 2019-02-13 VITALS
DIASTOLIC BLOOD PRESSURE: 84 MMHG | HEART RATE: 86 BPM | WEIGHT: 293 LBS | RESPIRATION RATE: 20 BRPM | HEIGHT: 65 IN | SYSTOLIC BLOOD PRESSURE: 128 MMHG | TEMPERATURE: 97 F | BODY MASS INDEX: 48.82 KG/M2

## 2019-02-13 DIAGNOSIS — S21.101A OPEN WOUND OF RIGHT CHEST WALL, INITIAL ENCOUNTER: Primary | ICD-10-CM

## 2019-02-13 DIAGNOSIS — M54.00 PANNICULITIS AFFECTING BACK: ICD-10-CM

## 2019-02-13 PROCEDURE — 99214 OFFICE O/P EST MOD 30 MIN: CPT | Performed by: INTERNAL MEDICINE

## 2019-02-13 RX ORDER — FUROSEMIDE 40 MG/1
40 TABLET ORAL DAILY
COMMUNITY
End: 2019-05-08

## 2019-02-14 ENCOUNTER — TELEPHONE (OUTPATIENT)
Dept: INFECTIOUS DISEASES | Age: 49
End: 2019-02-14

## 2019-02-14 ASSESSMENT — ENCOUNTER SYMPTOMS
CHOKING: 0
EYE DISCHARGE: 0
ABDOMINAL PAIN: 0
BACK PAIN: 0
COLOR CHANGE: 0
ABDOMINAL DISTENTION: 0
CHEST TIGHTNESS: 0

## 2019-05-08 ENCOUNTER — HOSPITAL ENCOUNTER (EMERGENCY)
Age: 49
Discharge: HOME | End: 2019-05-08
Attending: EMERGENCY MEDICINE
Payer: MEDICAID

## 2019-05-08 VITALS
HEART RATE: 78 BPM | TEMPERATURE: 98 F | DIASTOLIC BLOOD PRESSURE: 71 MMHG | HEIGHT: 65 IN | WEIGHT: 293 LBS | BODY MASS INDEX: 48.82 KG/M2 | SYSTOLIC BLOOD PRESSURE: 115 MMHG | OXYGEN SATURATION: 100 % | RESPIRATION RATE: 18 BRPM

## 2019-05-08 DIAGNOSIS — R79.9 ELEVATED BUN: Primary | ICD-10-CM

## 2019-05-08 DIAGNOSIS — T88.7XXA MEDICATION SIDE EFFECT: ICD-10-CM

## 2019-05-08 LAB
-: ABNORMAL
ABSOLUTE EOS #: 0.1 K/UL (ref 0–0.4)
ABSOLUTE IMMATURE GRANULOCYTE: ABNORMAL K/UL (ref 0–0.3)
ABSOLUTE LYMPH #: 1.9 K/UL (ref 1–4.8)
ABSOLUTE MONO #: 0.3 K/UL (ref 0.1–1.3)
ALBUMIN SERPL-MCNC: 3.5 G/DL (ref 3.5–5.2)
ALBUMIN/GLOBULIN RATIO: ABNORMAL (ref 1–2.5)
ALP BLD-CCNC: 55 U/L (ref 35–104)
ALT SERPL-CCNC: 13 U/L (ref 5–33)
AMORPHOUS: ABNORMAL
ANION GAP SERPL CALCULATED.3IONS-SCNC: 14 MMOL/L (ref 9–17)
AST SERPL-CCNC: 17 U/L
BACTERIA: ABNORMAL
BASOPHILS # BLD: 0 % (ref 0–2)
BASOPHILS ABSOLUTE: 0 K/UL (ref 0–0.2)
BILIRUB SERPL-MCNC: 0.15 MG/DL (ref 0.3–1.2)
BILIRUBIN URINE: NEGATIVE
BUN BLDV-MCNC: 62 MG/DL (ref 6–20)
BUN/CREAT BLD: ABNORMAL (ref 9–20)
CALCIUM SERPL-MCNC: 9.2 MG/DL (ref 8.6–10.4)
CASTS UA: ABNORMAL /LPF
CHLORIDE BLD-SCNC: 96 MMOL/L (ref 98–107)
CO2: 30 MMOL/L (ref 20–31)
COLOR: YELLOW
COMMENT UA: ABNORMAL
CREAT SERPL-MCNC: 1.39 MG/DL (ref 0.5–0.9)
CRYSTALS, UA: ABNORMAL /HPF
DIFFERENTIAL TYPE: ABNORMAL
EOSINOPHILS RELATIVE PERCENT: 2 % (ref 0–4)
EPITHELIAL CELLS UA: ABNORMAL /HPF
GFR AFRICAN AMERICAN: 49 ML/MIN
GFR NON-AFRICAN AMERICAN: 41 ML/MIN
GFR SERPL CREATININE-BSD FRML MDRD: ABNORMAL ML/MIN/{1.73_M2}
GFR SERPL CREATININE-BSD FRML MDRD: ABNORMAL ML/MIN/{1.73_M2}
GLUCOSE BLD-MCNC: 117 MG/DL (ref 70–99)
GLUCOSE URINE: NEGATIVE
HCT VFR BLD CALC: 33.3 % (ref 36–46)
HEMOGLOBIN: 10.9 G/DL (ref 12–16)
IMMATURE GRANULOCYTES: ABNORMAL %
KETONES, URINE: NEGATIVE
LEUKOCYTE ESTERASE, URINE: ABNORMAL
LYMPHOCYTES # BLD: 38 % (ref 24–44)
MCH RBC QN AUTO: 30.3 PG (ref 26–34)
MCHC RBC AUTO-ENTMCNC: 32.6 G/DL (ref 31–37)
MCV RBC AUTO: 93.1 FL (ref 80–100)
MONOCYTES # BLD: 7 % (ref 1–7)
MUCUS: ABNORMAL
NITRITE, URINE: NEGATIVE
NRBC AUTOMATED: ABNORMAL PER 100 WBC
OTHER OBSERVATIONS UA: ABNORMAL
PDW BLD-RTO: 17 % (ref 11.5–14.9)
PH UA: 7 (ref 5–8)
PLATELET # BLD: 171 K/UL (ref 150–450)
PLATELET ESTIMATE: ABNORMAL
PMV BLD AUTO: 8 FL (ref 6–12)
POTASSIUM SERPL-SCNC: 4 MMOL/L (ref 3.7–5.3)
PROTEIN UA: NEGATIVE
RBC # BLD: 3.58 M/UL (ref 4–5.2)
RBC # BLD: ABNORMAL 10*6/UL
RBC UA: ABNORMAL /HPF
RENAL EPITHELIAL, UA: ABNORMAL /HPF
SEG NEUTROPHILS: 53 % (ref 36–66)
SEGMENTED NEUTROPHILS ABSOLUTE COUNT: 2.6 K/UL (ref 1.3–9.1)
SODIUM BLD-SCNC: 140 MMOL/L (ref 135–144)
SPECIFIC GRAVITY UA: 1.01 (ref 1–1.03)
TOTAL PROTEIN: 9 G/DL (ref 6.4–8.3)
TRICHOMONAS: ABNORMAL
TURBIDITY: CLEAR
URINE HGB: NEGATIVE
UROBILINOGEN, URINE: NORMAL
WBC # BLD: 5 K/UL (ref 3.5–11)
WBC # BLD: ABNORMAL 10*3/UL
WBC UA: ABNORMAL /HPF
YEAST: ABNORMAL

## 2019-05-08 PROCEDURE — 36415 COLL VENOUS BLD VENIPUNCTURE: CPT

## 2019-05-08 PROCEDURE — 85025 COMPLETE CBC W/AUTO DIFF WBC: CPT

## 2019-05-08 PROCEDURE — 80053 COMPREHEN METABOLIC PANEL: CPT

## 2019-05-08 PROCEDURE — 81001 URINALYSIS AUTO W/SCOPE: CPT

## 2019-05-08 PROCEDURE — 99283 EMERGENCY DEPT VISIT LOW MDM: CPT

## 2019-05-08 PROCEDURE — 51798 US URINE CAPACITY MEASURE: CPT

## 2019-05-08 ASSESSMENT — ENCOUNTER SYMPTOMS
SHORTNESS OF BREATH: 0
ABDOMINAL PAIN: 0
COUGH: 0

## 2019-05-08 NOTE — ED NOTES
Female urinary wick placed in pt with assistance x 4 staff members. Pt urinates 550ml clear, yellow urine. UA sent to lab.      Marcelino Ness, REJI  05/08/19 0279

## 2019-05-08 NOTE — ED NOTES
Bed: 06  Expected date:   Expected time:   Means of arrival: [facility]  Comments: Pt is brought to this ER by EMS from [facility] with an increase in her BUN and Cr levels. Pt is asymptomatic. Pt arrives A+O x 4, GCS = 15, PMS x 4 intact, eupneic, and PWD. Pt is having appropriate conversation with this nurse. Pt states she has lost about 100 pounds recently, and her PT has been going well with her even being able to stand recently. Pt states she is taking Lasix and Bumex, and she was also put on another diuretic \"booster\" about 2 months ago.      Wiliam Felder RN  05/08/19 2004       Wiliam Felder RN  05/08/19 2007

## 2019-05-08 NOTE — ED PROVIDER NOTES
SURGICAL HISTORY       Past Surgical History:   Procedure Laterality Date    MASTECTOMY, MODIFIED RADICAL Right 2013    TRACHEOSTOMY         CURRENT MEDICATIONS       Discharge Medication List as of 5/8/2019 10:47 PM      CONTINUE these medications which have NOT CHANGED    Details   apixaban (ELIQUIS) 2.5 MG TABS tablet Take 1 tablet by mouth 2 times dailyDC to [facility]      Skin Protectants, Misc. (HYDROCERIN) CREA cream Apply topically 2 times daily, Topical, 2 TIMES DAILY Starting Thu 11/29/2018, Disp-1 Container, R-0, Normal      benzonatate (TESSALON) 100 MG capsule Take 100 mg by mouth 2 times daily as needed for CoughHistorical Med      acetaminophen (TYLENOL) 325 MG tablet Take 650 mg by mouth every 6 hours as needed for PainHistorical Med      ipratropium-albuterol (DUONEB) 0.5-2.5 (3) MG/3ML SOLN nebulizer solution Inhale 1 vial into the lungs every 4 hours as needed for Shortness of BreathHistorical Med      levothyroxine (SYNTHROID) 200 MCG tablet Take 250 mcg by mouth daily Historical Med      ferrous sulfate 325 (65 Fe) MG tablet Take 325 mg by mouth 3 times daily (with meals)Historical Med      Multiple Vitamins-Minerals (THERAPEUTIC MULTIVITAMIN-MINERALS) tablet Take 1 tablet by mouth daily (with breakfast)Historical Med             ALLERGIES     is allergic to zosyn [piperacillin-tazobactam in dex] and vancomycin. FAMILY HISTORY     indicated that the status of her paternal aunt is unknown. SOCIAL HISTORY      reports that she has never smoked. She has never used smokeless tobacco. She reports that she does not drink alcohol or use drugs.     PHYSICAL EXAM     INITIAL VITALS: /71   Pulse 78   Temp 98 °F (36.7 °C) (Oral)   Resp 18   Ht 5' 5\" (1.651 m)   Wt (!) 560 lb (254 kg)   LMP  (LMP Unknown)   SpO2 100%   BMI 93.19 kg/m²     Gen: NAD  Head: NC/AT  EYES: anicteric  Neck: Tracheostomy present  CVS: RRR  PULM: Diminished, clear  ABD: Soft, no TTP  Extr: bilateral Abnormal; Notable for the following components:    Bacteria, UA MODERATE (*)     All other components within normal limits       EMERGENCY DEPARTMENT COURSE:   Vitals:    Vitals:    05/08/19 1454 05/08/19 1742 05/08/19 1934   BP: 117/61 (!) 107/56 115/71   Pulse: 91 76 78   Resp: 16 18 18   Temp: 97.7 °F (36.5 °C)  98 °F (36.7 °C)   TempSrc: Temporal  Oral   SpO2: 96% 100% 100%   Weight: (!) 560 lb (254 kg)     Height: 5' 5\" (1.651 m)         The patient was given the following medications while in the emergency department:  No orders of the defined types were placed in this encounter. -------------------------  CRITICAL CARE:   CONSULTS: IP CONSULT TO PRIMARY CARE PROVIDER  PROCEDURES: Procedures     FINAL IMPRESSION      1. Elevated BUN    2. Medication side effect          DISPOSITION/PLAN   DISPOSITION Decision To Discharge 05/08/2019 05:27:04 PM      PATIENT REFERRED TO:  DO Malissa Altman 74.   [address] [address]  [phone]    In 1 day      [facility] ED  [facility] [address] [facility] [address]  [phone]    If symptoms worsen      DISCHARGE MEDICATIONS:  Discharge Medication List as of 5/8/2019 10:47 PM            Yany Moe MD  Attending Emergency Physician                      Yany Moe MD  05/09/19 [id number]

## 2019-05-09 NOTE — ED NOTES
Report given to [facility] LPN at [facility]. Endorsed plan of care, Pt status, and follow up.       Lita Hedrick RN  05/08/19 8933

## 2019-09-10 ENCOUNTER — HOSPITAL ENCOUNTER (INPATIENT)
Age: 49
LOS: 5 days | Discharge: NURSING, REHAB OR LONG-TERM CARE | DRG: 139 | End: 2019-09-15
Attending: EMERGENCY MEDICINE | Admitting: INTERNAL MEDICINE
Payer: MEDICAID

## 2019-09-10 ENCOUNTER — APPOINTMENT (OUTPATIENT)
Dept: GENERAL RADIOLOGY | Age: 49
DRG: 139 | End: 2019-09-10
Payer: MEDICAID

## 2019-09-10 DIAGNOSIS — G50.0 TRIGEMINAL NEURALGIA: ICD-10-CM

## 2019-09-10 DIAGNOSIS — R65.20 SEVERE SEPSIS (HCC): ICD-10-CM

## 2019-09-10 DIAGNOSIS — J18.9 PNEUMONIA DUE TO ORGANISM: Primary | ICD-10-CM

## 2019-09-10 DIAGNOSIS — N17.9 AKI (ACUTE KIDNEY INJURY) (HCC): ICD-10-CM

## 2019-09-10 DIAGNOSIS — A41.9 SEVERE SEPSIS (HCC): ICD-10-CM

## 2019-09-10 LAB
ABSOLUTE BANDS #: 4.15 K/UL (ref 0–1)
ABSOLUTE EOS #: 0 K/UL (ref 0–0.4)
ABSOLUTE IMMATURE GRANULOCYTE: ABNORMAL K/UL (ref 0–0.3)
ABSOLUTE LYMPH #: 0.29 K/UL (ref 1–4.8)
ABSOLUTE MONO #: 0.86 K/UL (ref 0.1–1.3)
ALBUMIN SERPL-MCNC: 3.3 G/DL (ref 3.5–5.2)
ALBUMIN/GLOBULIN RATIO: ABNORMAL (ref 1–2.5)
ALP BLD-CCNC: 77 U/L (ref 35–104)
ALT SERPL-CCNC: 21 U/L (ref 5–33)
ANION GAP SERPL CALCULATED.3IONS-SCNC: 15 MMOL/L (ref 9–17)
AST SERPL-CCNC: 25 U/L
BANDS: 29 % (ref 0–10)
BASOPHILS # BLD: 0 % (ref 0–2)
BASOPHILS ABSOLUTE: 0 K/UL (ref 0–0.2)
BILIRUB SERPL-MCNC: 0.6 MG/DL (ref 0.3–1.2)
BNP INTERPRETATION: ABNORMAL
BUN BLDV-MCNC: 47 MG/DL (ref 6–20)
BUN/CREAT BLD: ABNORMAL (ref 9–20)
CALCIUM SERPL-MCNC: 8.8 MG/DL (ref 8.6–10.4)
CHLORIDE BLD-SCNC: 91 MMOL/L (ref 98–107)
CO2: 30 MMOL/L (ref 20–31)
CREAT SERPL-MCNC: 2.44 MG/DL (ref 0.5–0.9)
DIFFERENTIAL TYPE: ABNORMAL
EOSINOPHILS RELATIVE PERCENT: 0 % (ref 0–4)
GFR AFRICAN AMERICAN: 26 ML/MIN
GFR NON-AFRICAN AMERICAN: 21 ML/MIN
GFR SERPL CREATININE-BSD FRML MDRD: ABNORMAL ML/MIN/{1.73_M2}
GFR SERPL CREATININE-BSD FRML MDRD: ABNORMAL ML/MIN/{1.73_M2}
GLUCOSE BLD-MCNC: 129 MG/DL (ref 70–99)
HCT VFR BLD CALC: 28 % (ref 36–46)
HEMOGLOBIN: 8.9 G/DL (ref 12–16)
IMMATURE GRANULOCYTES: ABNORMAL %
LACTIC ACID, SEPSIS WHOLE BLOOD: ABNORMAL MMOL/L (ref 0.5–1.9)
LACTIC ACID, SEPSIS WHOLE BLOOD: NORMAL MMOL/L (ref 0.5–1.9)
LACTIC ACID, SEPSIS: 1 MMOL/L (ref 0.5–1.9)
LACTIC ACID, SEPSIS: 2.5 MMOL/L (ref 0.5–1.9)
LYMPHOCYTES # BLD: 2 % (ref 24–44)
MCH RBC QN AUTO: 30.5 PG (ref 26–34)
MCHC RBC AUTO-ENTMCNC: 31.8 G/DL (ref 31–37)
MCV RBC AUTO: 95.8 FL (ref 80–100)
METAMYELOCYTES ABSOLUTE COUNT: 0.43 K/UL
METAMYELOCYTES: 3 %
MONOCYTES # BLD: 6 % (ref 1–7)
MORPHOLOGY: ABNORMAL
NRBC AUTOMATED: ABNORMAL PER 100 WBC
PDW BLD-RTO: 17.1 % (ref 11.5–14.9)
PLATELET # BLD: 162 K/UL (ref 150–450)
PLATELET ESTIMATE: ABNORMAL
PMV BLD AUTO: 8 FL (ref 6–12)
POTASSIUM SERPL-SCNC: 4.5 MMOL/L (ref 3.7–5.3)
PRO-BNP: 698 PG/ML
RBC # BLD: 2.92 M/UL (ref 4–5.2)
RBC # BLD: ABNORMAL 10*6/UL
SEG NEUTROPHILS: 60 % (ref 36–66)
SEGMENTED NEUTROPHILS ABSOLUTE COUNT: 8.57 K/UL (ref 1.3–9.1)
SODIUM BLD-SCNC: 136 MMOL/L (ref 135–144)
TOTAL PROTEIN: 7.9 G/DL (ref 6.4–8.3)
TROPONIN INTERP: ABNORMAL
TROPONIN T: ABNORMAL NG/ML
TROPONIN, HIGH SENSITIVITY: 26 NG/L (ref 0–14)
WBC # BLD: 14.3 K/UL (ref 3.5–11)
WBC # BLD: ABNORMAL 10*3/UL

## 2019-09-10 PROCEDURE — 87040 BLOOD CULTURE FOR BACTERIA: CPT

## 2019-09-10 PROCEDURE — 2700000000 HC OXYGEN THERAPY PER DAY

## 2019-09-10 PROCEDURE — 6360000002 HC RX W HCPCS: Performed by: INTERNAL MEDICINE

## 2019-09-10 PROCEDURE — 6370000000 HC RX 637 (ALT 250 FOR IP): Performed by: INTERNAL MEDICINE

## 2019-09-10 PROCEDURE — 80053 COMPREHEN METABOLIC PANEL: CPT

## 2019-09-10 PROCEDURE — 71045 X-RAY EXAM CHEST 1 VIEW: CPT

## 2019-09-10 PROCEDURE — 94002 VENT MGMT INPAT INIT DAY: CPT

## 2019-09-10 PROCEDURE — 6360000002 HC RX W HCPCS: Performed by: EMERGENCY MEDICINE

## 2019-09-10 PROCEDURE — 99285 EMERGENCY DEPT VISIT HI MDM: CPT

## 2019-09-10 PROCEDURE — 96365 THER/PROPH/DIAG IV INF INIT: CPT

## 2019-09-10 PROCEDURE — 94760 N-INVAS EAR/PLS OXIMETRY 1: CPT

## 2019-09-10 PROCEDURE — 96366 THER/PROPH/DIAG IV INF ADDON: CPT

## 2019-09-10 PROCEDURE — 2580000003 HC RX 258: Performed by: STUDENT IN AN ORGANIZED HEALTH CARE EDUCATION/TRAINING PROGRAM

## 2019-09-10 PROCEDURE — 93005 ELECTROCARDIOGRAM TRACING: CPT | Performed by: EMERGENCY MEDICINE

## 2019-09-10 PROCEDURE — 84484 ASSAY OF TROPONIN QUANT: CPT

## 2019-09-10 PROCEDURE — 99223 1ST HOSP IP/OBS HIGH 75: CPT | Performed by: INTERNAL MEDICINE

## 2019-09-10 PROCEDURE — 2060000000 HC ICU INTERMEDIATE R&B

## 2019-09-10 PROCEDURE — 6370000000 HC RX 637 (ALT 250 FOR IP): Performed by: STUDENT IN AN ORGANIZED HEALTH CARE EDUCATION/TRAINING PROGRAM

## 2019-09-10 PROCEDURE — 2580000003 HC RX 258: Performed by: EMERGENCY MEDICINE

## 2019-09-10 PROCEDURE — 6360000002 HC RX W HCPCS: Performed by: STUDENT IN AN ORGANIZED HEALTH CARE EDUCATION/TRAINING PROGRAM

## 2019-09-10 PROCEDURE — 83605 ASSAY OF LACTIC ACID: CPT

## 2019-09-10 PROCEDURE — 94640 AIRWAY INHALATION TREATMENT: CPT

## 2019-09-10 PROCEDURE — 83880 ASSAY OF NATRIURETIC PEPTIDE: CPT

## 2019-09-10 PROCEDURE — 85025 COMPLETE CBC W/AUTO DIFF WBC: CPT

## 2019-09-10 PROCEDURE — 36415 COLL VENOUS BLD VENIPUNCTURE: CPT

## 2019-09-10 PROCEDURE — 96368 THER/DIAG CONCURRENT INF: CPT

## 2019-09-10 RX ORDER — SODIUM CHLORIDE 0.9 % (FLUSH) 0.9 %
10 SYRINGE (ML) INJECTION PRN
Status: DISCONTINUED | OUTPATIENT
Start: 2019-09-10 | End: 2019-09-15 | Stop reason: HOSPADM

## 2019-09-10 RX ORDER — 0.9 % SODIUM CHLORIDE 0.9 %
1000 INTRAVENOUS SOLUTION INTRAVENOUS ONCE
Status: COMPLETED | OUTPATIENT
Start: 2019-09-10 | End: 2019-09-10

## 2019-09-10 RX ORDER — BUMETANIDE 1 MG/1
2 TABLET ORAL 2 TIMES DAILY
Status: DISCONTINUED | OUTPATIENT
Start: 2019-09-10 | End: 2019-09-11

## 2019-09-10 RX ORDER — ALBUTEROL SULFATE 2.5 MG/3ML
2.5 SOLUTION RESPIRATORY (INHALATION) EVERY 4 HOURS
Status: DISCONTINUED | OUTPATIENT
Start: 2019-09-10 | End: 2019-09-15 | Stop reason: HOSPADM

## 2019-09-10 RX ORDER — MIDAZOLAM HYDROCHLORIDE 1 MG/ML
2 INJECTION INTRAMUSCULAR; INTRAVENOUS
Status: DISCONTINUED | OUTPATIENT
Start: 2019-09-10 | End: 2019-09-15 | Stop reason: HOSPADM

## 2019-09-10 RX ORDER — BENZONATATE 100 MG/1
200 CAPSULE ORAL 3 TIMES DAILY
Status: DISCONTINUED | OUTPATIENT
Start: 2019-09-10 | End: 2019-09-15 | Stop reason: HOSPADM

## 2019-09-10 RX ORDER — ACETAMINOPHEN 325 MG/1
650 TABLET ORAL EVERY 6 HOURS PRN
Status: DISCONTINUED | OUTPATIENT
Start: 2019-09-10 | End: 2019-09-15 | Stop reason: HOSPADM

## 2019-09-10 RX ORDER — BUMETANIDE 2 MG/1
2 TABLET ORAL 2 TIMES DAILY
Status: ON HOLD | COMMUNITY
End: 2019-09-15 | Stop reason: HOSPADM

## 2019-09-10 RX ORDER — LINEZOLID 2 MG/ML
600 INJECTION, SOLUTION INTRAVENOUS EVERY 12 HOURS
Status: DISCONTINUED | OUTPATIENT
Start: 2019-09-10 | End: 2019-09-13

## 2019-09-10 RX ORDER — LANOLIN ALCOHOL/MO/W.PET/CERES
CREAM (GRAM) TOPICAL 2 TIMES DAILY
Status: DISCONTINUED | OUTPATIENT
Start: 2019-09-10 | End: 2019-09-15 | Stop reason: HOSPADM

## 2019-09-10 RX ORDER — METRONIDAZOLE 10 MG/G
GEL TOPICAL DAILY
COMMUNITY
End: 2019-10-30

## 2019-09-10 RX ORDER — DOCUSATE SODIUM 100 MG/1
100 CAPSULE, LIQUID FILLED ORAL DAILY PRN
Status: DISCONTINUED | OUTPATIENT
Start: 2019-09-10 | End: 2019-09-15 | Stop reason: HOSPADM

## 2019-09-10 RX ORDER — IPRATROPIUM BROMIDE AND ALBUTEROL SULFATE 2.5; .5 MG/3ML; MG/3ML
1 SOLUTION RESPIRATORY (INHALATION)
Status: DISCONTINUED | OUTPATIENT
Start: 2019-09-10 | End: 2019-09-10

## 2019-09-10 RX ORDER — CLINDAMYCIN PHOSPHATE 600 MG/50ML
600 INJECTION INTRAVENOUS EVERY 8 HOURS
Status: DISCONTINUED | OUTPATIENT
Start: 2019-09-10 | End: 2019-09-10

## 2019-09-10 RX ORDER — METOLAZONE 5 MG/1
5 TABLET ORAL DAILY
Status: ON HOLD | COMMUNITY
End: 2019-09-15 | Stop reason: HOSPADM

## 2019-09-10 RX ORDER — BENZONATATE 100 MG/1
100 CAPSULE ORAL 2 TIMES DAILY PRN
Status: DISCONTINUED | OUTPATIENT
Start: 2019-09-10 | End: 2019-09-10

## 2019-09-10 RX ORDER — L-DESOXYEPHEDRINE 50 MG
INHALER (EA) NASAL EVERY 6 HOURS PRN
COMMUNITY

## 2019-09-10 RX ORDER — METRONIDAZOLE 10 MG/G
GEL TOPICAL DAILY
Status: DISCONTINUED | OUTPATIENT
Start: 2019-09-10 | End: 2019-09-15 | Stop reason: HOSPADM

## 2019-09-10 RX ORDER — FERROUS SULFATE 325(65) MG
325 TABLET ORAL
Status: DISCONTINUED | OUTPATIENT
Start: 2019-09-10 | End: 2019-09-15 | Stop reason: HOSPADM

## 2019-09-10 RX ORDER — LEVOFLOXACIN 5 MG/ML
750 INJECTION, SOLUTION INTRAVENOUS EVERY 24 HOURS
Status: DISCONTINUED | OUTPATIENT
Start: 2019-09-10 | End: 2019-09-10

## 2019-09-10 RX ORDER — DOCUSATE SODIUM 100 MG/1
100 CAPSULE, LIQUID FILLED ORAL DAILY PRN
COMMUNITY

## 2019-09-10 RX ORDER — GUAIFENESIN 600 MG/1
1200 TABLET, EXTENDED RELEASE ORAL 2 TIMES DAILY
Status: DISCONTINUED | OUTPATIENT
Start: 2019-09-10 | End: 2019-09-15 | Stop reason: HOSPADM

## 2019-09-10 RX ORDER — IPRATROPIUM BROMIDE AND ALBUTEROL SULFATE 2.5; .5 MG/3ML; MG/3ML
1 SOLUTION RESPIRATORY (INHALATION) EVERY 4 HOURS PRN
Status: DISCONTINUED | OUTPATIENT
Start: 2019-09-10 | End: 2019-09-10

## 2019-09-10 RX ORDER — CEPHALEXIN 500 MG/1
500 CAPSULE ORAL 2 TIMES DAILY
COMMUNITY
Start: 2019-09-10 | End: 2019-09-20

## 2019-09-10 RX ORDER — CETIRIZINE HYDROCHLORIDE 10 MG/1
10 TABLET ORAL DAILY
Status: DISCONTINUED | OUTPATIENT
Start: 2019-09-10 | End: 2019-09-15 | Stop reason: HOSPADM

## 2019-09-10 RX ORDER — NAPROXEN 500 MG/1
500 TABLET ORAL 2 TIMES DAILY WITH MEALS
Status: ON HOLD | COMMUNITY
End: 2019-09-15 | Stop reason: HOSPADM

## 2019-09-10 RX ORDER — SODIUM CHLORIDE 9 MG/ML
INJECTION, SOLUTION INTRAVENOUS CONTINUOUS
Status: DISCONTINUED | OUTPATIENT
Start: 2019-09-10 | End: 2019-09-15 | Stop reason: HOSPADM

## 2019-09-10 RX ORDER — FENTANYL CITRATE 50 UG/ML
50 INJECTION, SOLUTION INTRAMUSCULAR; INTRAVENOUS
Status: DISCONTINUED | OUTPATIENT
Start: 2019-09-10 | End: 2019-09-15 | Stop reason: HOSPADM

## 2019-09-10 RX ORDER — LEVOFLOXACIN 5 MG/ML
750 INJECTION, SOLUTION INTRAVENOUS EVERY 24 HOURS
Status: DISCONTINUED | OUTPATIENT
Start: 2019-09-10 | End: 2019-09-12 | Stop reason: ALTCHOICE

## 2019-09-10 RX ORDER — ONDANSETRON 2 MG/ML
4 INJECTION INTRAMUSCULAR; INTRAVENOUS EVERY 6 HOURS PRN
Status: DISCONTINUED | OUTPATIENT
Start: 2019-09-10 | End: 2019-09-15 | Stop reason: HOSPADM

## 2019-09-10 RX ORDER — SODIUM CHLORIDE 0.9 % (FLUSH) 0.9 %
10 SYRINGE (ML) INJECTION EVERY 12 HOURS SCHEDULED
Status: DISCONTINUED | OUTPATIENT
Start: 2019-09-10 | End: 2019-09-15 | Stop reason: HOSPADM

## 2019-09-10 RX ORDER — M-VIT,TX,IRON,MINS/CALC/FOLIC 27MG-0.4MG
1 TABLET ORAL
Status: DISCONTINUED | OUTPATIENT
Start: 2019-09-11 | End: 2019-09-15 | Stop reason: HOSPADM

## 2019-09-10 RX ORDER — LEVOTHYROXINE SODIUM 0.15 MG/1
300 TABLET ORAL DAILY
Status: DISCONTINUED | OUTPATIENT
Start: 2019-09-10 | End: 2019-09-15 | Stop reason: HOSPADM

## 2019-09-10 RX ORDER — LORATADINE 10 MG/1
10 TABLET ORAL DAILY
COMMUNITY

## 2019-09-10 RX ORDER — LEVOFLOXACIN 5 MG/ML
750 INJECTION, SOLUTION INTRAVENOUS EVERY 24 HOURS
Status: CANCELLED | OUTPATIENT
Start: 2019-09-10

## 2019-09-10 RX ORDER — FAMOTIDINE 20 MG/1
20 TABLET, FILM COATED ORAL 2 TIMES DAILY
Status: DISCONTINUED | OUTPATIENT
Start: 2019-09-10 | End: 2019-09-15 | Stop reason: HOSPADM

## 2019-09-10 RX ADMIN — BENZONATATE 200 MG: 100 CAPSULE ORAL at 19:49

## 2019-09-10 RX ADMIN — GUAIFENESIN 1200 MG: 600 TABLET, EXTENDED RELEASE ORAL at 19:50

## 2019-09-10 RX ADMIN — AZITHROMYCIN MONOHYDRATE 500 MG: 500 INJECTION, POWDER, LYOPHILIZED, FOR SOLUTION INTRAVENOUS at 14:32

## 2019-09-10 RX ADMIN — ALBUTEROL SULFATE 2.5 MG: 2.5 SOLUTION RESPIRATORY (INHALATION) at 18:32

## 2019-09-10 RX ADMIN — METRONIDAZOLE: 10 GEL TOPICAL at 19:50

## 2019-09-10 RX ADMIN — CEFTRIAXONE SODIUM 1 G: 1 INJECTION, POWDER, FOR SOLUTION INTRAMUSCULAR; INTRAVENOUS at 14:20

## 2019-09-10 RX ADMIN — SODIUM CHLORIDE 1000 ML: 9 INJECTION, SOLUTION INTRAVENOUS at 14:15

## 2019-09-10 RX ADMIN — Medication: at 19:53

## 2019-09-10 RX ADMIN — FERROUS SULFATE TAB 325 MG (65 MG ELEMENTAL FE) 325 MG: 325 (65 FE) TAB at 18:02

## 2019-09-10 RX ADMIN — LINEZOLID 600 MG: 600 INJECTION, SOLUTION INTRAVENOUS at 19:51

## 2019-09-10 RX ADMIN — ALBUTEROL SULFATE 2.5 MG: 2.5 SOLUTION RESPIRATORY (INHALATION) at 20:38

## 2019-09-10 RX ADMIN — FAMOTIDINE 20 MG: 20 TABLET ORAL at 19:49

## 2019-09-10 RX ADMIN — SODIUM CHLORIDE: 9 INJECTION, SOLUTION INTRAVENOUS at 18:02

## 2019-09-10 RX ADMIN — BUMETANIDE 2 MG: 1 TABLET ORAL at 19:50

## 2019-09-10 RX ADMIN — CEFEPIME 2 G: 2 INJECTION, POWDER, FOR SOLUTION INTRAVENOUS at 18:02

## 2019-09-10 ASSESSMENT — ENCOUNTER SYMPTOMS
COUGH: 1
ABDOMINAL PAIN: 1
RHINORRHEA: 1
SHORTNESS OF BREATH: 1
WHEEZING: 1
ABDOMINAL PAIN: 0
BACK PAIN: 0
NAUSEA: 0
VOMITING: 0

## 2019-09-10 ASSESSMENT — PULMONARY FUNCTION TESTS: PIF_VALUE: 35

## 2019-09-10 NOTE — ED PROVIDER NOTES
unknown. SOCIAL HISTORY       Social History     Tobacco Use    Smoking status: Never Smoker    Smokeless tobacco: Never Used   Substance Use Topics    Alcohol use: No    Drug use: No     PHYSICAL EXAM     INITIAL VITALS: BP (!) 88/57   Pulse 105   Temp 97.7 °F (36.5 °C) (Axillary)   Resp 17   Ht 5' 5\" (1.651 m)   Wt (!) 590 lb (267.6 kg)   SpO2 100%   BMI 98.18 kg/m²    Physical Exam   Constitutional: She appears well-developed and well-nourished. No distress. Morbidly obese   HENT:   Head: Normocephalic and atraumatic. Neck: Normal range of motion. Neck supple. Cardiovascular: Normal rate. Distant heart sounds due to patient size   Pulmonary/Chest: Effort normal. No respiratory distress. Exam limited due to patient's size, but no overt crackles or wheezing heard   Abdominal: She exhibits no distension. Musculoskeletal:   No apparent erythema, active drainage, fluctuance, from either leg where she thought the drainage was coming from. Exam is somewhat limited due to patient size, although 3 different people were used to lift her legs, the very proximal posterior aspect of her legs was unable to be examined   Neurological: She is alert. Skin: Skin is warm and dry. She is not diaphoretic. Nursing note and vitals reviewed. MEDICAL DECISION MAKING:     Patient presents with malaise, shortness of breath, cough, fatigue. On exam, patient is borderline tachycardic, with a low blood pressure, afebrile. She is in no distress, but does appear fatigued. There is no apparent infection of her legs where she was worried there might be one. However, we will do a infectious work-up to rule out sepsis, pneumonia, dehydration or any other infectious process that could be contributing to her symptoms.     ED Course as of Sep 10 1610   Tue Sep 10, 2019   1319 Leukocytosis of 14   WBC(!): 14.3 [TR]   1329 Lactate elevated 2.5   Lactic Acid, Sepsis(!): 2.5 [TR]   1330 Creatinine doubled from Absolute 0.43 (*)     All other components within normal limits   COMPREHENSIVE METABOLIC PANEL W/ REFLEX TO MG FOR LOW K - Abnormal; Notable for the following components:    Glucose 129 (*)     BUN 47 (*)     CREATININE 2.44 (*)     Chloride 91 (*)     Alb 3.3 (*)     GFR Non- 21 (*)     GFR  26 (*)     All other components within normal limits   LACTATE, SEPSIS - Abnormal; Notable for the following components:    Lactic Acid, Sepsis 2.5 (*)     All other components within normal limits   BRAIN NATRIURETIC PEPTIDE - Abnormal; Notable for the following components:    Pro- (*)     All other components within normal limits   TROPONIN - Abnormal; Notable for the following components:    Troponin, High Sensitivity 26 (*)     All other components within normal limits   CULTURE BLOOD #1   CULTURE BLOOD #1   LACTATE, SEPSIS   LACTATE, SEPSIS   LACTATE, SEPSIS     EMERGENCY DEPARTMENTCOURSE:   Vitals:    Vitals:    09/10/19 1158 09/10/19 1406 09/10/19 1541   BP: 97/63 (!) 83/46 (!) 88/57   Pulse: 109 106 105   Resp: 20 18 17   Temp: 98.3 °F (36.8 °C) 97.8 °F (36.6 °C) 97.7 °F (36.5 °C)   TempSrc: Oral Axillary Axillary   SpO2:  100% 100%   Weight: (!) 590 lb (267.6 kg)     Height: 5' 5\" (1.651 m)         The patient was given the following medications while in the emergency department:  Orders Placed This Encounter   Medications    cefTRIAXone (ROCEPHIN) 1 g IVPB in 50 mL D5W minibag    azithromycin (ZITHROMAX) 500 mg in D5W 250ml addavial    0.9 % sodium chloride bolus    DISCONTD: cefepime (MAXIPIME) 2 g IVPB minibag    DISCONTD: clindamycin (CLEOCIN) 600 mg in dextrose 5 % 50 mL IVPB     CONSULTS:  IP CONSULT TO PULMONOLOGY  IP CONSULT TO SOCIAL WORK    FINAL IMPRESSION      1. Pneumonia due to organism    2. Severe sepsis ([address]    3.  LILIAN (acute kidney injury) [facility])          DISPOSITION/PLAN   DISPOSITION Admitted 09/10/2019 03:07:51 PM      PATIENT REFERRED TO:  Bernard Ceron

## 2019-09-10 NOTE — PROGRESS NOTES
Medication History completed:    New medications: naproxen, Aquaphor, metronidazole gel, metolazone, milk of magnesia, docusate, cephalexin, Vicks vaporub, bumetanide, bisacodyl    Medications discontinued: Eliquis    Changes to dosing:   Hydrocerin cream is applied to bilateral legs    Stated allergies: As listed    Other pertinent information: Medications confirmed with facility list. The patient was started on cephalexin today with an intended 10 day duration for cellulitis.      Thank you,  Navya Salazar, PharmD, BCPS  [phone]

## 2019-09-11 ENCOUNTER — APPOINTMENT (OUTPATIENT)
Dept: GENERAL RADIOLOGY | Age: 49
DRG: 139 | End: 2019-09-11
Payer: MEDICAID

## 2019-09-11 LAB
ABSOLUTE EOS #: 0.2 K/UL (ref 0–0.4)
ABSOLUTE IMMATURE GRANULOCYTE: ABNORMAL K/UL (ref 0–0.3)
ABSOLUTE LYMPH #: 1 K/UL (ref 1–4.8)
ABSOLUTE MONO #: 1 K/UL (ref 0.1–1.3)
ALLEN TEST: ABNORMAL
ANION GAP SERPL CALCULATED.3IONS-SCNC: 13 MMOL/L (ref 9–17)
BASOPHILS # BLD: 0 % (ref 0–2)
BASOPHILS ABSOLUTE: 0 K/UL (ref 0–0.2)
BNP INTERPRETATION: ABNORMAL
BUN BLDV-MCNC: 51 MG/DL (ref 6–20)
BUN/CREAT BLD: ABNORMAL (ref 9–20)
CALCIUM SERPL-MCNC: 8.6 MG/DL (ref 8.6–10.4)
CARBOXYHEMOGLOBIN: 1.7 % (ref 0–5)
CHLORIDE BLD-SCNC: 95 MMOL/L (ref 98–107)
CHLORIDE, UR: 73 MMOL/L
CO2: 30 MMOL/L (ref 20–31)
COMPLEMENT C3: 196 MG/DL (ref 90–180)
COMPLEMENT C4: 66 MG/DL (ref 10–40)
CREAT SERPL-MCNC: 2.6 MG/DL (ref 0.5–0.9)
CREATININE URINE: 47.8 MG/DL (ref 28–217)
DIFFERENTIAL TYPE: ABNORMAL
DIRECT EXAM: NORMAL
EKG ATRIAL RATE: 109 BPM
EKG P AXIS: 42 DEGREES
EKG P-R INTERVAL: 160 MS
EKG Q-T INTERVAL: 352 MS
EKG QRS DURATION: 90 MS
EKG QTC CALCULATION (BAZETT): 474 MS
EKG R AXIS: 49 DEGREES
EKG T AXIS: 22 DEGREES
EKG VENTRICULAR RATE: 109 BPM
EOSINOPHIL,URINE: NORMAL
EOSINOPHILS RELATIVE PERCENT: 2 % (ref 0–4)
FIO2: 30
FREE KAPPA/LAMBDA RATIO: 2.84 (ref 0.26–1.65)
GFR AFRICAN AMERICAN: 24 ML/MIN
GFR NON-AFRICAN AMERICAN: 20 ML/MIN
GFR SERPL CREATININE-BSD FRML MDRD: ABNORMAL ML/MIN/{1.73_M2}
GFR SERPL CREATININE-BSD FRML MDRD: ABNORMAL ML/MIN/{1.73_M2}
GLUCOSE BLD-MCNC: 124 MG/DL (ref 70–99)
HCO3 ARTERIAL: 33.5 MMOL/L (ref 22–26)
HCT VFR BLD CALC: 25.7 % (ref 36–46)
HEMOGLOBIN: 8.1 G/DL (ref 12–16)
IMMATURE GRANULOCYTES: ABNORMAL %
KAPPA FREE LIGHT CHAINS QNT: 18.62 MG/DL (ref 0.37–1.94)
LAMBDA FREE LIGHT CHAINS QNT: 6.55 MG/DL (ref 0.57–2.63)
LV EF: 55 %
LVEF MODALITY: NORMAL
LYMPHOCYTES # BLD: 8 % (ref 24–44)
Lab: NORMAL
MCH RBC QN AUTO: 29.5 PG (ref 26–34)
MCHC RBC AUTO-ENTMCNC: 31.3 G/DL (ref 31–37)
MCV RBC AUTO: 94.1 FL (ref 80–100)
METHEMOGLOBIN: 0.4 % (ref 0–1.9)
MODE: ABNORMAL
MONOCYTES # BLD: 8 % (ref 1–7)
MRSA, DNA, NASAL: ABNORMAL
NEGATIVE BASE EXCESS, ART: ABNORMAL MMOL/L (ref 0–2)
NOTIFICATION TIME: ABNORMAL
NOTIFICATION: ABNORMAL
NRBC AUTOMATED: ABNORMAL PER 100 WBC
O2 DEVICE/FLOW/%: ABNORMAL
O2 SAT, ARTERIAL: 96.6 % (ref 95–98)
OXYHEMOGLOBIN: ABNORMAL % (ref 95–98)
PATIENT TEMP: 37
PCO2 ARTERIAL: 50.2 MMHG (ref 35–45)
PCO2, ART, TEMP ADJ: ABNORMAL (ref 35–45)
PDW BLD-RTO: 17.3 % (ref 11.5–14.9)
PEEP/CPAP: 5
PH ARTERIAL: 7.43 (ref 7.35–7.45)
PH, ART, TEMP ADJ: ABNORMAL (ref 7.35–7.45)
PLATELET # BLD: 145 K/UL (ref 150–450)
PLATELET ESTIMATE: ABNORMAL
PMV BLD AUTO: 8.4 FL (ref 6–12)
PO2 ARTERIAL: 94.2 MMHG (ref 80–100)
PO2, ART, TEMP ADJ: ABNORMAL MMHG (ref 80–100)
POSITIVE BASE EXCESS, ART: 9.3 MMOL/L (ref 0–2)
POTASSIUM SERPL-SCNC: 4.5 MMOL/L (ref 3.7–5.3)
PRO-BNP: 2882 PG/ML
PROCALCITONIN: 31.11 NG/ML
PSV: ABNORMAL
PT. POSITION: ABNORMAL
RBC # BLD: 2.74 M/UL (ref 4–5.2)
RBC # BLD: ABNORMAL 10*6/UL
RESPIRATORY RATE: 16
SAMPLE SITE: ABNORMAL
SEG NEUTROPHILS: 82 % (ref 36–66)
SEGMENTED NEUTROPHILS ABSOLUTE COUNT: 10.8 K/UL (ref 1.3–9.1)
SET RATE: 16
SODIUM BLD-SCNC: 138 MMOL/L (ref 135–144)
SODIUM,UR: 95 MMOL/L
SPECIMEN DESCRIPTION: ABNORMAL
SPECIMEN DESCRIPTION: NORMAL
TEXT FOR RESPIRATORY: ABNORMAL
TOTAL CK: 126 U/L (ref 26–192)
TOTAL HB: ABNORMAL G/DL (ref 12–16)
TOTAL PROTEIN, URINE: 19 MG/DL
TOTAL RATE: 16
URINE TOTAL PROTEIN CREATININE RATIO: 0.4 (ref 0–0.2)
VT: 550
WBC # BLD: 13 K/UL (ref 3.5–11)
WBC # BLD: ABNORMAL 10*3/UL

## 2019-09-11 PROCEDURE — 87077 CULTURE AEROBIC IDENTIFY: CPT

## 2019-09-11 PROCEDURE — 94640 AIRWAY INHALATION TREATMENT: CPT

## 2019-09-11 PROCEDURE — 6360000002 HC RX W HCPCS: Performed by: STUDENT IN AN ORGANIZED HEALTH CARE EDUCATION/TRAINING PROGRAM

## 2019-09-11 PROCEDURE — 82805 BLOOD GASES W/O2 SATURATION: CPT

## 2019-09-11 PROCEDURE — 82436 ASSAY OF URINE CHLORIDE: CPT

## 2019-09-11 PROCEDURE — 2580000003 HC RX 258: Performed by: INTERNAL MEDICINE

## 2019-09-11 PROCEDURE — 36600 WITHDRAWAL OF ARTERIAL BLOOD: CPT

## 2019-09-11 PROCEDURE — 31502 CHANGE OF WINDPIPE AIRWAY: CPT

## 2019-09-11 PROCEDURE — 87449 NOS EACH ORGANISM AG IA: CPT

## 2019-09-11 PROCEDURE — 2580000003 HC RX 258: Performed by: STUDENT IN AN ORGANIZED HEALTH CARE EDUCATION/TRAINING PROGRAM

## 2019-09-11 PROCEDURE — 82570 ASSAY OF URINE CREATININE: CPT

## 2019-09-11 PROCEDURE — 87641 MR-STAPH DNA AMP PROBE: CPT

## 2019-09-11 PROCEDURE — 86038 ANTINUCLEAR ANTIBODIES: CPT

## 2019-09-11 PROCEDURE — 93010 ELECTROCARDIOGRAM REPORT: CPT | Performed by: INTERNAL MEDICINE

## 2019-09-11 PROCEDURE — 6370000000 HC RX 637 (ALT 250 FOR IP): Performed by: INTERNAL MEDICINE

## 2019-09-11 PROCEDURE — 83883 ASSAY NEPHELOMETRY NOT SPEC: CPT

## 2019-09-11 PROCEDURE — 86160 COMPLEMENT ANTIGEN: CPT

## 2019-09-11 PROCEDURE — 80048 BASIC METABOLIC PNL TOTAL CA: CPT

## 2019-09-11 PROCEDURE — 84156 ASSAY OF PROTEIN URINE: CPT

## 2019-09-11 PROCEDURE — 99233 SBSQ HOSP IP/OBS HIGH 50: CPT | Performed by: INTERNAL MEDICINE

## 2019-09-11 PROCEDURE — 82550 ASSAY OF CK (CPK): CPT

## 2019-09-11 PROCEDURE — 87070 CULTURE OTHR SPECIMN AEROBIC: CPT

## 2019-09-11 PROCEDURE — 6360000002 HC RX W HCPCS: Performed by: INTERNAL MEDICINE

## 2019-09-11 PROCEDURE — 85025 COMPLETE CBC W/AUTO DIFF WBC: CPT

## 2019-09-11 PROCEDURE — C8929 TTE W OR WO FOL WCON,DOPPLER: HCPCS

## 2019-09-11 PROCEDURE — 94761 N-INVAS EAR/PLS OXIMETRY MLT: CPT

## 2019-09-11 PROCEDURE — 6360000004 HC RX CONTRAST MEDICATION: Performed by: INTERNAL MEDICINE

## 2019-09-11 PROCEDURE — 2060000000 HC ICU INTERMEDIATE R&B

## 2019-09-11 PROCEDURE — 2700000000 HC OXYGEN THERAPY PER DAY

## 2019-09-11 PROCEDURE — 6370000000 HC RX 637 (ALT 250 FOR IP): Performed by: STUDENT IN AN ORGANIZED HEALTH CARE EDUCATION/TRAINING PROGRAM

## 2019-09-11 PROCEDURE — 87186 SC STD MICRODIL/AGAR DIL: CPT

## 2019-09-11 PROCEDURE — 84145 PROCALCITONIN (PCT): CPT

## 2019-09-11 PROCEDURE — 71045 X-RAY EXAM CHEST 1 VIEW: CPT

## 2019-09-11 PROCEDURE — 97161 PT EVAL LOW COMPLEX 20 MIN: CPT

## 2019-09-11 PROCEDURE — 84300 ASSAY OF URINE SODIUM: CPT

## 2019-09-11 PROCEDURE — 5A1935Z RESPIRATORY VENTILATION, LESS THAN 24 CONSECUTIVE HOURS: ICD-10-PCS | Performed by: INTERNAL MEDICINE

## 2019-09-11 PROCEDURE — 94003 VENT MGMT INPAT SUBQ DAY: CPT

## 2019-09-11 PROCEDURE — 83880 ASSAY OF NATRIURETIC PEPTIDE: CPT

## 2019-09-11 PROCEDURE — 36415 COLL VENOUS BLD VENIPUNCTURE: CPT

## 2019-09-11 PROCEDURE — 87205 SMEAR GRAM STAIN: CPT

## 2019-09-11 RX ADMIN — BENZONATATE 200 MG: 100 CAPSULE ORAL at 20:31

## 2019-09-11 RX ADMIN — CEFEPIME 2 G: 2 INJECTION, POWDER, FOR SOLUTION INTRAVENOUS at 09:36

## 2019-09-11 RX ADMIN — LEVOTHYROXINE SODIUM 300 MCG: 150 TABLET ORAL at 06:36

## 2019-09-11 RX ADMIN — PERFLUTREN 2.2 MG: 6.52 INJECTION, SUSPENSION INTRAVENOUS at 10:58

## 2019-09-11 RX ADMIN — ALBUTEROL SULFATE 2.5 MG: 2.5 SOLUTION RESPIRATORY (INHALATION) at 16:17

## 2019-09-11 RX ADMIN — ALBUTEROL SULFATE 2.5 MG: 2.5 SOLUTION RESPIRATORY (INHALATION) at 07:54

## 2019-09-11 RX ADMIN — CETIRIZINE HYDROCHLORIDE 10 MG: 10 TABLET, FILM COATED ORAL at 09:30

## 2019-09-11 RX ADMIN — ALBUTEROL SULFATE 2.5 MG: 2.5 SOLUTION RESPIRATORY (INHALATION) at 02:57

## 2019-09-11 RX ADMIN — LEVOFLOXACIN 750 MG: 5 INJECTION, SOLUTION INTRAVENOUS at 23:51

## 2019-09-11 RX ADMIN — FAMOTIDINE 20 MG: 20 TABLET ORAL at 20:31

## 2019-09-11 RX ADMIN — Medication: at 20:33

## 2019-09-11 RX ADMIN — BENZONATATE 200 MG: 100 CAPSULE ORAL at 14:13

## 2019-09-11 RX ADMIN — MULTIPLE VITAMINS W/ MINERALS TAB 1 TABLET: TAB at 09:31

## 2019-09-11 RX ADMIN — LINEZOLID 600 MG: 600 INJECTION, SOLUTION INTRAVENOUS at 07:37

## 2019-09-11 RX ADMIN — Medication 10 ML: at 20:32

## 2019-09-11 RX ADMIN — GUAIFENESIN 1200 MG: 600 TABLET, EXTENDED RELEASE ORAL at 09:30

## 2019-09-11 RX ADMIN — METRONIDAZOLE: 10 GEL TOPICAL at 09:35

## 2019-09-11 RX ADMIN — FERROUS SULFATE TAB 325 MG (65 MG ELEMENTAL FE) 325 MG: 325 (65 FE) TAB at 16:53

## 2019-09-11 RX ADMIN — ALBUTEROL SULFATE 2.5 MG: 2.5 SOLUTION RESPIRATORY (INHALATION) at 20:54

## 2019-09-11 RX ADMIN — GUAIFENESIN 1200 MG: 600 TABLET, EXTENDED RELEASE ORAL at 20:31

## 2019-09-11 RX ADMIN — FAMOTIDINE 20 MG: 20 TABLET ORAL at 09:30

## 2019-09-11 RX ADMIN — FERROUS SULFATE TAB 325 MG (65 MG ELEMENTAL FE) 325 MG: 325 (65 FE) TAB at 09:30

## 2019-09-11 RX ADMIN — FERROUS SULFATE TAB 325 MG (65 MG ELEMENTAL FE) 325 MG: 325 (65 FE) TAB at 12:16

## 2019-09-11 RX ADMIN — SODIUM CHLORIDE: 9 INJECTION, SOLUTION INTRAVENOUS at 09:36

## 2019-09-11 RX ADMIN — Medication: at 09:34

## 2019-09-11 RX ADMIN — LEVOFLOXACIN 750 MG: 5 INJECTION, SOLUTION INTRAVENOUS at 01:02

## 2019-09-11 RX ADMIN — ALBUTEROL SULFATE 2.5 MG: 2.5 SOLUTION RESPIRATORY (INHALATION) at 12:13

## 2019-09-11 RX ADMIN — CEFEPIME 2 G: 2 INJECTION, POWDER, FOR SOLUTION INTRAVENOUS at 16:53

## 2019-09-11 RX ADMIN — LINEZOLID 600 MG: 600 INJECTION, SOLUTION INTRAVENOUS at 17:56

## 2019-09-11 RX ADMIN — CEFEPIME 2 G: 2 INJECTION, POWDER, FOR SOLUTION INTRAVENOUS at 02:49

## 2019-09-11 RX ADMIN — BENZONATATE 200 MG: 100 CAPSULE ORAL at 09:30

## 2019-09-11 ASSESSMENT — PULMONARY FUNCTION TESTS
PIF_VALUE: 35
PIF_VALUE: 34
PIF_VALUE: 35
PIF_VALUE: 38
PIF_VALUE: 32

## 2019-09-11 ASSESSMENT — PAIN SCALES - GENERAL: PAINLEVEL_OUTOF10: 0

## 2019-09-11 NOTE — PROGRESS NOTES
Physical Therapy    Facility/Department: [facility] ICU  Initial Assessment    NAME: Darling Medley  : 1970  MRN: [id number]    Date of Service: 2019    Discharge Recommendations:  ECF with PT        Assessment   Body structures, Functions, Activity limitations: Decreased strength;Decreased endurance;Decreased ROM  Assessment: Pt presents with generally not feeling well, SOB, L LE cellulitis. Pt participates in daily PT with focus on ther exs and mobility at [facility]. Will cont TIW for LE ther exs at this time while admitted in hospital.  Treatment Diagnosis: impaired mobilty d/t PNA  Prognosis: Fair  Decision Making: Medium Complexity  History: see hx  Exam: ROM, MMT, sensation, skin assessment  Clinical Presentation: no distress  PT Education: Goals;PT Role;Plan of Care;Home Exercise Program  Barriers to Learning: none  REQUIRES PT FOLLOW UP: Yes  Activity Tolerance  Activity Tolerance: Patient Tolerated treatment well       Patient Diagnosis(es): The primary encounter diagnosis was Pneumonia due to organism. Diagnoses of Severe sepsis ([address] and LILIAN (acute kidney injury) ([address] were also pertinent to this visit. has a past medical history of Anemia, Disease of blood and blood forming organ, History of breast cancer, History of chemotherapy, Hypothyroidism, Lymphedema, Morbid obesity ([address] Respiratory failure ([address] and Tracheostomy present ([address] has a past surgical history that includes tracheostomy and Mastectomy, modified radical (Right, 2013).     Restrictions  Restrictions/Precautions  Restrictions/Precautions: Fall Risk  Required Braces or Orthoses?: No  Position Activity Restriction  Other position/activity restrictions: chronic trach, vent dependent at night, telemetry, IV, morbid obesity  Vision/Hearing  Vision: Within Functional Limits  Hearing: Within functional limits     Subjective  General  Chart Reviewed: Yes  Patient assessed for rehabilitation services?: Yes  Additional Pertinent Hx: Pt WFL  Strength RLE  Comment: limited assessment d/t body habitus, but demo's at least 2-/5  Strength LLE  Comment: limited assessment d/t body habitus, but demo's at least 2-/5  Strength RUE  Strength RUE: WFL  Strength LUE  Strength LUE: WFL     Sensation  Overall Sensation Status: WFL(pt denies sensory deficits in LEs)  Bed mobility  Bridging: Unable to assess  Rolling to Left: Unable to assess  Rolling to Right: Unable to assess  Supine to Sit: Unable to assess  Sit to Supine: Unable to assess  Scooting: Unable to assess  Transfers  Sit to Stand: Unable to assess  Stand to sit: Unable to assess  Bed to Chair: Unable to assess  Stand Pivot Transfers: Unable to assess  Squat Pivot Transfers: Unable to assess  Lateral Transfers: Unable to assess  Comment: Pt is jeanna lift OOB at [facility]           Exercises  Straight Leg Raise: 10x  Heelslides: 10x  Hip Abduction: 10x  Ankle Pumps: 10x     Plan   Plan  Times per week: 3x/wk  Times per day: Daily  Current Treatment Recommendations: Strengthening, ROM, Safety Education & Training, Home Exercise Program, Patient/Caregiver Education & Training  Safety Devices  Type of devices: Call light within reach, Left in bed    G-Code       OutComes Score                                                  AM-PAC Score     AM-PAC Inpatient Mobility without Stair Climbing Raw Score : 5 (09/11/19 1334)  AM-PAC Inpatient without Stair Climbing T-Scale Score : 23.59 (09/11/19 1334)  Mobility Inpatient CMS 0-100% Score: 100 (09/11/19 1334)  Mobility Inpatient without Stair CMS G-Code Modifier : CN (09/11/19 1334)       Goals  Short term goals  Time Frame for Short term goals: 3 sessions  Short term goal 1: Increase LE AROM by 10 degrees  Short term goal 2: Increase LE strength to at least 3/5  Short term goal 3:  Increase pt's knowledge of HEP to allow pt to perform ther exs IND       Therapy Time   Individual Concurrent Group Co-treatment   Time In 1130         Time Out 1146         Minutes 16

## 2019-09-11 NOTE — PROGRESS NOTES
Musculoskeletal: She exhibits edema and tenderness. Neurological: She is alert and oriented to person, place, and time. Skin: Skin is warm. Capillary refill takes less than 2 seconds. She is not diaphoretic. Psychiatric: She has a normal mood and affect. Assessment:        Primary Problem  Pneumonia    Active Hospital Problems    Diagnosis Date Noted    Pneumonia [J18.9] 09/10/2019    Shortness of breath [R06.02] 05/14/2018       Plan:              Community acquired pneumonia:    Patient is coming from bariatric nursing home with chronic trach in place  Patient got 1 dose of ceftriaxone &Azithromycin in the ED  Cefepime 2 g q8  linezolide 600 mg q 12 as she has aleery to vancomycin  Levofloxacin 750 mg q 24   This will cover Pseudomonas and gram negative, Levaquin will also cover atypicals       Respiratory eval and treatment  Tessalon Perles and Mucinex  DuoNeb     Hypothyroidism  Synthroid 300 mcg     Creatinine 2.44  proBNP 698  Troponin :226        PT  OT  GI prophylaxis: Protonix    Labs  Legionella negative,  Respiratory culture pending  Lactic acid trending down 1.0<--- 1    EF: 50 to 55%  RSVP:44           Jose Eng MD  Internal Medicine PGY1  9/11/2019  7:35 AM     Attending Physician Statement  I Independently seen and examined the patient. I have discussed the care of the patient, including pertinent history and exam findings,  with the resident. I have reviewed the key elements of all parts of the encounter with the resident. I agree with the assessment, plan and orders as documented by the resident.     Yaima Toussaint

## 2019-09-11 NOTE — CONSULTS
Department of Internal Medicine  Nephrology Jevon Wolff MD   Consult Note      SUBJECTIVE: This is a 54 y.o. female with a significant past medical history of breast cancer status post chemotherapy, Chronic respiratory failure [status post tracheostomy and on nocturnal CPAP], morbid obesity [transferred from [place] to [facility] in [facility], hypothyroidism, chronic bilateral leg lymphedema and chronic kidney disease stage III [baseline serum creatinine 1.2 to 1.4 mg/dL], who presented with 3-day history of shortness of breath, cough and generalized weakness. Blood pressure at presentation was 87/57 mmHg, pulse 105 bpm and temperature 97.7 °F.  Chest x-ray presentation showed right lung masses consistent with infiltrate and mild pulmonary edema. She was admitted to the ICU and started on antibiotics and is currently on a trach collar. Laboratory studies at presentation on 9/10/2019 were remarkable for elevated BUN/creatinine 47/2.44 mg/dL. Home medications included Bumex 2 mg twice daily which was continued on admission. Patient was also placed on IV fluids however. Today serum creatinine BUN/creatinine of increased to 51/2.60 mg/dL.     Zosyn [piperacillin-tazobactam in dex] and Vancomycin    Past Medical History:   Diagnosis Date    Anemia     Disease of blood and blood forming organ     History of breast cancer     History of chemotherapy     Hypothyroidism     Lymphedema     Chronic    Morbid obesity ([facility]     Respiratory failure (HCC)     Tracheostomy present (HCC)        Scheduled Meds:   sodium chloride flush  10 mL Intravenous 2 times per day    enoxaparin  40 mg Subcutaneous BID    famotidine  20 mg Oral BID    cefepime  2 g Intravenous Q8H    levothyroxine  300 mcg Oral Daily    therapeutic multivitamin-minerals  1 tablet Oral Daily with breakfast    ferrous sulfate  325 mg Oral TID WC    bumetanide  2 mg Oral BID    HYDROCERIN   Topical BID    metroNIDAZOLE Cardiac - no chest pain; Respiratory - +ve cough and shortness of breath; Gastrointestinal - No nausea, vomiting or diarrhea; Musculoskeletal - general body aches; Skin/Integument - no rashes. Physical Exam:    VITALS:  /67   Pulse 110   Temp 98.3 °F (36.8 °C) (Axillary)   Resp 24   Ht 5' 5\" (1.651 m)   Wt (!) 590 lb (267.6 kg)   SpO2 93%   BMI 98.18 kg/m²   24HR INTAKE/OUTPUT:    Intake/Output Summary (Last 24 hours) at 9/11/2019 1611  Last data filed at 9/11/2019 1400  Gross per 24 hour   Intake 1714.58 ml   Output 2600 ml   Net -885.42 ml       Constitutional: alert, appears stated age and cooperative    Skin: Skin color, texture, turgor normal. No rashes or lesions    Head: Normocephalic, without obvious abnormality, atraumatic     Cardiovascular/Edema: regular rate and rhythm, S1, S2 normal, no murmur, click, rub or gallop    Respiratory: Lungs: Diminished breath sounds    Abdomen: soft, non-tender; bowel sounds normal; no masses,  no organomegaly    Back: symmetric, no curvature. ROM normal. No CVA tenderness. Extremities: edema Chronic bilateral lymphedema.     Neuro:  Grossly normal      CBC:   Recent Labs     09/10/19  1256 09/11/19  0426   WBC 14.3* 13.0*   HGB 8.9* 8.1*    145*     BMP:    Recent Labs     09/10/19  1256 09/11/19  0426    138   K 4.5 4.5   CL 91* 95*   CO2 30 30   BUN 47* 51*   CREATININE 2.44* 2.60*   GLUCOSE 129* 124*       Lab Results   Component Value Date    NITRU NEGATIVE 05/08/2019    COLORU YELLOW 05/08/2019    PHUR 7.0 05/08/2019    WBCUA 10 TO 20 05/08/2019    RBCUA 0 TO 2 05/08/2019    MUCUS NOT REPORTED 05/08/2019    TRICHOMONAS NOT REPORTED 05/08/2019    YEAST NOT REPORTED 05/08/2019    BACTERIA MODERATE 05/08/2019    SPECGRAV 1.010 05/08/2019    LEUKOCYTESUR MOD 05/08/2019    UROBILINOGEN Normal 05/08/2019    BILIRUBINUR NEGATIVE 05/08/2019    GLUCOSEU NEGATIVE 05/08/2019    KETUA NEGATIVE 05/08/2019    AMORPHOUS NOT REPORTED 05/08/2019

## 2019-09-11 NOTE — CONSULTS
[address] [facility]                 [address] [facility], [address]                                  CONSULTATION    PATIENT NAME: Aga Liu                   :        1970  MED REC NO:   [id number]                              ROOM:       2121  ACCOUNT NO:   [de-identified]                           ADMIT DATE: 09/10/2019  PROVIDER:     Melani Valencia    CONSULT DATE:  09/10/2019    CHIEF COMPLAINT:  Dyspnea, cough. HISTORY OF PRESENT ILLNESS:  The patient is a 28-year-old female with a  history of obstructive sleep apnea, chronic respiratory failure and  obesity hypoventilation syndrome, who is a resident of [facility]. She does not usually follow with a pulmonologist.  She was admitted with  increasing shortness of breath, cough and she has had a fever up to 101. The patient also apparently has a history of breast cancer and had  chemotherapy in the past.  The patient had a tracheostomy tube initially  placed when she lived in [facility] [place] [place] back in 2018. She also  complains of some chest pain when she takes her breath in  intermittently. She denies any hemoptysis, night sweats or sinus  tenderness or drainage. She has been at [facility] for approximately  2 years and she has lost she says about 200 pounds intentionally. ALLERGIES:  Allergic to ZOSYN and VANCOMYCIN. PAST MEDICAL HISTORY:  Significant for morbid obesity, RU, history of  breast cancer, chronic lymphedema, tracheostomy in place. She has had a  modified right mastectomy in 2013 and tracheostomy placed as above. MEDICATIONS:  Medication list was reviewed. SOCIAL HISTORY:  She is a nonsmoker. No alcohol, illicit or drug use. She is originally from [facility] [place] [place]. FAMILY HISTORY:  Notable for breast cancer in her family. REVIEW OF SYSTEMS:  As in the history of present illness, all other  systems reviewed and are otherwise negative.     PHYSICAL

## 2019-09-11 NOTE — PROGRESS NOTES
ICU Progress Note (Vent)   Pulmonary and Critical Care Specialists    Patient - Gwynne Osler,  Age - 54 y.o.    - 1970      Room Number -    MRN -  [id number]   [facility]  Date of Admission -  9/10/2019 11:56 AM    Events of Past 24 Hours   Transferred to ICU because of worsening shortness of breath and also need for nocturnal ventilation-chronically on nocturnal vent, but because of pneumonia and worsening dyspnea felt that she should be monitored closely  This a.m., tolerated ventilator settings overnight and is alert and oriented    Vitals    height is 5' 5\" (1.651 m) and weight is 590 lb (267.6 kg) (abnormal). Her axillary temperature is 98.5 °F (36.9 °C). Her blood pressure is 88/52 (abnormal) and her pulse is 101. Her respiration is 19 and oxygen saturation is 97%. Temperature Range: Temp: 98.5 °F (36.9 °C) Temp  Av.2 °F (36.8 °C)  Min: 97.7 °F (36.5 °C)  Max: 98.7 °F (37.1 °C)  BP Range:  Systolic (00ATS), FQF:07 , Min:83 , SPK:677     Diastolic (69ZUS), QQY:45, Min:38, Max:66    Pulse Range: Pulse  Av.9  Min: 100  Max: 110  Respiration Range: Resp  Av.8  Min: 17  Max: 24  Current Pulse Ox[de-identified]  SpO2: 97 %  24HR Pulse Ox Range:  SpO2  Av.3 %  Min: 97 %  Max: 100 %  Oxygen Amount and Delivery: O2 Flow Rate (L/min): 8 L/min      Wt Readings from Last 3 Encounters:   09/10/19 (!) 590 lb (267.6 kg)   19 (!) 560 lb (254 kg)   19 (!) 600 lb (272.2 kg)     I/O       Intake/Output Summary (Last 24 hours) at 2019 0617  Last data filed at 2019 0527  Gross per 24 hour   Intake 2764.58 ml   Output 800 ml   Net 1964.58 ml     I/O last 3 completed shifts:   In: 0 [IV Piggyback:1050]  Out: -      DRAIN/TUBE OUTPUT:     Invasive Lines   Chronic tracheostomy  Lines -has a port on the right side, currently not accessed    ICP PRESSURE RANGE:  No data recorded  CVP PRESSURE RANGE:  No data recorded  Mechanical Ventilation Data

## 2019-09-11 NOTE — PLAN OF CARE
Problem: Risk for Impaired Skin Integrity  Goal: Tissue integrity - skin and mucous membranes  Description  Structural intactness and normal physiological function of skin and  mucous membranes. 9/11/2019 1628 by Miguel Goldberg RN  Outcome: Ongoing  Note:   Patient turned and repositioned every 2 hours and as needed for comfort. Skin remains dry and intact. No new skin breakdown noted. 9/11/2019 0717 by Juanito Escalona RN  Outcome: Ongoing     Problem: Falls - Risk of:  Goal: Will remain free from falls  Description  Will remain free from falls  9/11/2019 1628 by Miguel Goldberg RN  Outcome: Ongoing  Note:   Bed remains in lowest position, call light within reach. Patient remains free of falls at this time. RN will continue to monitor.    9/11/2019 0717 by Juanito Escalona RN  Outcome: Ongoing  Goal: Absence of physical injury  Description  Absence of physical injury  9/11/2019 1628 by Miguel Goldberg RN  Outcome: Ongoing  9/11/2019 0717 by Juanito Escalona RN  Outcome: Ongoing

## 2019-09-11 NOTE — PROGRESS NOTES
[facility] Nurse  Consult Note       NAME:  [address] RECORD NUMBER:  [id number]  AGE: 54 y.o. GENDER: female  : 1970  TODAY'S DATE:  2019    Subjective:     Reason for Wound Bill Gehrig Care and Assessment: drainage from left thigh      Gwynne Osler is a 54 y.o. female referred by:   [x] Physician  [] Nursing  [] Other:       Wound Identification:  Wound Type: undetermined  Contributing Factors: lymphedema, chronic pressure, decreased mobility, shear force and obesity    Wound History: The patient states that she has had the tenderness on her left upper thigh for approximately 1.5 weeks. That began to drain a couple of days ago. She states that it was previously more tender. It is still very tender, but seems to be improving. Both upper thigh areas near the orquidea areas are denuded from incontinence associated dermatitis (IAD) with erythema and denudation noted to both sides. The left side also has an area that looks like a small abscess, possible a folliculitis in that area. There is no induration noted around the site. The area of drainage is not warm or swollen, and the drainage output is clear. The area was cleansed well and zinc oxide cream was used to both areas. It would be best if incontinence was optimally managed, area kept clean and dry, and zinc oxide cream used at least twice daily. No actual wounds noted, see pictures of IAD in media.       Patient Goal of Care:  [x] Wound Healing  [] Odor Control  [] Palliative Care  [x] Pain Control   [] Other:         PAST MEDICAL HISTORY        Diagnosis Date    Anemia     Disease of blood and blood forming organ     History of breast cancer     History of chemotherapy     Hypothyroidism     Lymphedema     Chronic    Morbid obesity ([address]     Respiratory failure ([address]     Tracheostomy present ([facility]        PAST SURGICAL HISTORY    Past Surgical History:   Procedure Laterality Date    MASTECTOMY, MODIFIED RADICAL Septicemia due to group B Streptococcus ([facility]) A40.1    CRP elevated R79.82    Pneumonia J18.9       Patient Active Problem List   Diagnosis    Shortness of breath    Hypothyroidism    Bronchitis    Acute respiratory failure with hypoxia and hypercapnia ([facility])    Class 3 obesity due to excess calories with serious comorbidity and body mass index (BMI) greater than or equal to 70 in adult    Acute congestive heart failure ([facility])    BMI 70 and over, adult ([facility]    Chronic respiratory failure ([facility])    Tracheostomy present ([facility])    Lymphedema    Cellulitis of left lower extremity    Chest pain    Cellulitis    Left leg cellulitis    Bandemia    Septicemia due to group B Streptococcus ([facility])    CRP elevated    Pneumonia       Measurements:  Wound 11/22/18 Skin tear Thigh Posterior; Left (Active)   Number of days: 293       Wound 11/22/18 Blister Leg Left; Outer blister popped (Active)   Number of days: 292       Wound 11/22/18 Skin tear Leg Inner;Left (Active)   Number of days: 292       Wound 11/25/18 Other (Comment) Breast Right;Posterior bulbous growth (Active)   Number of days: 289       Response to treatment:  Well tolerated by patient. Plan:     Plan of Care:      -Keep skin folds and orquidea area clean and dry. -Use Zinc barrier cream to upper thigh areas of IAD denudation.    -Turn every 2 hours    -Float heels of of bed with pillows under     -Routine incontinence care with Dev barrier cloths and zinc oxide cream. Apply zinc oxide cream BID and prn incontinence. Covidien moisture wicking under pads vs cloth backed briefs    -Static air overlay. Check inflation each shift by sliding hand under the air overlay. Feel for the patient's heaviest/ most dependant body part. Ideally 1/2 to 1\" of air will be between your hand and the patient's body. Add air prn.     Specialty Bed Required : Yes   [] Low Air Loss   [] Pressure Redistribution  [] Fluid Immersion  [x] Bariatric  [] Total Pressure Relief  [] Other:     Current Diet: DIET GENERAL;  Dietician consult:  No    Discharge Plan:  Placement for patient upon discharge: skilled nursing   Patient appropriate for Outpatient [address]: No    Patient/Caregiver Teaching:  Level of patientunderstanding able to:     [x] Indicates understanding       [] Needs reinforcement  [] Unsuccessful      [x] Verbal Understanding  [] Demonstrated understanding       [] No evidence of learning  [] Refused teaching         [] N/A       Electronically signed by Samir Angulo RN on  9/11/2019 at 10:37 AM

## 2019-09-12 LAB
ABSOLUTE EOS #: 0.3 K/UL (ref 0–0.4)
ABSOLUTE IMMATURE GRANULOCYTE: ABNORMAL K/UL (ref 0–0.3)
ABSOLUTE LYMPH #: 1 K/UL (ref 1–4.8)
ABSOLUTE MONO #: 1 K/UL (ref 0.1–1.3)
ANION GAP SERPL CALCULATED.3IONS-SCNC: 13 MMOL/L (ref 9–17)
ANTI-NUCLEAR ANTIBODY (ANA): NEGATIVE
BASOPHILS # BLD: 0 % (ref 0–2)
BASOPHILS ABSOLUTE: 0 K/UL (ref 0–0.2)
BUN BLDV-MCNC: 54 MG/DL (ref 6–20)
BUN/CREAT BLD: ABNORMAL (ref 9–20)
CALCIUM SERPL-MCNC: 8.7 MG/DL (ref 8.6–10.4)
CHLORIDE BLD-SCNC: 95 MMOL/L (ref 98–107)
CO2: 30 MMOL/L (ref 20–31)
CREAT SERPL-MCNC: 2.38 MG/DL (ref 0.5–0.9)
DIFFERENTIAL TYPE: ABNORMAL
EOSINOPHILS RELATIVE PERCENT: 2 % (ref 0–4)
GFR AFRICAN AMERICAN: 26 ML/MIN
GFR NON-AFRICAN AMERICAN: 22 ML/MIN
GFR SERPL CREATININE-BSD FRML MDRD: ABNORMAL ML/MIN/{1.73_M2}
GFR SERPL CREATININE-BSD FRML MDRD: ABNORMAL ML/MIN/{1.73_M2}
GLUCOSE BLD-MCNC: 135 MG/DL (ref 70–99)
HCT VFR BLD CALC: 25.4 % (ref 36–46)
HEMOGLOBIN: 8.1 G/DL (ref 12–16)
IMMATURE GRANULOCYTES: ABNORMAL %
LYMPHOCYTES # BLD: 8 % (ref 24–44)
MCH RBC QN AUTO: 29.7 PG (ref 26–34)
MCHC RBC AUTO-ENTMCNC: 31.7 G/DL (ref 31–37)
MCV RBC AUTO: 93.7 FL (ref 80–100)
MONOCYTES # BLD: 8 % (ref 1–7)
NRBC AUTOMATED: ABNORMAL PER 100 WBC
PDW BLD-RTO: 16.8 % (ref 11.5–14.9)
PLATELET # BLD: 157 K/UL (ref 150–450)
PLATELET ESTIMATE: ABNORMAL
PMV BLD AUTO: 8.6 FL (ref 6–12)
POTASSIUM SERPL-SCNC: 4.3 MMOL/L (ref 3.7–5.3)
RBC # BLD: 2.71 M/UL (ref 4–5.2)
RBC # BLD: ABNORMAL 10*6/UL
SEG NEUTROPHILS: 82 % (ref 36–66)
SEGMENTED NEUTROPHILS ABSOLUTE COUNT: 11.1 K/UL (ref 1.3–9.1)
SODIUM BLD-SCNC: 138 MMOL/L (ref 135–144)
WBC # BLD: 13.5 K/UL (ref 3.5–11)
WBC # BLD: ABNORMAL 10*3/UL

## 2019-09-12 PROCEDURE — 99233 SBSQ HOSP IP/OBS HIGH 50: CPT | Performed by: INTERNAL MEDICINE

## 2019-09-12 PROCEDURE — 94003 VENT MGMT INPAT SUBQ DAY: CPT

## 2019-09-12 PROCEDURE — 36415 COLL VENOUS BLD VENIPUNCTURE: CPT

## 2019-09-12 PROCEDURE — 6370000000 HC RX 637 (ALT 250 FOR IP): Performed by: STUDENT IN AN ORGANIZED HEALTH CARE EDUCATION/TRAINING PROGRAM

## 2019-09-12 PROCEDURE — 86334 IMMUNOFIX E-PHORESIS SERUM: CPT

## 2019-09-12 PROCEDURE — 94640 AIRWAY INHALATION TREATMENT: CPT

## 2019-09-12 PROCEDURE — 80048 BASIC METABOLIC PNL TOTAL CA: CPT

## 2019-09-12 PROCEDURE — 6370000000 HC RX 637 (ALT 250 FOR IP): Performed by: INTERNAL MEDICINE

## 2019-09-12 PROCEDURE — 2060000000 HC ICU INTERMEDIATE R&B

## 2019-09-12 PROCEDURE — 84156 ASSAY OF PROTEIN URINE: CPT

## 2019-09-12 PROCEDURE — 2580000003 HC RX 258: Performed by: STUDENT IN AN ORGANIZED HEALTH CARE EDUCATION/TRAINING PROGRAM

## 2019-09-12 PROCEDURE — 94770 HC ETCO2 MONITOR DAILY: CPT

## 2019-09-12 PROCEDURE — 94761 N-INVAS EAR/PLS OXIMETRY MLT: CPT

## 2019-09-12 PROCEDURE — 88185 FLOWCYTOMETRY/TC ADD-ON: CPT

## 2019-09-12 PROCEDURE — 88184 FLOWCYTOMETRY/ TC 1 MARKER: CPT

## 2019-09-12 PROCEDURE — 6360000002 HC RX W HCPCS: Performed by: INTERNAL MEDICINE

## 2019-09-12 PROCEDURE — 2700000000 HC OXYGEN THERAPY PER DAY

## 2019-09-12 PROCEDURE — 97110 THERAPEUTIC EXERCISES: CPT

## 2019-09-12 PROCEDURE — 85025 COMPLETE CBC W/AUTO DIFF WBC: CPT

## 2019-09-12 PROCEDURE — 6360000002 HC RX W HCPCS: Performed by: STUDENT IN AN ORGANIZED HEALTH CARE EDUCATION/TRAINING PROGRAM

## 2019-09-12 PROCEDURE — 86335 IMMUNFIX E-PHORSIS/URINE/CSF: CPT

## 2019-09-12 RX ORDER — LEVOFLOXACIN 750 MG/1
750 TABLET ORAL DAILY
Status: DISCONTINUED | OUTPATIENT
Start: 2019-09-12 | End: 2019-09-15 | Stop reason: HOSPADM

## 2019-09-12 RX ADMIN — ALBUTEROL SULFATE 2.5 MG: 2.5 SOLUTION RESPIRATORY (INHALATION) at 15:59

## 2019-09-12 RX ADMIN — ALBUTEROL SULFATE 2.5 MG: 2.5 SOLUTION RESPIRATORY (INHALATION) at 04:18

## 2019-09-12 RX ADMIN — FERROUS SULFATE TAB 325 MG (65 MG ELEMENTAL FE) 325 MG: 325 (65 FE) TAB at 08:25

## 2019-09-12 RX ADMIN — FERROUS SULFATE TAB 325 MG (65 MG ELEMENTAL FE) 325 MG: 325 (65 FE) TAB at 12:58

## 2019-09-12 RX ADMIN — FAMOTIDINE 20 MG: 20 TABLET ORAL at 08:25

## 2019-09-12 RX ADMIN — ACETAMINOPHEN 650 MG: 325 TABLET, FILM COATED ORAL at 08:24

## 2019-09-12 RX ADMIN — MULTIPLE VITAMINS W/ MINERALS TAB 1 TABLET: TAB at 08:25

## 2019-09-12 RX ADMIN — ALBUTEROL SULFATE 2.5 MG: 2.5 SOLUTION RESPIRATORY (INHALATION) at 19:16

## 2019-09-12 RX ADMIN — BENZONATATE 200 MG: 100 CAPSULE ORAL at 15:27

## 2019-09-12 RX ADMIN — CEFEPIME 2 G: 2 INJECTION, POWDER, FOR SOLUTION INTRAVENOUS at 08:27

## 2019-09-12 RX ADMIN — Medication: at 20:46

## 2019-09-12 RX ADMIN — LINEZOLID 600 MG: 600 INJECTION, SOLUTION INTRAVENOUS at 06:04

## 2019-09-12 RX ADMIN — ALBUTEROL SULFATE 2.5 MG: 2.5 SOLUTION RESPIRATORY (INHALATION) at 00:14

## 2019-09-12 RX ADMIN — BENZONATATE 200 MG: 100 CAPSULE ORAL at 20:51

## 2019-09-12 RX ADMIN — GUAIFENESIN 1200 MG: 600 TABLET, EXTENDED RELEASE ORAL at 08:25

## 2019-09-12 RX ADMIN — BENZOCAINE: 100 GEL TOPICAL at 16:47

## 2019-09-12 RX ADMIN — LINEZOLID 600 MG: 600 INJECTION, SOLUTION INTRAVENOUS at 17:50

## 2019-09-12 RX ADMIN — LEVOTHYROXINE SODIUM 300 MCG: 150 TABLET ORAL at 06:04

## 2019-09-12 RX ADMIN — METRONIDAZOLE: 10 GEL TOPICAL at 08:27

## 2019-09-12 RX ADMIN — CETIRIZINE HYDROCHLORIDE 10 MG: 10 TABLET, FILM COATED ORAL at 08:25

## 2019-09-12 RX ADMIN — ONDANSETRON 4 MG: 2 INJECTION INTRAMUSCULAR; INTRAVENOUS at 18:14

## 2019-09-12 RX ADMIN — FERROUS SULFATE TAB 325 MG (65 MG ELEMENTAL FE) 325 MG: 325 (65 FE) TAB at 16:13

## 2019-09-12 RX ADMIN — BENZONATATE 200 MG: 100 CAPSULE ORAL at 08:25

## 2019-09-12 RX ADMIN — LEVOFLOXACIN 750 MG: 750 TABLET, FILM COATED ORAL at 20:51

## 2019-09-12 RX ADMIN — FAMOTIDINE 20 MG: 20 TABLET ORAL at 20:54

## 2019-09-12 RX ADMIN — ALBUTEROL SULFATE 2.5 MG: 2.5 SOLUTION RESPIRATORY (INHALATION) at 08:22

## 2019-09-12 RX ADMIN — GUAIFENESIN 1200 MG: 600 TABLET, EXTENDED RELEASE ORAL at 20:52

## 2019-09-12 RX ADMIN — CEFEPIME 2 G: 2 INJECTION, POWDER, FOR SOLUTION INTRAVENOUS at 16:13

## 2019-09-12 RX ADMIN — ACETAMINOPHEN 650 MG: 325 TABLET, FILM COATED ORAL at 15:27

## 2019-09-12 RX ADMIN — CEFEPIME 2 G: 2 INJECTION, POWDER, FOR SOLUTION INTRAVENOUS at 01:45

## 2019-09-12 RX ADMIN — Medication: at 08:27

## 2019-09-12 RX ADMIN — ALBUTEROL SULFATE 2.5 MG: 2.5 SOLUTION RESPIRATORY (INHALATION) at 11:39

## 2019-09-12 ASSESSMENT — PAIN SCALES - GENERAL
PAINLEVEL_OUTOF10: 8
PAINLEVEL_OUTOF10: 7
PAINLEVEL_OUTOF10: 7
PAINLEVEL_OUTOF10: 8
PAINLEVEL_OUTOF10: 0
PAINLEVEL_OUTOF10: 0

## 2019-09-12 ASSESSMENT — PULMONARY FUNCTION TESTS
PIF_VALUE: 34
PIF_VALUE: 26
PIF_VALUE: 29
PIF_VALUE: 24

## 2019-09-12 NOTE — PROGRESS NOTES
[address]    PROGRESS NOTE             9/12/2019    11:02 AM    Name:   Olivia Villatoro  MRN:     [id number]     [place]:      [de-identified]   Room:   2002/2002-01  IP Day:  2  Admit Date:  9/10/2019 11:56 AM    PCP:  Gregory Dietrich DO  Code Status:  Full Code    Subjective:     C/C:   Chief Complaint   Patient presents with    Shortness of Breath    Fatigue      Interval History Status: not changed. Examined patient in ICU day 2, patient chart reviewed. Spoke to patient nurse regarding overnight condition. No any acute event overnight. Complains of pain on the face left side and left-sided ear pain. That has been going for 1 year increases with talking chewing, and  When air gets inside her mouth it increases. Patient denies chest pain, shortness of breath, fever, chills, diarrhea, nausea, vomiting. Nasal swab for MRSA positive, C3: 196, C4: 66.  Procalcitonin 31.11    Brief History:     See H and P    Review of Systems:     Review of Systems  Constitutional: Positive for activity change, appetite change and fatigue. HENT: Negative. Respiratory: Positive for cough and wheezing. Cardiovascular: Positive for leg swelling. Gastrointestinal: Positive for abdominal pain. Genitourinary: Positive for difficulty urinating. Skin: Negative. Hematological: Negative. Psychiatric/Behavioral: Negative. Medications: Allergies:     Allergies   Allergen Reactions    Zosyn [Piperacillin-Tazobactam In Dex] Shortness Of Breath     tolerated cefepime 6/2018    Vancomycin Swelling     Lip swelling and redness and itching         Current Meds:   Scheduled Meds:    levofloxacin  750 mg Oral Daily    sodium chloride flush  10 mL Intravenous 2 times per day    enoxaparin  40 mg Subcutaneous BID    famotidine  20 mg Oral BID    cefepime  2 g Intravenous Q8H    levothyroxine  300 mcg Oral Daily    therapeutic 8.6 09/12/2019     Hemoglobin/Hematocrit:    Lab Results   Component Value Date    HGB 8.1 09/12/2019    HCT 25.4 09/12/2019     Sodium:    Lab Results   Component Value Date     09/12/2019     Hepatic Function Panel:    Lab Results   Component Value Date    ALKPHOS 77 09/10/2019    ALT 21 09/10/2019    AST 25 09/10/2019    PROT 7.9 09/10/2019    BILITOT 0.60 09/10/2019    LABALBU 3.3 09/10/2019     Calcium:    Lab Results   Component Value Date    CALCIUM 8.7 09/12/2019     Magnesium:  No results found for: MG    Lab Results   Component Value Date/Time    SPECIAL NOT REPORTED 09/11/2019 07:33 AM     Lab Results   Component Value Date/Time    CULTURE CULTURE IN PROGRESS 09/11/2019 07:33 AM         Radiology:    Xr Chest (single View Frontal)    Result Date: 9/10/2019  EXAMINATION: ONE XRAY VIEW OF THE CHEST 9/10/2019 12:48 pm COMPARISON: November 25, 2018 HISTORY: ORDERING SYSTEM PROVIDED HISTORY: shortness of breath TECHNOLOGIST PROVIDED HISTORY: shortness of breath Reason for Exam: dyspnea Acuity: Acute Type of Exam: Initial FINDINGS: Tracheostomy tube. Right-sided Port-A-Cath. Right axillary surgical clips. Right upper lobe and right lower lobe masses. Cardiomegaly. Mild pulmonary edema. Right lung masses may represent malignancy versus infection. Mild pulmonary edema. Physical Examination:        Physical Exam   Constitutional: She is oriented to person, place, and time. She appears well-developed and well-nourished. No distress. HENT:   Head: Normocephalic. Eyes: Pupils are equal, round, and reactive to light. Conjunctivae are normal.   Neck: Normal range of motion. Cardiovascular: Normal rate, regular rhythm, normal heart sounds and intact distal pulses. Pulmonary/Chest: Effort normal and breath sounds normal.   Abdominal: Soft. Bowel sounds are normal.   Musculoskeletal: She exhibits edema and tenderness. Neurological: She is alert and oriented to person, place, and time.

## 2019-09-12 NOTE — PLAN OF CARE
Problem: Risk for Impaired Skin Integrity  Goal: Tissue integrity - skin and mucous membranes  Description  Structural intactness and normal physiological function of skin and  mucous membranes. 9/12/2019 1636 by Mian Reyes RN  Outcome: Ongoing  Note:   Patient turned and repositioned every 2 hours and as needed for comfort. Skin remains dry and intact. No new skin breakdown noted. 9/12/2019 0437 by Deisi Easton RN  Outcome: Ongoing     Problem: Falls - Risk of:  Goal: Will remain free from falls  Description  Will remain free from falls  9/12/2019 1636 by Mian Reyes RN  Outcome: Ongoing  Note:   Bed remains in lowest position, call light within reach. Patient remains free of falls at this time. RN will continue to monitor. 9/12/2019 0437 by Deisi Easton RN  Outcome: Ongoing  Goal: Absence of physical injury  Description  Absence of physical injury  9/12/2019 1636 by Mian Reyes RN  Outcome: Ongoing  9/12/2019 0437 by Deisi Easton RN  Outcome: Ongoing     Problem: Pain:  Goal: Pain level will decrease  Description  Pain level will decrease  Outcome: Ongoing  Note:   Pain assessed at regular intervals. Medications administered as requested for comfort. Pain remains at a tolerable level.    Goal: Control of acute pain  Description  Control of acute pain  Outcome: Ongoing  Goal: Control of chronic pain  Description  Control of chronic pain  Outcome: Ongoing

## 2019-09-13 LAB
ABSOLUTE EOS #: 0.28 K/UL (ref 0–0.4)
ABSOLUTE IMMATURE GRANULOCYTE: ABNORMAL K/UL (ref 0–0.3)
ABSOLUTE LYMPH #: 1.24 K/UL (ref 1–4.8)
ABSOLUTE MONO #: 1.1 K/UL (ref 0.1–1.3)
ANION GAP SERPL CALCULATED.3IONS-SCNC: 12 MMOL/L (ref 9–17)
BASOPHILS # BLD: 0 % (ref 0–2)
BASOPHILS ABSOLUTE: 0 K/UL (ref 0–0.2)
BUN BLDV-MCNC: 48 MG/DL (ref 6–20)
BUN/CREAT BLD: ABNORMAL (ref 9–20)
CALCIUM SERPL-MCNC: 8.7 MG/DL (ref 8.6–10.4)
CHLORIDE BLD-SCNC: 97 MMOL/L (ref 98–107)
CO2: 31 MMOL/L (ref 20–31)
CREAT SERPL-MCNC: 2.28 MG/DL (ref 0.5–0.9)
DIFFERENTIAL TYPE: ABNORMAL
EOSINOPHILS RELATIVE PERCENT: 2 % (ref 0–4)
GFR AFRICAN AMERICAN: 28 ML/MIN
GFR NON-AFRICAN AMERICAN: 23 ML/MIN
GFR SERPL CREATININE-BSD FRML MDRD: ABNORMAL ML/MIN/{1.73_M2}
GFR SERPL CREATININE-BSD FRML MDRD: ABNORMAL ML/MIN/{1.73_M2}
GLUCOSE BLD-MCNC: 123 MG/DL (ref 70–99)
HCT VFR BLD CALC: 25.4 % (ref 36–46)
HEMOGLOBIN: 8.1 G/DL (ref 12–16)
IMMATURE GRANULOCYTES: ABNORMAL %
LYMPHOCYTES # BLD: 9 % (ref 24–44)
MCH RBC QN AUTO: 30.2 PG (ref 26–34)
MCHC RBC AUTO-ENTMCNC: 31.9 G/DL (ref 31–37)
MCV RBC AUTO: 94.7 FL (ref 80–100)
MONOCYTES # BLD: 8 % (ref 1–7)
MORPHOLOGY: ABNORMAL
MORPHOLOGY: ABNORMAL
NRBC AUTOMATED: ABNORMAL PER 100 WBC
PDW BLD-RTO: 17.2 % (ref 11.5–14.9)
PLATELET # BLD: 153 K/UL (ref 150–450)
PLATELET ESTIMATE: ABNORMAL
PMV BLD AUTO: 8.1 FL (ref 6–12)
POTASSIUM SERPL-SCNC: 4.7 MMOL/L (ref 3.7–5.3)
RBC # BLD: 2.68 M/UL (ref 4–5.2)
RBC # BLD: ABNORMAL 10*6/UL
SEG NEUTROPHILS: 81 % (ref 36–66)
SEGMENTED NEUTROPHILS ABSOLUTE COUNT: 11.18 K/UL (ref 1.3–9.1)
SODIUM BLD-SCNC: 140 MMOL/L (ref 135–144)
TROPONIN INTERP: ABNORMAL
TROPONIN INTERP: ABNORMAL
TROPONIN T: ABNORMAL NG/ML
TROPONIN T: ABNORMAL NG/ML
TROPONIN, HIGH SENSITIVITY: 17 NG/L (ref 0–14)
TROPONIN, HIGH SENSITIVITY: 17 NG/L (ref 0–14)
WBC # BLD: 13.8 K/UL (ref 3.5–11)
WBC # BLD: ABNORMAL 10*3/UL

## 2019-09-13 PROCEDURE — 2060000000 HC ICU INTERMEDIATE R&B

## 2019-09-13 PROCEDURE — 2580000003 HC RX 258: Performed by: INTERNAL MEDICINE

## 2019-09-13 PROCEDURE — 94640 AIRWAY INHALATION TREATMENT: CPT

## 2019-09-13 PROCEDURE — 99254 IP/OBS CNSLTJ NEW/EST MOD 60: CPT | Performed by: PSYCHIATRY & NEUROLOGY

## 2019-09-13 PROCEDURE — 94003 VENT MGMT INPAT SUBQ DAY: CPT

## 2019-09-13 PROCEDURE — 6370000000 HC RX 637 (ALT 250 FOR IP): Performed by: STUDENT IN AN ORGANIZED HEALTH CARE EDUCATION/TRAINING PROGRAM

## 2019-09-13 PROCEDURE — 36415 COLL VENOUS BLD VENIPUNCTURE: CPT

## 2019-09-13 PROCEDURE — 6370000000 HC RX 637 (ALT 250 FOR IP): Performed by: PSYCHIATRY & NEUROLOGY

## 2019-09-13 PROCEDURE — 85025 COMPLETE CBC W/AUTO DIFF WBC: CPT

## 2019-09-13 PROCEDURE — 94770 HC ETCO2 MONITOR DAILY: CPT

## 2019-09-13 PROCEDURE — 6360000002 HC RX W HCPCS: Performed by: STUDENT IN AN ORGANIZED HEALTH CARE EDUCATION/TRAINING PROGRAM

## 2019-09-13 PROCEDURE — 6360000002 HC RX W HCPCS: Performed by: INTERNAL MEDICINE

## 2019-09-13 PROCEDURE — 94761 N-INVAS EAR/PLS OXIMETRY MLT: CPT

## 2019-09-13 PROCEDURE — 6370000000 HC RX 637 (ALT 250 FOR IP): Performed by: INTERNAL MEDICINE

## 2019-09-13 PROCEDURE — 84484 ASSAY OF TROPONIN QUANT: CPT

## 2019-09-13 PROCEDURE — 2700000000 HC OXYGEN THERAPY PER DAY

## 2019-09-13 PROCEDURE — 2580000003 HC RX 258: Performed by: STUDENT IN AN ORGANIZED HEALTH CARE EDUCATION/TRAINING PROGRAM

## 2019-09-13 PROCEDURE — 99233 SBSQ HOSP IP/OBS HIGH 50: CPT | Performed by: INTERNAL MEDICINE

## 2019-09-13 PROCEDURE — 80048 BASIC METABOLIC PNL TOTAL CA: CPT

## 2019-09-13 PROCEDURE — 93005 ELECTROCARDIOGRAM TRACING: CPT | Performed by: INTERNAL MEDICINE

## 2019-09-13 RX ORDER — FLUTICASONE PROPIONATE 50 MCG
2 SPRAY, SUSPENSION (ML) NASAL DAILY
Status: DISCONTINUED | OUTPATIENT
Start: 2019-09-13 | End: 2019-09-15 | Stop reason: HOSPADM

## 2019-09-13 RX ORDER — CARBAMAZEPINE 200 MG/1
200 TABLET ORAL 2 TIMES DAILY
Status: DISCONTINUED | OUTPATIENT
Start: 2019-09-13 | End: 2019-09-15 | Stop reason: HOSPADM

## 2019-09-13 RX ADMIN — LEVOFLOXACIN 750 MG: 750 TABLET, FILM COATED ORAL at 21:22

## 2019-09-13 RX ADMIN — ALBUTEROL SULFATE 2.5 MG: 2.5 SOLUTION RESPIRATORY (INHALATION) at 15:53

## 2019-09-13 RX ADMIN — CETIRIZINE HYDROCHLORIDE 10 MG: 10 TABLET, FILM COATED ORAL at 08:47

## 2019-09-13 RX ADMIN — GUAIFENESIN 1200 MG: 600 TABLET, EXTENDED RELEASE ORAL at 08:47

## 2019-09-13 RX ADMIN — ONDANSETRON 4 MG: 2 INJECTION INTRAMUSCULAR; INTRAVENOUS at 09:04

## 2019-09-13 RX ADMIN — GUAIFENESIN 1200 MG: 600 TABLET, EXTENDED RELEASE ORAL at 21:22

## 2019-09-13 RX ADMIN — FERROUS SULFATE TAB 325 MG (65 MG ELEMENTAL FE) 325 MG: 325 (65 FE) TAB at 08:47

## 2019-09-13 RX ADMIN — CEFEPIME 2 G: 2 INJECTION, POWDER, FOR SOLUTION INTRAVENOUS at 08:46

## 2019-09-13 RX ADMIN — CEFEPIME 2 G: 2 INJECTION, POWDER, FOR SOLUTION INTRAVENOUS at 17:00

## 2019-09-13 RX ADMIN — FERROUS SULFATE TAB 325 MG (65 MG ELEMENTAL FE) 325 MG: 325 (65 FE) TAB at 12:04

## 2019-09-13 RX ADMIN — ALBUTEROL SULFATE 2.5 MG: 2.5 SOLUTION RESPIRATORY (INHALATION) at 03:06

## 2019-09-13 RX ADMIN — FAMOTIDINE 20 MG: 20 TABLET ORAL at 08:47

## 2019-09-13 RX ADMIN — ALBUTEROL SULFATE 2.5 MG: 2.5 SOLUTION RESPIRATORY (INHALATION) at 19:34

## 2019-09-13 RX ADMIN — ALBUTEROL SULFATE 2.5 MG: 2.5 SOLUTION RESPIRATORY (INHALATION) at 07:52

## 2019-09-13 RX ADMIN — ACETAMINOPHEN 650 MG: 325 TABLET, FILM COATED ORAL at 04:53

## 2019-09-13 RX ADMIN — FAMOTIDINE 20 MG: 20 TABLET ORAL at 21:24

## 2019-09-13 RX ADMIN — CEFEPIME 2 G: 2 INJECTION, POWDER, FOR SOLUTION INTRAVENOUS at 00:31

## 2019-09-13 RX ADMIN — MULTIPLE VITAMINS W/ MINERALS TAB 1 TABLET: TAB at 08:47

## 2019-09-13 RX ADMIN — BENZONATATE 200 MG: 100 CAPSULE ORAL at 21:23

## 2019-09-13 RX ADMIN — FLUTICASONE PROPIONATE 2 SPRAY: 50 SPRAY, METERED NASAL at 09:08

## 2019-09-13 RX ADMIN — CARBAMAZEPINE 200 MG: 200 TABLET ORAL at 17:07

## 2019-09-13 RX ADMIN — SODIUM CHLORIDE: 9 INJECTION, SOLUTION INTRAVENOUS at 15:51

## 2019-09-13 RX ADMIN — BENZONATATE 200 MG: 100 CAPSULE ORAL at 08:47

## 2019-09-13 RX ADMIN — ALBUTEROL SULFATE 2.5 MG: 2.5 SOLUTION RESPIRATORY (INHALATION) at 22:49

## 2019-09-13 RX ADMIN — Medication: at 20:57

## 2019-09-13 RX ADMIN — LINEZOLID 600 MG: 600 INJECTION, SOLUTION INTRAVENOUS at 05:31

## 2019-09-13 RX ADMIN — BENZONATATE 200 MG: 100 CAPSULE ORAL at 12:04

## 2019-09-13 RX ADMIN — ALBUTEROL SULFATE 2.5 MG: 2.5 SOLUTION RESPIRATORY (INHALATION) at 11:38

## 2019-09-13 RX ADMIN — FERROUS SULFATE TAB 325 MG (65 MG ELEMENTAL FE) 325 MG: 325 (65 FE) TAB at 17:00

## 2019-09-13 RX ADMIN — ALBUTEROL SULFATE 2.5 MG: 2.5 SOLUTION RESPIRATORY (INHALATION) at 00:20

## 2019-09-13 RX ADMIN — LEVOTHYROXINE SODIUM 300 MCG: 150 TABLET ORAL at 05:31

## 2019-09-13 ASSESSMENT — PULMONARY FUNCTION TESTS
PIF_VALUE: 26
PIF_VALUE: 26

## 2019-09-13 ASSESSMENT — PAIN SCALES - GENERAL
PAINLEVEL_OUTOF10: 0
PAINLEVEL_OUTOF10: 8

## 2019-09-13 NOTE — PROGRESS NOTES
rhonchi. Diminished breath sounds  Cardiovascular - Heart sounds are normal.  normal rate and rhythm regular, no murmur, gallop or rub. Abdomen - soft, nontender, nondistended, no masses or organomegaly  Neurologic - CN II-XII are grossly intact. There are no focal motor deficits  Skin - no bruising or bleeding  Extremities - no cyanosis, clubbing, chronic lymphedema     Lab Results   CBC     Lab Results   Component Value Date    WBC 13.8 09/13/2019    RBC 2.68 09/13/2019    HGB 8.1 09/13/2019    HCT 25.4 09/13/2019     09/13/2019    MCV 94.7 09/13/2019    MCH 30.2 09/13/2019    MCHC 31.9 09/13/2019    RDW 17.2 09/13/2019    METASPCT 3 09/10/2019    LYMPHOPCT 9 09/13/2019    MONOPCT 8 09/13/2019    MYELOPCT 1 11/25/2018    BASOPCT 0 09/13/2019    MONOSABS 1.10 09/13/2019    LYMPHSABS 1.24 09/13/2019    EOSABS 0.28 09/13/2019    BASOSABS 0.00 09/13/2019    DIFFTYPE NOT REPORTED 09/13/2019       BMP   Lab Results   Component Value Date     09/13/2019    K 4.7 09/13/2019    CL 97 09/13/2019    CO2 31 09/13/2019    BUN 48 09/13/2019    CREATININE 2.28 09/13/2019    GLUCOSE 123 09/13/2019    CALCIUM 8.7 09/13/2019       LFTS  Lab Results   Component Value Date    ALKPHOS 77 09/10/2019    ALT 21 09/10/2019    AST 25 09/10/2019    PROT 7.9 09/10/2019    BILITOT 0.60 09/10/2019    LABALBU 3.3 09/10/2019       INR  No results for input(s): PROTIME, INR in the last 72 hours. APTT  No results for input(s): APTT in the last 72 hours. Lactic Acid  Lab Results   Component Value Date    LACTA 2.0 11/17/2018    LACTA 0.8 01/27/2018        BNP   No results for input(s): BNP in the last 72 hours. Cultures       Radiology         SYSTEM ASSESSMENT    Acute on chronic hypoxic and hypercapnic respiratory failure  Presumed right lung pneumonia  RU/OHS  Chronic nocturnal ventilatory dependence  Acute kidney injury  Morbid obesity  ?  Trigeminal neuralgia      Neuro   Could treat her empirically for trigeminal

## 2019-09-13 NOTE — CONSULTS
[address]                 [address] [facility], [address]                                  CONSULTATION    PATIENT NAME: Lopez Means                   :        1970  MED REC NO:   [id number]                              ROOM:         ACCOUNT NO:   [de-identified]                           ADMIT DATE: 09/10/2019  PROVIDER:     Nicole Watson    CONSULT DATE:  2019    Room 2002, Bed 1. HISTORY OF PRESENT ILLNESS:  This is a 53-year-old, who was admitted two  days prior with shortness of breath. The reviewer is referred to the chart for definitive details. Pulmonary  Medicine was consulted. Single view chest x-ray upon admission,  09/10/2019, suggested cardiomegaly, vascular congestion, small left  pleural effusion along with 3 cm right mid lung nodular opacity. Healthcare-acquired pneumonia could not be ruled out. Triple antibiotic  coverage was initiated along with albuterol breathing treatments,  Tessalon Perles and Mucinex. Medical history is also significant for chronic kidney disease. Nephrology was consulted for acute kidney injury thought consistent with  prerenal azotemia, superimposed on her stage III chronic kidney disease. We are now asked to evaluate the patient in regards to a several-year  history of a left cheek pain. She denies history of bruxism,  temporomandibular joint dysfunction, or sinusitis. She admits to  multiple negative dental evaluations in this regard in the past.  She  denies previous directed imaging. PAST MEDICAL HISTORY:  Medical history is significant for morbid  obesity, hypoventilation syndrome, chronic respiratory failure, breast  carcinoma status post chemotherapy and chronic lower extremity  lymphedema. PAST SURGICAL HISTORY:  Tracheostomy, modified right mastectomy. ALLERGIES:  Phyllistine Aron. SOCIAL HISTORY:  Nonsmoker. Denies illicit drug or alcohol use.     MEDICATIONS:

## 2019-09-13 NOTE — CONSULTS
tobacco: Never Used   Substance and Sexual Activity    Alcohol use: No    Drug use: No    Sexual activity: Not Currently   Lifestyle    Physical activity:     Days per week: Not on file     Minutes per session: Not on file    Stress: Not on file   Relationships    Social connections:     Talks on phone: Not on file     Gets together: Not on file     Attends Jewish service: Not on file     Active member of club or organization: Not on file     Attends meetings of clubs or organizations: Not on file     Relationship status: Not on file    Intimate partner violence:     Fear of current or ex partner: Not on file     Emotionally abused: Not on file     Physically abused: Not on file     Forced sexual activity: Not on file   Other Topics Concern    Not on file   Social History Narrative    L[facility] [address]      Family History   Problem Relation Age of Onset    Breast Cancer Paternal Aunt     Breast Cancer Paternal Cousin 43    Breast Cancer Paternal Cousin 37    Breast Cancer Paternal Cousin 46      Allergies   Allergen Reactions    Zosyn [Piperacillin-Tazobactam In Dex] Shortness Of Breath     tolerated cefepime 6/2018    Vancomycin Swelling     Lip swelling and redness and itching        /68   Pulse 96   Temp 97.7 °F (36.5 °C) (Axillary)   Resp 28   Ht 5' 5\" (1.651 m)   Wt (!) 588 lb 10.1 oz (267 kg)   SpO2 96%   BMI 97.95 kg/m²      ROS:  Constitutional Negative for fever and chills   HEENT Negative for ear discharge, ear pain, nosebleed   Eyes Negative for photophobia, pain and discharge   Respiratory Negative for hemoptysis and sputum   Cardiovascular  positive for orthopnea, claudication and PND   Gastrointestinal Negative for abdominal pain, diarrhea, blood in stool   Musculoskeletal Negative for joint pain, negative for myalgia   Skin Negative for rash or itching   Endo/heme/allergies Negative for polydipsia, environmental allergy   Psychiatric/behavioral Negative for suicidal

## 2019-09-13 NOTE — PROGRESS NOTES
MCH 30.2 09/13/2019    MCHC 31.9 09/13/2019    RDW 17.2 09/13/2019     09/13/2019    MPV 8.1 09/13/2019     Hemoglobin/Hematocrit:    Lab Results   Component Value Date    HGB 8.1 09/13/2019    HCT 25.4 09/13/2019     Sodium:    Lab Results   Component Value Date     09/13/2019     Hepatic Function Panel:    Lab Results   Component Value Date    ALKPHOS 77 09/10/2019    ALT 21 09/10/2019    AST 25 09/10/2019    PROT 7.9 09/10/2019    BILITOT 0.60 09/10/2019    LABALBU 3.3 09/10/2019     Calcium:    Lab Results   Component Value Date    CALCIUM 8.7 09/13/2019     Magnesium:  No results found for: MG    Lab Results   Component Value Date/Time    SPECIAL NOT REPORTED 09/11/2019 07:33 AM     Lab Results   Component Value Date/Time    CULTURE CULTURE IN PROGRESS 09/11/2019 07:33 AM         Radiology:    Xr Chest (single View Frontal)    Result Date: 9/10/2019  EXAMINATION: ONE XRAY VIEW OF THE CHEST 9/10/2019 12:48 pm COMPARISON: November 25, 2018 HISTORY: ORDERING SYSTEM PROVIDED HISTORY: shortness of breath TECHNOLOGIST PROVIDED HISTORY: shortness of breath Reason for Exam: dyspnea Acuity: Acute Type of Exam: Initial FINDINGS: Tracheostomy tube. Right-sided Port-A-Cath. Right axillary surgical clips. Right upper lobe and right lower lobe masses. Cardiomegaly. Mild pulmonary edema. Right lung masses may represent malignancy versus infection. Mild pulmonary edema. Physical Examination:        Physical Exam   Constitutional: She is oriented to person, place, and time. She appears well-developed and well-nourished. No distress. HENT:   Head: Normocephalic. Eyes: Pupils are equal, round, and reactive to light. Conjunctivae are normal.   Neck: Normal range of motion. Cardiovascular: Normal rate, regular rhythm, normal heart sounds and intact distal pulses. Pulmonary/Chest: Effort normal and breath sounds normal.   Abdominal: Soft.  Bowel sounds are normal.   Musculoskeletal: She exhibits edema and tenderness. Neurological: She is alert and oriented to person, place, and time. Skin: Skin is warm. Capillary refill takes less than 2 seconds. She is not diaphoretic. Psychiatric: She has a normal mood and affect. Assessment:        Primary Problem  Pneumonia    Active Hospital Problems    Diagnosis Date Noted    Pneumonia [J18.9] 09/10/2019    Shortness of breath [R06.02] 05/14/2018       Plan:              Community acquired pneumonia:    Patient is coming from bariatric nursing home with chronic trach in place  -Patient got 1 dose of ceftriaxone &Azithromycin in the ED  -Zyvox: Discontinued 9/13/2019  -Cefepime 2 g q8  -Levaquin oral  -This will cover Pseudomonas and gram negative, Levaquin will also cover atypicals  -ABG: Pending   -chest x-ray for tomorrow morning    Respiratory eval and treatment  Tessalon Perles and Mucinex  DuoNeb  Neurology consulted   -Appreciate recs   -For possible left-sided trigeminal neuralgia    Hypothyroidism  -Synthroid 300 mcg     Creatinine 2.28<--2.44  proBNP 698  Troponin :226        PT  OT  GI prophylaxis: Protonix    Labs  Nasal MRSA positive  C3 196, C4 66  Procalcitonin: 31.11  Legionella negative,  Blood culture pending  Respiratory culture pending  Lactic acid trending down 1.0<--- 1    EF: 50 to 55%  RSVP:44           Nelsy Mitchell MD  Internal Medicine PGY1  9/13/2019  7:28 AM       IM Attending    Pt seen and examined today by me independently   I have discussed the care of pt , including pertinent history and exam findings,  with the resident. I have reviewed the key elements of all parts of the encounter with the resident. I agree with the assessment, plan and orders as documented by the resident.     Electronically signed by Silke Saenz MD on 9/13/2019 at 1:27 PM

## 2019-09-14 ENCOUNTER — APPOINTMENT (OUTPATIENT)
Dept: GENERAL RADIOLOGY | Age: 49
DRG: 139 | End: 2019-09-14
Payer: MEDICAID

## 2019-09-14 LAB
ABSOLUTE BANDS #: 0.54 K/UL (ref 0–1)
ABSOLUTE EOS #: 0.32 K/UL (ref 0–0.4)
ABSOLUTE IMMATURE GRANULOCYTE: ABNORMAL K/UL (ref 0–0.3)
ABSOLUTE LYMPH #: 1.3 K/UL (ref 1–4.8)
ABSOLUTE MONO #: 0.97 K/UL (ref 0.1–1.3)
ALLEN TEST: ABNORMAL
ANION GAP SERPL CALCULATED.3IONS-SCNC: 12 MMOL/L (ref 9–17)
ATYPICAL LYMPHOCYTE ABSOLUTE COUNT: 0.11 K/UL
ATYPICAL LYMPHOCYTES: 1 %
BANDS: 5 % (ref 0–10)
BASOPHILS # BLD: 0 % (ref 0–2)
BASOPHILS ABSOLUTE: 0 K/UL (ref 0–0.2)
BUN BLDV-MCNC: 42 MG/DL (ref 6–20)
BUN/CREAT BLD: ABNORMAL (ref 9–20)
CALCIUM SERPL-MCNC: 8.6 MG/DL (ref 8.6–10.4)
CARBOXYHEMOGLOBIN: 1.1 % (ref 0–5)
CHLORIDE BLD-SCNC: 100 MMOL/L (ref 98–107)
CO2: 29 MMOL/L (ref 20–31)
CREAT SERPL-MCNC: 1.79 MG/DL (ref 0.5–0.9)
DIFFERENTIAL TYPE: ABNORMAL
EOSINOPHILS RELATIVE PERCENT: 3 % (ref 0–4)
FIO2: 45
GFR AFRICAN AMERICAN: 37 ML/MIN
GFR NON-AFRICAN AMERICAN: 30 ML/MIN
GFR SERPL CREATININE-BSD FRML MDRD: ABNORMAL ML/MIN/{1.73_M2}
GFR SERPL CREATININE-BSD FRML MDRD: ABNORMAL ML/MIN/{1.73_M2}
GLUCOSE BLD-MCNC: 120 MG/DL (ref 70–99)
HCO3 ARTERIAL: 32.7 MMOL/L (ref 22–26)
HCT VFR BLD CALC: 25.9 % (ref 36–46)
HEMOGLOBIN: 8.2 G/DL (ref 12–16)
IMMATURE GRANULOCYTES: ABNORMAL %
LYMPHOCYTES # BLD: 12 % (ref 24–44)
MCH RBC QN AUTO: 29.9 PG (ref 26–34)
MCHC RBC AUTO-ENTMCNC: 31.5 G/DL (ref 31–37)
MCV RBC AUTO: 94.8 FL (ref 80–100)
METHEMOGLOBIN: 0.1 % (ref 0–1.9)
MODE: ABNORMAL
MONOCYTES # BLD: 9 % (ref 1–7)
MORPHOLOGY: ABNORMAL
NEGATIVE BASE EXCESS, ART: ABNORMAL MMOL/L (ref 0–2)
NOTIFICATION TIME: ABNORMAL
NOTIFICATION: ABNORMAL
NRBC AUTOMATED: ABNORMAL PER 100 WBC
O2 DEVICE/FLOW/%: ABNORMAL
O2 SAT, ARTERIAL: 98.5 % (ref 95–98)
OXYHEMOGLOBIN: ABNORMAL % (ref 95–98)
PATIENT TEMP: 37
PCO2 ARTERIAL: 59.2 MMHG (ref 35–45)
PCO2, ART, TEMP ADJ: ABNORMAL (ref 35–45)
PDW BLD-RTO: 16.9 % (ref 11.5–14.9)
PEEP/CPAP: 7
PH ARTERIAL: 7.35 (ref 7.35–7.45)
PH, ART, TEMP ADJ: ABNORMAL (ref 7.35–7.45)
PLATELET # BLD: 162 K/UL (ref 150–450)
PLATELET ESTIMATE: ABNORMAL
PMV BLD AUTO: 7.9 FL (ref 6–12)
PO2 ARTERIAL: 160 MMHG (ref 80–100)
PO2, ART, TEMP ADJ: ABNORMAL MMHG (ref 80–100)
POSITIVE BASE EXCESS, ART: 7.1 MMOL/L (ref 0–2)
POTASSIUM SERPL-SCNC: 4.8 MMOL/L (ref 3.7–5.3)
PSV: ABNORMAL
PT. POSITION: ABNORMAL
RBC # BLD: 2.73 M/UL (ref 4–5.2)
RBC # BLD: ABNORMAL 10*6/UL
RESPIRATORY RATE: 16
SAMPLE SITE: ABNORMAL
SEG NEUTROPHILS: 70 % (ref 36–66)
SEGMENTED NEUTROPHILS ABSOLUTE COUNT: 7.56 K/UL (ref 1.3–9.1)
SET RATE: 16
SODIUM BLD-SCNC: 141 MMOL/L (ref 135–144)
TEXT FOR RESPIRATORY: ABNORMAL
TOTAL HB: ABNORMAL G/DL (ref 12–16)
TOTAL RATE: 20
VT: 550
WBC # BLD: 10.8 K/UL (ref 3.5–11)
WBC # BLD: ABNORMAL 10*3/UL

## 2019-09-14 PROCEDURE — 6360000002 HC RX W HCPCS: Performed by: STUDENT IN AN ORGANIZED HEALTH CARE EDUCATION/TRAINING PROGRAM

## 2019-09-14 PROCEDURE — 82805 BLOOD GASES W/O2 SATURATION: CPT

## 2019-09-14 PROCEDURE — 2580000003 HC RX 258: Performed by: STUDENT IN AN ORGANIZED HEALTH CARE EDUCATION/TRAINING PROGRAM

## 2019-09-14 PROCEDURE — 36600 WITHDRAWAL OF ARTERIAL BLOOD: CPT

## 2019-09-14 PROCEDURE — 71045 X-RAY EXAM CHEST 1 VIEW: CPT

## 2019-09-14 PROCEDURE — 70220 X-RAY EXAM OF SINUSES: CPT

## 2019-09-14 PROCEDURE — 85025 COMPLETE CBC W/AUTO DIFF WBC: CPT

## 2019-09-14 PROCEDURE — 36415 COLL VENOUS BLD VENIPUNCTURE: CPT

## 2019-09-14 PROCEDURE — 94003 VENT MGMT INPAT SUBQ DAY: CPT

## 2019-09-14 PROCEDURE — 6370000000 HC RX 637 (ALT 250 FOR IP): Performed by: INTERNAL MEDICINE

## 2019-09-14 PROCEDURE — 2700000000 HC OXYGEN THERAPY PER DAY

## 2019-09-14 PROCEDURE — 94640 AIRWAY INHALATION TREATMENT: CPT

## 2019-09-14 PROCEDURE — 2060000000 HC ICU INTERMEDIATE R&B

## 2019-09-14 PROCEDURE — 94761 N-INVAS EAR/PLS OXIMETRY MLT: CPT

## 2019-09-14 PROCEDURE — 80048 BASIC METABOLIC PNL TOTAL CA: CPT

## 2019-09-14 PROCEDURE — 99233 SBSQ HOSP IP/OBS HIGH 50: CPT | Performed by: INTERNAL MEDICINE

## 2019-09-14 PROCEDURE — 6360000002 HC RX W HCPCS: Performed by: INTERNAL MEDICINE

## 2019-09-14 PROCEDURE — 6370000000 HC RX 637 (ALT 250 FOR IP): Performed by: PSYCHIATRY & NEUROLOGY

## 2019-09-14 PROCEDURE — 99232 SBSQ HOSP IP/OBS MODERATE 35: CPT | Performed by: PSYCHIATRY & NEUROLOGY

## 2019-09-14 PROCEDURE — 6370000000 HC RX 637 (ALT 250 FOR IP): Performed by: STUDENT IN AN ORGANIZED HEALTH CARE EDUCATION/TRAINING PROGRAM

## 2019-09-14 RX ADMIN — CETIRIZINE HYDROCHLORIDE 10 MG: 10 TABLET, FILM COATED ORAL at 09:03

## 2019-09-14 RX ADMIN — FAMOTIDINE 20 MG: 20 TABLET ORAL at 20:33

## 2019-09-14 RX ADMIN — CARBAMAZEPINE 200 MG: 200 TABLET ORAL at 20:33

## 2019-09-14 RX ADMIN — BENZONATATE 200 MG: 100 CAPSULE ORAL at 09:04

## 2019-09-14 RX ADMIN — LEVOTHYROXINE SODIUM 300 MCG: 150 TABLET ORAL at 06:23

## 2019-09-14 RX ADMIN — GUAIFENESIN 1200 MG: 600 TABLET, EXTENDED RELEASE ORAL at 20:32

## 2019-09-14 RX ADMIN — BENZONATATE 200 MG: 100 CAPSULE ORAL at 14:02

## 2019-09-14 RX ADMIN — CARBAMAZEPINE 200 MG: 200 TABLET ORAL at 09:04

## 2019-09-14 RX ADMIN — LEVOFLOXACIN 750 MG: 750 TABLET, FILM COATED ORAL at 20:32

## 2019-09-14 RX ADMIN — MULTIPLE VITAMINS W/ MINERALS TAB 1 TABLET: TAB at 09:03

## 2019-09-14 RX ADMIN — FLUTICASONE PROPIONATE 2 SPRAY: 50 SPRAY, METERED NASAL at 09:03

## 2019-09-14 RX ADMIN — FERROUS SULFATE TAB 325 MG (65 MG ELEMENTAL FE) 325 MG: 325 (65 FE) TAB at 12:19

## 2019-09-14 RX ADMIN — Medication 10 ML: at 09:04

## 2019-09-14 RX ADMIN — FAMOTIDINE 20 MG: 20 TABLET ORAL at 09:03

## 2019-09-14 RX ADMIN — CEFEPIME 2 G: 2 INJECTION, POWDER, FOR SOLUTION INTRAVENOUS at 01:12

## 2019-09-14 RX ADMIN — ALBUTEROL SULFATE 2.5 MG: 2.5 SOLUTION RESPIRATORY (INHALATION) at 16:18

## 2019-09-14 RX ADMIN — ALBUTEROL SULFATE 2.5 MG: 2.5 SOLUTION RESPIRATORY (INHALATION) at 20:56

## 2019-09-14 RX ADMIN — FERROUS SULFATE TAB 325 MG (65 MG ELEMENTAL FE) 325 MG: 325 (65 FE) TAB at 09:03

## 2019-09-14 RX ADMIN — METRONIDAZOLE: 10 GEL TOPICAL at 09:04

## 2019-09-14 RX ADMIN — FERROUS SULFATE TAB 325 MG (65 MG ELEMENTAL FE) 325 MG: 325 (65 FE) TAB at 17:11

## 2019-09-14 RX ADMIN — Medication 10 ML: at 20:32

## 2019-09-14 RX ADMIN — ALBUTEROL SULFATE 2.5 MG: 2.5 SOLUTION RESPIRATORY (INHALATION) at 11:47

## 2019-09-14 RX ADMIN — CEFEPIME 2 G: 2 INJECTION, POWDER, FOR SOLUTION INTRAVENOUS at 09:02

## 2019-09-14 RX ADMIN — CEFEPIME 2 G: 2 INJECTION, POWDER, FOR SOLUTION INTRAVENOUS at 17:11

## 2019-09-14 RX ADMIN — GUAIFENESIN 1200 MG: 600 TABLET, EXTENDED RELEASE ORAL at 09:03

## 2019-09-14 RX ADMIN — BENZONATATE 200 MG: 100 CAPSULE ORAL at 20:32

## 2019-09-14 RX ADMIN — Medication: at 09:04

## 2019-09-14 RX ADMIN — ALBUTEROL SULFATE 2.5 MG: 2.5 SOLUTION RESPIRATORY (INHALATION) at 02:30

## 2019-09-14 RX ADMIN — ACETAMINOPHEN 650 MG: 325 TABLET, FILM COATED ORAL at 04:33

## 2019-09-14 RX ADMIN — ALBUTEROL SULFATE 2.5 MG: 2.5 SOLUTION RESPIRATORY (INHALATION) at 08:11

## 2019-09-14 ASSESSMENT — ENCOUNTER SYMPTOMS
SHORTNESS OF BREATH: 0
WHEEZING: 0
ABDOMINAL PAIN: 0
CHEST TIGHTNESS: 0

## 2019-09-14 ASSESSMENT — PULMONARY FUNCTION TESTS
PIF_VALUE: 29
PIF_VALUE: 29

## 2019-09-14 ASSESSMENT — PAIN SCALES - GENERAL
PAINLEVEL_OUTOF10: 0
PAINLEVEL_OUTOF10: 2
PAINLEVEL_OUTOF10: 0

## 2019-09-14 NOTE — PROGRESS NOTES
RN  Called me in to check on pt due to sats reading 88-89% current vent settings PRVC 35%  PEEP 5 RR 16  in increased PEEP to 7 due to pt being severely obese sats began to come up to 95% got called back in sats back down to 89%  At this time I increased o2 to 45%. Will continue to monitor.  Dr Abhijit Foster  okay with sats being >88%

## 2019-09-14 NOTE — PROGRESS NOTES
rhonchi. Cardiovascular - Heart sounds are normal.  normal rate and rhythm regular, no murmur, gallop or rub. Abdomen - soft, nontender, nondistended, no masses or organomegaly  Neurologic - CN II-XII are grossly intact. There are no focal motor deficits  Skin - no bruising or bleeding  Extremities - no cyanosis, clubbing, chronic lymphedema  Lab Results   CBC     Lab Results   Component Value Date    WBC 10.8 09/14/2019    RBC 2.73 09/14/2019    HGB 8.2 09/14/2019    HCT 25.9 09/14/2019     09/14/2019    MCV 94.8 09/14/2019    MCH 29.9 09/14/2019    MCHC 31.5 09/14/2019    RDW 16.9 09/14/2019    METASPCT 3 09/10/2019    LYMPHOPCT 12 09/14/2019    MONOPCT 9 09/14/2019    MYELOPCT 1 11/25/2018    BASOPCT 0 09/14/2019    MONOSABS 0.97 09/14/2019    LYMPHSABS 1.30 09/14/2019    EOSABS 0.32 09/14/2019    BASOSABS 0.00 09/14/2019    DIFFTYPE NOT REPORTED 09/14/2019       BMP   Lab Results   Component Value Date     09/14/2019    K 4.8 09/14/2019     09/14/2019    CO2 29 09/14/2019    BUN 42 09/14/2019    CREATININE 1.79 09/14/2019    GLUCOSE 120 09/14/2019    CALCIUM 8.6 09/14/2019       LFTS  Lab Results   Component Value Date    ALKPHOS 77 09/10/2019    ALT 21 09/10/2019    AST 25 09/10/2019    PROT 7.9 09/10/2019    BILITOT 0.60 09/10/2019    LABALBU 3.3 09/10/2019       INR  No results for input(s): PROTIME, INR in the last 72 hours. APTT  No results for input(s): APTT in the last 72 hours. Lactic Acid  Lab Results   Component Value Date    LACTA 2.0 11/17/2018    LACTA 0.8 01/27/2018        BNP   No results for input(s): BNP in the last 72 hours. Cultures       Radiology     Plain Films         Unchanged bilateral infiltrates, right greater than left    SYSTEM ASSESSMENT    Acute on chronic hypoxic and hypercapnic respiratory failure  Presumed right lung pneumonia  RU/OHS  Chronic nocturnal ventilatory dependence  Acute kidney injury  Morbid obesity  ?  Trigeminal neuralgia    Neuro Tegretol started for presumed trigeminal neuralgia    Respiratory   Wean oxygen as tolerated. Keep O2 sat > 88%  May need a little bit higher level of PEEP to help with atelectasis due to her morbid obesity  ABG shows chronic hypercapnic respiratory failure  Continue bronchodilators and pulmonary hygiene  Acute tracheostomy collar during the day       Hemodynamics   Stable    Gastrointestinal/Nutrition   Able to eat well off the ventilator    Renal   At some point, suspect Bumex will have to be restarted    Infectious Disease   Continue Levaquin and cefepime  Given patient's elevated procalcitonin level, morbid obesity and extensive infiltrates, I would treat with IV antibiotics for a total of 10 days.   At some point, she will need a follow-up x-ray in approximately 4 weeks    Hematology/Oncology   Continue DVT prophylaxis    Endocrine   Blood sugars are okay    Social/Spiritual/DNR/Disposition/Other     Continue current level of care in intermediate ICU because of chest pain and desaturation  Hopefully back to ECF soon    Critical Care Time   0 min    Electronically signed by Shelly Uriostegui MD on 9/14/2019 at 6:48 AM

## 2019-09-14 NOTE — PLAN OF CARE
Problem: Risk for Impaired Skin Integrity  Goal: Tissue integrity - skin and mucous membranes  Description  Structural intactness and normal physiological function of skin and  mucous membranes.   9/14/2019 0545 by Bess Ambriz RN  Outcome: Ongoing     Problem: Falls - Risk of:  Goal: Will remain free from falls  Description  Will remain free from falls  9/14/2019 0545 by Bess Ambriz RN  Outcome: Ongoing     Problem: Falls - Risk of:  Goal: Absence of physical injury  Description  Absence of physical injury  9/14/2019 0545 by Bess Ambriz RN  Outcome: Ongoing     Problem: Pain:  Goal: Pain level will decrease  Description  Pain level will decrease  9/14/2019 0545 by Bess Ambriz RN  Outcome: Ongoing     Problem: Pain:  Goal: Control of acute pain  Description  Control of acute pain  9/14/2019 0545 by Bess Ambriz RN  Outcome: Ongoing

## 2019-09-14 NOTE — PROGRESS NOTES
Department of Internal Medicine  Nephrology Eduardo Batres MD    Progress Note      Interval history: Patient is on trach collar and is awake and alert. She is nonoliguric and is comfortable on IV fluid at 75 mL/h. Bumex remains on hold. History of present illness: This is a 54 y.o. female with a significant past medical history of breast cancer status post chemotherapy, Chronic respiratory failure [status post tracheostomy and on nocturnal CPAP], morbid obesity [transferred from [place] to [facility] in [facility], hypothyroidism, chronic bilateral leg lymphedema and chronic kidney disease stage 3 [baseline serum creatinine 1.2 to 1.4 mg/dL], who presented with 3-day history of shortness of breath, cough and generalized weakness. Blood pressure at presentation was 87/57 mmHg, pulse 105 bpm and temperature 97.7 °F.  Chest x-ray presentation showed right lung masses consistent with infiltrate and mild pulmonary edema. She was admitted to the ICU and started on antibiotics and is currently on a trach collar. Laboratory studies at presentation on 9/10/2019 were remarkable for elevated BUN/creatinine 47/2.44 mg/dL. Home medications included Bumex 2 mg twice daily which was continued on admission. Patient was also placed on IV fluids however. Today serum creatinine BUN/creatinine of increased to 51/2.60 mg/dL. Physical Exam:    VITALS:  /61   Pulse 100   Temp 98.3 °F (36.8 °C)   Resp 20   Ht 5' 5\" (1.651 m)   Wt (!) 588 lb 10.1 oz (267 kg)   SpO2 95%   BMI 97.95 kg/m²   24HR INTAKE/OUTPUT:      Intake/Output Summary (Last 24 hours) at 9/14/2019 1203  Last data filed at 9/14/2019 0615  Gross per 24 hour   Intake 3045 ml   Output 1200 ml   Net 1845 ml     Constitutional:  Awake and on ventilator support via trach. Cardiovascular/Edema:  S1,S2 with no pericardial rub. Respiratory: Diminished breath sounds. Gastrointestinal:  Soft with normal bowel sounds.     CBC:   Recent Labs 09/12/19 0427 09/13/19  0423 09/14/19  0415   WBC 13.5* 13.8* 10.8   HGB 8.1* 8.1* 8.2*    153 162     BMP:    Recent Labs     09/12/19 0427 09/13/19 0423 09/14/19  0415    140 141   K 4.3 4.7 4.8   CL 95* 97* 100   CO2 30 31 29   BUN 54* 48* 42*   CREATININE 2.38* 2.28* 1.79*   GLUCOSE 135* 123* 120*       Lab Results   Component Value Date    NITRU NEGATIVE 05/08/2019    COLORU YELLOW 05/08/2019    PHUR 7.0 05/08/2019    WBCUA 10 TO 20 05/08/2019    RBCUA 0 TO 2 05/08/2019    MUCUS NOT REPORTED 05/08/2019    TRICHOMONAS NOT REPORTED 05/08/2019    YEAST NOT REPORTED 05/08/2019    BACTERIA MODERATE 05/08/2019    SPECGRAV 1.010 05/08/2019    LEUKOCYTESUR MOD 05/08/2019    UROBILINOGEN Normal 05/08/2019    BILIRUBINUR NEGATIVE 05/08/2019    GLUCOSEU NEGATIVE 05/08/2019    KETUA NEGATIVE 05/08/2019    AMORPHOUS NOT REPORTED 05/08/2019     Urine Sodium:     Lab Results   Component Value Date    CONNOR 95 09/11/2019     Urine Chloride:    Lab Results   Component Value Date    CLUR 73 09/11/2019     Urine Protein:     Lab Results   Component Value Date    TPU 34 09/12/2019     Urine Creatinine:     Lab Results   Component Value Date    LABCREA 47.8 09/11/2019       IMPRESSION/RECOMMENDATIONS:      1. Acute kidney injury superimposed on chronic kidney disease stage III - Renal function continues to slowly improve. Plan: Change IV fluid to Leeta Gayer. Avoid nephrotoxic agents. Continue to hold Bumex for now but will start at a lower dose tomorrow. Strict input and output documentation. Basic metabolic profile daily. 2.  Elevated kappa/lambda light chains with ratio - await serum immunofixation to rule out monoclonal gammopathy. 3.  Hypertension - controlled.     MOHAMUD RiberaCP  Attending Nephrologist  9/14/2019 12:03 PM

## 2019-09-14 NOTE — PROGRESS NOTES
Daily    albuterol  2.5 mg Nebulization Q4H    guaiFENesin  1,200 mg Oral BID    benzonatate  200 mg Oral TID     Continuous Infusions:    sodium chloride 75 mL/hr at 19 1551     PRN Meds: benzocaine, sodium chloride flush, magnesium hydroxide, ondansetron, acetaminophen, bisacodyl, docusate sodium, fentanNYL, midazolam    Data:     Past Medical History:   has a past medical history of Anemia, Disease of blood and blood forming organ, History of breast cancer, History of chemotherapy, Hypothyroidism, Lymphedema, Morbid obesity ([facility] [address] Respiratory failure ([facility] [address] and Tracheostomy present ([facility] [address] Social History:   reports that she has never smoked. She has never used smokeless tobacco. She reports that she does not drink alcohol or use drugs. Family History:   Family History   Problem Relation Age of Onset    Breast Cancer Paternal Aunt     Breast Cancer Paternal Cousin 43    Breast Cancer Paternal Cousin 37    Breast Cancer Paternal Cousin 46       Vitals:  BP (!) 106/52   Pulse 97   Temp 97.9 °F (36.6 °C) (Axillary)   Resp 18   Ht 5' 5\" (1.651 m)   Wt (!) 588 lb 10.1 oz (267 kg)   SpO2 96%   BMI 97.95 kg/m²   Temp (24hrs), Av.7 °F (36.5 °C), Min:97.5 °F (36.4 °C), Max:98 °F (36.7 °C)    No results for input(s): POCGLU in the last 72 hours. I/O(24Hr):     Intake/Output Summary (Last 24 hours) at 2019 0929  Last data filed at 2019 0615  Gross per 24 hour   Intake 3395 ml   Output 1200 ml   Net 2195 ml       Labs:    CBC with Differential:    Lab Results   Component Value Date    WBC 10.8 2019    RBC 2.73 2019    HGB 8.2 2019    HCT 25.9 2019     2019    MCV 94.8 2019    MCH 29.9 2019    MCHC 31.5 2019    RDW 16.9 2019    METASPCT 3 09/10/2019    LYMPHOPCT 12 2019    MONOPCT 9 2019    MYELOPCT 1 2018    BASOPCT 0 2019    MONOSABS 0.97 2019    LYMPHSABS 1.30 2019    EOSABS 0.32 09/14/2019    BASOSABS 0.00 09/14/2019    DIFFTYPE NOT REPORTED 09/14/2019     BMP:    Lab Results   Component Value Date     09/14/2019    K 4.8 09/14/2019     09/14/2019    CO2 29 09/14/2019    BUN 42 09/14/2019    LABALBU 3.3 09/10/2019    CREATININE 1.79 09/14/2019    CALCIUM 8.6 09/14/2019    GFRAA 37 09/14/2019    LABGLOM 30 09/14/2019    GLUCOSE 120 09/14/2019       Lab Results   Component Value Date/Time    SPECIAL NOT REPORTED 09/11/2019 07:33 AM     Lab Results   Component Value Date/Time    CULTURE CULTURE IN PROGRESS 09/11/2019 07:33 AM    CULTURE NORMAL RESPIRATORY YAHAIRA LIGHT GROWTH 09/11/2019 07:33 AM         Radiology:    Xr Chest (single View Frontal)    Result Date: 9/10/2019  EXAMINATION: ONE XRAY VIEW OF THE CHEST 9/10/2019 12:48 pm COMPARISON: November 25, 2018 HISTORY: ORDERING SYSTEM PROVIDED HISTORY: shortness of breath TECHNOLOGIST PROVIDED HISTORY: shortness of breath Reason for Exam: dyspnea Acuity: Acute Type of Exam: Initial FINDINGS: Tracheostomy tube. Right-sided Port-A-Cath. Right axillary surgical clips. Right upper lobe and right lower lobe masses. Cardiomegaly. Mild pulmonary edema. Right lung masses may represent malignancy versus infection. Mild pulmonary edema. Xr Chest Portable    Result Date: 9/11/2019  EXAMINATION: ONE XRAY VIEW OF THE CHEST 9/11/2019 6:29 am COMPARISON: 09/10/2019 HISTORY: ORDERING SYSTEM PROVIDED HISTORY: Pneumonia TECHNOLOGIST PROVIDED HISTORY: Pneumonia Reason for Exam: trach to vent Acuity: Acute Type of Exam: Initial FINDINGS: Tracheostomy in place. There is probably cardiomegaly. Redemonstration of pulmonary vascular congestion. Unchanged 3 cm nodular opacity right mid lung. Right sided infusion port is in place. Right axillary surgical clips noted. There is probably small left pleural effusion.      Stable chest showing cardiomegaly, vascular congestion, probable small left pleural effusion and about 3 cm right mid lung

## 2019-09-14 NOTE — PROGRESS NOTES
Brief history: Vicky Boykin is a 54 y.o. old female admitted on 9/10/2019 with trigeminal neuralgia        Subjective: The patient reports continued left facial pain last night. No new complaints.   She is tolerating the medication Tegretol okay     Objective: /62   Pulse 97   Temp 98.3 °F (36.8 °C) (Axillary)   Resp 19   Ht 5' 5\" (1.651 m)   Wt (!) 588 lb 10.1 oz (267 kg)   SpO2 100%   BMI 97.95 kg/m²       Medications:    fluticasone  2 spray Each Nostril Daily    carBAMazepine  200 mg Oral BID    levofloxacin  750 mg Oral Daily    sodium chloride flush  10 mL Intravenous 2 times per day    enoxaparin  40 mg Subcutaneous BID    famotidine  20 mg Oral BID    cefepime  2 g Intravenous Q8H    levothyroxine  300 mcg Oral Daily    therapeutic multivitamin-minerals  1 tablet Oral Daily with breakfast    ferrous sulfate  325 mg Oral TID WC    HYDROCERIN   Topical BID    metroNIDAZOLE   Topical Daily    cetirizine  10 mg Oral Daily    albuterol  2.5 mg Nebulization Q4H    guaiFENesin  1,200 mg Oral BID    benzonatate  200 mg Oral TID        General examination:    Head: Normocephalic, atraumatic  Eyes: Extraocular movements intact  Lungs: Respirations unlabored, chest wall no deformity  ENT: Normal external ear canals, no sinus tenderness  Heart: Regular rate rhythm  Abdomen: No masses, tenderness  Extremities: No cyanosis or edema, 2+ pulses  Skin: Intact, normal skin color    Neurological examination:    Mental status   Alert and oriented; intact memory with no confusion, speech or language problems; no hallucinations or delusions     Cranial nerves   II - visual fields intact to confrontation                                                III, IV, VI - extra-ocular muscles full: no pupillary defect; no NIESHA, no nystagmus, no ptosis                                                                      V - normal facial sensation

## 2019-09-15 VITALS
HEART RATE: 96 BPM | HEIGHT: 65 IN | BODY MASS INDEX: 48.82 KG/M2 | TEMPERATURE: 98.2 F | RESPIRATION RATE: 18 BRPM | WEIGHT: 293 LBS | OXYGEN SATURATION: 97 % | DIASTOLIC BLOOD PRESSURE: 81 MMHG | SYSTOLIC BLOOD PRESSURE: 129 MMHG

## 2019-09-15 LAB
ABSOLUTE BANDS #: 0.39 K/UL (ref 0–1)
ABSOLUTE EOS #: 0.49 K/UL (ref 0–0.4)
ABSOLUTE IMMATURE GRANULOCYTE: ABNORMAL K/UL (ref 0–0.3)
ABSOLUTE LYMPH #: 1.67 K/UL (ref 1–4.8)
ABSOLUTE MONO #: 0.88 K/UL (ref 0.1–1.3)
ANION GAP SERPL CALCULATED.3IONS-SCNC: 10 MMOL/L (ref 9–17)
BANDS: 4 % (ref 0–10)
BASOPHILS # BLD: 0 % (ref 0–2)
BASOPHILS ABSOLUTE: 0 K/UL (ref 0–0.2)
BUN BLDV-MCNC: 37 MG/DL (ref 6–20)
BUN/CREAT BLD: ABNORMAL (ref 9–20)
CALCIUM SERPL-MCNC: 8.7 MG/DL (ref 8.6–10.4)
CHLORIDE BLD-SCNC: 100 MMOL/L (ref 98–107)
CO2: 30 MMOL/L (ref 20–31)
CREAT SERPL-MCNC: 1.69 MG/DL (ref 0.5–0.9)
CULTURE: ABNORMAL
CULTURE: ABNORMAL
DIFFERENTIAL TYPE: ABNORMAL
DIRECT EXAM: ABNORMAL
EOSINOPHILS RELATIVE PERCENT: 5 % (ref 0–4)
GFR AFRICAN AMERICAN: 39 ML/MIN
GFR NON-AFRICAN AMERICAN: 32 ML/MIN
GFR SERPL CREATININE-BSD FRML MDRD: ABNORMAL ML/MIN/{1.73_M2}
GFR SERPL CREATININE-BSD FRML MDRD: ABNORMAL ML/MIN/{1.73_M2}
GLUCOSE BLD-MCNC: 124 MG/DL (ref 70–99)
HCT VFR BLD CALC: 25.8 % (ref 36–46)
HEMOGLOBIN: 8.4 G/DL (ref 12–16)
IMMATURE GRANULOCYTES: ABNORMAL %
LYMPHOCYTES # BLD: 17 % (ref 24–44)
Lab: ABNORMAL
MCH RBC QN AUTO: 30.6 PG (ref 26–34)
MCHC RBC AUTO-ENTMCNC: 32.6 G/DL (ref 31–37)
MCV RBC AUTO: 94 FL (ref 80–100)
MONOCYTES # BLD: 9 % (ref 1–7)
MORPHOLOGY: ABNORMAL
MORPHOLOGY: ABNORMAL
NRBC AUTOMATED: ABNORMAL PER 100 WBC
PDW BLD-RTO: 17.1 % (ref 11.5–14.9)
PLATELET # BLD: 165 K/UL (ref 150–450)
PLATELET ESTIMATE: ABNORMAL
PMV BLD AUTO: 8.7 FL (ref 6–12)
POTASSIUM SERPL-SCNC: 5 MMOL/L (ref 3.7–5.3)
RBC # BLD: 2.74 M/UL (ref 4–5.2)
RBC # BLD: ABNORMAL 10*6/UL
SEG NEUTROPHILS: 65 % (ref 36–66)
SEGMENTED NEUTROPHILS ABSOLUTE COUNT: 6.37 K/UL (ref 1.3–9.1)
SODIUM BLD-SCNC: 140 MMOL/L (ref 135–144)
SPECIMEN DESCRIPTION: ABNORMAL
WBC # BLD: 9.8 K/UL (ref 3.5–11)
WBC # BLD: ABNORMAL 10*3/UL

## 2019-09-15 PROCEDURE — 6370000000 HC RX 637 (ALT 250 FOR IP): Performed by: INTERNAL MEDICINE

## 2019-09-15 PROCEDURE — 6370000000 HC RX 637 (ALT 250 FOR IP): Performed by: STUDENT IN AN ORGANIZED HEALTH CARE EDUCATION/TRAINING PROGRAM

## 2019-09-15 PROCEDURE — 85025 COMPLETE CBC W/AUTO DIFF WBC: CPT

## 2019-09-15 PROCEDURE — 94640 AIRWAY INHALATION TREATMENT: CPT

## 2019-09-15 PROCEDURE — 2700000000 HC OXYGEN THERAPY PER DAY

## 2019-09-15 PROCEDURE — 36415 COLL VENOUS BLD VENIPUNCTURE: CPT

## 2019-09-15 PROCEDURE — 99239 HOSP IP/OBS DSCHRG MGMT >30: CPT | Performed by: INTERNAL MEDICINE

## 2019-09-15 PROCEDURE — 6360000002 HC RX W HCPCS: Performed by: STUDENT IN AN ORGANIZED HEALTH CARE EDUCATION/TRAINING PROGRAM

## 2019-09-15 PROCEDURE — 94003 VENT MGMT INPAT SUBQ DAY: CPT

## 2019-09-15 PROCEDURE — 6370000000 HC RX 637 (ALT 250 FOR IP): Performed by: PSYCHIATRY & NEUROLOGY

## 2019-09-15 PROCEDURE — 2580000003 HC RX 258: Performed by: STUDENT IN AN ORGANIZED HEALTH CARE EDUCATION/TRAINING PROGRAM

## 2019-09-15 PROCEDURE — 94761 N-INVAS EAR/PLS OXIMETRY MLT: CPT

## 2019-09-15 PROCEDURE — 80048 BASIC METABOLIC PNL TOTAL CA: CPT

## 2019-09-15 PROCEDURE — 6360000002 HC RX W HCPCS: Performed by: INTERNAL MEDICINE

## 2019-09-15 RX ORDER — GUAIFENESIN 600 MG/1
1200 TABLET, EXTENDED RELEASE ORAL 2 TIMES DAILY
Qty: 56 TABLET | Refills: 0 | Status: SHIPPED | OUTPATIENT
Start: 2019-09-15 | End: 2019-09-29

## 2019-09-15 RX ORDER — BUMETANIDE 1 MG/1
1 TABLET ORAL 2 TIMES DAILY
Qty: 60 TABLET | Refills: 3 | Status: SHIPPED | OUTPATIENT
Start: 2019-09-15

## 2019-09-15 RX ORDER — LEVOFLOXACIN 750 MG/1
750 TABLET ORAL DAILY
Qty: 5 TABLET | Refills: 0 | Status: SHIPPED | OUTPATIENT
Start: 2019-09-15 | End: 2019-09-20

## 2019-09-15 RX ORDER — CARBAMAZEPINE 200 MG/1
200 TABLET ORAL 2 TIMES DAILY
Qty: 90 TABLET | Refills: 3 | Status: SHIPPED | OUTPATIENT
Start: 2019-09-15

## 2019-09-15 RX ADMIN — ALBUTEROL SULFATE 2.5 MG: 2.5 SOLUTION RESPIRATORY (INHALATION) at 08:05

## 2019-09-15 RX ADMIN — ALBUTEROL SULFATE 2.5 MG: 2.5 SOLUTION RESPIRATORY (INHALATION) at 13:25

## 2019-09-15 RX ADMIN — GUAIFENESIN 1200 MG: 600 TABLET, EXTENDED RELEASE ORAL at 10:02

## 2019-09-15 RX ADMIN — FERROUS SULFATE TAB 325 MG (65 MG ELEMENTAL FE) 325 MG: 325 (65 FE) TAB at 10:03

## 2019-09-15 RX ADMIN — MULTIPLE VITAMINS W/ MINERALS TAB 1 TABLET: TAB at 10:02

## 2019-09-15 RX ADMIN — CARBAMAZEPINE 200 MG: 200 TABLET ORAL at 10:08

## 2019-09-15 RX ADMIN — FERROUS SULFATE TAB 325 MG (65 MG ELEMENTAL FE) 325 MG: 325 (65 FE) TAB at 13:54

## 2019-09-15 RX ADMIN — ALBUTEROL SULFATE 2.5 MG: 2.5 SOLUTION RESPIRATORY (INHALATION) at 03:50

## 2019-09-15 RX ADMIN — CEFEPIME 2 G: 2 INJECTION, POWDER, FOR SOLUTION INTRAVENOUS at 00:44

## 2019-09-15 RX ADMIN — Medication: at 10:11

## 2019-09-15 RX ADMIN — ALBUTEROL SULFATE 2.5 MG: 2.5 SOLUTION RESPIRATORY (INHALATION) at 00:12

## 2019-09-15 RX ADMIN — BENZONATATE 200 MG: 100 CAPSULE ORAL at 13:54

## 2019-09-15 RX ADMIN — CETIRIZINE HYDROCHLORIDE 10 MG: 10 TABLET, FILM COATED ORAL at 10:03

## 2019-09-15 RX ADMIN — CEFEPIME 2 G: 2 INJECTION, POWDER, FOR SOLUTION INTRAVENOUS at 10:11

## 2019-09-15 RX ADMIN — BENZONATATE 200 MG: 100 CAPSULE ORAL at 10:03

## 2019-09-15 RX ADMIN — FAMOTIDINE 20 MG: 20 TABLET ORAL at 10:03

## 2019-09-15 RX ADMIN — LEVOTHYROXINE SODIUM 300 MCG: 150 TABLET ORAL at 10:10

## 2019-09-15 RX ADMIN — ENOXAPARIN SODIUM 40 MG: 40 INJECTION SUBCUTANEOUS at 10:03

## 2019-09-15 ASSESSMENT — PULMONARY FUNCTION TESTS: PIF_VALUE: 24

## 2019-09-15 ASSESSMENT — ENCOUNTER SYMPTOMS
NAUSEA: 0
VOMITING: 0
WHEEZING: 0
ABDOMINAL PAIN: 0
COUGH: 0
SHORTNESS OF BREATH: 0

## 2019-09-15 ASSESSMENT — PAIN SCALES - GENERAL: PAINLEVEL_OUTOF10: 0

## 2019-09-15 NOTE — CARE COORDINATION
CASE MANAGEMENT NOTE:    Admission Date:  9/10/2019 Nitin Bernal is a 54 y.o.  female    Admitted for : Pneumonia [J18.9]    Met with:  Family    PCP:  Dr. Red Hallman:  Medicaid       Current Residence/ Living Arrangements: in a nursing facility    Is the Patient an Digital Trowel with Readmission Risk Score greater than 14%? No  If yes, pt needs a follow up appointment made within 7 days. Does Patient want to use MEDS to BEDS? No    Family Members/Caregivers that pt would like involved in their care:    Yes    If yes, list name here: Julio César    Transportation Provider:  Donald Dash             Is patient in Isolation/One on One/Altered Mental Status? No  If yes, skip next question. If no, would they like an I-Pad to  use? No  If yes, call [phone]. Discharge Plan:  9/11/19 Medicaid Pt is from [facility] pt will return back when medically cleared will follow for needs pt is on iv abt chronic vent  Will follow .//tv               Electronically signed by:  Adelia Nguyen RN on 9/11/2019 at 2:27 PM
ONGOING DISCHARGE PLAN:    Spoke with patient regarding discharge plan and patient confirms that plan is still discharge to [facility]   Waiting on cultures, Possible discharge to ecf 1-2 days   Patient will remain in ICU         Will continue to follow for additional discharge needs.     Electronically signed by Kristin Meza RN on 9/12/2019 at 12:29 PM
Pain       ipratropium-albuterol (DUONEB) 0.5-2.5 (3) MG/3ML SOLN nebulizer solution 1 vial   Inhale 1 vial into the lungs every 4 hours as needed for Shortness of Breath       levothyroxine (SYNTHROID) 300 MCG tablet 300 mcg   Take 300 mcg by mouth daily        ferrous sulfate 325 (65 Fe) MG tablet 325 mg   Take 325 mg by mouth 3 times daily (with meals)       Multiple Vitamins-Minerals (THERAPEUTIC MULTIVITAMIN-MINERALS) tablet 1 tablet   Take 1 tablet by mouth daily (with breakfast)           STOP taking these previous medications     Medication Dose Reason for Stopping Comments   (STOP TAKING) metOLazone (ZAROXOLYN) 5 MG tablet 5 mg           (STOP TAKING) naproxen (NAPROSYN) 500 MG tablet 500 mg           (STOP TAKING) apixaban (ELIQUIS) 2.5 MG TABS tablet 2.5 mg           Printing Report     Report ID Report Name Print   [id number] Discharge Meds Print

## 2019-09-15 NOTE — PROGRESS NOTES
9/10/2019  EXAMINATION: ONE XRAY VIEW OF THE CHEST 9/10/2019 12:48 pm COMPARISON: November 25, 2018 HISTORY: ORDERING SYSTEM PROVIDED HISTORY: shortness of breath TECHNOLOGIST PROVIDED HISTORY: shortness of breath Reason for Exam: dyspnea Acuity: Acute Type of Exam: Initial FINDINGS: Tracheostomy tube. Right-sided Port-A-Cath. Right axillary surgical clips. Right upper lobe and right lower lobe masses. Cardiomegaly. Mild pulmonary edema. Right lung masses may represent malignancy versus infection. Mild pulmonary edema. Xr Chest Portable    Result Date: 9/11/2019  EXAMINATION: ONE XRAY VIEW OF THE CHEST 9/11/2019 6:29 am COMPARISON: 09/10/2019 HISTORY: ORDERING SYSTEM PROVIDED HISTORY: Pneumonia TECHNOLOGIST PROVIDED HISTORY: Pneumonia Reason for Exam: trach to vent Acuity: Acute Type of Exam: Initial FINDINGS: Tracheostomy in place. There is probably cardiomegaly. Redemonstration of pulmonary vascular congestion. Unchanged 3 cm nodular opacity right mid lung. Right sided infusion port is in place. Right axillary surgical clips noted. There is probably small left pleural effusion. Stable chest showing cardiomegaly, vascular congestion, probable small left pleural effusion and about 3 cm right mid lung nodular opacity. Physical Examination:        Physical Exam   Constitutional: She appears well-developed. Morbidly obese   Cardiovascular: Normal rate, regular rhythm and normal heart sounds. Pulmonary/Chest: Effort normal and breath sounds normal. No respiratory distress. Abdominal: Soft. There is no tenderness. Assessment:        Primary Problem  Pneumonia    Active Hospital Problems    Diagnosis Date Noted    Pneumonia [J18.9] 09/10/2019    Shortness of breath [R06.02] 05/14/2018       Plan:        1. Community acquired pneumonia  - pulmonology onboard, appreciate recs.  As per Dr. Darwin Garcia, she is ready for discharge  - afebrile overnight, no leukocytosis today  - 9/11:

## 2019-09-15 NOTE — DISCHARGE INSTR - COC
Respiratory Treatments: see MAR  Oxygen Therapy:  Trach collar at 30%  Ventilator:    - Ventilator Settings: At HS  Vt Ordered: 550 mL  Rate Set: 16 bmp  FiO2 : 30 %    PEEP/CPAP: 7       Rehab Therapies:   Weight Bearing Status/Restrictions: No weight bearing restirctions  Other Medical Equipment (for information only, NOT a DME order): Other Treatments: skilled nursing assessment, medication education and monitoring  Peripheral IV care and flushes  IV Cefepime 2gm every 8 hours for 5 days  Resume previous orders    Patient's personal belongings (please select all that are sent with patient):  None    RN SIGNATURE:  Electronically signed by Darline Yoder RN on 9/15/19 at 11:44 AM    CASE MANAGEMENT/SOCIAL WORK SECTION    Inpatient Status Date: 9/10/19    Readmission Risk Assessment Score:  Readmission Risk              Risk of Unplanned Readmission:        16           Discharging to Facility/ Agency   [address] [address]  · P: [phone]  · F: [phone]    / signature: Electronically signed by Darline Yoder RN on 9/15/19 at 10:19 AM    PHYSICIAN SECTION    Prognosis: Fair    Condition at Discharge: Stable    Rehab Potential (if transferring to Rehab): Fair    Recommended Labs or Other Treatments After Discharge: see above    Physician Certification: I certify the above information and transfer of Gwynne Osler  is necessary for the continuing treatment of the diagnosis listed and that she requires [facility] for greater 30 days.      Update Admission H&P: No change in H&P    PHYSICIAN SIGNATURE:  Electronically signed by Tobi Lawler MD on 9/15/19 at 12:00 PM

## 2019-09-15 NOTE — DISCHARGE SUMMARY
acetaminophen 325 MG tablet  Commonly known as:  TYLENOL     benzonatate 100 MG capsule  Commonly known as:  TESSALON     bisacodyl 5 MG EC tablet  Commonly known as:  DULCOLAX     cephALEXin 500 MG capsule  Commonly known as:  KEFLEX     docusate sodium 100 MG capsule  Commonly known as:  COLACE     ferrous sulfate 325 (65 Fe) MG tablet     HYDROCERIN Crea cream  Apply topically 2 times daily     ipratropium-albuterol 0.5-2.5 (3) MG/3ML Soln nebulizer solution  Commonly known as:  DUONEB     levothyroxine 300 MCG tablet  Commonly known as:  SYNTHROID     loratadine 10 MG tablet  Commonly known as:  CLARITIN     magnesium hydroxide 400 MG/5ML suspension  Commonly known as:  MILK OF MAGNESIA     metroNIDAZOLE 1 % gel  Commonly known as:  METROGEL     mineral oil-hydrophilic petrolatum ointment     therapeutic multivitamin-minerals tablet     VICKS VAPORUB 4.7-1.2-2.6 % Oint        STOP taking these medications    metOLazone 5 MG tablet  Commonly known as:  ZAROXOLYN     naproxen 500 MG tablet  Commonly known as:  NAPROSYN           Where to Get Your Medications      These medications were sent to [address] [facility] [address]    Phone:  [phone]   · bumetanide 1 MG tablet  · carBAMazepine 200 MG tablet  · guaiFENesin 600 MG extended release tablet  · levofloxacin 750 MG tablet     You can get these medications from any pharmacy    Bring a paper prescription for each of these medications  · cefepime  infusion         Time Spent on discharge is  35 mins in patient examination, evaluation, counseling as well as medication reconciliation, prescriptions for required medications, discharge plan and follow up. Electronically signed by   Keshia Woodruff MD  9/15/2019  12:49 PM      Thank you Dr. Joao Kraft DO for the opportunity to be involved in this patient's care.     IM Attending     Pt seen and examined

## 2019-09-16 LAB
CULTURE: NORMAL
CULTURE: NORMAL
EKG ATRIAL RATE: 102 BPM
EKG P AXIS: 30 DEGREES
EKG P-R INTERVAL: 164 MS
EKG Q-T INTERVAL: 350 MS
EKG QRS DURATION: 104 MS
EKG QTC CALCULATION (BAZETT): 456 MS
EKG R AXIS: 32 DEGREES
EKG T AXIS: 33 DEGREES
EKG VENTRICULAR RATE: 102 BPM
Lab: NORMAL
Lab: NORMAL
SPECIMEN DESCRIPTION: NORMAL
SPECIMEN DESCRIPTION: NORMAL
SURGICAL PATHOLOGY REPORT: NORMAL

## 2019-09-16 PROCEDURE — 93010 ELECTROCARDIOGRAM REPORT: CPT | Performed by: INTERNAL MEDICINE

## 2019-09-17 LAB
FLOW CYTOMETRY BL: NORMAL
PATHOLOGIST: NORMAL
SERUM IFX INTERP: NORMAL
URINE IFX INTERP: NORMAL
URINE IFX SPECIMEN: NORMAL
URINE TOTAL PROTEIN: 34 MG/DL
VOLUME: NORMAL ML

## 2019-10-30 ENCOUNTER — OFFICE VISIT (OUTPATIENT)
Dept: NEUROLOGY | Age: 49
End: 2019-10-30
Payer: MEDICAID

## 2019-10-30 VITALS
BODY MASS INDEX: 104.14 KG/M2 | WEIGHT: 293 LBS | HEART RATE: 70 BPM | DIASTOLIC BLOOD PRESSURE: 80 MMHG | SYSTOLIC BLOOD PRESSURE: 130 MMHG

## 2019-10-30 DIAGNOSIS — G50.0 TRIGEMINAL NEURALGIA: Primary | ICD-10-CM

## 2019-10-30 PROCEDURE — 99213 OFFICE O/P EST LOW 20 MIN: CPT | Performed by: PSYCHIATRY & NEUROLOGY

## 2019-12-30 ENCOUNTER — APPOINTMENT (OUTPATIENT)
Dept: GENERAL RADIOLOGY | Age: 49
DRG: 130 | End: 2019-12-30
Payer: MEDICAID

## 2019-12-30 ENCOUNTER — HOSPITAL ENCOUNTER (INPATIENT)
Age: 49
LOS: 5 days | Discharge: NURSING, REHAB OR LONG-TERM CARE | DRG: 130 | End: 2020-01-04
Attending: EMERGENCY MEDICINE | Admitting: INTERNAL MEDICINE
Payer: MEDICAID

## 2019-12-30 PROBLEM — J96.90 RESPIRATORY FAILURE (HCC): Status: ACTIVE | Noted: 2019-12-30

## 2019-12-30 LAB
-: NORMAL
ABSOLUTE EOS #: 0.1 K/UL (ref 0–0.4)
ABSOLUTE IMMATURE GRANULOCYTE: ABNORMAL K/UL (ref 0–0.3)
ABSOLUTE LYMPH #: 0.7 K/UL (ref 1–4.8)
ABSOLUTE MONO #: 0.6 K/UL (ref 0.1–1.3)
ALLEN TEST: ABNORMAL
ANION GAP SERPL CALCULATED.3IONS-SCNC: 11 MMOL/L (ref 9–17)
BASOPHILS # BLD: 0 % (ref 0–2)
BASOPHILS ABSOLUTE: 0 K/UL (ref 0–0.2)
BUN BLDV-MCNC: 29 MG/DL (ref 6–20)
BUN/CREAT BLD: ABNORMAL (ref 9–20)
CALCIUM SERPL-MCNC: 9.7 MG/DL (ref 8.6–10.4)
CARBOXYHEMOGLOBIN: 3.5 % (ref 0–5)
CHLORIDE BLD-SCNC: 98 MMOL/L (ref 98–107)
CO2: 30 MMOL/L (ref 20–31)
CREAT SERPL-MCNC: 1.11 MG/DL (ref 0.5–0.9)
DIFFERENTIAL TYPE: ABNORMAL
EOSINOPHILS RELATIVE PERCENT: 1 % (ref 0–4)
FIO2: ABNORMAL
GFR AFRICAN AMERICAN: >60 ML/MIN
GFR NON-AFRICAN AMERICAN: 53 ML/MIN
GFR SERPL CREATININE-BSD FRML MDRD: ABNORMAL ML/MIN/{1.73_M2}
GFR SERPL CREATININE-BSD FRML MDRD: ABNORMAL ML/MIN/{1.73_M2}
GLUCOSE BLD-MCNC: 137 MG/DL (ref 70–99)
HCO3 VENOUS: 33.8 MMOL/L (ref 24–30)
HCT VFR BLD CALC: 27.9 % (ref 36–46)
HEMOGLOBIN: 8.9 G/DL (ref 12–16)
IMMATURE GRANULOCYTES: ABNORMAL %
LYMPHOCYTES # BLD: 12 % (ref 24–44)
MCH RBC QN AUTO: 31 PG (ref 26–34)
MCHC RBC AUTO-ENTMCNC: 31.8 G/DL (ref 31–37)
MCV RBC AUTO: 97.4 FL (ref 80–100)
METHEMOGLOBIN: ABNORMAL % (ref 0–1.9)
MODE: ABNORMAL
MONOCYTES # BLD: 10 % (ref 1–7)
NEGATIVE BASE EXCESS, VEN: ABNORMAL MMOL/L (ref 0–2)
NOTIFICATION TIME: ABNORMAL
NOTIFICATION: ABNORMAL
NRBC AUTOMATED: ABNORMAL PER 100 WBC
O2 DEVICE/FLOW/%: ABNORMAL
O2 SAT, VEN: 79.3 % (ref 60–85)
OXYHEMOGLOBIN: ABNORMAL % (ref 95–98)
PATIENT TEMP: 37
PCO2, VEN, TEMP ADJ: ABNORMAL MMHG (ref 39–55)
PCO2, VEN: 53.3 (ref 39–55)
PDW BLD-RTO: 15.2 % (ref 11.5–14.9)
PEEP/CPAP: ABNORMAL
PH VENOUS: 7.41 (ref 7.32–7.42)
PH, VEN, TEMP ADJ: ABNORMAL (ref 7.32–7.42)
PLATELET # BLD: 164 K/UL (ref 150–450)
PLATELET ESTIMATE: ABNORMAL
PMV BLD AUTO: 7.6 FL (ref 6–12)
PO2, VEN, TEMP ADJ: ABNORMAL MMHG (ref 30–50)
PO2, VEN: 43.6 (ref 30–50)
POSITIVE BASE EXCESS, VEN: 9.2 MMOL/L (ref 0–2)
POTASSIUM SERPL-SCNC: 5.3 MMOL/L (ref 3.7–5.3)
PSV: ABNORMAL
PT. POSITION: ABNORMAL
RBC # BLD: 2.86 M/UL (ref 4–5.2)
RBC # BLD: ABNORMAL 10*6/UL
REASON FOR REJECTION: NORMAL
RESPIRATORY RATE: ABNORMAL
SAMPLE SITE: ABNORMAL
SEG NEUTROPHILS: 77 % (ref 36–66)
SEGMENTED NEUTROPHILS ABSOLUTE COUNT: 4.5 K/UL (ref 1.3–9.1)
SET RATE: ABNORMAL
SODIUM BLD-SCNC: 139 MMOL/L (ref 135–144)
TEXT FOR RESPIRATORY: ABNORMAL
TOTAL HB: ABNORMAL G/DL (ref 12–16)
TOTAL RATE: ABNORMAL
VT: ABNORMAL
WBC # BLD: 5.9 K/UL (ref 3.5–11)
WBC # BLD: ABNORMAL 10*3/UL
ZZ NTE CLEAN UP: ORDERED TEST: NORMAL
ZZ NTE WITH NAME CLEAN UP: SPECIMEN SOURCE: NORMAL

## 2019-12-30 PROCEDURE — 82805 BLOOD GASES W/O2 SATURATION: CPT

## 2019-12-30 PROCEDURE — 99223 1ST HOSP IP/OBS HIGH 75: CPT | Performed by: INTERNAL MEDICINE

## 2019-12-30 PROCEDURE — 2060000000 HC ICU INTERMEDIATE R&B

## 2019-12-30 PROCEDURE — 82800 BLOOD PH: CPT

## 2019-12-30 PROCEDURE — 5A1955Z RESPIRATORY VENTILATION, GREATER THAN 96 CONSECUTIVE HOURS: ICD-10-PCS | Performed by: FAMILY MEDICINE

## 2019-12-30 PROCEDURE — 6370000000 HC RX 637 (ALT 250 FOR IP): Performed by: STUDENT IN AN ORGANIZED HEALTH CARE EDUCATION/TRAINING PROGRAM

## 2019-12-30 PROCEDURE — 87070 CULTURE OTHR SPECIMN AEROBIC: CPT

## 2019-12-30 PROCEDURE — 87205 SMEAR GRAM STAIN: CPT

## 2019-12-30 PROCEDURE — 80048 BASIC METABOLIC PNL TOTAL CA: CPT

## 2019-12-30 PROCEDURE — 71045 X-RAY EXAM CHEST 1 VIEW: CPT

## 2019-12-30 PROCEDURE — 94002 VENT MGMT INPAT INIT DAY: CPT

## 2019-12-30 PROCEDURE — 99285 EMERGENCY DEPT VISIT HI MDM: CPT

## 2019-12-30 PROCEDURE — 6360000002 HC RX W HCPCS: Performed by: STUDENT IN AN ORGANIZED HEALTH CARE EDUCATION/TRAINING PROGRAM

## 2019-12-30 PROCEDURE — 85025 COMPLETE CBC W/AUTO DIFF WBC: CPT

## 2019-12-30 PROCEDURE — 2580000003 HC RX 258: Performed by: EMERGENCY MEDICINE

## 2019-12-30 PROCEDURE — 6360000002 HC RX W HCPCS: Performed by: EMERGENCY MEDICINE

## 2019-12-30 PROCEDURE — 2000000000 HC ICU R&B

## 2019-12-30 PROCEDURE — 36415 COLL VENOUS BLD VENIPUNCTURE: CPT

## 2019-12-30 RX ORDER — M-VIT,TX,IRON,MINS/CALC/FOLIC 27MG-0.4MG
1 TABLET ORAL
Status: DISCONTINUED | OUTPATIENT
Start: 2019-12-31 | End: 2020-01-04 | Stop reason: HOSPADM

## 2019-12-30 RX ORDER — SODIUM CHLORIDE 0.9 % (FLUSH) 0.9 %
10 SYRINGE (ML) INJECTION EVERY 12 HOURS SCHEDULED
Status: DISCONTINUED | OUTPATIENT
Start: 2019-12-30 | End: 2020-01-01

## 2019-12-30 RX ORDER — VIT B COMP NO.3/FOLIC/C/BIOTIN 1 MG-60 MG
1 TABLET ORAL
COMMUNITY
End: 2020-01-08

## 2019-12-30 RX ORDER — BUMETANIDE 1 MG/1
2 TABLET ORAL 2 TIMES DAILY
Status: DISCONTINUED | OUTPATIENT
Start: 2019-12-30 | End: 2020-01-04 | Stop reason: HOSPADM

## 2019-12-30 RX ORDER — BUMETANIDE 1 MG/1
1 TABLET ORAL 2 TIMES DAILY
Status: DISCONTINUED | OUTPATIENT
Start: 2019-12-30 | End: 2019-12-30

## 2019-12-30 RX ORDER — FERROUS SULFATE 325(65) MG
325 TABLET ORAL
Status: DISCONTINUED | OUTPATIENT
Start: 2019-12-30 | End: 2020-01-04 | Stop reason: HOSPADM

## 2019-12-30 RX ORDER — BENZONATATE 100 MG/1
100 CAPSULE ORAL 2 TIMES DAILY PRN
Status: DISCONTINUED | OUTPATIENT
Start: 2019-12-30 | End: 2020-01-04 | Stop reason: HOSPADM

## 2019-12-30 RX ORDER — ACETAMINOPHEN 325 MG/1
650 TABLET ORAL EVERY 4 HOURS PRN
Status: DISCONTINUED | OUTPATIENT
Start: 2019-12-30 | End: 2020-01-01

## 2019-12-30 RX ORDER — LINEZOLID 2 MG/ML
600 INJECTION, SOLUTION INTRAVENOUS EVERY 12 HOURS
Status: DISCONTINUED | OUTPATIENT
Start: 2019-12-30 | End: 2020-01-03

## 2019-12-30 RX ORDER — SODIUM CHLORIDE 0.9 % (FLUSH) 0.9 %
10 SYRINGE (ML) INJECTION PRN
Status: DISCONTINUED | OUTPATIENT
Start: 2019-12-30 | End: 2020-01-04 | Stop reason: HOSPADM

## 2019-12-30 RX ORDER — CARBAMAZEPINE 200 MG/1
200 TABLET ORAL 2 TIMES DAILY
Status: DISCONTINUED | OUTPATIENT
Start: 2019-12-30 | End: 2020-01-04 | Stop reason: HOSPADM

## 2019-12-30 RX ORDER — SODIUM CHLORIDE 0.9 % (FLUSH) 0.9 %
10 SYRINGE (ML) INJECTION PRN
Status: DISCONTINUED | OUTPATIENT
Start: 2019-12-30 | End: 2020-01-01

## 2019-12-30 RX ORDER — LEVOFLOXACIN 5 MG/ML
750 INJECTION, SOLUTION INTRAVENOUS EVERY 24 HOURS
Status: DISCONTINUED | OUTPATIENT
Start: 2019-12-30 | End: 2020-01-04

## 2019-12-30 RX ORDER — LEVOTHYROXINE SODIUM 0.1 MG/1
300 TABLET ORAL DAILY
Status: DISCONTINUED | OUTPATIENT
Start: 2019-12-30 | End: 2020-01-04 | Stop reason: HOSPADM

## 2019-12-30 RX ORDER — FAMOTIDINE 20 MG/1
20 TABLET, FILM COATED ORAL 2 TIMES DAILY
Status: DISCONTINUED | OUTPATIENT
Start: 2019-12-30 | End: 2020-01-04 | Stop reason: HOSPADM

## 2019-12-30 RX ORDER — SODIUM CHLORIDE 0.9 % (FLUSH) 0.9 %
10 SYRINGE (ML) INJECTION EVERY 12 HOURS SCHEDULED
Status: DISCONTINUED | OUTPATIENT
Start: 2019-12-30 | End: 2020-01-04 | Stop reason: HOSPADM

## 2019-12-30 RX ORDER — ONDANSETRON 2 MG/ML
4 INJECTION INTRAMUSCULAR; INTRAVENOUS EVERY 6 HOURS PRN
Status: DISCONTINUED | OUTPATIENT
Start: 2019-12-30 | End: 2020-01-04 | Stop reason: HOSPADM

## 2019-12-30 RX ADMIN — CARBAMAZEPINE 200 MG: 200 TABLET ORAL at 20:43

## 2019-12-30 RX ADMIN — FERROUS SULFATE TAB 325 MG (65 MG ELEMENTAL FE) 325 MG: 325 (65 FE) TAB at 19:43

## 2019-12-30 RX ADMIN — MEROPENEM 1 G: 1 INJECTION, POWDER, FOR SOLUTION INTRAVENOUS at 14:42

## 2019-12-30 RX ADMIN — BUMETANIDE 2 MG: 1 TABLET ORAL at 20:43

## 2019-12-30 RX ADMIN — BENZONATATE 100 MG: 100 CAPSULE ORAL at 20:42

## 2019-12-30 ASSESSMENT — ENCOUNTER SYMPTOMS
CONSTIPATION: 0
WHEEZING: 0
EYE REDNESS: 0
DIARRHEA: 0
TROUBLE SWALLOWING: 0
SINUS PRESSURE: 0
COUGH: 1
BLOOD IN STOOL: 0
FACIAL SWELLING: 0
EYE DISCHARGE: 0
NAUSEA: 0
RHINORRHEA: 0
SINUS PAIN: 0
VOMITING: 0
CHOKING: 0
CHEST TIGHTNESS: 1
COLOR CHANGE: 0
ABDOMINAL PAIN: 0
ABDOMINAL DISTENTION: 0
BACK PAIN: 0
SHORTNESS OF BREATH: 1
APNEA: 0
CHEST TIGHTNESS: 0
SORE THROAT: 0
EYE PAIN: 0

## 2019-12-30 ASSESSMENT — PAIN DESCRIPTION - DESCRIPTORS: DESCRIPTORS: TIGHTNESS

## 2019-12-30 ASSESSMENT — PULMONARY FUNCTION TESTS
PIF_VALUE: 26
PIF_VALUE: 36
PIF_VALUE: 35
PIF_VALUE: 30
PIF_VALUE: 36

## 2019-12-30 ASSESSMENT — PAIN SCALES - GENERAL
PAINLEVEL_OUTOF10: 0
PAINLEVEL_OUTOF10: 0
PAINLEVEL_OUTOF10: 10

## 2019-12-30 ASSESSMENT — PAIN DESCRIPTION - PAIN TYPE: TYPE: ACUTE PAIN

## 2019-12-30 ASSESSMENT — PAIN DESCRIPTION - LOCATION: LOCATION: CHEST

## 2019-12-30 NOTE — ED NOTES
Report given to Juan Cha RN from [facility]. Report method in person and via phone   The following was reviewed with receiving RN:   Current vital signs:  BP (!) 151/105   Pulse 87   Temp 98.2 °F (36.8 °C) (Axillary)   Resp 16   Ht 5' 5\" (1.651 m)   Wt (!) 623 lb (282.6 kg)   SpO2 100%   .67 kg/m²                      Any medication or safety alerts were reviewed. Any pending diagnostics and notifications were also reviewed, as well as any safety concerns or issues, abnormal labs, abnormal imaging, and abnormal assessment findings. Questions were answered.             Peri Riedel, RN  12/30/19 [id number]

## 2019-12-30 NOTE — H&P
[address]     HISTORY AND PHYSICAL EXAMINATION            Date:   12/30/2019  Patient name:  Sirena Mathews  Date of admission:  12/30/2019 11:30 AM  MRN:   [id number]  Account:  [de-identified]  YOB: 1970  PCP:    Paz Loza DO  Room:   06/06  Code Status:    Prior    Chief Complaint:     Chief Complaint   Patient presents with    Shortness of Breath     History Obtained From:     patient    History of Present Illness: The patient is a 54 y.o. Non-/non  female who presents withShortness of Breath   and she is admitted to the hospital for the management of multifocal pneumonia. Patient is on chronic trach but is not vent dependent. patient reported to the ED today with complaints of shortness of breath for the last 2 to 3 weeks. Patient states that she was on vent during this time as she was having a hard time breathing without vent. Patient also admits that she has had cough for the last 2 to 3 weeks with green-yellow thick mucus. Denies any chest pain, abdominal pain, changes in urination or bowel movements. In the emergency department, vital signs were stable. chest x-ray was significant for multifocal opacities secondary to pneumonia versus pulmonary edema. Hemoglobin low at 8.9. BUN elevated 29, creatinine elevated 1. 11. Patient had a recent admission this year 09/2019 during which he presented with similar complaints and was treated with Levaquin, cefepime, and Zyvox.     Past Medical History:     Past Medical History:   Diagnosis Date    Anemia     Disease of blood and blood forming organ     History of breast cancer     History of chemotherapy     Hypothyroidism     Lymphedema     Chronic    Morbid obesity ([address]     Respiratory failure ([address]     Tracheostomy present (HCC)         Past SurgicalHistory:     Past Surgical History:   Procedure Laterality Date    MASTECTOMY, MODIFIED RADICAL Right 2013    TRACHEOSTOMY          Medications Prior to Admission:        Prior to Admission medications    Medication Sig Start Date End Date Taking?  Authorizing Provider   carBAMazepine (TEGRETOL) 200 MG tablet Take 1 tablet by mouth 2 times daily 9/15/19   Mone Nicole MD   bumetanide (BUMEX) 1 MG tablet Take 1 tablet by mouth 2 times daily 9/15/19   Mone Nicole MD   bisacodyl (DULCOLAX) 5 MG EC tablet Take 5 mg by mouth 2 times daily as needed for Constipation    Historical Provider, MD   docusate sodium (COLACE) 100 MG capsule Take 100 mg by mouth daily as needed for Constipation    Historical Provider, MD   loratadine (CLARITIN) 10 MG tablet Take 10 mg by mouth daily    Historical Provider, MD   magnesium hydroxide (MILK OF MAGNESIA) 400 MG/5ML suspension Take 30 mLs by mouth daily as needed for Constipation    Historical Provider, MD   Camphor-Eucalyptus-Menthol (VICKS VAPORUB) 4.7-1.2-2.6 % OINT Apply topically every 6 hours as needed (congestion)    Historical Provider, MD   benzonatate (TESSALON) 100 MG capsule Take 100 mg by mouth 2 times daily as needed for Cough    Historical Provider, MD   acetaminophen (TYLENOL) 325 MG tablet Take 650 mg by mouth every 6 hours as needed for Pain    Historical Provider, MD   ipratropium-albuterol (DUONEB) 0.5-2.5 (3) MG/3ML SOLN nebulizer solution Inhale 1 vial into the lungs every 4 hours as needed for Shortness of Breath    Historical Provider, MD   levothyroxine (SYNTHROID) 300 MCG tablet Take 300 mcg by mouth daily     Historical Provider, MD   ferrous sulfate 325 (65 Fe) MG tablet Take 325 mg by mouth 3 times daily (with meals)    Historical Provider, MD   Multiple Vitamins-Minerals (THERAPEUTIC MULTIVITAMIN-MINERALS) tablet Take 1 tablet by mouth daily (with breakfast)    Historical Provider, MD        Allergies:     Zosyn [piperacillin-tazobactam in dex] and Vancomycin    Social History:     Tobacco:    reports that she has never smoked. She has never used smokeless tobacco.  Alcohol:      reports no history of alcohol use. Drug Use:  reports no history of drug use. Family History:     Family History   Problem Relation Age of Onset    Breast Cancer Paternal Aunt     Breast Cancer Paternal Cousin 43    Breast Cancer Paternal Cousin 37    Breast Cancer Paternal Cousin 46       Review of Systems:     Positive and Negative as described in HPI. Review of Systems   Constitutional: Negative for fatigue and fever. HENT: Negative for sinus pressure and sinus pain. Respiratory: Positive for cough, chest tightness and shortness of breath. Negative for apnea, choking and wheezing. Cardiovascular: Negative for chest pain, palpitations and leg swelling. Gastrointestinal: Negative for abdominal distention, abdominal pain, blood in stool, constipation and diarrhea. Genitourinary: Negative for dyspareunia, dysuria, enuresis, frequency, menstrual problem and urgency. Musculoskeletal: Negative for arthralgias. Neurological: Negative for dizziness, speech difficulty, numbness and headaches. Psychiatric/Behavioral: Negative for agitation and confusion. Physical Exam:   BP (!) 133/99   Pulse 96   Temp 97.9 °F (36.6 °C) (Oral)   Resp 20   Ht 5' 5\" (1.651 m)   Wt (!) 623 lb (282.6 kg)   SpO2 98%   .67 kg/m²   Temp (24hrs), Av.9 °F (36.6 °C), Min:97.9 °F (36.6 °C), Max:97.9 °F (36.6 °C)    No results for input(s): POCGLU in the last 72 hours. No intake or output data in the 24 hours ending 19 1647    Physical Exam  Constitutional:       Appearance: Normal appearance. HENT:      Head: Normocephalic and atraumatic. Eyes:      Extraocular Movements: Extraocular movements intact. Cardiovascular:      Rate and Rhythm: Normal rate and regular rhythm. Heart sounds: No murmur. No friction rub. No gallop.     Pulmonary:      Effort: Pulmonary effort is normal.      Breath sounds: Rhonchi present. No wheezing or rales. Comments: Difficult to appreciate breath sounds due to patient's body habitus  Abdominal:      General: Bowel sounds are normal. There is no distension. Tenderness: There is no tenderness. There is no guarding. Musculoskeletal: Normal range of motion. General: No deformity. Skin:     General: Skin is warm. Capillary Refill: Capillary refill takes less than 2 seconds. Investigations:     Laboratory Testing:  Recent Results (from the past 24 hour(s))   Basic Metabolic Panel    Collection Time: 12/30/19 12:45 PM   Result Value Ref Range    Glucose 137 (H) 70 - 99 mg/dL    BUN 29 (H) 6 - 20 mg/dL    CREATININE 1.11 (H) 0.50 - 0.90 mg/dL    Bun/Cre Ratio NOT REPORTED 9 - 20    Calcium 9.7 8.6 - 10.4 mg/dL    Sodium 139 135 - 144 mmol/L    Potassium 5.3 3.7 - 5.3 mmol/L    Chloride 98 98 - 107 mmol/L    CO2 30 20 - 31 mmol/L    Anion Gap 11 9 - 17 mmol/L    GFR Non-African American 53 (L) >60 mL/min    GFR African American >60 >60 mL/min    GFR Comment          GFR Staging NOT REPORTED    SPECIMEN REJECTION    Collection Time: 12/30/19 12:45 PM   Result Value Ref Range    Specimen Source . BLOOD     Ordered Test CDP     Reason for Rejection Unable to perform testing: No specimen received.      - NOT REPORTED    Blood Gas, Venous    Collection Time: 12/30/19  1:01 PM   Result Value Ref Range    pH, Arthur 7.411 7.320 - 7.420    pCO2, Arthur 53.3 39.0 - 55.0    pO2, Arthur 43.6 30.0 - 50.0    HCO3, Venous 33.8 (H) 24.0 - 30.0 mmol/L    Positive Base Excess, Arthur 9.2 (H) 0.0 - 2.0 mmol/L    Negative Base Excess, Arthur NOT REPORTED 0.0 - 2.0 mmol/L    O2 Sat, Arthur 79.3 60.0 - 85.0 %    Total Hb NOT REPORTED 12.0 - 16.0 g/dl    Oxyhemoglobin NOT REPORTED 95.0 - 98.0 %    Carboxyhemoglobin 3.5 0 - 5 %    Methemoglobin NOT REPORTED 0.0 - 1.9 %    Pt Temp 37     pH, Arthur, Temp Adj NOT REPORTED 7.320 - 7.420    pCO2, Arthur, Temp Adj NOT REPORTED 39.0 - 55.0 mmHg    pO2, Arthur, Temp Adj NOT REPORTED 30.0 - 50.0 mmHg    O2 Device/Flow/% NOT REPORTED     Respiratory Rate NOT REPORTED     Demetrio Test NOT REPORTED     Sample Site NOT REPORTED     Pt. Position NOT REPORTED     Mode NOT REPORTED     Set Rate NOT REPORTED     Total Rate NOT REPORTED     VT NOT REPORTED     FIO2 NOT REPORTED     Peep/Cpap NOT REPORTED     PSV NOT REPORTED     Text for Respiratory VBG     NOTIFICATION NOT REPORTED     NOTIFICATION TIME NOT REPORTED    CBC Auto Differential    Collection Time: 12/30/19  1:40 PM   Result Value Ref Range    WBC 5.9 3.5 - 11.0 k/uL    RBC 2.86 (L) 4.0 - 5.2 m/uL    Hemoglobin 8.9 (L) 12.0 - 16.0 g/dL    Hematocrit 27.9 (L) 36 - 46 %    MCV 97.4 80 - 100 fL    MCH 31.0 26 - 34 pg    MCHC 31.8 31 - 37 g/dL    RDW 15.2 (H) 11.5 - 14.9 %    Platelets 290 103 - 811 k/uL    MPV 7.6 6.0 - 12.0 fL    NRBC Automated NOT REPORTED per 100 WBC    Differential Type NOT REPORTED     Seg Neutrophils 77 (H) 36 - 66 %    Lymphocytes 12 (L) 24 - 44 %    Monocytes 10 (H) 1 - 7 %    Eosinophils % 1 0 - 4 %    Basophils 0 0 - 2 %    Immature Granulocytes NOT REPORTED 0 %    Segs Absolute 4.50 1.3 - 9.1 k/uL    Absolute Lymph # 0.70 (L) 1.0 - 4.8 k/uL    Absolute Mono # 0.60 0.1 - 1.3 k/uL    Absolute Eos # 0.10 0.0 - 0.4 k/uL    Basophils Absolute 0.00 0.0 - 0.2 k/uL    Absolute Immature Granulocyte NOT REPORTED 0.00 - 0.30 k/uL    WBC Morphology NOT REPORTED     RBC Morphology NOT REPORTED     Platelet Estimate NOT REPORTED        Imaging/Diagnostics:  Xr Chest Portable    Result Date: 12/30/2019  EXAMINATION: ONE XRAY VIEW OF THE CHEST 12/30/2019 11:54 am COMPARISON: September 14, 2019 HISTORY: ORDERING SYSTEM PROVIDED HISTORY: shortness of breath TECHNOLOGIST PROVIDED HISTORY: shortness of breath Reason for Exam: shortness of breath and chest mara Acuity: Unknown Type of Exam: Initial FINDINGS: Right-sided port in good position. Tracheostomy. Cardiomegaly. resident. I have seen and examined the patient and the key elements of all parts of the encounter have been performed by me . I agree with the assessment, plan and orders as documented by the resident. Principal Problem:    Multifocal pneumonia  Active Problems:    Shortness of breath    Hypothyroidism    Tracheostomy present (HCC)    Respiratory failure (HCC)  Resolved Problems:    * No resolved hospital problems. *        Patient is   ill and high risk for complications   Patient is in -   [] pccu , [x] icu , [] other unit    Symptoms or ailment  course ;                                  ----     CAD admitted through ER to ICU with acute on chronic respiratory failure with hypoxemia  Also have bilateral pneumonia is healthcare associated  Hypercapnic respiratory failure  Chronic tracheostomy  Multiple comorbid conditions as outlined above       Medications: Allergies: Allergies   Allergen Reactions    Zosyn [Piperacillin-Tazobactam In Dex] Shortness Of Breath     tolerated cefepime 6/2018    Vancomycin Swelling     Lip swelling and redness and itching         Current Meds:   Scheduled Meds:    sodium chloride flush  10 mL Intravenous 2 times per day    enoxaparin  40 mg Subcutaneous BID    meropenem  1 g Intravenous Q8H    carBAMazepine  200 mg Oral BID    ferrous sulfate  325 mg Oral TID WC    levothyroxine  300 mcg Oral Daily    [START ON 12/31/2019] therapeutic multivitamin-minerals  1 tablet Oral Daily with breakfast    sodium chloride flush  10 mL Intravenous 2 times per day    famotidine  20 mg Oral BID    levofloxacin  750 mg Intravenous Q24H    linezolid  600 mg Intravenous Q12H    bumetanide  2 mg Oral BID     Continuous Infusions:   PRN Meds: sodium chloride flush, acetaminophen, benzonatate, sodium chloride flush, ondansetron, magnesium hydroxide, acetaminophen      ---       Stephane Graves    . tdnrr  MANAN DARDEN [address], [address] [place] [address].    Phone [phone])

## 2019-12-30 NOTE — ED PROVIDER NOTES
Coordination: Coordination normal.      Deep Tendon Reflexes: Reflexes normal.   Psychiatric:         Behavior: Behavior normal.         Thought Content: Thought content normal.         Judgment: Judgment normal.         MEDICAL DECISION MAKING:     We will do an x-ray and blood work and this patient is most likely either having a URI pneumonia or fluid overload. DIAGNOSTIC RESULTS     EKG: All EKG's are interpreted by the Emergency Department Physician who either signs or Co-signs this chart in the absence of a cardiologist.        RADIOLOGY:All plain film, CT, MRI, and formal ultrasound images (except ED bedside ultrasound)are read by the radiologist and interpretations are directly viewed by the emergency physician. Xr Chest Portable    Result Date: 12/30/2019  EXAMINATION: ONE XRAY VIEW OF THE CHEST 12/30/2019 11:54 am COMPARISON: September 14, 2019 HISTORY: ORDERING SYSTEM PROVIDED HISTORY: shortness of breath TECHNOLOGIST PROVIDED HISTORY: shortness of breath Reason for Exam: shortness of breath and chest mara Acuity: Unknown Type of Exam: Initial FINDINGS: Right-sided port in good position. Tracheostomy. Cardiomegaly. Shallow inspiration. Multifocal pulmonary opacities with central congestion worsened from prior exam.  Questionable trace effusions. No definite pneumothorax. Worsening multifocal opacities, possibly pneumonia versus edema. LABS: All lab results were reviewed bymyself, and all abnormals are listed below.   Labs Reviewed   BASIC METABOLIC PANEL - Abnormal; Notable for the following components:       Result Value    Glucose 137 (*)     BUN 29 (*)     CREATININE 1.11 (*)     GFR Non- 53 (*)     All other components within normal limits   SPECIMEN REJECTION   BLOOD GAS, VENOUS   CBC WITH AUTO DIFFERENTIAL         EMERGENCY DEPARTMENT COURSE:   Vitals:    Vitals:    12/30/19 1158 12/30/19 1214 12/30/19 1215   BP:  (!) 129/96 (!) 133/99   Pulse: 96     Resp: 20 Temp: 97.9 °F (36.6 °C)     TempSrc: Oral     SpO2: 100%  98%   Weight:  (!) 623 lb (282.6 kg)    Height:  5' 5\" (1.651 m)        The patient was given the following medications while in the emergency department:     Orders Placed This Encounter   Medications    meropenem (MERREM) 1 g in sodium chloride 0.9 % 100 mL IVPB (mini-bag)       -------------------------  1:34 PM  Patient has been evaluated and because of the x-ray findings I think she should be admitted. I discussed the case with Dr. Pepper Rosario who agrees to the admission and wants the residents to be admitting. They have been notified. He also is requesting pulmonary to manage the vent and also infectious disease. I spoke with Dr. Agustín Araya for infectious disease who is recommending the patient be placed on meropenem and to have sputum cultures taken. 1:37 PM  Discussed the case with Dr. Skip Ocasio for pulmonary who agrees to the current plan and will follow with this patient. CRITICAL CARE:     CRITICAL CARE: There was a high probability of clinically significant/life threatening deterioration in this patient's condition which required my urgent intervention. Total critical care time was 30 minutes. This excludes any time for separately reportable procedures. CONSULTS:  IP CONSULT TO PRIMARY CARE PROVIDER  IP CONSULT TO PULMONOLOGY  IP CONSULT TO INFECTIOUS DISEASES    PROCEDURES:  None    FINAL IMPRESSION      1. Acute on chronic respiratory failure with hypoxia (HCC)    2. Multifocal pneumonia          DISPOSITION/PLAN   DISPOSITION Admitted 12/30/2019 01:28:19 PM      PATIENT REFERRED TO:  DO Malissa Francis 74.   [address] [address]  [phone]            DISCHARGE MEDICATIONS:  New Prescriptions    No medications on file       (Please note that portions of this note were completed with a voice recognition program.  Efforts were made to edit the dictations but occasionally words are mis-transcribed.)    Skip Ocasio MD  Attending Noy Mejía MD  12/30/19 4065

## 2019-12-30 NOTE — PROGRESS NOTES
Patient VT setting increased from 500 to 600. Home vent settings from facility unknown, patient does not know them, patient was complaining of the vent settings being wrong and that she was not getting enough air. VT increased for patient comfort, patient states that settings feel much better and that her breathing feels better. CO2 on previous VBG slightly elevated at 53.3, settings should help to correct that.

## 2019-12-30 NOTE — ED NOTES
Pt moved into baratric bed for further comfort. Required 6 people to move her from zoom cart to baratric bed. Pt tolerated well.      Garrett Almanza RN  12/30/19 1300

## 2019-12-31 ENCOUNTER — APPOINTMENT (OUTPATIENT)
Dept: INTERVENTIONAL RADIOLOGY/VASCULAR | Age: 49
DRG: 130 | End: 2019-12-31
Payer: MEDICAID

## 2019-12-31 ENCOUNTER — APPOINTMENT (OUTPATIENT)
Dept: GENERAL RADIOLOGY | Age: 49
DRG: 130 | End: 2019-12-31
Payer: MEDICAID

## 2019-12-31 LAB
ABSOLUTE EOS #: 0.1 K/UL (ref 0–0.4)
ABSOLUTE IMMATURE GRANULOCYTE: ABNORMAL K/UL (ref 0–0.3)
ABSOLUTE LYMPH #: 0.9 K/UL (ref 1–4.8)
ABSOLUTE MONO #: 0.5 K/UL (ref 0.1–1.3)
ADENOVIRUS PCR: NOT DETECTED
ANION GAP SERPL CALCULATED.3IONS-SCNC: 9 MMOL/L (ref 9–17)
BASOPHILS # BLD: 1 % (ref 0–2)
BASOPHILS ABSOLUTE: 0 K/UL (ref 0–0.2)
BNP INTERPRETATION: ABNORMAL
BORDETELLA PARAPERTUSSIS: NOT DETECTED
BORDETELLA PERTUSSIS PCR: NOT DETECTED
BUN BLDV-MCNC: 28 MG/DL (ref 6–20)
BUN/CREAT BLD: ABNORMAL (ref 9–20)
C-REACTIVE PROTEIN: 163.9 MG/L (ref 0–5)
CALCIUM SERPL-MCNC: 9.6 MG/DL (ref 8.6–10.4)
CHLAMYDIA PNEUMONIAE BY PCR: NOT DETECTED
CHLORIDE BLD-SCNC: 98 MMOL/L (ref 98–107)
CO2: 32 MMOL/L (ref 20–31)
CORONAVIRUS 229E PCR: NOT DETECTED
CORONAVIRUS HKU1 PCR: NOT DETECTED
CORONAVIRUS NL63 PCR: NOT DETECTED
CORONAVIRUS OC43 PCR: NOT DETECTED
CREAT SERPL-MCNC: 1.09 MG/DL (ref 0.5–0.9)
DIFFERENTIAL TYPE: ABNORMAL
DIRECT EXAM: NORMAL
DIRECT EXAM: NORMAL
EOSINOPHILS RELATIVE PERCENT: 1 % (ref 0–4)
FERRITIN: 611 UG/L (ref 13–150)
FOLATE: 20 NG/ML
GFR AFRICAN AMERICAN: >60 ML/MIN
GFR NON-AFRICAN AMERICAN: 54 ML/MIN
GFR SERPL CREATININE-BSD FRML MDRD: ABNORMAL ML/MIN/{1.73_M2}
GFR SERPL CREATININE-BSD FRML MDRD: ABNORMAL ML/MIN/{1.73_M2}
GLUCOSE BLD-MCNC: 134 MG/DL (ref 70–99)
HCT VFR BLD CALC: 25.4 % (ref 36–46)
HEMOGLOBIN: 8.1 G/DL (ref 12–16)
HUMAN METAPNEUMOVIRUS PCR: NOT DETECTED
IMMATURE GRANULOCYTES: ABNORMAL %
INFLUENZA A BY PCR: NOT DETECTED
INFLUENZA A H1 (2009) PCR: NORMAL
INFLUENZA A H1 PCR: NORMAL
INFLUENZA A H3 PCR: NORMAL
INFLUENZA B BY PCR: NOT DETECTED
IRON SATURATION: 23 % (ref 20–55)
IRON: 35 UG/DL (ref 37–145)
LYMPHOCYTES # BLD: 19 % (ref 24–44)
Lab: NORMAL
Lab: NORMAL
MCH RBC QN AUTO: 30.8 PG (ref 26–34)
MCHC RBC AUTO-ENTMCNC: 32.1 G/DL (ref 31–37)
MCV RBC AUTO: 95.8 FL (ref 80–100)
MONOCYTES # BLD: 11 % (ref 1–7)
MYCOPLASMA PNEUMONIAE PCR: NOT DETECTED
NRBC AUTOMATED: ABNORMAL PER 100 WBC
PARAINFLUENZA 1 PCR: NOT DETECTED
PARAINFLUENZA 2 PCR: NOT DETECTED
PARAINFLUENZA 3 PCR: NOT DETECTED
PARAINFLUENZA 4 PCR: NOT DETECTED
PDW BLD-RTO: 15 % (ref 11.5–14.9)
PLATELET # BLD: 158 K/UL (ref 150–450)
PLATELET ESTIMATE: ABNORMAL
PMV BLD AUTO: 7.8 FL (ref 6–12)
POTASSIUM SERPL-SCNC: 5.1 MMOL/L (ref 3.7–5.3)
PRO-BNP: 637 PG/ML
PROCALCITONIN: 0.15 NG/ML
RBC # BLD: 2.65 M/UL (ref 4–5.2)
RBC # BLD: ABNORMAL 10*6/UL
RESP SYNCYTIAL VIRUS PCR: NOT DETECTED
RHINO/ENTEROVIRUS PCR: NOT DETECTED
SEDIMENTATION RATE, ERYTHROCYTE: >130 MM (ref 0–20)
SEG NEUTROPHILS: 68 % (ref 36–66)
SEGMENTED NEUTROPHILS ABSOLUTE COUNT: 3.5 K/UL (ref 1.3–9.1)
SODIUM BLD-SCNC: 139 MMOL/L (ref 135–144)
SPECIMEN DESCRIPTION: NORMAL
TOTAL IRON BINDING CAPACITY: 153 UG/DL (ref 250–450)
UNSATURATED IRON BINDING CAPACITY: 118 UG/DL (ref 112–347)
VITAMIN B-12: 475 PG/ML (ref 232–1245)
WBC # BLD: 5.1 K/UL (ref 3.5–11)
WBC # BLD: ABNORMAL 10*3/UL

## 2019-12-31 PROCEDURE — 85651 RBC SED RATE NONAUTOMATED: CPT

## 2019-12-31 PROCEDURE — 86738 MYCOPLASMA ANTIBODY: CPT

## 2019-12-31 PROCEDURE — 85025 COMPLETE CBC W/AUTO DIFF WBC: CPT

## 2019-12-31 PROCEDURE — 6370000000 HC RX 637 (ALT 250 FOR IP): Performed by: STUDENT IN AN ORGANIZED HEALTH CARE EDUCATION/TRAINING PROGRAM

## 2019-12-31 PROCEDURE — 86140 C-REACTIVE PROTEIN: CPT

## 2019-12-31 PROCEDURE — 6360000002 HC RX W HCPCS: Performed by: EMERGENCY MEDICINE

## 2019-12-31 PROCEDURE — 87449 NOS EACH ORGANISM AG IA: CPT

## 2019-12-31 PROCEDURE — 99254 IP/OBS CNSLTJ NEW/EST MOD 60: CPT | Performed by: INTERNAL MEDICINE

## 2019-12-31 PROCEDURE — 36573 INSJ PICC RS&I 5 YR+: CPT | Performed by: RADIOLOGY

## 2019-12-31 PROCEDURE — 6360000002 HC RX W HCPCS: Performed by: STUDENT IN AN ORGANIZED HEALTH CARE EDUCATION/TRAINING PROGRAM

## 2019-12-31 PROCEDURE — 83540 ASSAY OF IRON: CPT

## 2019-12-31 PROCEDURE — 99233 SBSQ HOSP IP/OBS HIGH 50: CPT | Performed by: INTERNAL MEDICINE

## 2019-12-31 PROCEDURE — 94003 VENT MGMT INPAT SUBQ DAY: CPT

## 2019-12-31 PROCEDURE — 2700000000 HC OXYGEN THERAPY PER DAY

## 2019-12-31 PROCEDURE — 87899 AGENT NOS ASSAY W/OPTIC: CPT

## 2019-12-31 PROCEDURE — 83880 ASSAY OF NATRIURETIC PEPTIDE: CPT

## 2019-12-31 PROCEDURE — 02HV33Z INSERTION OF INFUSION DEVICE INTO SUPERIOR VENA CAVA, PERCUTANEOUS APPROACH: ICD-10-PCS | Performed by: RADIOLOGY

## 2019-12-31 PROCEDURE — 36415 COLL VENOUS BLD VENIPUNCTURE: CPT

## 2019-12-31 PROCEDURE — 82746 ASSAY OF FOLIC ACID SERUM: CPT

## 2019-12-31 PROCEDURE — 83550 IRON BINDING TEST: CPT

## 2019-12-31 PROCEDURE — 2060000000 HC ICU INTERMEDIATE R&B

## 2019-12-31 PROCEDURE — 94640 AIRWAY INHALATION TREATMENT: CPT

## 2019-12-31 PROCEDURE — 71045 X-RAY EXAM CHEST 1 VIEW: CPT

## 2019-12-31 PROCEDURE — 6370000000 HC RX 637 (ALT 250 FOR IP): Performed by: INTERNAL MEDICINE

## 2019-12-31 PROCEDURE — 2709999900 IR FLUORO GUIDED CVA DEVICE PLMT/REPLACE/REMOVAL

## 2019-12-31 PROCEDURE — 84145 PROCALCITONIN (PCT): CPT

## 2019-12-31 PROCEDURE — 82607 VITAMIN B-12: CPT

## 2019-12-31 PROCEDURE — 82728 ASSAY OF FERRITIN: CPT

## 2019-12-31 PROCEDURE — 80048 BASIC METABOLIC PNL TOTAL CA: CPT

## 2019-12-31 PROCEDURE — 0100U HC RESPIRPTHGN MULT REV TRANS & AMP PRB TECH 21 TRGT: CPT

## 2019-12-31 PROCEDURE — 2580000003 HC RX 258: Performed by: EMERGENCY MEDICINE

## 2019-12-31 PROCEDURE — 2580000003 HC RX 258: Performed by: RADIOLOGY

## 2019-12-31 RX ORDER — SODIUM CHLORIDE 0.9 % (FLUSH) 0.9 %
10 SYRINGE (ML) INJECTION PRN
Status: DISCONTINUED | OUTPATIENT
Start: 2019-12-31 | End: 2020-01-04 | Stop reason: HOSPADM

## 2019-12-31 RX ORDER — ALBUTEROL SULFATE 2.5 MG/3ML
2.5 SOLUTION RESPIRATORY (INHALATION) EVERY 6 HOURS PRN
Status: DISCONTINUED | OUTPATIENT
Start: 2019-12-31 | End: 2020-01-04 | Stop reason: HOSPADM

## 2019-12-31 RX ORDER — SODIUM CHLORIDE 0.9 % (FLUSH) 0.9 %
10 SYRINGE (ML) INJECTION EVERY 12 HOURS SCHEDULED
Status: DISCONTINUED | OUTPATIENT
Start: 2019-12-31 | End: 2020-01-04 | Stop reason: HOSPADM

## 2019-12-31 RX ORDER — IPRATROPIUM BROMIDE AND ALBUTEROL SULFATE 2.5; .5 MG/3ML; MG/3ML
1 SOLUTION RESPIRATORY (INHALATION)
Status: DISCONTINUED | OUTPATIENT
Start: 2019-12-31 | End: 2020-01-04 | Stop reason: HOSPADM

## 2019-12-31 RX ADMIN — Medication 10 ML: at 10:39

## 2019-12-31 RX ADMIN — BUMETANIDE 2 MG: 1 TABLET ORAL at 20:57

## 2019-12-31 RX ADMIN — LEVOTHYROXINE SODIUM 300 MCG: 100 TABLET ORAL at 08:03

## 2019-12-31 RX ADMIN — FERROUS SULFATE TAB 325 MG (65 MG ELEMENTAL FE) 325 MG: 325 (65 FE) TAB at 17:19

## 2019-12-31 RX ADMIN — FERROUS SULFATE TAB 325 MG (65 MG ELEMENTAL FE) 325 MG: 325 (65 FE) TAB at 08:03

## 2019-12-31 RX ADMIN — CARBAMAZEPINE 200 MG: 200 TABLET ORAL at 08:05

## 2019-12-31 RX ADMIN — LEVOFLOXACIN 750 MG: 5 INJECTION, SOLUTION INTRAVENOUS at 11:54

## 2019-12-31 RX ADMIN — MEROPENEM 1 G: 1 INJECTION, POWDER, FOR SOLUTION INTRAVENOUS at 23:07

## 2019-12-31 RX ADMIN — Medication 10 ML: at 20:57

## 2019-12-31 RX ADMIN — BUMETANIDE 2 MG: 1 TABLET ORAL at 08:03

## 2019-12-31 RX ADMIN — IPRATROPIUM BROMIDE AND ALBUTEROL SULFATE 1 AMPULE: .5; 3 SOLUTION RESPIRATORY (INHALATION) at 14:45

## 2019-12-31 RX ADMIN — IPRATROPIUM BROMIDE AND ALBUTEROL SULFATE 1 AMPULE: .5; 3 SOLUTION RESPIRATORY (INHALATION) at 20:52

## 2019-12-31 RX ADMIN — CARBAMAZEPINE 200 MG: 200 TABLET ORAL at 22:09

## 2019-12-31 RX ADMIN — MEROPENEM 1 G: 1 INJECTION, POWDER, FOR SOLUTION INTRAVENOUS at 14:56

## 2019-12-31 RX ADMIN — IPRATROPIUM BROMIDE AND ALBUTEROL SULFATE 1 AMPULE: .5; 3 SOLUTION RESPIRATORY (INHALATION) at 10:58

## 2019-12-31 RX ADMIN — MULTIPLE VITAMINS W/ MINERALS TAB 1 TABLET: TAB at 08:03

## 2019-12-31 RX ADMIN — LINEZOLID 600 MG: 600 INJECTION, SOLUTION INTRAVENOUS at 13:56

## 2019-12-31 ASSESSMENT — PULMONARY FUNCTION TESTS
PIF_VALUE: 39
PIF_VALUE: 26
PIF_VALUE: 34
PIF_VALUE: 30
PIF_VALUE: 33
PIF_VALUE: 35
PIF_VALUE: 34
PIF_VALUE: 42
PIF_VALUE: 35
PIF_VALUE: 36
PIF_VALUE: 36
PIF_VALUE: 43
PIF_VALUE: 29
PIF_VALUE: 35
PIF_VALUE: 35
PIF_VALUE: 33
PIF_VALUE: 33
PIF_VALUE: 35
PIF_VALUE: 35
PIF_VALUE: 33
PIF_VALUE: 36
PIF_VALUE: 33
PIF_VALUE: 33
PIF_VALUE: 34
PIF_VALUE: 34
PIF_VALUE: 29
PIF_VALUE: 35
PIF_VALUE: 38
PIF_VALUE: 38
PIF_VALUE: 35
PIF_VALUE: 35

## 2019-12-31 ASSESSMENT — PAIN SCALES - GENERAL
PAINLEVEL_OUTOF10: 0

## 2019-12-31 NOTE — FLOWSHEET NOTE
12/31/19 1500   Encounter Summary   Services provided to: Patient not available  (patient receiving medical care)

## 2019-12-31 NOTE — CONSULTS
Lymphedema     Chronic    [facility]     Respiratory failure ([facility]     [facility]        Past Surgical  History:     Past Surgical History:   Procedure Laterality Date    MASTECTOMY, MODIFIED RADICAL Right 2013    TRACHEOSTOMY         Medications:      ipratropium-albuterol  1 ampule Inhalation Q4H WA    sodium chloride flush  10 mL Intravenous 2 times per day    sodium chloride flush  10 mL Intravenous 2 times per day    enoxaparin  40 mg Subcutaneous BID    meropenem  1 g Intravenous Q8H    carBAMazepine  200 mg Oral BID    ferrous sulfate  325 mg Oral TID WC    levothyroxine  300 mcg Oral Daily    therapeutic multivitamin-minerals  1 tablet Oral Daily with breakfast    sodium chloride flush  10 mL Intravenous 2 times per day    famotidine  20 mg Oral BID    levofloxacin  750 mg Intravenous Q24H    linezolid  600 mg Intravenous Q12H    bumetanide  2 mg Oral BID       Social History:     Social History     Socioeconomic History    Marital status: Single     Spouse name: Not on file    Number of children: Not on file    Years of education: Not on file    Highest education level: Not on file   Occupational History    Not on file   Social Needs    Financial resource strain: Not on file    Food insecurity:     Worry: Not on file     Inability: Not on file    Transportation needs:     Medical: Not on file     Non-medical: Not on file   Tobacco Use    Smoking status: Never Smoker    Smokeless tobacco: Never Used   Substance and Sexual Activity    Alcohol use: No    Drug use: No    Sexual activity: Not Currently   Lifestyle    Physical activity:     Days per week: Not on file     Minutes per session: Not on file    Stress: Not on file   Relationships    Social connections:     Talks on phone: Not on file     Gets together: Not on file     Attends [employer] service: Not on file     Active member of club or organization: Not on file     Attends meetings of clubs

## 2019-12-31 NOTE — PROGRESS NOTES
Bronchodilator Assessment     RR 20  Breath Sounds: diminished  SPO2: 97  FiO2: 40      · Bronchodilator assessment at level  3  ·   · []    Bronchodilator Assessment  BRONCHODILATOR ASSESSMENT SCORE  Score 0 1 2 3 4 5   Breath Sounds   []  Patient Baseline []  No Wheeze good aeration []  Faint, scattered wheezing, good aeration [x]  Expiratory Wheezing and or moderately diminished []  Insp/Exp wheeze and/or very diminished []  Insp/Exp and/ or marked distress   Respiratory Rate   []  Patient Baseline []  Less than 20 []  Less than 20 [x]  20-25 []  Greater than 25 []  Greater than 25   Peak flow % of Pred or PB [x]  NA   []  Greater than 90%  []  81-90% []  71-80% []  Less than or equal to 70%  or unable to perform []  Unable due to Respiratory Distress   Dyspnea re []  Patient Baseline []  No SOB []  No SOB [x]  SOB on exertion []  SOB min activity []  At rest/acute   e FEV% Predicted       [x]  NA []  Above 69%  []  Unable []  Above 60-69%  []  Unable []  Above 50-59%  []  Unable []  Above 35-49%  []  Unable []  Less than 35%  []  Unable          ROBERTA John      8:51 AM

## 2019-12-31 NOTE — PROGRESS NOTES
ICU Progress Note (Vent)   Pulmonary [facility] Care Specialists    Patient - Zully Hill,  Age - 54 y.o.    - 1970      Room Number -    N -  [id number]   [facility]  Date of Admission -  2019 11:30 AM     Follow-up: Acute respiratory failure    Events of Past 24 Hours   Feels the same, on AC vent all the time for now  Denies any fever or chills, no chest pain  Short of breath , cough and wheezing  No nausea vomiting or diarrhea    Vitals    height is 5' 5\" (1.651 m) and weight is 632 lb (286.7 kg) (abnormal). Her axillary temperature is 98.6 °F (37 °C). Her blood pressure is 154/95 (abnormal) and her pulse is 102. Her respiration is 16 and oxygen saturation is 98%. Temperature Range: Temp: 98.6 °F (37 °C) Temp  Av.5 °F (36.9 °C)  Min: 98 °F (36.7 °C)  Max: 98.6 °F (37 °C)  BP Range:  Systolic (97NYZ), SJQ:468 , Min:113 , WDH:113     Diastolic (35RIX), HTO:72, Min:62, Max:129    Pulse Range: Pulse  Av.8  Min: 92  Max: 106  Respiration Range: Resp  Av  Min: 16  Max: 28  Current Pulse Ox[de-identified]  SpO2: 98 %  24HR Pulse Ox Range:  SpO2  Av.6 %  Min: 91 %  Max: 100 %  Oxygen Amount and Delivery: O2 Flow Rate (L/min): 45 L/min      Wt Readings from Last 3 Encounters:   19 (!) 632 lb (286.7 kg)   10/30/19 (!) 625 lb 12.8 oz (283.9 kg)   19 (!) 588 lb 10.1 oz (267 kg)     I/O       Intake/Output Summary (Last 24 hours) at 2019 1804  Last data filed at 2019 1722  Gross per 24 hour   Intake 1806 ml   Output 2150 ml   Net -344 ml     I/O last 3 completed shifts:   In: 5 [P.O.:720]  Out: 1850 [Urine:1850]     DRAIN/TUBE OUTPUT:     Invasive Lines   ETT Day -     Lines -      ICP PRESSURE RANGE:  No data recorded  CVP PRESSURE RANGE:  No data recorded  Mechanical Ventilation Data   SETTINGS (Comprehensive)  Vent Information  $Ventilation: $Subsequent Day  Skin Assessment: Clean, dry, & intact  Equipment ID: [id number]  Vent Type: Servo i  Vent Mode: PRVC  Vt Ordered: 600 mL  Rate Set: 16 bmp  FiO2 : 45 %  Sensitivity: 5  PEEP/CPAP: 5  I Time/ I Time %: 1.25 s  Humidification Source: HME  Additional Respiratory  Assessments  Pulse: 102  Resp: 16  SpO2: 98 %  Humidification Source: HME       ABGs:   Lab Results   Component Value Date    PHART 7.351 09/14/2019    PO2ART 160.0 09/14/2019    EZA3ODS 59.2 09/14/2019       Lab Results   Component Value Date    MODE NOT REPORTED 12/30/2019         Medications   IV     ipratropium-albuterol  1 ampule Inhalation Q4H WA    sodium chloride flush  10 mL Intravenous 2 times per day    sodium chloride flush  10 mL Intravenous 2 times per day    enoxaparin  40 mg Subcutaneous BID    meropenem  1 g Intravenous Q8H    carBAMazepine  200 mg Oral BID    ferrous sulfate  325 mg Oral TID WC    levothyroxine  300 mcg Oral Daily    therapeutic multivitamin-minerals  1 tablet Oral Daily with breakfast    sodium chloride flush  10 mL Intravenous 2 times per day    famotidine  20 mg Oral BID    levofloxacin  750 mg Intravenous Q24H    linezolid  600 mg Intravenous Q12H    bumetanide  2 mg Oral BID       Diet/Nutrition   DIET GENERAL;    Exam   VITALS    height is 5' 5\" (1.651 m) and weight is 632 lb (286.7 kg) (abnormal). Her axillary temperature is 98.6 °F (37 °C). Her blood pressure is 154/95 (abnormal) and her pulse is 102. Her respiration is 16 and oxygen saturation is 98%. Ventilator Settings (Basic)  Vent Mode: PRVC Rate Set: 16 bmp/Vt Ordered: 600 mL/ /FiO2 : 45 %    Constitutional -awake and alert, looks comfortable on vent  General Appearance  well developed, well nourished  HEENT - Life support devices in place (trach),normocephalic, atraumatic. PERRLA  Lungs - Chest expands equally, no wheezes, rales, bilateral coarse rhonchi. Cardiovascular - Heart sounds are normal.  normal rate and rhythm regular, no murmur, gallop or rub.   Abdomen - soft, nontender,  nondistended, no

## 2019-12-31 NOTE — PROGRESS NOTES
[address] [facility] [employer]    PROGRESS NOTE             12/31/2019    10:08 AM    Name:   Ap Morrow  MRN:     [id number]     Acct:      [de-identified]   Room:   2011/2011-01  IP Day:  1  Admit Date:  12/30/2019 11:30 AM    PCP:  Kathryn Hanson DO  Code Status:  Full Code    Subjective:     C/C:   Chief Complaint   Patient presents with    Shortness of Breath     Interval History Status: not changed. Patient seen and examined at bedside. Patient did not receive antibiotics yesterday due to difficult IV access. IR PICC line placement today. Patient tearful due to weight issues. Continues with shortness of breath. Brief History:     See H&P    Review of Systems:     Review of Systems  Review of Systems   Constitutional: Negative for fatigue and fever. HENT: Negative for sinus pressure and sinus pain. Respiratory: Positive for cough, chest tightness and shortness of breath. Negative for apnea, choking and wheezing. Cardiovascular: Negative for chest pain, palpitations and leg swelling. Gastrointestinal: Negative for abdominal distention, abdominal pain, blood in stool, constipation and diarrhea. Genitourinary: Negative for dyspareunia, dysuria, enuresis, frequency, menstrual problem and urgency. Musculoskeletal: Negative for arthralgias. Neurological: Negative for dizziness, speech difficulty, numbness and headaches. Psychiatric/Behavioral: Negative for agitation and confusion.        Medications: Allergies:     Allergies   Allergen Reactions    Zosyn [Piperacillin-Tazobactam In Dex] Shortness Of Breath     tolerated cefepime 6/2018    Vancomycin Swelling     Lip swelling and redness and itching         Current Meds:   Scheduled Meds:    ipratropium-albuterol  1 ampule Inhalation Q4H WA    sodium chloride flush  10 mL Intravenous 2 times per day    sodium chloride flush  10 mL Intravenous 2 times per day    enoxaparin Shortness of breath [R06.02] 05/14/2018    Hypothyroidism [E03.9] 05/14/2018       Plan:        Shortness of breath secondary to multifocal pneumonia vs pulmonary edema  -VBG 7.4/50 3.3/40 3.6/33.8  -Chest x-ray: Worsening multifocal opacities 2/2 pneumonia vs. Edema  -Echo 09/2019- left ventricular ejection fraction 55%, mild pulmonary hypertension RVSP 44 mmHg.  -Follow-up Legionella, mycoplasma, strep, respiratory culture  -Pro-Darren elevated 0.15, sed rate elevated at 130, CRP elevated 163  -proBNP elevated at 637 (last measured 2,882)  -ID consulted, appreciate recs  -Pulm consulted, appreciate recs   -Bumex changed to 2 mg twice daily  -Merrem 1 g IV every 8 hrs, Levaquin 750 mg IV every 24, Zyvox 600 mg IV     Anemia  -Hemoglobin 8.9 (baseline 8)->8.1 MCV 97.4  -Ferrous sulfate 325 mg 3 times daily  -Follow-up iron studies     Hypothyroidism  -Synthyroid 300 MCG daily     Morbid obesity     Diet: Low-fat diet     GI prophylaxis: Pepcid 20 mg twice daily  DVT prophylaxis: Lovenox 40 mg    Dennis Gonzalez MD  12/31/2019  10:08 AM       Attestation and add on       I have discussed the care of HANNA Mendoza  ncluding pertinent history and exam findings,      12/31/19  with the resident. I have seen and examined the patient and the key elements of all parts of the encounter have been performed by me . I agree with the assessment, plan and orders as documented by the resident. Principal Problem:    Multifocal pneumonia  Active Problems:    Shortness of breath    Hypothyroidism    Tracheostomy present (HCC)    Respiratory failure (HCC)  Resolved Problems:    * No resolved hospital problems. *        Patient is   ill and high risk for complications   Patient is in -   [] pccu , [x] icu , [] other unit    Symptoms or ailment  course ;                                  ----            Medications: Allergies:     Allergies   Allergen Reactions    Zosyn [Piperacillin-Tazobactam In Dex] Shortness Of Breath     tolerated cefepime 6/2018    Vancomycin Swelling     Lip swelling and redness and itching         Current Meds:   Scheduled Meds:    ipratropium-albuterol  1 ampule Inhalation Q4H WA    sodium chloride flush  10 mL Intravenous 2 times per day    sodium chloride flush  10 mL Intravenous 2 times per day    enoxaparin  40 mg Subcutaneous BID    meropenem  1 g Intravenous Q8H    carBAMazepine  200 mg Oral BID    ferrous sulfate  325 mg Oral TID WC    levothyroxine  300 mcg Oral Daily    therapeutic multivitamin-minerals  1 tablet Oral Daily with breakfast    sodium chloride flush  10 mL Intravenous 2 times per day    famotidine  20 mg Oral BID    levofloxacin  750 mg Intravenous Q24H    linezolid  600 mg Intravenous Q12H    bumetanide  2 mg Oral BID     Continuous Infusions:   PRN Meds: albuterol, sodium chloride flush, sodium chloride flush, acetaminophen, benzonatate, sodium chloride flush, ondansetron, magnesium hydroxide, acetaminophen      ---       Jose Alexandre    . tdnrr  MANA[place] DARDEN [address].    Phone [phone]   Fax: [phone]  Answering Service: [phone]

## 2019-12-31 NOTE — BRIEF OP NOTE
Brief Postoperative Note    Tamir Dasilva  YOB: 1970  [id number]    Pre-operative Diagnosis: Respiratory failure; multifocal pneumonia. Morbid obesity. Post-operative Diagnosis: Same    Procedure: PICC Placement at bedside in IR (patient too heavy for IR table). 5 Fr double lumen Power Picc placed in left upper extremity; 55 cm (longest PICC available would only reach the upper SVC from the left antecubital fossa, which was required puncture site due to excessive vein depth more proximally and patient s/p right mastectomy and LN dissection). May use PICC.     Anesthesia: Local      Surgeons/Assistants: OMID CARR    Estimated Blood Loss: Minimal    Complications: none    Specimens: were not obtained      Ilia Breaux

## 2019-12-31 NOTE — CONSULTS
HISTORY:  She had right modified radical mastectomy in  2013, had tracheostomy as per the patient 2 years ago. FAMILY HISTORY:  Positive for breast cancer. SOCIAL HISTORY:  She denies any history of smoking. No alcohol abuse. She lives at the [facility]. ALLERGIES:  ZOSYN and VANCOMYCIN. MEDICATIONS:  Her current medication noted. She is on Bumex and  Lovenox, Pepcid and she was started on Zyvox and meropenem and Levaquin. PHYSICAL EXAMINATION:  VITALS:  Temperature is 98.2, pulse is 100, respiratory rate 20, blood  pressure is 160/99, saturation she is 99% on 50%. She is currently on  vent, AC of 16, 600 tidal volume and 5 of PEEP and 50% FiO2. HEENT:  No icterus noted. No JVD or lymphadenopathy appreciated around  her neck. HEART:  S1, S2 regular. No gallop. LUNGS:  Coarse rhonchi bilaterally. No wheezing. Mild distress. ABDOMEN:  Morbidly obese, soft, no guarding. EXTREMITIES:  Bilateral edema and stiffness. No tenderness. SKIN:  No new rash or ulceration noted. NEURO:  She is awake, alert, appropriate, very pleasant, following  commands. IMAGING DATA:  Chest x-ray showed alveolar infiltrates bilaterally. LABORATORY DATA:  Her blood work, her potassium 5.3, BUN 29, creatinine  1.1, blood sugar is 137 and white count is 5.9, hemoglobin 8.9,  platelets is 534 and venous blood gas showed pH 7.41, pCO2 53. ASSESSMENT:  1. Acute-on-chronic respiratory failure with hypoxia. She was on  nocturnal vent and she was on vent during the day in the last few days. 2.  Healthcare-associated bilateral pneumonia. Denies dysphagia. 3.  RU. 4.  Hypercapnia and obesity hypoventilation syndrome. 5.  Chronic trach for at least 2 years, according to the patient. 6.  Chronic lymphedema. 7.  Hypothyroidism. 8.  Chronic anemia. 9.  Chronic kidney disease, possible chronic kidney disease. PLAN OF TREATMENT:  Continue vent support and we will go for antibiotic  as per ID.   She is on multiple

## 2020-01-01 LAB
ABSOLUTE EOS #: 0.1 K/UL (ref 0–0.4)
ABSOLUTE IMMATURE GRANULOCYTE: ABNORMAL K/UL (ref 0–0.3)
ABSOLUTE LYMPH #: 1 K/UL (ref 1–4.8)
ABSOLUTE MONO #: 0.5 K/UL (ref 0.1–1.3)
ANION GAP SERPL CALCULATED.3IONS-SCNC: 11 MMOL/L (ref 9–17)
BASOPHILS # BLD: 1 % (ref 0–2)
BASOPHILS ABSOLUTE: 0 K/UL (ref 0–0.2)
BUN BLDV-MCNC: 25 MG/DL (ref 6–20)
BUN/CREAT BLD: ABNORMAL (ref 9–20)
CALCIUM SERPL-MCNC: 9.2 MG/DL (ref 8.6–10.4)
CHLORIDE BLD-SCNC: 98 MMOL/L (ref 98–107)
CO2: 30 MMOL/L (ref 20–31)
CREAT SERPL-MCNC: 1.09 MG/DL (ref 0.5–0.9)
CULTURE: NORMAL
DIFFERENTIAL TYPE: ABNORMAL
DIRECT EXAM: NORMAL
EOSINOPHILS RELATIVE PERCENT: 2 % (ref 0–4)
GFR AFRICAN AMERICAN: >60 ML/MIN
GFR NON-AFRICAN AMERICAN: 54 ML/MIN
GFR SERPL CREATININE-BSD FRML MDRD: ABNORMAL ML/MIN/{1.73_M2}
GFR SERPL CREATININE-BSD FRML MDRD: ABNORMAL ML/MIN/{1.73_M2}
GLUCOSE BLD-MCNC: 132 MG/DL (ref 70–99)
HCT VFR BLD CALC: 24.8 % (ref 36–46)
HEMOGLOBIN: 8 G/DL (ref 12–16)
IMMATURE GRANULOCYTES: ABNORMAL %
LYMPHOCYTES # BLD: 20 % (ref 24–44)
Lab: NORMAL
MCH RBC QN AUTO: 31.1 PG (ref 26–34)
MCHC RBC AUTO-ENTMCNC: 32.2 G/DL (ref 31–37)
MCV RBC AUTO: 96.6 FL (ref 80–100)
MONOCYTES # BLD: 11 % (ref 1–7)
NRBC AUTOMATED: ABNORMAL PER 100 WBC
PDW BLD-RTO: 14.7 % (ref 11.5–14.9)
PLATELET # BLD: 160 K/UL (ref 150–450)
PLATELET ESTIMATE: ABNORMAL
PMV BLD AUTO: 8.5 FL (ref 6–12)
POTASSIUM SERPL-SCNC: 4.6 MMOL/L (ref 3.7–5.3)
RBC # BLD: 2.56 M/UL (ref 4–5.2)
RBC # BLD: ABNORMAL 10*6/UL
SEG NEUTROPHILS: 66 % (ref 36–66)
SEGMENTED NEUTROPHILS ABSOLUTE COUNT: 3.3 K/UL (ref 1.3–9.1)
SODIUM BLD-SCNC: 139 MMOL/L (ref 135–144)
SPECIMEN DESCRIPTION: NORMAL
WBC # BLD: 5 K/UL (ref 3.5–11)
WBC # BLD: ABNORMAL 10*3/UL

## 2020-01-01 PROCEDURE — 87641 MR-STAPH DNA AMP PROBE: CPT

## 2020-01-01 PROCEDURE — 6360000002 HC RX W HCPCS: Performed by: INTERNAL MEDICINE

## 2020-01-01 PROCEDURE — 94640 AIRWAY INHALATION TREATMENT: CPT

## 2020-01-01 PROCEDURE — 2580000003 HC RX 258: Performed by: INTERNAL MEDICINE

## 2020-01-01 PROCEDURE — 6360000002 HC RX W HCPCS: Performed by: EMERGENCY MEDICINE

## 2020-01-01 PROCEDURE — 85025 COMPLETE CBC W/AUTO DIFF WBC: CPT

## 2020-01-01 PROCEDURE — 2580000003 HC RX 258: Performed by: RADIOLOGY

## 2020-01-01 PROCEDURE — 2580000003 HC RX 258: Performed by: EMERGENCY MEDICINE

## 2020-01-01 PROCEDURE — 99233 SBSQ HOSP IP/OBS HIGH 50: CPT | Performed by: INTERNAL MEDICINE

## 2020-01-01 PROCEDURE — 2700000000 HC OXYGEN THERAPY PER DAY

## 2020-01-01 PROCEDURE — 94003 VENT MGMT INPAT SUBQ DAY: CPT

## 2020-01-01 PROCEDURE — 6370000000 HC RX 637 (ALT 250 FOR IP): Performed by: INTERNAL MEDICINE

## 2020-01-01 PROCEDURE — 36415 COLL VENOUS BLD VENIPUNCTURE: CPT

## 2020-01-01 PROCEDURE — 6370000000 HC RX 637 (ALT 250 FOR IP): Performed by: STUDENT IN AN ORGANIZED HEALTH CARE EDUCATION/TRAINING PROGRAM

## 2020-01-01 PROCEDURE — 6360000002 HC RX W HCPCS: Performed by: STUDENT IN AN ORGANIZED HEALTH CARE EDUCATION/TRAINING PROGRAM

## 2020-01-01 PROCEDURE — 80048 BASIC METABOLIC PNL TOTAL CA: CPT

## 2020-01-01 PROCEDURE — 2060000000 HC ICU INTERMEDIATE R&B

## 2020-01-01 PROCEDURE — 94761 N-INVAS EAR/PLS OXIMETRY MLT: CPT

## 2020-01-01 RX ORDER — ACETAMINOPHEN 325 MG/1
650 TABLET ORAL EVERY 4 HOURS PRN
Status: DISCONTINUED | OUTPATIENT
Start: 2020-01-01 | End: 2020-01-04 | Stop reason: HOSPADM

## 2020-01-01 RX ADMIN — LEVOTHYROXINE SODIUM 300 MCG: 100 TABLET ORAL at 12:28

## 2020-01-01 RX ADMIN — MEROPENEM 1 G: 1 INJECTION, POWDER, FOR SOLUTION INTRAVENOUS at 06:41

## 2020-01-01 RX ADMIN — MULTIPLE VITAMINS W/ MINERALS TAB 1 TABLET: TAB at 09:04

## 2020-01-01 RX ADMIN — FERROUS SULFATE TAB 325 MG (65 MG ELEMENTAL FE) 325 MG: 325 (65 FE) TAB at 17:58

## 2020-01-01 RX ADMIN — LINEZOLID 600 MG: 600 INJECTION, SOLUTION INTRAVENOUS at 00:56

## 2020-01-01 RX ADMIN — FERROUS SULFATE TAB 325 MG (65 MG ELEMENTAL FE) 325 MG: 325 (65 FE) TAB at 09:00

## 2020-01-01 RX ADMIN — Medication 10 ML: at 09:06

## 2020-01-01 RX ADMIN — CARBAMAZEPINE 200 MG: 200 TABLET ORAL at 21:51

## 2020-01-01 RX ADMIN — Medication 10 ML: at 21:52

## 2020-01-01 RX ADMIN — IPRATROPIUM BROMIDE AND ALBUTEROL SULFATE 1 AMPULE: .5; 3 SOLUTION RESPIRATORY (INHALATION) at 16:15

## 2020-01-01 RX ADMIN — MEROPENEM 1 G: 1 INJECTION, POWDER, FOR SOLUTION INTRAVENOUS at 23:50

## 2020-01-01 RX ADMIN — BUMETANIDE 2 MG: 1 TABLET ORAL at 21:50

## 2020-01-01 RX ADMIN — BUMETANIDE 2 MG: 1 TABLET ORAL at 09:03

## 2020-01-01 RX ADMIN — IPRATROPIUM BROMIDE AND ALBUTEROL SULFATE 1 AMPULE: .5; 3 SOLUTION RESPIRATORY (INHALATION) at 20:48

## 2020-01-01 RX ADMIN — LEVOFLOXACIN 750 MG: 5 INJECTION, SOLUTION INTRAVENOUS at 12:27

## 2020-01-01 RX ADMIN — IPRATROPIUM BROMIDE AND ALBUTEROL SULFATE 1 AMPULE: .5; 3 SOLUTION RESPIRATORY (INHALATION) at 08:48

## 2020-01-01 RX ADMIN — Medication 10 ML: at 09:05

## 2020-01-01 RX ADMIN — MEROPENEM 1 G: 1 INJECTION, POWDER, FOR SOLUTION INTRAVENOUS at 15:58

## 2020-01-01 RX ADMIN — LINEZOLID 600 MG: 600 INJECTION, SOLUTION INTRAVENOUS at 14:00

## 2020-01-01 RX ADMIN — IPRATROPIUM BROMIDE AND ALBUTEROL SULFATE 1 AMPULE: .5; 3 SOLUTION RESPIRATORY (INHALATION) at 13:18

## 2020-01-01 RX ADMIN — CARBAMAZEPINE 200 MG: 200 TABLET ORAL at 09:04

## 2020-01-01 ASSESSMENT — PULMONARY FUNCTION TESTS
PIF_VALUE: 33
PIF_VALUE: 32
PIF_VALUE: 35
PIF_VALUE: 31
PIF_VALUE: 32
PIF_VALUE: 36
PIF_VALUE: 33
PIF_VALUE: 32
PIF_VALUE: 33
PIF_VALUE: 36
PIF_VALUE: 22
PIF_VALUE: 34
PIF_VALUE: 34
PIF_VALUE: 33
PIF_VALUE: 27
PIF_VALUE: 30
PIF_VALUE: 28
PIF_VALUE: 35
PIF_VALUE: 32
PIF_VALUE: 31
PIF_VALUE: 32
PIF_VALUE: 32
PIF_VALUE: 37
PIF_VALUE: 23
PIF_VALUE: 29
PIF_VALUE: 34

## 2020-01-01 ASSESSMENT — ENCOUNTER SYMPTOMS
DIARRHEA: 0
VOMITING: 0
ABDOMINAL DISTENTION: 0
CHEST TIGHTNESS: 0
ABDOMINAL PAIN: 0
SHORTNESS OF BREATH: 1
APNEA: 0

## 2020-01-01 ASSESSMENT — PAIN SCALES - GENERAL
PAINLEVEL_OUTOF10: 0

## 2020-01-01 NOTE — CARE COORDINATION
ONGOING DISCHARGE PLAN:    Chronic trach to vent. Spoke with patient regarding discharge plan and patient confirms that plan is still to return to [facility]. LSW was notified. Infectious disease is consulted. Patient remains on IV merrem, zyvox and levaquin. Per pulmonology note, if follow up sputum cultures are negative, patient may need bronch. Will continue to follow for additional discharge needs.     Electronically signed by Rodney Escalona RN on 1/1/2020 at 12:38 PM

## 2020-01-01 NOTE — PROGRESS NOTES
[facility]    PROGRESS NOTE             1/1/2020    8:33 AM    Name:   Bree Rodas  MRN:     [id number]     Acct:      [de-identified]   Room:   2011/2011-01  IP Day:  2  Admit Date:  12/30/2019 11:30 AM    PCP:  Amadeo Ku DO  Code Status:  Full Code    Subjective:     C/C:   Chief Complaint   Patient presents with    Shortness of Breath     Interval History Status: improved. Seen and examined at bedside this morning. No acute events overnight, no new complaints. Patient still receiving cefepime and Solu-Medrol. Patient's pro-Darren 0.06, currently getting Lopressor 25 twice daily. Creatinine 1.09, patient tearful and complaining that these are not her normal vent settings. No acute complaints otherwise. Hemoglobin dropped to 8 this morning. Review of Systems:     Review of Systems   Constitutional: Negative for activity change, appetite change, chills, fatigue and fever. Respiratory: Positive for shortness of breath. Negative for apnea and chest tightness. Cardiovascular: Negative for chest pain, palpitations and leg swelling. Gastrointestinal: Negative for abdominal distention, abdominal pain, diarrhea and vomiting. Genitourinary: Negative for difficulty urinating, frequency and urgency. Neurological: Negative for dizziness, light-headedness and headaches. Psychiatric/Behavioral: Negative for agitation, behavioral problems and confusion. The patient is not nervous/anxious. Medications: Allergies:     Allergies   Allergen Reactions    Zosyn [Piperacillin-Tazobactam In Dex] Shortness Of Breath     tolerated cefepime 6/2018    Vancomycin Swelling     Lip swelling and redness and itching       Current Meds:   Scheduled Meds:    ipratropium-albuterol  1 ampule Inhalation Q4H WA    sodium chloride flush  10 mL Intravenous 2 times per day    sodium chloride flush  10 mL Intravenous 2 times per day Component Value Date/Time    CULTURE NORMAL RESPIRATORY YAHAIRA MODERATE GROWTH 12/30/2019 02:07 PM       [unfilled]    Radiology:    Jose Angel Pretty Fiddler Device Plmt/replace/removal    Result Date: 12/31/2019  PROCEDURE: ULTRASOUND GUIDED VASCULAR ACCESS. FLUOROSCOPY GUIDED PICC PLACEMENT 12/31/2019. HISTORY: ORDERING SYSTEM PROVIDED HISTORY: Multifocal pneumonia need of antibiotics TECHNOLOGIST PROVIDED HISTORY: Multifocal pneumonia in need of antibiotics Is the patient pregnant?->No Reason for Exam: pneumonia Acuity: Unknown Type of Exam: Unknown History of right mastectomy for breast cancer with axillary lymph node dissection, respiratory failure with superimposed infection. Congestive heart failure. SEDATION: Local anesthesia FLUOROSCOPY DOSE AND TYPE OR TIME AND EXPOSURES: None TECHNIQUE: Patient is morbidly obese and could not be placed on the fluoroscopy table in interventional radiology; the procedure was performed with the patient in bed and portable chest x-rays for position. Left upper extremity access was used due to patient's status post right mastectomy and lymph node dissection. Universal protocol was observed. The left arm was prepped and draped in sterile fashion using maximum sterile barrier technique. Local anesthesia was achieved with lidocaine. A micropuncture needle was used to access the left basilic vein using ultrasound guidance; due to morbid obesity and vein depth, puncture near the antecubital fossa was required. An ultrasound image demonstrating patency of the vein with needle tip located within it. An image was obtained and stored in PACs. A 0.018 guidewire was then used to place a peel-a-way sheath and a 55 cm dual-lumen PICC was advanced with fluoroscopic guidance with the tip at the cavo-atrial junction. No PICC longer and 55 cm is available. The catheter flushed easily and there was a good blood return. The catheter was secured to the skin.   The patient tolerated the procedure well and there were no immediate complications. FINDINGS: Fluoroscopic image demonstrates the tip of the catheter in the upper SVC. Successful ultrasound and fluoroscopy guided bedside PICC placement with portable chest x-rays for determining tip position. Tip in the upper SVC due to limitations patient body habitus and need to use the left arm. PICC is satisfactory for use at this time. Xr Chest Portable    Result Date: 12/31/2019  EXAMINATION: ONE XRAY VIEW OF THE CHEST 12/31/2019 8:03 am COMPARISON: AP chest from 12/30/2019 HISTORY: ORDERING SYSTEM PROVIDED HISTORY: S/P PICC central line placement TECHNOLOGIST PROVIDED HISTORY: PICC line placement History of breast cancer, congestive heart failure, respiratory failure, morbid obesity, and sepsis. FINDINGS: Left-sided PICC with its tip in the upper SVC. Right subclavian port kinked at its entry site with its tip in the right brachiocephalic vein. Clips right axilla. Overlying ECG monitor leads and a necklace. Tracheostomy tube in unchanged position. Cardiomediastinal shadow difficult to assess due slight rotation but appears unchanged. Patchy opacities both lungs primarily in a perihilar distribution and denser on the right and somewhat more extensive. Latter findings are similar. No pneumothorax. No large pleural effusion. Bones unchanged. Status post left-sided PICC with tip in the upper SVC. Worsening multifocal opacities, greater on the right, and most likely pneumonia. Pulmonary edema should also be considered. Examination otherwise unchanged.      Xr Chest Portable    Result Date: 12/30/2019  EXAMINATION: ONE XRAY VIEW OF THE CHEST 12/30/2019 11:54 am COMPARISON: September 14, 2019 HISTORY: ORDERING SYSTEM PROVIDED HISTORY: shortness of breath TECHNOLOGIST PROVIDED HISTORY: shortness of breath Reason for Exam: shortness of breath and chest mara Acuity: Unknown Type of Exam: Initial FINDINGS: Right-sided port in good position. Tracheostomy. Cardiomegaly. Shallow inspiration. Multifocal pulmonary opacities with central congestion worsened from prior exam.  Questionable trace effusions. No definite pneumothorax. Worsening multifocal opacities, possibly pneumonia versus edema. Physical Examination:        Physical Exam  Constitutional:       General: She is not in acute distress. Appearance: Normal appearance. She is obese. Cardiovascular:      Rate and Rhythm: Normal rate and regular rhythm. Pulses: Normal pulses. Heart sounds: Normal heart sounds. Pulmonary:      Effort: Pulmonary effort is normal.      Breath sounds: Normal breath sounds. Abdominal:      General: Abdomen is flat. Bowel sounds are normal.      Palpations: Abdomen is soft. Neurological:      General: No focal deficit present. Mental Status: She is alert and oriented to person, place, and time. Mental status is at baseline. Psychiatric:         Mood and Affect: Mood normal.         Behavior: Behavior normal.         Thought Content: Thought content normal.         Judgment: Judgment normal.       Assessment:        Primary Problem  Multifocal pneumonia    Active Hospital Problems    Diagnosis Date Noted    Respiratory failure ([facility] [address] [J96.90] 12/30/2019    Multifocal pneumonia [J18.9] 09/10/2019    Tracheostomy present ([facility] [address] [Z93.0] 11/20/2018    Shortness of breath [R06.02] 05/14/2018    Hypothyroidism [E03.9] 05/14/2018       Plan:      Shortness of breath secondary to multifocal pneumonia vs pulmonary edema  -VBG 7.4/50 3.3/40 3.6/33.8  -Chest x-ray: S/p picc , worsening multifocal opacities, likely pneumonia vs pulm edema   -Echo 09/2019- left ventricular ejection fraction 55%, mild pulmonary hypertension RVSP 44 mmHg.  -Legionella, S.  Pneumo, Resp virus panel negative  -Pro-Darren elevated 0.15, sed rate elevated at 130, CRP elevated 163  -proBNP elevated at 637 (last measured 2,882)  -ID consulted, appreciate recs  -Pulm consulted 1/1 : vent support, Abx may need  bronch  -Bumex changed to 2 mg twice daily  -Merrem 1 g IV every 8 hrs, Levaquin 750 mg IV every 24, Zyvox 600 mg IV     Anemia  -Hemoglobin 8.9 (baseline 8)->8.0 MCV 96.6  -Ferrous sulfate 325 mg 3 times daily  -Fe studies- Fe 35, TIBC 153      Hypothyroidism  -Synthyroid 300 MCG daily     Morbid obesity    Diet: Low-fat diet     GI prophylaxis: Pepcid 20 mg twice daily  DVT prophylaxis: Lovenox 40 mg     Plan will be discussed with the attending, Dr Jessica Whiteside MD  PGY I Family Medicine Resident  1/1/2020 8:33 AM     Attestation and add on       I have discussed the care of Anastacia Cheek , HANNA  ncluding pertinent history and exam findings,      1/1/20  with the resident. I have seen and examined the patient and the key elements of all parts of the encounter have been performed by me . I agree with the assessment, plan and orders as documented by the resident. Principal Problem:    Multifocal pneumonia  Active Problems:    Shortness of breath    Hypothyroidism    Tracheostomy present (HCC)    Respiratory failure (HCC)  Resolved Problems:    * No resolved hospital problems. *        Patient is   ill and high risk for complications   Patient is in -   [] pccu , [x] icu , [] other unit    Symptoms or ailment  course ;                                  ----            Medications: Allergies:     Allergies   Allergen Reactions    Zosyn [Piperacillin-Tazobactam In Dex] Shortness Of Breath     tolerated cefepime 6/2018    Vancomycin Swelling     Lip swelling and redness and itching         Current Meds:   Scheduled Meds:    ipratropium-albuterol  1 ampule Inhalation Q4H WA    sodium chloride flush  10 mL Intravenous 2 times per day    sodium chloride flush  10 mL Intravenous 2 times per day    enoxaparin  40 mg Subcutaneous BID    meropenem  1 g Intravenous Q8H    carBAMazepine  200 mg Oral BID    ferrous sulfate  325 mg Oral TID WC  levothyroxine  300 mcg Oral Daily    therapeutic multivitamin-minerals  1 tablet Oral Daily with breakfast    famotidine  20 mg Oral BID    levofloxacin  750 mg Intravenous Q24H    linezolid  600 mg Intravenous Q12H    bumetanide  2 mg Oral BID     Continuous Infusions:   PRN Meds: acetaminophen, albuterol, sodium chloride flush, sodium chloride flush, benzonatate, ondansetron, magnesium hydroxide      ---       Valentin Stover    . tdnrr  MA[place] [address].    Phone [phone]   Fax: [phone]  Answering Service: [phone]

## 2020-01-01 NOTE — PROGRESS NOTES
[facility] -   Infectious diseases evaluation  admission date 12/30/2019      Impression :   Current:  · Pulmonary edema with possible underlying pneumonia. · History of MRSA colonization  · Lymphedema  · History of allergy to vancomycin and Zosyn with reported swelling and shortness of breath, patient tolerated cefepime on June 2018  · History of group B Streptococcus bacteremia secondary to cellulitis in November 2018. Recommendations   · Continue Levaquin and Zyvox  · Follow respiratory cultures and adjust antibiotics as needed  · Respiratory viral PCR panel pending  · Strep pneumo and Legionella antigen pending  · Continue supportive care    Infection Control Recommendations   · Blain Precautions  · Contact Isolation            History of Present Illness:   Initial history:  Gaby Robbins is a 54y.o.-year-old female presented to the hospital with increased shortness of breath, increased cough productive of yellow sputum worsening over several days. She is morbidly obese with hypoventilation syndrome, obstructive sleep apnea, chronic respiratory failure status post tracheostomy 2 years ago. Chest x-ray 12/30/2019 showed multifocal pulmonary opacities, right-sided port in good position. WBC normal, creatinine 1.11 on admission    Interval changes  1/1/2020   She is feeling about the same still complaining of shortness of breath and cough productive of yellow phlegm on vent, no fever, no nausea or vomiting no other complaints  Procalcitonin level 0.15      I have personally reviewed the past medical history, past surgical history, medications, social history, and family history, and I haveupdated the database accordingly.   Past Medical History:     Past Medical History:   Diagnosis Date    Anemia     Disease of blood and blood forming organ     History of breast cancer     History of chemotherapy     Hypothyroidism     Lymphedema     Chronic    [facility]bid obesity ([facility] [address]     Respiratory failure ([facility] [address] [facility]     Tracheostomy present ([facility] [address] [facility]        Past Surgical  History:     Past Surgical History:   Procedure Laterality Date    MASTECTOMY, MODIFIED RADICAL Right 2013    TRACHEOSTOMY         Medications:      ipratropium-albuterol  1 ampule Inhalation Q4H WA    sodium chloride flush  10 mL Intravenous 2 times per day    sodium chloride flush  10 mL Intravenous 2 times per day    enoxaparin  40 mg Subcutaneous BID    meropenem  1 g Intravenous Q8H    carBAMazepine  200 mg Oral BID    ferrous sulfate  325 mg Oral TID WC    levothyroxine  300 mcg Oral Daily    therapeutic multivitamin-minerals  1 tablet Oral Daily with breakfast    famotidine  20 mg Oral BID    levofloxacin  750 mg Intravenous Q24H    linezolid  600 mg Intravenous Q12H    bumetanide  2 mg Oral BID       Social History:     Social History     Socioeconomic History    Marital status: Single     Spouse name: Not on file    Number of children: Not on file    Years of education: Not on file    Highest education level: Not on file   Occupational History    Not on file   Social Needs    Financial resource strain: Not on file    Food insecurity:     Worry: Not on file     Inability: Not on file    Transportation needs:     Medical: Not on file     Non-medical: Not on file   Tobacco Use    Smoking status: Never Smoker    Smokeless tobacco: Never Used   Substance and Sexual Activity    Alcohol use: No    Drug use: No    Sexual activity: Not Currently   Lifestyle    Physical activity:     Days per week: Not on file     Minutes per session: Not on file    Stress: Not on file   Relationships    Social connections:     Talks on phone: Not on file     Gets together: Not on file     Attends Mu-ism service: Not on file     Active member of club or organization: Not on file     Attends meetings of clubs or organizations: Not on file     Relationship status: Not on file    Intimate partner violence:     Fear of current or ex partner: Not on file     Emotionally abused: Not on file     Physically abused: Not on file     Forced sexual activity: Not on file   Other Topics Concern    Not on file   Social History Narrative    Lives at [facility] [address]        Family History:     Family History   Problem Relation Age of Onset    Breast Cancer Paternal Aunt     Breast Cancer Paternal Cousin 43    Breast Cancer Paternal Cousin 37    Breast Cancer Paternal Cousin 46        Allergies:   Zosyn [piperacillin-tazobactam in dex] and Vancomycin     Review of Systems:     As per history of present illness other than above 14 systems reviewed were negative    Physical Examination :     Patient Vitals for the past 8 hrs:   BP Temp Temp src Pulse Resp SpO2   01/01/20 1320 -- -- -- 99 20 99 %   01/01/20 1200 (!) 186/108 -- -- 109 25 98 %   01/01/20 1100 136/78 -- -- 92 18 100 %   01/01/20 1000 (!) 161/85 -- -- 94 20 100 %   01/01/20 0900 (!) 151/98 -- -- 95 22 99 %   01/01/20 0848 -- -- -- 91 29 98 %   01/01/20 0800 132/89 98.7 °F (37.1 °C) Axillary 90 22 100 %   01/01/20 0700 132/76 -- -- 92 20 98 %       Physical Exam  Constitutional:       Appearance: Normal appearance. HENT:      Head: Normocephalic. Eyes:      General:         Right eye: No discharge. Left eye: No discharge. Conjunctiva/sclera: Conjunctivae normal.   Neck:      Musculoskeletal: No neck rigidity. Comments: Tracheostomy in place  Cardiovascular:      Rate and Rhythm: Normal rate and regular rhythm. Heart sounds: No murmur. Pulmonary:      Effort: No respiratory distress. Breath sounds: Rhonchi present. Comments: Coarse decreased breath sounds bilaterally  Abdominal:      Palpations: Abdomen is soft. Tenderness: There is no tenderness. Musculoskeletal:      Right lower leg: Edema present. Left lower leg: Edema present. Skin:     General: Skin is warm and dry.    Neurological:      General: No focal deficit present. Mental Status: She is alert and oriented to person, place, and time. Medical Decision Making:   I have independently reviewed/ordered the following labs:    CBC with Differential:   Recent Labs     12/31/19  0559 01/01/20  0505   WBC 5.1 5.0   HGB 8.1* 8.0*   HCT 25.4* 24.8*    160   LYMPHOPCT 19* 20*   MONOPCT 11* 11*     BMP:  Recent Labs     12/31/19  0559 01/01/20  0505    139   K 5.1 4.6   CL 98 98   CO2 32* 30   BUN 28* 25*   CREATININE 1.09* 1.09*       Lab Results   Component Value Date    CREATININE 1.09 01/01/2020    GLUCOSE 132 01/01/2020       Detailed results: Thank you for allowing us to participate in the care of this patient. Please call with questions. This note is created with the assistance of a speech recognition program.  While intending to generate adocument that actually reflects the content of the visit, the document can still have some errors including those of syntax and sound a like substitutions which may escape proof reading. It such instances, actual meaningcan be extrapolated by contextual diversion.     Deneen Bunn MD  Office: [phone]  Perfect serve / office [phone]

## 2020-01-02 PROBLEM — J15.7 MYCOPLASMA PNEUMONIA: Status: ACTIVE | Noted: 2020-01-02

## 2020-01-02 LAB
ABSOLUTE EOS #: 0.1 K/UL (ref 0–0.4)
ABSOLUTE IMMATURE GRANULOCYTE: ABNORMAL K/UL (ref 0–0.3)
ABSOLUTE LYMPH #: 0.9 K/UL (ref 1–4.8)
ABSOLUTE MONO #: 0.4 K/UL (ref 0.1–1.3)
ANION GAP SERPL CALCULATED.3IONS-SCNC: 10 MMOL/L (ref 9–17)
BASOPHILS # BLD: 0 % (ref 0–2)
BASOPHILS ABSOLUTE: 0 K/UL (ref 0–0.2)
BUN BLDV-MCNC: 22 MG/DL (ref 6–20)
BUN/CREAT BLD: ABNORMAL (ref 9–20)
CALCIUM SERPL-MCNC: 9 MG/DL (ref 8.6–10.4)
CHLORIDE BLD-SCNC: 96 MMOL/L (ref 98–107)
CO2: 31 MMOL/L (ref 20–31)
CREAT SERPL-MCNC: 0.98 MG/DL (ref 0.5–0.9)
DIFFERENTIAL TYPE: ABNORMAL
EOSINOPHILS RELATIVE PERCENT: 2 % (ref 0–4)
GFR AFRICAN AMERICAN: >60 ML/MIN
GFR NON-AFRICAN AMERICAN: >60 ML/MIN
GFR SERPL CREATININE-BSD FRML MDRD: ABNORMAL ML/MIN/{1.73_M2}
GFR SERPL CREATININE-BSD FRML MDRD: ABNORMAL ML/MIN/{1.73_M2}
GLUCOSE BLD-MCNC: 126 MG/DL (ref 70–99)
HCT VFR BLD CALC: 25.9 % (ref 36–46)
HEMOGLOBIN: 8.4 G/DL (ref 12–16)
IMMATURE GRANULOCYTES: ABNORMAL %
LYMPHOCYTES # BLD: 19 % (ref 24–44)
MCH RBC QN AUTO: 31 PG (ref 26–34)
MCHC RBC AUTO-ENTMCNC: 32.3 G/DL (ref 31–37)
MCV RBC AUTO: 95.9 FL (ref 80–100)
MONOCYTES # BLD: 10 % (ref 1–7)
MYCOPLASMA PNEUMONIAE IGM: 1.24
NRBC AUTOMATED: ABNORMAL PER 100 WBC
PDW BLD-RTO: 14.9 % (ref 11.5–14.9)
PLATELET # BLD: 161 K/UL (ref 150–450)
PLATELET ESTIMATE: ABNORMAL
PMV BLD AUTO: 7.9 FL (ref 6–12)
POTASSIUM SERPL-SCNC: 4.4 MMOL/L (ref 3.7–5.3)
RBC # BLD: 2.7 M/UL (ref 4–5.2)
RBC # BLD: ABNORMAL 10*6/UL
SEG NEUTROPHILS: 69 % (ref 36–66)
SEGMENTED NEUTROPHILS ABSOLUTE COUNT: 3.3 K/UL (ref 1.3–9.1)
SODIUM BLD-SCNC: 137 MMOL/L (ref 135–144)
WBC # BLD: 4.7 K/UL (ref 3.5–11)
WBC # BLD: ABNORMAL 10*3/UL

## 2020-01-02 PROCEDURE — 6370000000 HC RX 637 (ALT 250 FOR IP): Performed by: STUDENT IN AN ORGANIZED HEALTH CARE EDUCATION/TRAINING PROGRAM

## 2020-01-02 PROCEDURE — 2060000000 HC ICU INTERMEDIATE R&B

## 2020-01-02 PROCEDURE — 94761 N-INVAS EAR/PLS OXIMETRY MLT: CPT

## 2020-01-02 PROCEDURE — 2580000003 HC RX 258: Performed by: EMERGENCY MEDICINE

## 2020-01-02 PROCEDURE — 99233 SBSQ HOSP IP/OBS HIGH 50: CPT | Performed by: INTERNAL MEDICINE

## 2020-01-02 PROCEDURE — 2580000003 HC RX 258: Performed by: INTERNAL MEDICINE

## 2020-01-02 PROCEDURE — 94003 VENT MGMT INPAT SUBQ DAY: CPT

## 2020-01-02 PROCEDURE — 6360000002 HC RX W HCPCS: Performed by: EMERGENCY MEDICINE

## 2020-01-02 PROCEDURE — 2700000000 HC OXYGEN THERAPY PER DAY

## 2020-01-02 PROCEDURE — 36415 COLL VENOUS BLD VENIPUNCTURE: CPT

## 2020-01-02 PROCEDURE — 6370000000 HC RX 637 (ALT 250 FOR IP): Performed by: INTERNAL MEDICINE

## 2020-01-02 PROCEDURE — 80048 BASIC METABOLIC PNL TOTAL CA: CPT

## 2020-01-02 PROCEDURE — 85025 COMPLETE CBC W/AUTO DIFF WBC: CPT

## 2020-01-02 PROCEDURE — 2580000003 HC RX 258: Performed by: RADIOLOGY

## 2020-01-02 PROCEDURE — 6360000002 HC RX W HCPCS: Performed by: STUDENT IN AN ORGANIZED HEALTH CARE EDUCATION/TRAINING PROGRAM

## 2020-01-02 PROCEDURE — 94640 AIRWAY INHALATION TREATMENT: CPT

## 2020-01-02 RX ORDER — ESCITALOPRAM OXALATE 10 MG/1
10 TABLET ORAL DAILY
Status: DISCONTINUED | OUTPATIENT
Start: 2020-01-02 | End: 2020-01-04 | Stop reason: HOSPADM

## 2020-01-02 RX ADMIN — IPRATROPIUM BROMIDE AND ALBUTEROL SULFATE 1 AMPULE: .5; 3 SOLUTION RESPIRATORY (INHALATION) at 08:36

## 2020-01-02 RX ADMIN — FERROUS SULFATE TAB 325 MG (65 MG ELEMENTAL FE) 325 MG: 325 (65 FE) TAB at 08:38

## 2020-01-02 RX ADMIN — CARBAMAZEPINE 200 MG: 200 TABLET ORAL at 23:12

## 2020-01-02 RX ADMIN — LINEZOLID 600 MG: 600 INJECTION, SOLUTION INTRAVENOUS at 13:30

## 2020-01-02 RX ADMIN — IPRATROPIUM BROMIDE AND ALBUTEROL SULFATE 1 AMPULE: .5; 3 SOLUTION RESPIRATORY (INHALATION) at 21:00

## 2020-01-02 RX ADMIN — FERROUS SULFATE TAB 325 MG (65 MG ELEMENTAL FE) 325 MG: 325 (65 FE) TAB at 17:16

## 2020-01-02 RX ADMIN — IPRATROPIUM BROMIDE AND ALBUTEROL SULFATE 1 AMPULE: .5; 3 SOLUTION RESPIRATORY (INHALATION) at 14:30

## 2020-01-02 RX ADMIN — LEVOFLOXACIN 750 MG: 5 INJECTION, SOLUTION INTRAVENOUS at 12:53

## 2020-01-02 RX ADMIN — IPRATROPIUM BROMIDE AND ALBUTEROL SULFATE 1 AMPULE: .5; 3 SOLUTION RESPIRATORY (INHALATION) at 10:33

## 2020-01-02 RX ADMIN — BUMETANIDE 2 MG: 1 TABLET ORAL at 08:38

## 2020-01-02 RX ADMIN — MULTIPLE VITAMINS W/ MINERALS TAB 1 TABLET: TAB at 08:38

## 2020-01-02 RX ADMIN — MEROPENEM 1 G: 1 INJECTION, POWDER, FOR SOLUTION INTRAVENOUS at 06:44

## 2020-01-02 RX ADMIN — LEVOTHYROXINE SODIUM 300 MCG: 100 TABLET ORAL at 07:00

## 2020-01-02 RX ADMIN — Medication 10 ML: at 08:40

## 2020-01-02 RX ADMIN — Medication 10 ML: at 08:41

## 2020-01-02 RX ADMIN — CARBAMAZEPINE 200 MG: 200 TABLET ORAL at 08:40

## 2020-01-02 RX ADMIN — FERROUS SULFATE TAB 325 MG (65 MG ELEMENTAL FE) 325 MG: 325 (65 FE) TAB at 12:54

## 2020-01-02 RX ADMIN — LINEZOLID 600 MG: 600 INJECTION, SOLUTION INTRAVENOUS at 02:12

## 2020-01-02 RX ADMIN — BENZONATATE 100 MG: 100 CAPSULE ORAL at 08:38

## 2020-01-02 RX ADMIN — ESCITALOPRAM OXALATE 10 MG: 10 TABLET ORAL at 17:16

## 2020-01-02 RX ADMIN — BUMETANIDE 2 MG: 1 TABLET ORAL at 23:12

## 2020-01-02 RX ADMIN — Medication 10 ML: at 23:17

## 2020-01-02 ASSESSMENT — ENCOUNTER SYMPTOMS
COUGH: 1
SHORTNESS OF BREATH: 1
ABDOMINAL PAIN: 0
CHEST TIGHTNESS: 1
NAUSEA: 0

## 2020-01-02 ASSESSMENT — PULMONARY FUNCTION TESTS
PIF_VALUE: 33
PIF_VALUE: 33
PIF_VALUE: 29
PIF_VALUE: 30
PIF_VALUE: 33
PIF_VALUE: 23
PIF_VALUE: 31
PIF_VALUE: 30
PIF_VALUE: 33
PIF_VALUE: 34
PIF_VALUE: 36
PIF_VALUE: 26
PIF_VALUE: 31
PIF_VALUE: 34
PIF_VALUE: 32
PIF_VALUE: 26
PIF_VALUE: 35
PIF_VALUE: 26
PIF_VALUE: 25
PIF_VALUE: 34
PIF_VALUE: 32
PIF_VALUE: 34
PIF_VALUE: 23
PIF_VALUE: 35
PIF_VALUE: 34
PIF_VALUE: 32
PIF_VALUE: 30
PIF_VALUE: 33
PIF_VALUE: 32
PIF_VALUE: 33
PIF_VALUE: 30
PIF_VALUE: 31
PIF_VALUE: 29
PIF_VALUE: 34

## 2020-01-02 ASSESSMENT — PAIN SCALES - GENERAL
PAINLEVEL_OUTOF10: 0

## 2020-01-02 NOTE — DISCHARGE INSTR - COC
present ([facility] [address] Z93.0    Lymphedema I89.0    Cellulitis of left lower extremity L03. 116    Chest pain R07.9    Cellulitis L03.90    Left leg cellulitis L03. [address] D72.825    Septicemia due to group B Streptococcus (HCC) A40.1    CRP elevated R79.82    Multifocal pneumonia J18.9    Respiratory failure (HCC) J96.90       Isolation/Infection:   Isolation          Contact        Patient Infection Status     Infection Onset Added Last Indicated Last Indicated By Review Planned Expiration Resolved Resolved By    Mycoplasma pneumonia 12/31/19 01/02/20 12/31/19 Mycoplasma Ab,IgM        MRSA 09/11/19 09/11/19 09/11/19 MRSA DNA Probe, Nasal              Nurse Assessment:  Last Vital Signs: BP (!) 141/78   Pulse 95   Temp 98.4 °F (36.9 °C) (Axillary)   Resp 21   Ht 5' 5\" (1.651 m)   Wt (!) 632 lb (286.7 kg)   SpO2 96%   .17 kg/m²     Last documented pain score (0-10 scale): Pain Level: 0  Last Weight:   Wt Readings from Last 1 Encounters:   12/30/19 (!) 632 lb (286.7 kg)     Mental Status:  oriented and alert    IV Access:  - None    Nursing Mobility/ADLs:  Walking   Dependent  Transfer  Dependent  Bathing  Dependent  Dressing  Dependent  Toileting  Dependent  Feeding  Assisted  Med Admin  Dependent  Med Delivery   whole    Wound Care Documentation and Therapy:  Wound 11/22/18 Skin tear Thigh Posterior; Left (Active)   Number of days: 406       Wound 11/22/18 Blister Leg Left; Outer blister popped (Active)   Number of days: 406       Wound 11/22/18 Skin tear Leg Inner;Left (Active)   Number of days: 406       Wound 11/25/18 Other (Comment) Breast Right;Posterior bulbous growth (Active)   Number of days: 403        Elimination:  Continence:   · Bowel:  Yes  · Bladder: Yes  Urinary Catheter: None   Colostomy/Ileostomy/Ileal Conduit: No       Date of Last BM: 1/4/2020    Intake/Output Summary (Last 24 hours) at 1/2/2020 1243  Last data filed at 1/2/2020 1000  Gross per 24 hour   Intake 1286 ml   Output 1800 ml   Net -514 ml     I/O last 3 completed shifts: In: 1046 [P.O.:240; I.V.:806]  Out: 1800 [Urine:1800]    Safety Concerns:     Aspiration Risk    Impairments/Disabilities:      None    Nutrition Therapy:  Current Nutrition Therapy:   - Oral Diet:  General    Routes of Feeding: Oral  Liquids: No Restrictions  Daily Fluid Restriction: no  Last Modified Barium Swallow with Video (Video Swallowing Test): not done    Treatments at the Time of Hospital Discharge:   Respiratory Treatments: see mar  Oxygen Therapy:  ventilator   Ventilator:    - Ventilator Settings:    Vt Ordered: 600 mL  Rate Set: 16 bmp  FiO2 : 45 %    PEEP/CPAP: 5        Rehab Therapies: Physical Therapy and Occupational Therapy  Weight Bearing Status/Restrictions: No weight bearing restirctions  Other Medical Equipment (for information only, NOT a DME order):  hospital bed  Other Treatments: Skilled Nursing Assessment Per Protocol. Medication Education & Monitoring., Chest xray in 3 weeks. Follow up with pulmonology in 3-4 weeks. Dr. Xander Nice ([address], [facility] [address] [address])      Patient's personal belongings (please select all that are sent with patient):  Alem MENDOZA SIGNATURE:  Electronically signed by Sarbjit Rausch RN on 1/4/20 at 3:44 PM    CASE MANAGEMENT/SOCIAL WORK SECTION    Inpatient Status Date: ***    Readmission Risk Assessment Score:  Readmission Risk              Risk of Unplanned Readmission:        17           Discharging to Facility/ Agency   Name: Ricardo Rashaad:  [address] [place] [address], [address]  · P: [phone]  · Address:  · Phone:  · Fax:    Dialysis Facility (if applicable)   · Name:  · Address:  · Dialysis Schedule:  · Phone:  · Fax:    / signature: Electronically signed by KULWANT Art on 1/4/20 at 11:36 AM    PHYSICIAN SECTION    Prognosis: {Prognosis:5384188602}    Condition at Discharge: [facility] Patient Condition:527670084}    Rehab Potential (if transferring to Rehab): {Prognosis:2374030377}    Recommended Labs or Other Treatments After Discharge: ***    Physician Certification: I certify the above information and transfer of Zhao Higgins  is necessary for the continuing treatment of the diagnosis listed and that she requires {Admit to Appropriate Level of Care:43482} for {GREATER/LESS:044359988} 30 days.      Update Admission H&P: {CHP DME Changes in TXQPE:858407267}     PHYSICIAN SIGNATURE:  Electronically signed by Gail Brenner MD on 1/4/20 at 1:51 PM

## 2020-01-02 NOTE — PROGRESS NOTES
Morbid obesity ([facility] [address]     Respiratory failure ([facility] [address] [facility]     Tracheostomy present ([facility] [address]        Past Surgical  History:     Past Surgical History:   Procedure Laterality Date    MASTECTOMY, MODIFIED RADICAL Right 2013    TRACHEOSTOMY         Medications:      escitalopram  10 mg Oral Daily    ipratropium-albuterol  1 ampule Inhalation Q4H WA    sodium chloride flush  10 mL Intravenous 2 times per day    sodium chloride flush  10 mL Intravenous 2 times per day    enoxaparin  40 mg Subcutaneous BID    carBAMazepine  200 mg Oral BID    ferrous sulfate  325 mg Oral TID WC    levothyroxine  300 mcg Oral Daily    therapeutic multivitamin-minerals  1 tablet Oral Daily with breakfast    famotidine  20 mg Oral BID    levofloxacin  750 mg Intravenous Q24H    linezolid  600 mg Intravenous Q12H    bumetanide  2 mg Oral BID       Social History:     Social History     Socioeconomic History    Marital status: Single     Spouse name: Not on file    Number of children: Not on file    Years of education: Not on file    Highest education level: Not on file   Occupational History    Not on file   Social Needs    Financial resource strain: Not on file    Food insecurity:     Worry: Not on file     Inability: Not on file    Transportation needs:     Medical: Not on file     Non-medical: Not on file   Tobacco Use    Smoking status: Never Smoker    Smokeless tobacco: Never Used   Substance and Sexual Activity    Alcohol use: No    Drug use: No    Sexual activity: Not Currently   Lifestyle    Physical activity:     Days per week: Not on file     Minutes per session: Not on file    Stress: Not on file   Relationships    Social connections:     Talks on phone: Not on file     Gets together: Not on file     Attends Evangelical service: Not on file     Active member of club or organization: Not on file     Attends meetings of clubs or organizations: Not on file     Relationship status: Not on file    Intimate partner violence:     Fear of current or ex partner: Not on file     Emotionally abused: Not on file     Physically abused: Not on file     Forced sexual activity: Not on file   Other Topics Concern    Not on file   Social History Narrative    Lives at [facility] [address]        Family History:     Family History   Problem Relation Age of Onset    Breast Cancer Paternal Aunt     Breast Cancer Paternal Cousin 43    Breast Cancer Paternal Cousin 37    Breast Cancer Paternal Cousin 46        Allergies:   Zosyn [piperacillin-tazobactam in dex] and Vancomycin     Review of Systems:     As per history of present illness other than above 14 systems reviewed were negative    Physical Examination :     Patient Vitals for the past 8 hrs:   BP Temp Temp src Pulse Resp SpO2   01/02/20 1400 (!) 152/85 -- -- 101 25 (!) 85 %   01/02/20 1300 (!) 155/84 -- -- 95 19 98 %   01/02/20 1200 136/83 98.8 °F (37.1 °C) Axillary 82 23 100 %   01/02/20 1034 -- -- -- -- 21 96 %   01/02/20 1033 -- -- -- 95 23 96 %   01/02/20 1000 (!) 141/78 -- -- -- -- --   01/02/20 0900 (!) 144/84 -- -- 92 24 100 %   01/02/20 0835 -- -- -- 92 22 100 %   01/02/20 0800 131/67 98.4 °F (36.9 °C) Axillary 93 22 100 %   01/02/20 0630 -- -- -- 88 17 99 %       Physical Exam  Constitutional:       Appearance: Normal appearance. HENT:      Head: Normocephalic. Eyes:      General:         Right eye: No discharge. Left eye: No discharge. Conjunctiva/sclera: Conjunctivae normal.   Neck:      Musculoskeletal: No neck rigidity. Comments: Tracheostomy in place  Cardiovascular:      Rate and Rhythm: Normal rate and regular rhythm. Heart sounds: No murmur. Pulmonary:      Effort: No respiratory distress. Breath sounds: Rhonchi present. Comments: Coarse decreased breath sounds bilaterally  Abdominal:      Palpations: Abdomen is soft. Tenderness: There is no tenderness. Musculoskeletal:      Right lower leg: Edema present.       Left lower leg: Edema present. Skin:     General: Skin is warm and dry. Neurological:      General: No focal deficit present. Mental Status: She is alert and oriented to person, place, and time. Medical Decision Making:   I have independently reviewed/ordered the following labs:    CBC with Differential:   Recent Labs     01/01/20  0505 01/02/20  0453   WBC 5.0 4.7   HGB 8.0* 8.4*   HCT 24.8* 25.9*    161   LYMPHOPCT 20* 19*   MONOPCT 11* 10*     BMP:  Recent Labs     01/01/20  0505 01/02/20  0453    137   K 4.6 4.4   CL 98 96*   CO2 30 31   BUN 25* 22*   CREATININE 1.09* 0.98*       Lab Results   Component Value Date    CREATININE 0.98 01/02/2020    GLUCOSE 126 01/02/2020       Detailed results: Thank you for allowing us to participate in the care of this patient. Please call with questions. This note is created with the assistance of a speech recognition program.  While intending to generate adocument that actually reflects the content of the visit, the document can still have some errors including those of syntax and sound a like substitutions which may escape proof reading. It such instances, actual meaningcan be extrapolated by contextual diversion.     Mindy Echavarria MD  Office: [phone]  Perfect serve / office [phone]

## 2020-01-02 NOTE — PROGRESS NOTES
[address]   OCCUPATIONAL THERAPY MISSED TREATMENT NOTE   INPATIENT   Date: 20  Patient Name: Flower Poster       Room:   MRN: [id number]   Account #: [de-identified]    : 1970  (54 y.o.)  Gender: female                 REASON FOR MISSED TREATMENT:  Patient with another ancillary department   -   2020 Needs OT eval. OT attempt at 10:35 a.m pt w respiratory therapy          Geri Ross, OT

## 2020-01-02 NOTE — PROGRESS NOTES
Infusions:   PRN Meds: acetaminophen, albuterol, sodium chloride flush, sodium chloride flush, benzonatate, ondansetron, magnesium hydroxide    Data:     Past Medical History:   has a past medical history of Anemia, Disease of blood and blood forming organ, History of breast cancer, History of chemotherapy, Hypothyroidism, Lymphedema, Morbid obesity ([address] Respiratory failure ([address] and Tracheostomy present ([address] Social History:   reports that she has never smoked. She has never used smokeless tobacco. She reports that she does not drink alcohol or use drugs. Family History:   Family History   Problem Relation Age of Onset    Breast Cancer Paternal Aunt     Breast Cancer Paternal Cousin 43    Breast Cancer Paternal Cousin 37    Breast Cancer Paternal Cousin 46       Vitals:  /66   Pulse 88   Temp 98.5 °F (36.9 °C) (Axillary)   Resp 17   Ht 5' 5\" (1.651 m)   Wt (!) 632 lb (286.7 kg)   SpO2 99%   .17 kg/m²   Temp (24hrs), Av.6 °F (37 °C), Min:98.5 °F (36.9 °C), Max:98.7 °F (37.1 °C)    No results for input(s): POCGLU in the last 72 hours. I/O(24Hr): Intake/Output Summary (Last 24 hours) at 2020 0831  Last data filed at 2020 0200  Gross per 24 hour   Intake 1046 ml   Output 1800 ml   Net -754 ml       Labs:  [unfilled]    Lab Results   Component Value Date/Time    SPECIAL NOT REPORTED 2019 12:30 AM    SPECIAL NOT REPORTED 2019 12:30 AM     Lab Results   Component Value Date/Time    CULTURE NORMAL RESPIRATORY YAHAIRA MODERATE GROWTH 2019 02:07 PM       [unfilled]    Radiology:    Jose Angel Ruiz Device Plmt/replace/removal    Result Date: 2019  PROCEDURE: ULTRASOUND GUIDED VASCULAR ACCESS. FLUOROSCOPY GUIDED PICC PLACEMENT 2019.  HISTORY: ORDERING SYSTEM PROVIDED HISTORY: Multifocal pneumonia need of antibiotics TECHNOLOGIST PROVIDED HISTORY: Multifocal pneumonia in need of antibiotics Is the patient pregnant?->No Reason for Exam: pneumonia Acuity: Unknown Type of Exam: Unknown History of right mastectomy for breast cancer with axillary lymph node dissection, respiratory failure with superimposed infection. Congestive heart failure. SEDATION: Local anesthesia FLUOROSCOPY DOSE AND TYPE OR TIME AND EXPOSURES: None TECHNIQUE: Patient is morbidly obese and could not be placed on the fluoroscopy table in interventional radiology; the procedure was performed with the patient in bed and portable chest x-rays for position. Left upper extremity access was used due to patient's status post right mastectomy and lymph node dissection. Universal protocol was observed. The left arm was prepped and draped in sterile fashion using maximum sterile barrier technique. Local anesthesia was achieved with lidocaine. A micropuncture needle was used to access the left basilic vein using ultrasound guidance; due to morbid obesity and vein depth, puncture near the antecubital fossa was required. An ultrasound image demonstrating patency of the vein with needle tip located within it. An image was obtained and stored in PACs. A 0.018 guidewire was then used to place a peel-a-way sheath and a 55 cm dual-lumen PICC was advanced with fluoroscopic guidance with the tip at the cavo-atrial junction. No PICC longer and 55 cm is available. The catheter flushed easily and there was a good blood return. The catheter was secured to the skin. The patient tolerated the procedure well and there were no immediate complications. FINDINGS: Fluoroscopic image demonstrates the tip of the catheter in the upper SVC. Successful ultrasound and fluoroscopy guided bedside PICC placement with portable chest x-rays for determining tip position. Tip in the upper SVC due to limitations patient body habitus and need to use the left arm. PICC is satisfactory for use at this time.      Xr Chest Portable    Result Date: 12/31/2019  EXAMINATION: ONE XRAY VIEW OF THE CHEST 12/31/2019 8:03 am COMPARISON: AP chest from 12/30/2019 HISTORY: ORDERING SYSTEM PROVIDED HISTORY: S/P PICC central line placement TECHNOLOGIST PROVIDED HISTORY: PICC line placement History of breast cancer, congestive heart failure, respiratory failure, morbid obesity, and sepsis. FINDINGS: Left-sided PICC with its tip in the upper SVC. Right subclavian port kinked at its entry site with its tip in the right brachiocephalic vein. Clips right axilla. Overlying ECG monitor leads and a necklace. Tracheostomy tube in unchanged position. Cardiomediastinal shadow difficult to assess due slight rotation but appears unchanged. Patchy opacities both lungs primarily in a perihilar distribution and denser on the right and somewhat more extensive. Latter findings are similar. No pneumothorax. No large pleural effusion. Bones unchanged. Status post left-sided PICC with tip in the upper SVC. Worsening multifocal opacities, greater on the right, and most likely pneumonia. Pulmonary edema should also be considered. Examination otherwise unchanged. Xr Chest Portable    Result Date: 12/30/2019  EXAMINATION: ONE XRAY VIEW OF THE CHEST 12/30/2019 11:54 am COMPARISON: September 14, 2019 HISTORY: ORDERING SYSTEM PROVIDED HISTORY: shortness of breath TECHNOLOGIST PROVIDED HISTORY: shortness of breath Reason for Exam: shortness of breath and chest mara Acuity: Unknown Type of Exam: Initial FINDINGS: Right-sided port in good position. Tracheostomy. Cardiomegaly. Shallow inspiration. Multifocal pulmonary opacities with central congestion worsened from prior exam.  Questionable trace effusions. No definite pneumothorax. Worsening multifocal opacities, possibly pneumonia versus edema. Physical Examination:        Physical Exam  Constitutional:       Appearance: Normal appearance. She is obese. HENT:      Head: Normocephalic and atraumatic. Eyes:      General: No scleral icterus.      Conjunctiva/sclera: Conjunctivae normal.   Cardiovascular:      Rate and Rhythm: Normal rate and regular rhythm. Pulmonary:      Breath sounds: No wheezing. Comments: On vent chronic   Abdominal:      General: Abdomen is flat. Bowel sounds are normal. There is no distension. Palpations: Abdomen is soft. Tenderness: There is no tenderness. Neurological:      Mental Status: She is alert and oriented to person, place, and time. Psychiatric:         Mood and Affect: Mood normal.           Assessment:        Primary Problem  Multifocal pneumonia    Active Hospital Problems    Diagnosis Date Noted    Respiratory failure ([facility] [J96.90] 12/30/2019    Multifocal pneumonia [J18.9] 09/10/2019    Tracheostomy present ([facility] [Z93.0] 11/20/2018    Shortness of breath [R06.02] 05/14/2018    Hypothyroidism [E03.9] 05/14/2018       Plan:       Resp failure 2/2 multifocal pneumonia vs pulmonary edema  - levaquin and zyvox day 3 per ID  - chest xray 12/31: s/p picc placement, worsening multifocal pneumonia   - pulm work up negative  - bronch tomorrow per pulm   - bumex 2 mg BID     Anemia  - Hb: 8.4 <-- 8.9  - hct: 25.9  - ferrous sulfate 325 mg TID  - FE 35, TIBC 153     Hypothyroidism  - synthroid 300 MCG daily     Morbid obesity    Diet: low fat   GI PPX: pepcid 20 mg BID  DVT ppx: lovenox 40       Radha Burnett MD  1/2/2020  8:31 AM       Attestation and add on       I have discussed the care of Dc Davila , I  ncluding pertinent history and exam findings,      1/2/20  with the resident. I have seen and examined the patient and the key elements of all parts of the encounter have been performed by me . I agree with the assessment, plan and orders as documented by the resident. Principal Problem:    Mycoplasma pneumonia  Active Problems:    Shortness of breath    Hypothyroidism    Tracheostomy present (HCC)    Multifocal pneumonia    Respiratory failure ([facility]  Resolved Problems:    * No resolved hospital problems.  * Patient is   ill and high risk for complications   Patient is in -   [] pccu , [x] icu , [] other unit    Symptoms or ailment  course ;                                  ---Ventilator  Respiratory failure secondary to multilobar pneumonia  Response to treatment has been suboptimal so far  Patient's condition continues to be critical-            Medications: Allergies: Allergies   Allergen Reactions    Zosyn [Piperacillin-Tazobactam In Dex] Shortness Of Breath     tolerated cefepime 6/2018    Vancomycin Swelling     Lip swelling and redness and itching         Current Meds:   Scheduled Meds:    escitalopram  10 mg Oral Daily    ipratropium-albuterol  1 ampule Inhalation Q4H WA    sodium chloride flush  10 mL Intravenous 2 times per day    sodium chloride flush  10 mL Intravenous 2 times per day    enoxaparin  40 mg Subcutaneous BID    carBAMazepine  200 mg Oral BID    ferrous sulfate  325 mg Oral TID WC    levothyroxine  300 mcg Oral Daily    therapeutic multivitamin-minerals  1 tablet Oral Daily with breakfast    famotidine  20 mg Oral BID    levofloxacin  750 mg Intravenous Q24H    linezolid  600 mg Intravenous Q12H    bumetanide  2 mg Oral BID     Continuous Infusions:   PRN Meds: acetaminophen, albuterol, sodium chloride flush, sodium chloride flush, benzonatate, ondansetron, magnesium hydroxide      ---       Rayo Lion    . tdnrr  MANAN DARDEN [address], [address] [place] [address].    Phone [phone]   Fax: [phone]  Answering Service: [phone]

## 2020-01-02 NOTE — PROGRESS NOTES
ICU Progress Note (Vent)   [facility]    Patient - Sharri Wallis,  Age - 54 y.o.    - 1970      Room Number -    MRN -  [id number]   [facility]  Date of Admission -  2019 11:30 AM     Follow-up: Acute respiratory failure    Events of Past 24 Hours   Feels the same, on AC 16, 600, +5 of PEEP and 45% FiO2   Denies any fever or chills, no chest pain  Still with Short of breath , cough and wheezing, large amount of yellow secretions  No nausea vomiting or diarrhea    Vitals    height is 5' 5\" (1.651 m) and weight is 632 lb (286.7 kg) (abnormal). Her axillary temperature is 98.7 °F (37.1 °C). Her blood pressure is 139/76 and her pulse is 101. Her respiration is 22 and oxygen saturation is 100%. Temperature Range: Temp: 98.7 °F (37.1 °C) Temp  Av.7 °F (37.1 °C)  Min: 98.6 °F (37 °C)  Max: 98.8 °F (37.1 °C)  BP Range:  Systolic (26PIW), HED:257 , Min:110 , DFT:380     Diastolic (94ZAK), OTONIEL:61, Min:61, Max:123    Pulse Range: Pulse  Av  Min: 90  Max: 109  Respiration Range: Resp  Av.5  Min: 17  Max: 29  Current Pulse Ox[de-identified]  SpO2: 100 %  24HR Pulse Ox Range:  SpO2  Av.9 %  Min: 97 %  Max: 100 %  Oxygen Amount and Delivery: O2 Flow Rate (L/min): 45 L/min      Wt Readings from Last 3 Encounters:   19 (!) 632 lb (286.7 kg)   10/30/19 (!) 625 lb 12.8 oz (283.9 kg)   19 (!) 588 lb 10.1 oz (267 kg)     I/O       Intake/Output Summary (Last 24 hours) at 2020  Last data filed at 2020 1855  Gross per 24 hour   Intake 1786 ml   Output 1750 ml   Net 36 ml     I/O last 3 completed shifts: In: 0517 [P.O.:720;  I.V.:706; IV Piggyback:400]  Out: 1100 [Urine:1100]     DRAIN/TUBE OUTPUT:     Invasive Lines   ETT Day -     Lines -      ICP PRESSURE RANGE:  No data recorded  CVP PRESSURE RANGE:  No data recorded  Mechanical Ventilation Data   SETTINGS (Comprehensive)  Vent Information  $Ventilation: $Subsequent Day  Skin Assessment: Clean, dry, & intact  Equipment ID: [id number]  Equipment Changed: HME  Vent Type: Servo i  Vent Mode: PRVC  Vt Ordered: 600 mL  Rate Set: 16 bmp  FiO2 : 45 %  Sensitivity: 3  PEEP/CPAP: 5  I Time/ I Time %: 1.25 s  Humidification Source: HME  Additional Respiratory  Assessments  Pulse: 101  Resp: 22  SpO2: 100 %  Humidification Source: HME       ABGs:   Lab Results   Component Value Date    PHART 7.351 09/14/2019    PO2ART 160.0 09/14/2019    YOQ0RYW 59.2 09/14/2019       Lab Results   Component Value Date    MODE NOT REPORTED 12/30/2019         Medications   IV     ipratropium-albuterol  1 ampule Inhalation Q4H WA    sodium chloride flush  10 mL Intravenous 2 times per day    sodium chloride flush  10 mL Intravenous 2 times per day    enoxaparin  40 mg Subcutaneous BID    meropenem  1 g Intravenous Q8H    carBAMazepine  200 mg Oral BID    ferrous sulfate  325 mg Oral TID WC    levothyroxine  300 mcg Oral Daily    therapeutic multivitamin-minerals  1 tablet Oral Daily with breakfast    famotidine  20 mg Oral BID    levofloxacin  750 mg Intravenous Q24H    linezolid  600 mg Intravenous Q12H    bumetanide  2 mg Oral BID       Diet/Nutrition   DIET GENERAL;    Exam   VITALS    height is 5' 5\" (1.651 m) and weight is 632 lb (286.7 kg) (abnormal). Her axillary temperature is 98.7 °F (37.1 °C). Her blood pressure is 139/76 and her pulse is 101. Her respiration is 22 and oxygen saturation is 100%. Ventilator Settings (Basic)  Vent Mode: PRVC Rate Set: 16 bmp/Vt Ordered: 600 mL/ /FiO2 : 45 %    Constitutional -awake and alert,  comfortable on vent  General Appearance  well developed, well nourished  HEENT - Life support devices in place (trach),normocephalic, atraumatic. PERRLA  Lungs - Chest expands equally, no wheezes, rales, bilateral coarse rhonchi. Cardiovascular - Heart sounds are normal.  normal rate and rhythm regular, no murmur, gallop or rub.   Abdomen - soft, nontender, nondistended, no guarding  Neurologic -appropriate, following commands, moving all extremities  Skin - no bruising or bleeding  Extremities - no cyanosis, clubbing, bilateral edema    Lab Results   CBC     Lab Results   Component Value Date    WBC 5.0 01/01/2020    RBC 2.56 01/01/2020    HGB 8.0 01/01/2020    HCT 24.8 01/01/2020     01/01/2020    MCV 96.6 01/01/2020    MCH 31.1 01/01/2020    MCHC 32.2 01/01/2020    RDW 14.7 01/01/2020    METASPCT 3 09/10/2019    LYMPHOPCT 20 01/01/2020    MONOPCT 11 01/01/2020    MYELOPCT 1 11/25/2018    BASOPCT 1 01/01/2020    MONOSABS 0.50 01/01/2020    LYMPHSABS 1.00 01/01/2020    EOSABS 0.10 01/01/2020    BASOSABS 0.00 01/01/2020    DIFFTYPE NOT REPORTED 01/01/2020       BMP   Lab Results   Component Value Date     01/01/2020    K 4.6 01/01/2020    CL 98 01/01/2020    CO2 30 01/01/2020    BUN 25 01/01/2020    CREATININE 1.09 01/01/2020    GLUCOSE 132 01/01/2020    CALCIUM 9.2 01/01/2020       LFTS  Lab Results   Component Value Date    ALKPHOS 77 09/10/2019    ALT 21 09/10/2019    AST 25 09/10/2019    PROT 7.9 09/10/2019    BILITOT 0.60 09/10/2019    LABALBU 3.3 09/10/2019       INR  No results for input(s): PROTIME, INR in the last 72 hours. APTT  No results for input(s): APTT in the last 72 hours. Lactic Acid  Lab Results   Component Value Date    LACTA 2.0 11/17/2018    LACTA 0.8 01/27/2018        BNP   No results for input(s): BNP in the last 72 hours. Cultures   Negative respiratory pathogen panel  Sputum: Normal clinton  Negative urine for Legionella and pneumococcal antigens    Radiology     Plain Films,          CT Scans    See actual reports for details    SYSTEM ASSESSMENT      Neuro       Respiratory   Wean oxygen as tolerated. Keep O2 sat > 88%       Hemodynamics       Gastrointestinal/Nutrition       Renal       Infectious Disease       Hematology/Oncology       Endocrine       Social/Spiritual/DNR/Disposition/Other     ASSESSMENT:  1.

## 2020-01-03 LAB
ABSOLUTE EOS #: 0.1 K/UL (ref 0–0.4)
ABSOLUTE IMMATURE GRANULOCYTE: ABNORMAL K/UL (ref 0–0.3)
ABSOLUTE LYMPH #: 0.9 K/UL (ref 1–4.8)
ABSOLUTE MONO #: 0.4 K/UL (ref 0.1–1.3)
ANION GAP SERPL CALCULATED.3IONS-SCNC: 8 MMOL/L (ref 9–17)
BASOPHILS # BLD: 1 % (ref 0–2)
BASOPHILS ABSOLUTE: 0 K/UL (ref 0–0.2)
BUN BLDV-MCNC: 22 MG/DL (ref 6–20)
BUN/CREAT BLD: ABNORMAL (ref 9–20)
CALCIUM SERPL-MCNC: 8.9 MG/DL (ref 8.6–10.4)
CHLORIDE BLD-SCNC: 98 MMOL/L (ref 98–107)
CO2: 32 MMOL/L (ref 20–31)
CREAT SERPL-MCNC: 1.14 MG/DL (ref 0.5–0.9)
DIFFERENTIAL TYPE: ABNORMAL
EOSINOPHILS RELATIVE PERCENT: 3 % (ref 0–4)
GFR AFRICAN AMERICAN: >60 ML/MIN
GFR NON-AFRICAN AMERICAN: 51 ML/MIN
GFR SERPL CREATININE-BSD FRML MDRD: ABNORMAL ML/MIN/{1.73_M2}
GFR SERPL CREATININE-BSD FRML MDRD: ABNORMAL ML/MIN/{1.73_M2}
GLUCOSE BLD-MCNC: 127 MG/DL (ref 70–99)
HCT VFR BLD CALC: 26.5 % (ref 36–46)
HEMOGLOBIN: 8.4 G/DL (ref 12–16)
IMMATURE GRANULOCYTES: ABNORMAL %
LYMPHOCYTES # BLD: 20 % (ref 24–44)
MCH RBC QN AUTO: 30.2 PG (ref 26–34)
MCHC RBC AUTO-ENTMCNC: 31.5 G/DL (ref 31–37)
MCV RBC AUTO: 95.7 FL (ref 80–100)
MONOCYTES # BLD: 9 % (ref 1–7)
NRBC AUTOMATED: ABNORMAL PER 100 WBC
PDW BLD-RTO: 15.1 % (ref 11.5–14.9)
PLATELET # BLD: 182 K/UL (ref 150–450)
PLATELET ESTIMATE: ABNORMAL
PMV BLD AUTO: 8.3 FL (ref 6–12)
POTASSIUM SERPL-SCNC: 4.8 MMOL/L (ref 3.7–5.3)
RBC # BLD: 2.77 M/UL (ref 4–5.2)
RBC # BLD: ABNORMAL 10*6/UL
SEG NEUTROPHILS: 67 % (ref 36–66)
SEGMENTED NEUTROPHILS ABSOLUTE COUNT: 3 K/UL (ref 1.3–9.1)
SODIUM BLD-SCNC: 138 MMOL/L (ref 135–144)
WBC # BLD: 4.4 K/UL (ref 3.5–11)
WBC # BLD: ABNORMAL 10*3/UL

## 2020-01-03 PROCEDURE — 3609027000 HC BRONCHOSCOPY: Performed by: FAMILY MEDICINE

## 2020-01-03 PROCEDURE — 99233 SBSQ HOSP IP/OBS HIGH 50: CPT | Performed by: INTERNAL MEDICINE

## 2020-01-03 PROCEDURE — 6370000000 HC RX 637 (ALT 250 FOR IP): Performed by: INTERNAL MEDICINE

## 2020-01-03 PROCEDURE — 6360000002 HC RX W HCPCS: Performed by: STUDENT IN AN ORGANIZED HEALTH CARE EDUCATION/TRAINING PROGRAM

## 2020-01-03 PROCEDURE — 6370000000 HC RX 637 (ALT 250 FOR IP): Performed by: FAMILY MEDICINE

## 2020-01-03 PROCEDURE — 2580000003 HC RX 258: Performed by: FAMILY MEDICINE

## 2020-01-03 PROCEDURE — 80048 BASIC METABOLIC PNL TOTAL CA: CPT

## 2020-01-03 PROCEDURE — 2580000003 HC RX 258: Performed by: INTERNAL MEDICINE

## 2020-01-03 PROCEDURE — 2060000000 HC ICU INTERMEDIATE R&B

## 2020-01-03 PROCEDURE — 94003 VENT MGMT INPAT SUBQ DAY: CPT

## 2020-01-03 PROCEDURE — 6360000002 HC RX W HCPCS

## 2020-01-03 PROCEDURE — 6370000000 HC RX 637 (ALT 250 FOR IP): Performed by: STUDENT IN AN ORGANIZED HEALTH CARE EDUCATION/TRAINING PROGRAM

## 2020-01-03 PROCEDURE — 85025 COMPLETE CBC W/AUTO DIFF WBC: CPT

## 2020-01-03 PROCEDURE — 2709999900 HC NON-CHARGEABLE SUPPLY: Performed by: FAMILY MEDICINE

## 2020-01-03 PROCEDURE — 94761 N-INVAS EAR/PLS OXIMETRY MLT: CPT

## 2020-01-03 PROCEDURE — 2700000000 HC OXYGEN THERAPY PER DAY

## 2020-01-03 PROCEDURE — 94640 AIRWAY INHALATION TREATMENT: CPT

## 2020-01-03 RX ORDER — MIDAZOLAM HYDROCHLORIDE 1 MG/ML
2 INJECTION INTRAMUSCULAR; INTRAVENOUS ONCE
Status: COMPLETED | OUTPATIENT
Start: 2020-01-03 | End: 2020-01-03

## 2020-01-03 RX ORDER — MIDAZOLAM HYDROCHLORIDE 1 MG/ML
INJECTION INTRAMUSCULAR; INTRAVENOUS
Status: COMPLETED
Start: 2020-01-03 | End: 2020-01-03

## 2020-01-03 RX ADMIN — ESCITALOPRAM OXALATE 10 MG: 10 TABLET ORAL at 12:28

## 2020-01-03 RX ADMIN — FERROUS SULFATE TAB 325 MG (65 MG ELEMENTAL FE) 325 MG: 325 (65 FE) TAB at 18:35

## 2020-01-03 RX ADMIN — BUMETANIDE 2 MG: 1 TABLET ORAL at 12:28

## 2020-01-03 RX ADMIN — IPRATROPIUM BROMIDE AND ALBUTEROL SULFATE 1 AMPULE: .5; 3 SOLUTION RESPIRATORY (INHALATION) at 08:48

## 2020-01-03 RX ADMIN — IPRATROPIUM BROMIDE AND ALBUTEROL SULFATE 1 AMPULE: .5; 3 SOLUTION RESPIRATORY (INHALATION) at 11:58

## 2020-01-03 RX ADMIN — CARBAMAZEPINE 200 MG: 200 TABLET ORAL at 23:41

## 2020-01-03 RX ADMIN — IPRATROPIUM BROMIDE AND ALBUTEROL SULFATE 1 AMPULE: .5; 3 SOLUTION RESPIRATORY (INHALATION) at 22:36

## 2020-01-03 RX ADMIN — MULTIPLE VITAMINS W/ MINERALS TAB 1 TABLET: TAB at 12:29

## 2020-01-03 RX ADMIN — CARBAMAZEPINE 200 MG: 200 TABLET ORAL at 12:32

## 2020-01-03 RX ADMIN — MIDAZOLAM HYDROCHLORIDE 2 MG: 1 INJECTION INTRAMUSCULAR; INTRAVENOUS at 12:15

## 2020-01-03 RX ADMIN — Medication 10 ML: at 21:54

## 2020-01-03 RX ADMIN — BUMETANIDE 2 MG: 1 TABLET ORAL at 21:54

## 2020-01-03 RX ADMIN — MIDAZOLAM 2 MG: 1 INJECTION INTRAMUSCULAR; INTRAVENOUS at 12:15

## 2020-01-03 RX ADMIN — Medication 10 ML: at 12:44

## 2020-01-03 RX ADMIN — LEVOFLOXACIN 750 MG: 5 INJECTION, SOLUTION INTRAVENOUS at 12:32

## 2020-01-03 RX ADMIN — LEVOTHYROXINE SODIUM 300 MCG: 100 TABLET ORAL at 12:42

## 2020-01-03 RX ADMIN — LINEZOLID 600 MG: 600 INJECTION, SOLUTION INTRAVENOUS at 01:10

## 2020-01-03 ASSESSMENT — PULMONARY FUNCTION TESTS
PIF_VALUE: 50
PIF_VALUE: 32
PIF_VALUE: 26
PIF_VALUE: 33
PIF_VALUE: 28
PIF_VALUE: 24
PIF_VALUE: 29
PIF_VALUE: 24
PIF_VALUE: 32
PIF_VALUE: 26
PIF_VALUE: 29
PIF_VALUE: 37
PIF_VALUE: 53
PIF_VALUE: 37
PIF_VALUE: 32
PIF_VALUE: 26
PIF_VALUE: 36
PIF_VALUE: 30
PIF_VALUE: 32
PIF_VALUE: 25
PIF_VALUE: 32
PIF_VALUE: 36
PIF_VALUE: 34
PIF_VALUE: 28
PIF_VALUE: 35

## 2020-01-03 ASSESSMENT — ENCOUNTER SYMPTOMS
ABDOMINAL PAIN: 0
ABDOMINAL DISTENTION: 0
CHEST TIGHTNESS: 1
WHEEZING: 1
SHORTNESS OF BREATH: 1
VOMITING: 0
NAUSEA: 0
COUGH: 1

## 2020-01-03 ASSESSMENT — PAIN SCALES - GENERAL: PAINLEVEL_OUTOF10: 0

## 2020-01-03 NOTE — PROGRESS NOTES
Pulmonary Progress Note  [facility] Specialists      Patient - Flower Poster,  Age - 54 y.o.    - 1970      Room Number -    MRN -  [id number]   Acct # - [de-identified]  Date of Admission -  2019 11:30 AM        Consulting [address] [place] [address],[address], MD  Primary Care Physician - Cady Ambriz, DO     SUBJECTIVE   Comfortable in bed, sitting up at 45 degrees, just received aerosol treatment\    Discussed bronchoscopy with patient-patient agreeable  Unable to perform bronchoscopy due to bronchoscope being too large to pass through the #6 Shiley trach  Discussed with internal medicine service-diagnosis of mycoplasma pneumonia and presently on antibiotics-do not feel need to do bronchoscopy at this time      OBJECTIVE   VITALS    height is 5' 5\" (1.651 m) and weight is 632 lb (286.7 kg) (abnormal). Her axillary temperature is 99.1 °F (37.3 °C). Her blood pressure is 129/68 and her pulse is 96. Her respiration is 17 and oxygen saturation is 96%. Body mass index is 105.17 kg/m².   Temperature Range: Temp: 99.1 °F (37.3 °C) Temp  Av.7 °F (37.1 °C)  Min: 98.4 °F (36.9 °C)  Max: 99.1 °F (37.3 °C)  BP Range:  Systolic (90HVC), DJT:412 , Min:107 , UGQ:654     Diastolic (00SLI), MND:72, Min:59, Max:99    Pulse Range: Pulse  Av.3  Min: 79  Max: 101  Respiration Range: Resp  Av.8  Min: 14  Max: 28  Current Pulse Ox[de-identified]  SpO2: 96 %  24HR Pulse Ox Range:  SpO2  Av.4 %  Min: 85 %  Max: 100 %  Oxygen Amount and Delivery: O2 Flow Rate (L/min): 45 L/min    Wt Readings from Last 3 Encounters:   19 (!) 632 lb (286.7 kg)   10/30/19 (!) 625 lb 12.8 oz (283.9 kg)   19 (!) 588 lb 10.1 oz (267 kg)       I/O (24 Hours)    Intake/Output Summary (Last 24 hours) at 1/3/2020 1225  Last data filed at 1/3/2020 0650  Gross per 24 hour   Intake  ml   Output 2000 ml   Net 23 ml       EXAM     General Appearance  Awake,

## 2020-01-03 NOTE — PROGRESS NOTES
[facility] -   Infectious diseases evaluation  admission date 12/30/2019      Impression :   Current:  · Pulmonary edema with possible  mycoplasma pneumonia. · History of MRSA colonization  · Lymphedema  · History of allergy to vancomycin and Zosyn with reported swelling and shortness of breath, patient tolerated cefepime on June 2018  · History of group B Streptococcus bacteremia secondary to cellulitis in November 2018. Recommendations   · Continue Levaquin IV  · Normal clinton respiratory cultures 12/30/2019  · Respiratory viral PCR panel negative  · Mycoplasma IgM was 1.24  · Strep pneumo and Legionella antigen negative  · Continue supportive care    Infection Control Recommendations   · Ericson Precautions  · Contact Isolation            History of Present Illness:   Initial history:  Corazon Jerez is a 54y.o.-year-old female presented to the hospital with increased shortness of breath, increased cough productive of yellow sputum worsening over several days. She is morbidly obese with hypoventilation syndrome, obstructive sleep apnea, chronic respiratory failure status post tracheostomy 2 years ago. Chest x-ray 12/30/2019 showed multifocal pulmonary opacities, right-sided port in good position. WBC normal, creatinine 1.11 on admission    Interval changes  1/3/2020   She is feeling a little better, still short of breath and cough and a lot of phlegm, denied any fever or chills, no nausea or vomiting no diarrhea no other complaints. Mycoplasma IgM was 1.24    I have personally reviewed the past medical history, past surgical history, medications, social history, and family history, and I haveupdated the database accordingly.   Past Medical History:     Past Medical History:   Diagnosis Date    Anemia     Disease of blood and blood forming organ     History of breast cancer     History of chemotherapy     Hypothyroidism     Lymphedema     Chronic  Morbid [facility] [address] [address]     Respiratory failure ([facility] [facility]     [facility]        Past Surgical  History:     Past Surgical History:   Procedure Laterality Date    MASTECTOMY, MODIFIED RADICAL Right 2013    TRACHEOSTOMY         Medications:      escitalopram  10 mg Oral Daily    ipratropium-albuterol  1 ampule Inhalation Q4H WA    sodium chloride flush  10 mL Intravenous 2 times per day    sodium chloride flush  10 mL Intravenous 2 times per day    enoxaparin  40 mg Subcutaneous BID    carBAMazepine  200 mg Oral BID    ferrous sulfate  325 mg Oral TID WC    levothyroxine  300 mcg Oral Daily    therapeutic multivitamin-minerals  1 tablet Oral Daily with breakfast    famotidine  20 mg Oral BID    levofloxacin  750 mg Intravenous Q24H    linezolid  600 mg Intravenous Q12H    bumetanide  2 mg Oral BID       Social History:     Social History     Socioeconomic History    Marital status: Single     Spouse name: Not on file    Number of children: Not on file    Years of education: Not on file    Highest education level: Not on file   Occupational History    Not on file   Social Needs    Financial resource strain: Not on file    Food insecurity:     Worry: Not on file     Inability: Not on file    Transportation needs:     Medical: Not on file     Non-medical: Not on file   Tobacco Use    Smoking status: Never Smoker    Smokeless tobacco: Never Used   Substance and Sexual Activity    Alcohol use: No    Drug use: No    Sexual activity: Not Currently   Lifestyle    Physical activity:     Days per week: Not on file     Minutes per session: Not on file    Stress: Not on file   Relationships    Social connections:     Talks on phone: Not on file     Gets together: Not on file     Attends Gnosticism service: Not on file     Active member of club or organization: Not on file     Attends meetings of clubs or organizations: Not on file     Relationship status: Not on file    Intimate partner violence:     Fear of current or ex partner: Not on file     Emotionally abused: Not on file     Physically abused: Not on file     Forced sexual activity: Not on file   Other Topics Concern    Not on file   Social History Narrative    [facility] [address]        Family History:     Family History   Problem Relation Age of Onset    Breast Cancer Paternal Aunt     Breast Cancer Paternal Cousin 43    Breast Cancer Paternal Cousin 37    Breast Cancer Paternal Cousin 46        Allergies:   Zosyn [piperacillin-tazobactam in dex] and Vancomycin     Review of Systems:     As per history of present illness other than above 14 systems reviewed were negative    Physical Examination :     Patient Vitals for the past 8 hrs:   BP Temp Temp src Pulse Resp SpO2   01/03/20 0845 -- -- -- 79 16 100 %   01/03/20 0800 129/68 99.1 °F (37.3 °C) Axillary 84 17 100 %   01/03/20 0700 124/61 -- -- 85 15 98 %   01/03/20 0630 -- -- -- 85 16 99 %   01/03/20 0600 115/66 -- -- 85 14 99 %   01/03/20 0530 -- -- -- 86 17 100 %   01/03/20 0500 121/76 -- -- 89 14 100 %   01/03/20 0430 -- -- -- 89 19 100 %   01/03/20 0407 -- 98.6 °F (37 °C) Axillary -- -- --   01/03/20 0400 -- -- -- 89 22 --   01/03/20 0330 -- -- -- 90 20 100 %   01/03/20 0300 -- -- -- 89 22 100 %       Physical Exam  Constitutional:       Appearance: Normal appearance. HENT:      Head: Normocephalic. Eyes:      General:         Right eye: No discharge. Left eye: No discharge. Conjunctiva/sclera: Conjunctivae normal.   Neck:      Musculoskeletal: No neck rigidity. Comments: Tracheostomy in place  Cardiovascular:      Rate and Rhythm: Normal rate and regular rhythm. Heart sounds: No murmur. Pulmonary:      Effort: No respiratory distress. Breath sounds: Rhonchi present. Comments: Coarse decreased breath sounds bilaterally  Abdominal:      Palpations: Abdomen is soft. Tenderness: There is no tenderness.    Musculoskeletal: Right lower leg: Edema present. Left lower leg: Edema present. Skin:     General: Skin is warm and dry. Neurological:      General: No focal deficit present. Mental Status: She is alert and oriented to person, place, and time. Medical Decision Making:   I have independently reviewed/ordered the following labs:    CBC with Differential:   Recent Labs     01/02/20  0453 01/03/20  0404   WBC 4.7 4.4   HGB 8.4* 8.4*   HCT 25.9* 26.5*    182   LYMPHOPCT 19* 20*   MONOPCT 10* 9*     BMP:  Recent Labs     01/02/20  0453 01/03/20  0404    138   K 4.4 4.8   CL 96* 98   CO2 31 32*   BUN 22* 22*   CREATININE 0.98* 1.14*       Lab Results   Component Value Date    CREATININE 1.14 01/03/2020    GLUCOSE 127 01/03/2020       Detailed results: Thank you for allowing us to participate in the care of this patient. Please call with questions. This note is created with the assistance of a speech recognition program.  While intending to generate adocument that actually reflects the content of the visit, the document can still have some errors including those of syntax and sound a like substitutions which may escape proof reading. It such instances, actual meaningcan be extrapolated by contextual diversion.     Nola Diaz MD  Office: [phone]  Perfect serve / office [phone]

## 2020-01-03 NOTE — PROGRESS NOTES
[facility]    PROGRESS NOTE             1/3/2020    8:49 AM    Name:   Tiki Espino  MRN:     [id number]     Acct:      [de-identified]   Room:   2011/2011-01  IP Day:  4  Admit Date:  12/30/2019 11:30 AM    PCP:  See Carbajal DO  Code Status:  Full Code    Subjective:     C/C:   Chief Complaint   Patient presents with    Shortness of Breath     Interval History Status: not changed. No acute events overnight. Patient resting comfortably in bed this morning. Still reporting shortness of breath. No other complaints. Patient tested positive for mycoplasma yesterday. Appreciate antibiotic coverage recs from ID. Plan for bronch today at noon per pulm. Will continue to monitor. Brief History:     See H&P    Review of Systems:     Review of Systems   Constitutional: Negative for chills, fatigue and fever. HENT: Negative for congestion. Respiratory: Positive for cough, shortness of breath and wheezing. Gastrointestinal: Negative for abdominal distention, abdominal pain, nausea and vomiting. Musculoskeletal: Negative for arthralgias. Medications: Allergies:     Allergies   Allergen Reactions    Zosyn [Piperacillin-Tazobactam In Dex] Shortness Of Breath     tolerated cefepime 6/2018    Vancomycin Swelling     Lip swelling and redness and itching         Current Meds:   Scheduled Meds:    escitalopram  10 mg Oral Daily    ipratropium-albuterol  1 ampule Inhalation Q4H WA    sodium chloride flush  10 mL Intravenous 2 times per day    sodium chloride flush  10 mL Intravenous 2 times per day    enoxaparin  40 mg Subcutaneous BID    carBAMazepine  200 mg Oral BID    ferrous sulfate  325 mg Oral TID WC    levothyroxine  300 mcg Oral Daily    therapeutic multivitamin-minerals  1 tablet Oral Daily with breakfast    famotidine  20 mg Oral BID    levofloxacin  750 mg Intravenous Q24H    linezolid  600 mg Intravenous Q12H    bumetanide  2 mg Oral BID     Continuous Infusions:   PRN Meds: acetaminophen, albuterol, sodium chloride flush, sodium chloride flush, benzonatate, ondansetron, magnesium hydroxide    Data:     Past Medical History:   has a past medical history of Anemia, Disease of blood and blood forming organ, History of breast cancer, History of chemotherapy, Hypothyroidism, Lymphedema, Morbid obesity ([address] Respiratory failure ([address] and Tracheostomy present ([address] Social History:   reports that she has never smoked. She has never used smokeless tobacco. She reports that she does not drink alcohol or use drugs. Family History:   Family History   Problem Relation Age of Onset    Breast Cancer Paternal Aunt     Breast Cancer Paternal Cousin 43    Breast Cancer Paternal Cousin 37    Breast Cancer Paternal Cousin 46       Vitals:  /61   Pulse 79   Temp 98.6 °F (37 °C) (Axillary)   Resp 16   Ht 5' 5\" (1.651 m)   Wt (!) 632 lb (286.7 kg)   SpO2 100%   .17 kg/m²   Temp (24hrs), Av.6 °F (37 °C), Min:98.4 °F (36.9 °C), Max:98.8 °F (37.1 °C)    No results for input(s): POCGLU in the last 72 hours. I/O(24Hr): Intake/Output Summary (Last 24 hours) at 1/3/2020 0849  Last data filed at 1/3/2020 0650  Gross per 24 hour   Intake 2263 ml   Output 2000 ml   Net 263 ml       Labs:  [unfilled]    Lab Results   Component Value Date/Time    SPECIAL NOT REPORTED 2019 12:30 AM    SPECIAL NOT REPORTED 2019 12:30 AM     Lab Results   Component Value Date/Time    CULTURE NORMAL RESPIRATORY YAHAIRA MODERATE GROWTH 2019 02:07 PM       [unfilled]    Radiology:    Ir Mcgee Fish Device Plmt/replace/removal    Result Date: 2019  PROCEDURE: ULTRASOUND GUIDED VASCULAR ACCESS. FLUOROSCOPY GUIDED PICC PLACEMENT 2019.  HISTORY: ORDERING SYSTEM PROVIDED HISTORY: Multifocal pneumonia need of antibiotics TECHNOLOGIST PROVIDED HISTORY: Multifocal pneumonia in need of antibiotics Is the patient pregnant?->No Reason for Exam: pneumonia Acuity: Unknown Type of Exam: Unknown History of right mastectomy for breast cancer with axillary lymph node dissection, respiratory failure with superimposed infection. Congestive heart failure. SEDATION: Local anesthesia FLUOROSCOPY DOSE AND TYPE OR TIME AND EXPOSURES: None TECHNIQUE: Patient is morbidly obese and could not be placed on the fluoroscopy table in interventional radiology; the procedure was performed with the patient in bed and portable chest x-rays for position. Left upper extremity access was used due to patient's status post right mastectomy and lymph node dissection. Universal protocol was observed. The left arm was prepped and draped in sterile fashion using maximum sterile barrier technique. Local anesthesia was achieved with lidocaine. A micropuncture needle was used to access the left basilic vein using ultrasound guidance; due to morbid obesity and vein depth, puncture near the antecubital fossa was required. An ultrasound image demonstrating patency of the vein with needle tip located within it. An image was obtained and stored in PACs. A 0.018 guidewire was then used to place a peel-a-way sheath and a 55 cm dual-lumen PICC was advanced with fluoroscopic guidance with the tip at the cavo-atrial junction. No PICC longer and 55 cm is available. The catheter flushed easily and there was a good blood return. The catheter was secured to the skin. The patient tolerated the procedure well and there were no immediate complications. FINDINGS: Fluoroscopic image demonstrates the tip of the catheter in the upper SVC. Successful ultrasound and fluoroscopy guided bedside PICC placement with portable chest x-rays for determining tip position. Tip in the upper SVC due to limitations patient body habitus and need to use the left arm. PICC is satisfactory for use at this time.      Xr Chest Portable    Result Date:

## 2020-01-03 NOTE — PROGRESS NOTES
DR Arabella Casey to room with endoscopy for bronch. Versed 2 mg given as ordered IV. Dr Arabella Casey unable to pass the scope thru the #6 trach after multiple attempts. Procedure stopped and pt informed.

## 2020-01-03 NOTE — FLOWSHEET NOTE
Writer talked with Ed Parra RN, about patient's emotional status. Writer attempted to see but patient sleeping. Writer left patient a book on hope and will see her on Sunday.      01/03/20 1437   Encounter Summary   Services provided to: Patient   Referral/Consult From: Rounding   Complexity of Encounter Low   Length of Encounter 15 minutes   Spiritual/Yarsanism   Type Spiritual support   Assessment Sleeping   Intervention Provided reading materials/devotional materials;Prayer   Outcome Did not respond

## 2020-01-04 ENCOUNTER — APPOINTMENT (OUTPATIENT)
Dept: GENERAL RADIOLOGY | Age: 50
DRG: 130 | End: 2020-01-04
Payer: MEDICAID

## 2020-01-04 VITALS
SYSTOLIC BLOOD PRESSURE: 121 MMHG | RESPIRATION RATE: 22 BRPM | HEART RATE: 90 BPM | HEIGHT: 65 IN | OXYGEN SATURATION: 100 % | DIASTOLIC BLOOD PRESSURE: 89 MMHG | BODY MASS INDEX: 48.82 KG/M2 | WEIGHT: 293 LBS | TEMPERATURE: 97.7 F

## 2020-01-04 LAB
ABSOLUTE EOS #: 0.1 K/UL (ref 0–0.4)
ABSOLUTE IMMATURE GRANULOCYTE: ABNORMAL K/UL (ref 0–0.3)
ABSOLUTE LYMPH #: 0.8 K/UL (ref 1–4.8)
ABSOLUTE MONO #: 0.3 K/UL (ref 0.1–1.3)
ANION GAP SERPL CALCULATED.3IONS-SCNC: 11 MMOL/L (ref 9–17)
BASOPHILS # BLD: 0 % (ref 0–2)
BASOPHILS ABSOLUTE: 0 K/UL (ref 0–0.2)
BUN BLDV-MCNC: 20 MG/DL (ref 6–20)
BUN/CREAT BLD: ABNORMAL (ref 9–20)
CALCIUM SERPL-MCNC: 9 MG/DL (ref 8.6–10.4)
CHLORIDE BLD-SCNC: 97 MMOL/L (ref 98–107)
CO2: 30 MMOL/L (ref 20–31)
CREAT SERPL-MCNC: 1.04 MG/DL (ref 0.5–0.9)
DIFFERENTIAL TYPE: ABNORMAL
EOSINOPHILS RELATIVE PERCENT: 4 % (ref 0–4)
GFR AFRICAN AMERICAN: >60 ML/MIN
GFR NON-AFRICAN AMERICAN: 57 ML/MIN
GFR SERPL CREATININE-BSD FRML MDRD: ABNORMAL ML/MIN/{1.73_M2}
GFR SERPL CREATININE-BSD FRML MDRD: ABNORMAL ML/MIN/{1.73_M2}
GLUCOSE BLD-MCNC: 110 MG/DL (ref 70–99)
HCT VFR BLD CALC: 26.2 % (ref 36–46)
HEMOGLOBIN: 8.5 G/DL (ref 12–16)
IMMATURE GRANULOCYTES: ABNORMAL %
LYMPHOCYTES # BLD: 22 % (ref 24–44)
MCH RBC QN AUTO: 30.7 PG (ref 26–34)
MCHC RBC AUTO-ENTMCNC: 32.5 G/DL (ref 31–37)
MCV RBC AUTO: 94.4 FL (ref 80–100)
MONOCYTES # BLD: 8 % (ref 1–7)
NRBC AUTOMATED: ABNORMAL PER 100 WBC
PDW BLD-RTO: 15.1 % (ref 11.5–14.9)
PLATELET # BLD: 171 K/UL (ref 150–450)
PLATELET ESTIMATE: ABNORMAL
PMV BLD AUTO: 8.2 FL (ref 6–12)
POTASSIUM SERPL-SCNC: 4.6 MMOL/L (ref 3.7–5.3)
RBC # BLD: 2.77 M/UL (ref 4–5.2)
RBC # BLD: ABNORMAL 10*6/UL
SEG NEUTROPHILS: 66 % (ref 36–66)
SEGMENTED NEUTROPHILS ABSOLUTE COUNT: 2.5 K/UL (ref 1.3–9.1)
SODIUM BLD-SCNC: 138 MMOL/L (ref 135–144)
WBC # BLD: 3.8 K/UL (ref 3.5–11)
WBC # BLD: ABNORMAL 10*3/UL

## 2020-01-04 PROCEDURE — 36592 COLLECT BLOOD FROM PICC: CPT

## 2020-01-04 PROCEDURE — 6370000000 HC RX 637 (ALT 250 FOR IP): Performed by: FAMILY MEDICINE

## 2020-01-04 PROCEDURE — 99233 SBSQ HOSP IP/OBS HIGH 50: CPT | Performed by: INTERNAL MEDICINE

## 2020-01-04 PROCEDURE — 2580000003 HC RX 258: Performed by: FAMILY MEDICINE

## 2020-01-04 PROCEDURE — 80048 BASIC METABOLIC PNL TOTAL CA: CPT

## 2020-01-04 PROCEDURE — 85025 COMPLETE CBC W/AUTO DIFF WBC: CPT

## 2020-01-04 PROCEDURE — 94640 AIRWAY INHALATION TREATMENT: CPT

## 2020-01-04 PROCEDURE — 71045 X-RAY EXAM CHEST 1 VIEW: CPT

## 2020-01-04 PROCEDURE — 6370000000 HC RX 637 (ALT 250 FOR IP): Performed by: INTERNAL MEDICINE

## 2020-01-04 RX ORDER — LEVOFLOXACIN 750 MG/1
750 TABLET ORAL DAILY
Qty: 3 TABLET | Refills: 0 | Status: SHIPPED | OUTPATIENT
Start: 2020-01-05 | End: 2020-01-08

## 2020-01-04 RX ORDER — ESCITALOPRAM OXALATE 10 MG/1
10 TABLET ORAL DAILY
Qty: 30 TABLET | Refills: 0 | Status: SHIPPED | OUTPATIENT
Start: 2020-01-05 | End: 2020-03-25 | Stop reason: DRUGHIGH

## 2020-01-04 RX ORDER — LEVOFLOXACIN 750 MG/1
750 TABLET ORAL DAILY
Qty: 3 TABLET | Refills: 0 | Status: CANCELLED | OUTPATIENT
Start: 2020-01-04 | End: 2020-01-07

## 2020-01-04 RX ORDER — LEVOFLOXACIN 750 MG/1
750 TABLET ORAL DAILY
Status: DISCONTINUED | OUTPATIENT
Start: 2020-01-04 | End: 2020-01-04 | Stop reason: HOSPADM

## 2020-01-04 RX ADMIN — Medication 10 ML: at 10:10

## 2020-01-04 RX ADMIN — LEVOTHYROXINE SODIUM 300 MCG: 100 TABLET ORAL at 10:09

## 2020-01-04 RX ADMIN — IPRATROPIUM BROMIDE AND ALBUTEROL SULFATE 1 AMPULE: .5; 3 SOLUTION RESPIRATORY (INHALATION) at 15:06

## 2020-01-04 RX ADMIN — ESCITALOPRAM OXALATE 10 MG: 10 TABLET ORAL at 10:09

## 2020-01-04 RX ADMIN — IPRATROPIUM BROMIDE AND ALBUTEROL SULFATE 1 AMPULE: .5; 3 SOLUTION RESPIRATORY (INHALATION) at 10:47

## 2020-01-04 RX ADMIN — LEVOFLOXACIN 750 MG: 750 TABLET, FILM COATED ORAL at 12:12

## 2020-01-04 RX ADMIN — CARBAMAZEPINE 200 MG: 200 TABLET ORAL at 12:12

## 2020-01-04 RX ADMIN — BUMETANIDE 2 MG: 1 TABLET ORAL at 10:09

## 2020-01-04 RX ADMIN — MULTIPLE VITAMINS W/ MINERALS TAB 1 TABLET: TAB at 12:12

## 2020-01-04 RX ADMIN — FERROUS SULFATE TAB 325 MG (65 MG ELEMENTAL FE) 325 MG: 325 (65 FE) TAB at 12:12

## 2020-01-04 ASSESSMENT — ENCOUNTER SYMPTOMS
VOMITING: 0
NAUSEA: 0
COUGH: 0
SHORTNESS OF BREATH: 1
ABDOMINAL PAIN: 0

## 2020-01-04 NOTE — CARE COORDINATION
ONGOING DISCHARGE PLAN:    Plan remains for LSW to continue to follow for DC back to [facility]. Per Notes, Transport has already been set up for toay at 2:30. Will continue to follow for additional discharge needs.     Electronically signed by Neeraj Carrillo RN on 1/4/2020 at 12:43 PM

## 2020-01-04 NOTE — PROGRESS NOTES
sodium chloride flush, sodium chloride flush, benzonatate, ondansetron, magnesium hydroxide    Data:     Past Medical History:   has a past medical history of Anemia, Disease of blood and blood forming organ, History of breast cancer, History of chemotherapy, Hypothyroidism, Lymphedema, Morbid obesity ([address] Respiratory failure ([address] and Tracheostomy present ([facility] Social History:   reports that she has never smoked. She has never used smokeless tobacco. She reports that she does not drink alcohol or use drugs. Family History:   Family History   Problem Relation Age of Onset    Breast Cancer Paternal Aunt     Breast Cancer Paternal Cousin 43    Breast Cancer Paternal Cousin 37    Breast Cancer Paternal Cousin 46       Vitals:  BP (!) 152/80   Pulse 93   Temp 98.1 °F (36.7 °C) (Axillary)   Resp 18   Ht 5' 5\" (1.651 m)   Wt (!) 632 lb (286.7 kg)   SpO2 98%   .17 kg/m²   Temp (24hrs), Av.4 °F (36.9 °C), Min:98.1 °F (36.7 °C), Max:98.7 °F (37.1 °C)    No results for input(s): POCGLU in the last 72 hours. I/O(24Hr): Intake/Output Summary (Last 24 hours) at 2020 0937  Last data filed at 1/3/2020 1800  Gross per 24 hour   Intake 150 ml   Output 650 ml   Net -500 ml       Labs:  [unfilled]    Lab Results   Component Value Date/Time    SPECIAL NOT REPORTED 2019 12:30 AM    SPECIAL NOT REPORTED 2019 12:30 AM     Lab Results   Component Value Date/Time    CULTURE NORMAL RESPIRATORY YAHAIRA MODERATE GROWTH 2019 02:07 PM       [unfilled]    Radiology:    Ir Leanord Prior Device Plmt/replace/removal    Result Date: 2019  PROCEDURE: ULTRASOUND GUIDED VASCULAR ACCESS. FLUOROSCOPY GUIDED PICC PLACEMENT 2019.  HISTORY: ORDERING SYSTEM PROVIDED HISTORY: Multifocal pneumonia need of antibiotics TECHNOLOGIST PROVIDED HISTORY: Multifocal pneumonia in need of antibiotics Is the patient pregnant?->No Reason for Exam: pneumonia Acuity: Unknown Type of Exam: Unknown ORDERING SYSTEM PROVIDED HISTORY: S/P PICC central line placement TECHNOLOGIST PROVIDED HISTORY: PICC line placement History of breast cancer, congestive heart failure, respiratory failure, morbid obesity, and sepsis. FINDINGS: Left-sided PICC with its tip in the upper SVC. Right subclavian port kinked at its entry site with its tip in the right brachiocephalic vein. Clips right axilla. Overlying ECG monitor leads and a necklace. Tracheostomy tube in unchanged position. Cardiomediastinal shadow difficult to assess due slight rotation but appears unchanged. Patchy opacities both lungs primarily in a perihilar distribution and denser on the right and somewhat more extensive. Latter findings are similar. No pneumothorax. No large pleural effusion. Bones unchanged. Status post left-sided PICC with tip in the upper SVC. Worsening multifocal opacities, greater on the right, and most likely pneumonia. Pulmonary edema should also be considered. Examination otherwise unchanged. Xr Chest Portable    Result Date: 12/30/2019  EXAMINATION: ONE XRAY VIEW OF THE CHEST 12/30/2019 11:54 am COMPARISON: September 14, 2019 HISTORY: ORDERING SYSTEM PROVIDED HISTORY: shortness of breath TECHNOLOGIST PROVIDED HISTORY: shortness of breath Reason for Exam: shortness of breath and chest mara Acuity: Unknown Type of Exam: Initial FINDINGS: Right-sided port in good position. Tracheostomy. Cardiomegaly. Shallow inspiration. Multifocal pulmonary opacities with central congestion worsened from prior exam.  Questionable trace effusions. No definite pneumothorax. Worsening multifocal opacities, possibly pneumonia versus edema. Physical Examination:        Physical Exam  Constitutional:       Appearance: Normal appearance. She is obese. HENT:      Head: Normocephalic and atraumatic. Eyes:      General: No scleral icterus.      Conjunctiva/sclera: Conjunctivae normal.   Cardiovascular:      Rate and Rhythm: Normal rate and regular rhythm. Pulmonary:      Effort: Pulmonary effort is normal.      Breath sounds: Normal breath sounds. Abdominal:      General: Abdomen is flat. Bowel sounds are normal. There is no distension. Palpations: Abdomen is soft. Tenderness: There is no tenderness. Neurological:      Mental Status: She is alert and oriented to person, place, and time. Psychiatric:         Mood and Affect: Mood normal.           Assessment:        Primary Problem  Mycoplasma pneumonia    Active Hospital Problems    Diagnosis Date Noted    Mycoplasma pneumonia [J15.7] 01/02/2020    Respiratory failure ([address] [J96.90] 12/30/2019    Multifocal pneumonia [J18.9] 09/10/2019    Tracheostomy present ([address] [Z93.0] 11/20/2018    Shortness of breath [R06.02] 05/14/2018    Hypothyroidism [E03.9] 05/14/2018       Plan:        Resp failure 2/2 mycoplasma pneumonia  - positive for mycoplasma pneumonia.  Appreciate ID recs   - levaquin day 5, zyvox stopped per ID   - chest xray 12/31: s/p picc placement, worsening multifocal pneumonia  - f/u repeat CXR   - pulm work up negative  - bronch not able to be done due to trach size   - bumex 2 mg BID      Anemia  - Hb: 8.5 <-- 8.4  - hct: 26.2  - ferrous sulfate 325 mg TID  - FE 35, TIBC 153      Hypothyroidism  - synthroid 300 MCG daily      Morbid obesity     Diet: low fat   GI PPX: pepcid 20 mg BID  DVT ppx: lovenox 40     Todd Marcus MD  1/4/2020  9:37 AM

## 2020-01-04 NOTE — PROGRESS NOTES
Nurse spoke with Dr. Kaur Jones informed him Dr. Ani Zhang feels patient is ready to discharge and is changing her antibiotics to oral. Nurse updated him that patient continues to be on vent fi02 at 45% looks comfortable, will request to have oxygen turned up d/t anxiety but oxygen saturation are wnl. Dr. Kaur Jones okay to have patient discharged.

## 2020-01-04 NOTE — PLAN OF CARE
Problem: Falls - Risk of:  Goal: Will remain free from falls  Description  Will remain free from falls  Outcome: Ongoing  Note:   Pt free from falls. Call light within reach. Bed in lowest position with bed wheels locked. Hourly rounding.

## 2020-01-04 NOTE — PROGRESS NOTES
-entry expansion decreased breath sounds in the bases  Cardiovascular - Heart sounds are normal.  Regular rate and rhythm   Abdomen - Soft, nontender, nondistended, no masses or organomegaly  Neurologic - There are no focal motor or sensory deficits  Skin - No bruising or bleeding  Extremities - No clubbing, cyanosis, edema    MEDS      levofloxacin  750 mg Oral Daily    escitalopram  10 mg Oral Daily    ipratropium-albuterol  1 ampule Inhalation Q4H WA    sodium chloride flush  10 mL Intravenous 2 times per day    sodium chloride flush  10 mL Intravenous 2 times per day    enoxaparin  40 mg Subcutaneous BID    carBAMazepine  200 mg Oral BID    ferrous sulfate  325 mg Oral TID WC    levothyroxine  300 mcg Oral Daily    therapeutic multivitamin-minerals  1 tablet Oral Daily with breakfast    famotidine  20 mg Oral BID    bumetanide  2 mg Oral BID       acetaminophen, albuterol, sodium chloride flush, sodium chloride flush, benzonatate, ondansetron, magnesium hydroxide    LABS   CBC   Recent Labs     01/04/20  0642   WBC 3.8   HGB 8.5*   HCT 26.2*   MCV 94.4        BMP:   Lab Results   Component Value Date     01/04/2020    K 4.6 01/04/2020    CL 97 01/04/2020    CO2 30 01/04/2020    BUN 20 01/04/2020    LABALBU 3.3 09/10/2019    CREATININE 1.04 01/04/2020    CALCIUM 9.0 01/04/2020    GFRAA >60 01/04/2020    LABGLOM 57 01/04/2020     ABGs:  Lab Results   Component Value Date    PHART 7.351 09/14/2019    PO2ART 160.0 09/14/2019    XNX8WEO 59.2 09/14/2019      Lab Results   Component Value Date    MODE NOT REPORTED 12/30/2019     Ionized Calcium:  No results found for: IONCA  Magnesium:  No results found for: MG  Phosphorus:  No results found for: PHOS     LIVER PROFILE No results for input(s): AST, ALT, LIPASE, BILIDIR, BILITOT, ALKPHOS in the last 72 hours. Invalid input(s): AMYLASE,  ALB  INR No results for input(s): INR in the last 72 hours.   PTT   Lab Results   Component Value Date

## 2020-01-04 NOTE — DISCHARGE SUMMARY
[address]    Discharge Summary     Patient ID: Chin Oliver  :  1970   MRN: [id number]     ACCOUNT:  [de-identified]   Patient's PCP: Anjali Hinojosa DO  Admit Date: 2019   Discharge Date: 2020   Length of Stay: 5  Code Status:  Full Code  Admitting Physician: Rayo Lion MD  Discharge Physician: Tushar Santos MD     Active Discharge Diagnoses:       Primary Problem  Mycoplasma pneumonia      Hospital Problems  Active Hospital Problems    Diagnosis Date Noted    Mycoplasma pneumonia [J15.7] 2020    Respiratory failure ([address] [J96.90] 2019    Multifocal pneumonia [J18.9] 09/10/2019    Tr[address]ostomy present ([facility] [address] [Z93.0] 2018    Shortness of breath [R06.02] 2018    Hypothyroidism [E03.9] 2018       Admission Condition:  poor     Discharged Condition: good    Hospital Stay:       Hospital Course:  Chin Oliver is a 54 y.o. female who was admitted for the management of   Mycoplasma pneumonia , presented to ER with Shortness of Breath    CXR in ED was significant for multiple opacities. Patient is on chronic trach. Pulm and ID consulted. Patient was started on levaquin, zyvox and cefepime. Bumex dose was increased from 1 mg BID to 2 mg BID. Patient tested positive for mycoplasma pneumonia. Antibiotics de-escalated to IV levaquin. Throughout hospital course patient became increasingly anxious and asking for increase in oxygen. Lexapro 10 daily started for anxiety. On day of discharge patient was stable and on day 5 of levaquin. Denied any difficulty breathing or coughing. No fever or chills. White count WNL and afebrile. It was decided by all consulting teams that patient was stable for discharge back to [facility]. Patient to continue lexapro and levaquin and to resume home dose of bumex.      Significant therapeutic interventions: IV antibiotics     Significant Diagnostic Studies: and vein depth, puncture near the antecubital fossa was required. An ultrasound image demonstrating patency of the vein with needle tip located within it. An image was obtained and stored in PACs. A 0.018 guidewire was then used to place a peel-a-way sheath and a 55 cm dual-lumen PICC was advanced with fluoroscopic guidance with the tip at the cavo-atrial junction. No PICC longer and 55 cm is available. The catheter flushed easily and there was a good blood return. The catheter was secured to the skin. The patient tolerated the procedure well and there were no immediate complications. FINDINGS: Fluoroscopic image demonstrates the tip of the catheter in the upper SVC. Successful ultrasound and fluoroscopy guided bedside PICC placement with portable chest x-rays for determining tip position. Tip in the upper SVC due to limitations patient body habitus and need to use the left arm. PICC is satisfactory for use at this time. Xr Chest Portable    Result Date: 12/31/2019  EXAMINATION: ONE XRAY VIEW OF THE CHEST 12/31/2019 8:03 am COMPARISON: AP chest from 12/30/2019 HISTORY: ORDERING SYSTEM PROVIDED HISTORY: S/P PICC central line placement TECHNOLOGIST PROVIDED HISTORY: PICC line placement History of breast cancer, congestive heart failure, respiratory failure, morbid obesity, and sepsis. FINDINGS: Left-sided PICC with its tip in the upper SVC. Right subclavian port kinked at its entry site with its tip in the right brachiocephalic vein. Clips right axilla. Overlying ECG monitor leads and a necklace. Tracheostomy tube in unchanged position. Cardiomediastinal shadow difficult to assess due slight rotation but appears unchanged. Patchy opacities both lungs primarily in a perihilar distribution and denser on the right and somewhat more extensive. Latter findings are similar. No pneumothorax. No large pleural effusion. Bones unchanged. Status post left-sided PICC with tip in the upper SVC. daily for 3 days  Start taking on:  January 5, 2020     mineral oil-hydrophilic petrolatum ointment  Apply topically as needed. CONTINUE taking these medications    acetaminophen 325 MG tablet  Commonly known as:  TYLENOL     B complex-vitamin C-folic acid 1 MG tablet     benzonatate 100 MG capsule  Commonly known as:  TESSALON     bisacodyl 5 MG EC tablet  Commonly known as:  DULCOLAX     bumetanide 1 MG tablet  Commonly known as:  BUMEX  Take 1 tablet by mouth 2 times daily     carBAMazepine 200 MG tablet  Commonly known as:  TEGRETOL  Take 1 tablet by mouth 2 times daily     docusate sodium 100 MG capsule  Commonly known as:  COLACE     ferrous sulfate 325 (65 Fe) MG tablet     ipratropium-albuterol 0.5-2.5 (3) MG/3ML Soln nebulizer solution  Commonly known as:  DUONEB     levothyroxine 300 MCG tablet  Commonly known as:  SYNTHROID     loratadine 10 MG tablet  Commonly known as:  CLARITIN     magnesium hydroxide 400 MG/5ML suspension  Commonly known as:  MILK OF MAGNESIA     therapeutic multivitamin-minerals tablet     VICKS VAPORUB 4.7-1.2-2.6 % Oint           Where to Get Your Medications      These medications were sent to [facility], [place] [id number], [address] [facility] [address]    Phone:  [phone]   · escitalopram 10 MG tablet  · levofloxacin 750 MG tablet     You can get these medications from any pharmacy    Bring a paper prescription for each of these medications  · mineral oil-hydrophilic petrolatum ointment         Time Spent on discharge is  33 mins in patient examination, evaluation, counseling as well as medication reconciliation, prescriptions for required medications, discharge plan and follow up. Electronically signed by   Ashutosh Headley MD  1/4/2020  2:13 PM      Thank you Dr. Jennifer Levine DO for the opportunity to be involved in this patient's care.   Attending Physician Statement  I have reviewed and edited the discharge summary of  Zully Hill AS NEEDED  ,   including pertinent history and exam findings. I have reviewed the key elements of all parts of the discharge summary . I agree with the information and plans as documented by the resident. Time spent on discharge planning ;          [] less than 30 minutes . [x]   more  than 30 minutes . Electronically signed by Martin Warner MD on 1/4/2020 .

## 2020-01-04 NOTE — PROGRESS NOTES
PRN Meds: acetaminophen, albuterol, sodium chloride flush, sodium chloride flush, benzonatate, ondansetron, magnesium hydroxide    Data:     Past Medical History:   has a past medical history of Anemia, Disease of blood and blood forming organ, History of breast cancer, History of chemotherapy, Hypothyroidism, Lymphedema, Morbid obesity ([address] Respiratory failure ([address] and Tracheostomy present ([address] Social History:   reports that she has never smoked. She has never used smokeless tobacco. She reports that she does not drink alcohol or use drugs. Family History:   Family History   Problem Relation Age of Onset    Breast Cancer Paternal Aunt     Breast Cancer Paternal Cousin 43    Breast Cancer Paternal Cousin 37    Breast Cancer Paternal Cousin 46       Vitals:  BP (!) 159/75   Pulse 93   Temp 98.7 °F (37.1 °C) (Axillary)   Resp 18   Ht 5' 5\" (1.651 m)   Wt (!) 632 lb (286.7 kg)   SpO2 100%   .17 kg/m²   Temp (24hrs), Av.6 °F (37 °C), Min:98.2 °F (36.8 °C), Max:99.1 °F (37.3 °C)    No results for input(s): POCGLU in the last 72 hours. I/O(24Hr): Intake/Output Summary (Last 24 hours) at 1/3/2020 2318  Last data filed at 1/3/2020 1800  Gross per 24 hour   Intake 1215 ml   Output 1250 ml   Net -35 ml       Labs:  [unfilled]    Lab Results   Component Value Date/Time    SPECIAL NOT REPORTED 2019 12:30 AM    SPECIAL NOT REPORTED 2019 12:30 AM     Lab Results   Component Value Date/Time    CULTURE NORMAL RESPIRATORY YAHAIRA MODERATE GROWTH 2019 02:07 PM       [unfilled]    Radiology:    Jose Angel Aneta Taylor Device Plmt/replace/removal    Result Date: 2019  PROCEDURE: ULTRASOUND GUIDED VASCULAR ACCESS. FLUOROSCOPY GUIDED PICC PLACEMENT 2019.  HISTORY: ORDERING SYSTEM PROVIDED HISTORY: Multifocal pneumonia need of antibiotics TECHNOLOGIST PROVIDED HISTORY: Multifocal pneumonia in need of antibiotics Is the patient pregnant?->No Reason for Exam: pneumonia Acuity: Unknown Type of Exam: Unknown History of right mastectomy for breast cancer with axillary lymph node dissection, respiratory failure with superimposed infection. Congestive heart failure. SEDATION: Local anesthesia FLUOROSCOPY DOSE AND TYPE OR TIME AND EXPOSURES: None TECHNIQUE: Patient is morbidly obese and could not be placed on the fluoroscopy table in interventional radiology; the procedure was performed with the patient in bed and portable chest x-rays for position. Left upper extremity access was used due to patient's status post right mastectomy and lymph node dissection. Universal protocol was observed. The left arm was prepped and draped in sterile fashion using maximum sterile barrier technique. Local anesthesia was achieved with lidocaine. A micropuncture needle was used to access the left basilic vein using ultrasound guidance; due to morbid obesity and vein depth, puncture near the antecubital fossa was required. An ultrasound image demonstrating patency of the vein with needle tip located within it. An image was obtained and stored in PACs. A 0.018 guidewire was then used to place a peel-a-way sheath and a 55 cm dual-lumen PICC was advanced with fluoroscopic guidance with the tip at the cavo-atrial junction. No PICC longer and 55 cm is available. The catheter flushed easily and there was a good blood return. The catheter was secured to the skin. The patient tolerated the procedure well and there were no immediate complications. FINDINGS: Fluoroscopic image demonstrates the tip of the catheter in the upper SVC. Successful ultrasound and fluoroscopy guided bedside PICC placement with portable chest x-rays for determining tip position. Tip in the upper SVC due to limitations patient body habitus and need to use the left arm. PICC is satisfactory for use at this time.      Xr Chest Portable    Result Date: 12/31/2019  EXAMINATION: ONE XRAY VIEW OF THE CHEST 12/31/2019 8:03 am COMPARISON: AP chest from 12/30/2019 HISTORY: ORDERING SYSTEM PROVIDED HISTORY: S/P PICC central line placement TECHNOLOGIST PROVIDED HISTORY: PICC line placement History of breast cancer, congestive heart failure, respiratory failure, morbid obesity, and sepsis. FINDINGS: Left-sided PICC with its tip in the upper SVC. Right subclavian port kinked at its entry site with its tip in the right brachiocephalic vein. Clips right axilla. Overlying ECG monitor leads and a necklace. Tracheostomy tube in unchanged position. Cardiomediastinal shadow difficult to assess due slight rotation but appears unchanged. Patchy opacities both lungs primarily in a perihilar distribution and denser on the right and somewhat more extensive. Latter findings are similar. No pneumothorax. No large pleural effusion. Bones unchanged. Status post left-sided PICC with tip in the upper SVC. Worsening multifocal opacities, greater on the right, and most likely pneumonia. Pulmonary edema should also be considered. Examination otherwise unchanged. Xr Chest Portable    Result Date: 12/30/2019  EXAMINATION: ONE XRAY VIEW OF THE CHEST 12/30/2019 11:54 am COMPARISON: September 14, 2019 HISTORY: ORDERING SYSTEM PROVIDED HISTORY: shortness of breath TECHNOLOGIST PROVIDED HISTORY: shortness of breath Reason for Exam: shortness of breath and chest mara Acuity: Unknown Type of Exam: Initial FINDINGS: Right-sided port in good position. Tracheostomy. Cardiomegaly. Shallow inspiration. Multifocal pulmonary opacities with central congestion worsened from prior exam.  Questionable trace effusions. No definite pneumothorax. Worsening multifocal opacities, possibly pneumonia versus edema. Physical Examination:        Physical Exam  Constitutional:       Appearance: Normal appearance. She is obese. HENT:      Head: Normocephalic and atraumatic. Eyes:      General: No scleral icterus.      Conjunctiva/sclera: Conjunctivae normal. Cardiovascular:      Rate and Rhythm: Normal rate and regular rhythm. Pulmonary:      Breath sounds: No wheezing. Comments: On vent chronic   Abdominal:      General: Abdomen is flat. Bowel sounds are normal. There is no distension. Palpations: Abdomen is soft. Tenderness: There is no tenderness. Neurological:      Mental Status: She is alert and oriented to person, place, and time. Psychiatric:         Mood and Affect: Mood normal.           Assessment:        Primary Problem  Mycoplasma pneumonia    Active Hospital Problems    Diagnosis Date Noted    Mycoplasma pneumonia [J15.7] 01/02/2020    Respiratory failure ([address] [J96.90] 12/30/2019    Multifocal pneumonia [J18.9] 09/10/2019    Tracheostomy present ([facility] [address] [Z93.0] 11/20/2018    Shortness of breath [R06.02] 05/14/2018    Hypothyroidism [E03.9] 05/14/2018       Plan:       Resp failure 2/2 multifocal pneumonia vs pulmonary edema  - levaquin and zyvox day 3 per ID  - chest xray 12/31: s/p picc placement, worsening multifocal pneumonia   - pulm work up negative  - bronch tomorrow per pulm   - bumex 2 mg BID     Anemia  - Hb: 8.4 <-- 8.9  - hct: 25.9  - ferrous sulfate 325 mg TID  - FE 35, TIBC 153     Hypothyroidism  - synthroid 300 MCG daily     Morbid obesity    Diet: low fat   GI PPX: pepcid 20 mg BID  DVT ppx: lovenox 40       Jim Shaver MD  1/3/2020  11:18 PM       Attestation and add on       I have discussed the care of HANNA Coyle  ncluding pertinent history and exam findings,      1/2/20  with the resident. I have seen and examined the patient and the key elements of all parts of the encounter have been performed by me . I agree with the assessment, plan and orders as documented by the resident.      Principal Problem:    Mycoplasma pneumonia  Active Problems:    Shortness of breath    Hypothyroidism    Tracheostomy present (HCC)    Multifocal pneumonia    Respiratory failure ([address]  Resolved Problems:    * No resolved hospital problems. *        Patient is   ill and high risk for complications   Patient is in -   [] pccu , [x] icu , [] other unit    Symptoms or ailment  course ;                                  ---Ventilator  Respiratory failure secondary to multilobar pneumonia  Response to treatment has been suboptimal so far  Patient's condition continues to be critical-            Medications: Allergies: Allergies   Allergen Reactions    Zosyn [Piperacillin-Tazobactam In Dex] Shortness Of Breath     tolerated cefepime 6/2018    Vancomycin Swelling     Lip swelling and redness and itching         Current Meds:   Scheduled Meds:    escitalopram  10 mg Oral Daily    ipratropium-albuterol  1 ampule Inhalation Q4H [place]    sodium chloride flush  10 mL Intravenous 2 times per day    sodium chloride flush  10 mL Intravenous 2 times per day    enoxaparin  40 mg Subcutaneous BID    carBAMazepine  200 mg Oral BID    ferrous sulfate  325 mg Oral TID WC    levothyroxine  300 mcg Oral Daily    therapeutic multivitamin-minerals  1 tablet Oral Daily with breakfast    famotidine  20 mg Oral BID    levofloxacin  750 mg Intravenous Q24H    bumetanide  2 mg Oral BID     Continuous Infusions:   PRN Meds: acetaminophen, albuterol, sodium chloride flush, sodium chloride flush, benzonatate, ondansetron, magnesium hydroxide      ---       Ann Reagan    . [email or web address]  [address], [address] [place] [address].    Phone [phone]   Fax: [phone]  Answering Service: [phone]

## 2020-01-08 ENCOUNTER — APPOINTMENT (OUTPATIENT)
Dept: GENERAL RADIOLOGY | Age: 50
DRG: 145 | End: 2020-01-08
Payer: MEDICAID

## 2020-01-08 ENCOUNTER — TELEPHONE (OUTPATIENT)
Dept: OTHER | Facility: CLINIC | Age: 50
End: 2020-01-08

## 2020-01-08 ENCOUNTER — HOSPITAL ENCOUNTER (INPATIENT)
Age: 50
LOS: 2 days | Discharge: NURSING, REHAB OR LONG-TERM CARE | DRG: 145 | End: 2020-01-10
Attending: EMERGENCY MEDICINE | Admitting: INTERNAL MEDICINE
Payer: MEDICAID

## 2020-01-08 PROBLEM — J18.9 PNEUMONIA: Status: ACTIVE | Noted: 2020-01-08

## 2020-01-08 PROBLEM — L03.116 CELLULITIS OF LEFT LOWER EXTREMITY: Status: RESOLVED | Noted: 2018-11-21 | Resolved: 2020-01-08

## 2020-01-08 PROBLEM — G47.33 OSA (OBSTRUCTIVE SLEEP APNEA): Status: ACTIVE | Noted: 2020-01-08

## 2020-01-08 LAB
ABSOLUTE EOS #: 0.1 K/UL (ref 0–0.4)
ABSOLUTE IMMATURE GRANULOCYTE: ABNORMAL K/UL (ref 0–0.3)
ABSOLUTE LYMPH #: 1 K/UL (ref 1–4.8)
ABSOLUTE MONO #: 0.3 K/UL (ref 0.1–1.3)
ALBUMIN SERPL-MCNC: 3.3 G/DL (ref 3.5–5.2)
ALBUMIN/GLOBULIN RATIO: ABNORMAL (ref 1–2.5)
ALP BLD-CCNC: 149 U/L (ref 35–104)
ALT SERPL-CCNC: 87 U/L (ref 5–33)
ANION GAP SERPL CALCULATED.3IONS-SCNC: 12 MMOL/L (ref 9–17)
AST SERPL-CCNC: 65 U/L
BASOPHILS # BLD: 1 % (ref 0–2)
BASOPHILS ABSOLUTE: 0 K/UL (ref 0–0.2)
BILIRUB SERPL-MCNC: 0.24 MG/DL (ref 0.3–1.2)
BNP INTERPRETATION: ABNORMAL
BUN BLDV-MCNC: 17 MG/DL (ref 6–20)
BUN/CREAT BLD: ABNORMAL (ref 9–20)
C-REACTIVE PROTEIN: 28 MG/L (ref 0–5)
CALCIUM SERPL-MCNC: 9.3 MG/DL (ref 8.6–10.4)
CHLORIDE BLD-SCNC: 96 MMOL/L (ref 98–107)
CO2: 29 MMOL/L (ref 20–31)
CREAT SERPL-MCNC: 0.89 MG/DL (ref 0.5–0.9)
DIFFERENTIAL TYPE: ABNORMAL
EOSINOPHILS RELATIVE PERCENT: 3 % (ref 0–4)
GFR AFRICAN AMERICAN: >60 ML/MIN
GFR NON-AFRICAN AMERICAN: >60 ML/MIN
GFR SERPL CREATININE-BSD FRML MDRD: ABNORMAL ML/MIN/{1.73_M2}
GFR SERPL CREATININE-BSD FRML MDRD: ABNORMAL ML/MIN/{1.73_M2}
GLUCOSE BLD-MCNC: 113 MG/DL (ref 70–99)
HCT VFR BLD CALC: 30.4 % (ref 36–46)
HEMOGLOBIN: 9.6 G/DL (ref 12–16)
IMMATURE GRANULOCYTES: ABNORMAL %
LACTIC ACID: 0.7 MMOL/L (ref 0.5–2.2)
LYMPHOCYTES # BLD: 22 % (ref 24–44)
MCH RBC QN AUTO: 30.1 PG (ref 26–34)
MCHC RBC AUTO-ENTMCNC: 31.6 G/DL (ref 31–37)
MCV RBC AUTO: 95.3 FL (ref 80–100)
MONOCYTES # BLD: 7 % (ref 1–7)
NRBC AUTOMATED: ABNORMAL PER 100 WBC
PDW BLD-RTO: 15.3 % (ref 11.5–14.9)
PLATELET # BLD: 233 K/UL (ref 150–450)
PLATELET ESTIMATE: ABNORMAL
PMV BLD AUTO: 8.1 FL (ref 6–12)
POTASSIUM SERPL-SCNC: 5 MMOL/L (ref 3.7–5.3)
PRO-BNP: 364 PG/ML
PROCALCITONIN: 0.07 NG/ML
RBC # BLD: 3.19 M/UL (ref 4–5.2)
RBC # BLD: ABNORMAL 10*6/UL
SEG NEUTROPHILS: 67 % (ref 36–66)
SEGMENTED NEUTROPHILS ABSOLUTE COUNT: 3 K/UL (ref 1.3–9.1)
SODIUM BLD-SCNC: 137 MMOL/L (ref 135–144)
TOTAL PROTEIN: 9 G/DL (ref 6.4–8.3)
TROPONIN INTERP: ABNORMAL
TROPONIN INTERP: ABNORMAL
TROPONIN T: ABNORMAL NG/ML
TROPONIN T: ABNORMAL NG/ML
TROPONIN, HIGH SENSITIVITY: 17 NG/L (ref 0–14)
TROPONIN, HIGH SENSITIVITY: 18 NG/L (ref 0–14)
WBC # BLD: 4.4 K/UL (ref 3.5–11)
WBC # BLD: ABNORMAL 10*3/UL

## 2020-01-08 PROCEDURE — 6360000002 HC RX W HCPCS: Performed by: EMERGENCY MEDICINE

## 2020-01-08 PROCEDURE — 71045 X-RAY EXAM CHEST 1 VIEW: CPT

## 2020-01-08 PROCEDURE — 87899 AGENT NOS ASSAY W/OPTIC: CPT

## 2020-01-08 PROCEDURE — 99285 EMERGENCY DEPT VISIT HI MDM: CPT

## 2020-01-08 PROCEDURE — 96365 THER/PROPH/DIAG IV INF INIT: CPT

## 2020-01-08 PROCEDURE — 6370000000 HC RX 637 (ALT 250 FOR IP)

## 2020-01-08 PROCEDURE — 94761 N-INVAS EAR/PLS OXIMETRY MLT: CPT

## 2020-01-08 PROCEDURE — 2580000003 HC RX 258

## 2020-01-08 PROCEDURE — 84484 ASSAY OF TROPONIN QUANT: CPT

## 2020-01-08 PROCEDURE — 83605 ASSAY OF LACTIC ACID: CPT

## 2020-01-08 PROCEDURE — 86140 C-REACTIVE PROTEIN: CPT

## 2020-01-08 PROCEDURE — 87040 BLOOD CULTURE FOR BACTERIA: CPT

## 2020-01-08 PROCEDURE — 83880 ASSAY OF NATRIURETIC PEPTIDE: CPT

## 2020-01-08 PROCEDURE — 85025 COMPLETE CBC W/AUTO DIFF WBC: CPT

## 2020-01-08 PROCEDURE — 6360000002 HC RX W HCPCS

## 2020-01-08 PROCEDURE — 2060000000 HC ICU INTERMEDIATE R&B

## 2020-01-08 PROCEDURE — 80053 COMPREHEN METABOLIC PANEL: CPT

## 2020-01-08 PROCEDURE — 94640 AIRWAY INHALATION TREATMENT: CPT

## 2020-01-08 PROCEDURE — 36415 COLL VENOUS BLD VENIPUNCTURE: CPT

## 2020-01-08 PROCEDURE — 84145 PROCALCITONIN (PCT): CPT

## 2020-01-08 PROCEDURE — 99223 1ST HOSP IP/OBS HIGH 75: CPT | Performed by: INTERNAL MEDICINE

## 2020-01-08 PROCEDURE — 93005 ELECTROCARDIOGRAM TRACING: CPT | Performed by: EMERGENCY MEDICINE

## 2020-01-08 PROCEDURE — 0100U HC RESPIRPTHGN MULT REV TRANS & AMP PRB TECH 21 TRGT: CPT

## 2020-01-08 RX ORDER — BUMETANIDE 1 MG/1
1 TABLET ORAL 2 TIMES DAILY
Status: DISCONTINUED | OUTPATIENT
Start: 2020-01-08 | End: 2020-01-10 | Stop reason: HOSPADM

## 2020-01-08 RX ORDER — ACETAMINOPHEN 325 MG/1
650 TABLET ORAL EVERY 4 HOURS PRN
Status: DISCONTINUED | OUTPATIENT
Start: 2020-01-08 | End: 2020-01-10 | Stop reason: HOSPADM

## 2020-01-08 RX ORDER — LEVOTHYROXINE SODIUM 0.1 MG/1
300 TABLET ORAL DAILY
Status: DISCONTINUED | OUTPATIENT
Start: 2020-01-09 | End: 2020-01-10 | Stop reason: HOSPADM

## 2020-01-08 RX ORDER — DOCUSATE SODIUM 100 MG/1
100 CAPSULE, LIQUID FILLED ORAL DAILY PRN
Status: DISCONTINUED | OUTPATIENT
Start: 2020-01-08 | End: 2020-01-10 | Stop reason: HOSPADM

## 2020-01-08 RX ORDER — IPRATROPIUM BROMIDE AND ALBUTEROL SULFATE 2.5; .5 MG/3ML; MG/3ML
1 SOLUTION RESPIRATORY (INHALATION) EVERY 4 HOURS PRN
Status: DISCONTINUED | OUTPATIENT
Start: 2020-01-08 | End: 2020-01-10

## 2020-01-08 RX ORDER — FAMOTIDINE 20 MG/1
20 TABLET, FILM COATED ORAL 2 TIMES DAILY
Status: DISCONTINUED | OUTPATIENT
Start: 2020-01-08 | End: 2020-01-10 | Stop reason: HOSPADM

## 2020-01-08 RX ORDER — ESCITALOPRAM OXALATE 10 MG/1
10 TABLET ORAL DAILY
Status: DISCONTINUED | OUTPATIENT
Start: 2020-01-08 | End: 2020-01-10 | Stop reason: HOSPADM

## 2020-01-08 RX ORDER — FERROUS SULFATE 325(65) MG
325 TABLET ORAL
Status: DISCONTINUED | OUTPATIENT
Start: 2020-01-08 | End: 2020-01-10 | Stop reason: HOSPADM

## 2020-01-08 RX ORDER — BENZONATATE 100 MG/1
100 CAPSULE ORAL 2 TIMES DAILY PRN
Status: DISCONTINUED | OUTPATIENT
Start: 2020-01-08 | End: 2020-01-10 | Stop reason: HOSPADM

## 2020-01-08 RX ORDER — VIT B COMP NO.3/FOLIC/C/BIOTIN 1 MG-60 MG
1 TABLET ORAL
Status: DISCONTINUED | OUTPATIENT
Start: 2020-01-09 | End: 2020-01-10 | Stop reason: HOSPADM

## 2020-01-08 RX ORDER — CARBAMAZEPINE 200 MG/1
200 TABLET ORAL 2 TIMES DAILY
Status: DISCONTINUED | OUTPATIENT
Start: 2020-01-08 | End: 2020-01-10 | Stop reason: HOSPADM

## 2020-01-08 RX ORDER — SODIUM CHLORIDE 0.9 % (FLUSH) 0.9 %
10 SYRINGE (ML) INJECTION PRN
Status: DISCONTINUED | OUTPATIENT
Start: 2020-01-08 | End: 2020-01-10 | Stop reason: HOSPADM

## 2020-01-08 RX ORDER — POTASSIUM CHLORIDE 7.45 MG/ML
10 INJECTION INTRAVENOUS PRN
Status: DISCONTINUED | OUTPATIENT
Start: 2020-01-08 | End: 2020-01-10 | Stop reason: HOSPADM

## 2020-01-08 RX ORDER — ONDANSETRON 2 MG/ML
4 INJECTION INTRAMUSCULAR; INTRAVENOUS EVERY 6 HOURS PRN
Status: DISCONTINUED | OUTPATIENT
Start: 2020-01-08 | End: 2020-01-10 | Stop reason: HOSPADM

## 2020-01-08 RX ORDER — LEVOFLOXACIN 5 MG/ML
750 INJECTION, SOLUTION INTRAVENOUS EVERY 24 HOURS
Status: DISCONTINUED | OUTPATIENT
Start: 2020-01-08 | End: 2020-01-09

## 2020-01-08 RX ORDER — POTASSIUM CHLORIDE 20 MEQ/1
40 TABLET, EXTENDED RELEASE ORAL PRN
Status: DISCONTINUED | OUTPATIENT
Start: 2020-01-08 | End: 2020-01-10 | Stop reason: HOSPADM

## 2020-01-08 RX ORDER — SODIUM CHLORIDE 0.9 % (FLUSH) 0.9 %
10 SYRINGE (ML) INJECTION EVERY 12 HOURS SCHEDULED
Status: DISCONTINUED | OUTPATIENT
Start: 2020-01-08 | End: 2020-01-10 | Stop reason: HOSPADM

## 2020-01-08 RX ADMIN — BUMETANIDE 1 MG: 1 TABLET ORAL at 21:48

## 2020-01-08 RX ADMIN — IPRATROPIUM BROMIDE AND ALBUTEROL SULFATE 3 ML: .5; 3 SOLUTION RESPIRATORY (INHALATION) at 21:55

## 2020-01-08 RX ADMIN — CARBAMAZEPINE 200 MG: 200 TABLET ORAL at 21:49

## 2020-01-08 RX ADMIN — ONDANSETRON 4 MG: 2 INJECTION INTRAMUSCULAR; INTRAVENOUS at 22:28

## 2020-01-08 RX ADMIN — FERROUS SULFATE TAB 325 MG (65 MG ELEMENTAL FE) 325 MG: 325 (65 FE) TAB at 21:48

## 2020-01-08 RX ADMIN — ESCITALOPRAM OXALATE 10 MG: 10 TABLET ORAL at 21:48

## 2020-01-08 RX ADMIN — LEVOFLOXACIN 750 MG: 5 INJECTION, SOLUTION INTRAVENOUS at 15:13

## 2020-01-08 RX ADMIN — Medication 10 ML: at 21:50

## 2020-01-08 ASSESSMENT — ENCOUNTER SYMPTOMS
BACK PAIN: 0
GASTROINTESTINAL NEGATIVE: 1
ABDOMINAL PAIN: 0
SHORTNESS OF BREATH: 1
SPUTUM PRODUCTION: 1
COUGH: 1
NAUSEA: 0
DIARRHEA: 0
EYES NEGATIVE: 1

## 2020-01-08 ASSESSMENT — PAIN SCALES - GENERAL: PAINLEVEL_OUTOF10: 0

## 2020-01-08 NOTE — H&P
[address]      [address]     HISTORY AND PHYSICAL EXAMINATION            Date:   1/8/2020  Patient name:  Naya Canchola  Date of admission:  1/8/2020  1:47 PM  MRN:   [id number]  Account:  [de-identified]  YOB: 1970  PCP:    Keith Rajan DO  Room:   08/08  Code Status:    Prior    Chief Complaint:     Chief Complaint   Patient presents with    Shortness of Breath   Pneumonia     History Obtained From:     patient    History of Present Illness: The patient is a 54 y.o. Non-/non  female who presents withShortness of Breath   and she is admitted to the hospital for the management of  Pneumonia. Patient was recently seen [facility] about a week ago due to similar complaints and was treated for pneumonia. Discharged with Levaquin for 7 days. Patient today states that she has not been feeling well and that her pneumonia has not went away. She is still complaining of a dry and a productive cough at the same time. She complains about intermittent shortness of breath especially at nighttime and states that she requires 3 L oxygen and is on ventilator at nighttime.   patient denies chills or fever any other complaints at the moment        Past Medical History:     Past Medical History:   Diagnosis Date    Anemia     Disease of blood and blood forming organ     History of breast cancer     History of chemotherapy     Hypothyroidism     Lymphedema     Chronic    Morbid obesity ([address]     Respiratory failure ([address]     Tracheostomy present [facility])         Past SurgicalHistory:     Past Surgical History:   Procedure Laterality Date    BRONCHOSCOPY N/A 1/3/2020    BRONCHOSCOPY ATTEMPTED performed by Georgette Zuniga MD at [place] [address], MODIFIED RADICAL Right 2013    TRACHEOSTOMY          Medications Prior to Admission:        Prior to Admission medications    Medication Sig Start Date End Date Taking? Authorizing Provider   mineral oil-hydrophilic petrolatum (AQUAPHOR) ointment Apply topically as needed.  1/4/20   Martin Warner MD   escitalopram (LEXAPRO) 10 MG tablet Take 1 tablet by mouth daily 1/5/20   Roland Fuentes MD   levofloxacin [facility]) 750 MG tablet Take 1 tablet by mouth daily for 3 days 1/5/20 1/8/20  Roland Fuentes MD   B complex-vitamin C-folic acid (NEPHRO-YOANDY) 1 MG tablet Take 1 tablet by mouth daily (with breakfast)    Historical Provider, MD   carBAMazepine (TEGRETOL) 200 MG tablet Take 1 tablet by mouth 2 times daily 9/15/19   Marina Sotelo MD   bumetanide (BUMEX) 1 MG tablet Take 1 tablet by mouth 2 times daily 9/15/19   Marina Sotelo MD   bisacodyl (DULCOLAX) 5 MG EC tablet Take 5 mg by mouth 2 times daily as needed for Constipation    Historical Provider, MD   docusate sodium (COLACE) 100 MG capsule Take 100 mg by mouth daily as needed for Constipation    Historical Provider, MD   loratadine (CLARITIN) 10 MG tablet Take 10 mg by mouth daily    Historical Provider, MD   magnesium hydroxide (MILK OF MAGNESIA) 400 MG/5ML suspension Take 30 mLs by mouth daily as needed for Constipation    Historical Provider, MD   Camphor-Eucalyptus-Menthol (VICKS VAPORUB) 4.7-1.2-2.6 % OINT Apply topically every 6 hours as needed (congestion)    Historical Provider, MD   benzonatate (TESSALON) 100 MG capsule Take 100 mg by mouth 2 times daily as needed for Cough    Historical Provider, MD   acetaminophen (TYLENOL) 325 MG tablet Take 650 mg by mouth every 6 hours as needed for Pain    Historical Provider, MD   ipratropium-albuterol (DUONEB) 0.5-2.5 (3) MG/3ML SOLN nebulizer solution Inhale 1 vial into the lungs every 4 hours as needed for Shortness of Breath    Historical Provider, MD   levothyroxine (SYNTHROID) 300 MCG tablet Take 300 mcg by mouth daily     Historical Provider, MD   ferrous sulfate 325 (65 Fe) MG tablet Take 325 mg by mouth 3 times daily (with meals)    Historical Provider, MD   Multiple Vitamins-Minerals (THERAPEUTIC MULTIVITAMIN-MINERALS) tablet Take 1 tablet by mouth daily (with breakfast)    Historical Provider, MD        Allergies:     Zosyn [piperacillin-tazobactam in dex] and Vancomycin    Social History:     Tobacco:    reports that she has never smoked. She has never used smokeless tobacco.  Alcohol:      reports no history of alcohol use. Drug Use:  reports no history of drug use. Family History:     Family History   Problem Relation Age of Onset    Breast Cancer Paternal Aunt     Breast Cancer Paternal Cousin 43    Breast Cancer Paternal Cousin 37    Breast Cancer Paternal Cousin 46       Review of Systems:     Positive and Negative as described in HPI. Review of Systems   Constitutional: Negative for activity change and appetite change. HENT: Negative for congestion. Eyes: Negative for visual disturbance. Respiratory: Positive for cough and shortness of breath. Cardiovascular: Negative for chest pain. Gastrointestinal: Negative for diarrhea and nausea. Endocrine: Negative for polyuria. Genitourinary: Negative for urgency. Neurological: Negative for headaches. Psychiatric/Behavioral: Negative for agitation, behavioral problems, confusion and decreased concentration. Physical Exam:   BP (!) 145/95   Pulse 91   Temp 97.9 °F (36.6 °C) (Axillary)   Resp 15   Ht 5' 5\" (1.651 m)   Wt (!) 630 lb (285.8 kg)   SpO2 98%   .84 kg/m²   Temp (24hrs), Av.9 °F (36.6 °C), Min:97.9 °F (36.6 °C), Max:97.9 °F (36.6 °C)    No results for input(s): POCGLU in the last 72 hours. Intake/Output Summary (Last 24 hours) at 2020 1704  Last data filed at 2020 1643  Gross per 24 hour   Intake 150 ml   Output --   Net 150 ml       Physical Exam  Constitutional:       Appearance: She is obese.       Comments: Patient is morbidly obese   HENT:      Head: Normocephalic and failure with superimposed infection. Congestive heart failure. SEDATION: Local anesthesia FLUOROSCOPY DOSE AND TYPE OR TIME AND EXPOSURES: None TECHNIQUE: Patient is morbidly obese and could not be placed on the fluoroscopy table in interventional radiology; the procedure was performed with the patient in bed and portable chest x-rays for position. Left upper extremity access was used due to patient's status post right mastectomy and lymph node dissection. Universal protocol was observed. The left arm was prepped and draped in sterile fashion using maximum sterile barrier technique. Local anesthesia was achieved with lidocaine. A micropuncture needle was used to access the left basilic vein using ultrasound guidance; due to morbid obesity and vein depth, puncture near the antecubital fossa was required. An ultrasound image demonstrating patency of the vein with needle tip located within it. An image was obtained and stored in PACs. A 0.018 guidewire was then used to place a peel-a-way sheath and a 55 cm dual-lumen PICC was advanced with fluoroscopic guidance with the tip at the cavo-atrial junction. No PICC longer and 55 cm is available. The catheter flushed easily and there was a good blood return. The catheter was secured to the skin. The patient tolerated the procedure well and there were no immediate complications. FINDINGS: Fluoroscopic image demonstrates the tip of the catheter in the upper SVC. Successful ultrasound and fluoroscopy guided bedside PICC placement with portable chest x-rays for determining tip position. Tip in the upper SVC due to limitations patient body habitus and need to use the left arm. PICC is satisfactory for use at this time.      Xr Chest Portable    Result Date: 1/8/2020  EXAMINATION: ONE XRAY VIEW OF THE CHEST 1/8/2020 2:12 pm COMPARISON: January 4, 2020, December 31, 2019 HISTORY: ORDERING SYSTEM PROVIDED HISTORY: Chest Pain TECHNOLOGIST PROVIDED HISTORY: Chest Pain Reason for Exam: SOB Acuity: Unknown Type of Exam: Unknown FINDINGS: Tracheostomy tube projects over the tracheal air column. Right subclavian Port-A-Cath appears unchanged with the tip at the level of the proximal SVC. The left PICC line is no longer visualized. Bilateral consolidative opacities are again demonstrated without appreciable change. No pneumothorax or significant effusion. Surgical clips project over the right axilla. 1.  No significant change in bilateral airspace disease, which may represent pulmonary edema, inflammatory process or infection. 2.  Previously seen left PICC line is no longer visualized, which may have been removed or retracted. Xr Chest Portable    Result Date: 1/4/2020  EXAMINATION: ONE XRAY VIEW OF THE CHEST 1/4/2020 2:11 pm COMPARISON: 12/31/2019 HISTORY: ORDERING SYSTEM PROVIDED HISTORY: multifocal pneumonia TECHNOLOGIST PROVIDED HISTORY: multifocal pneumonia Reason for Exam: follow up multifocal pneumonia. Acuity: Acute Type of Exam: Initial FINDINGS: Tracheostomy in good position. Left upper extremity PICC terminating near the confluence of innominate vein and SVC. Right subclavian port without kink or discontinuity. Multifocal airspace disease and cardiomegaly, similar to prior exam.  No pneumothorax. Postsurgical changes right axilla. No acute osseous abnormality. Multifocal airspace disease similar to prior exam.     Xr Chest Portable    Result Date: 12/31/2019  EXAMINATION: ONE XRAY VIEW OF THE CHEST 12/31/2019 8:03 am COMPARISON: AP chest from 12/30/2019 HISTORY: ORDERING SYSTEM PROVIDED HISTORY: S/P PICC central line placement TECHNOLOGIST PROVIDED HISTORY: PICC line placement History of breast cancer, congestive heart failure, respiratory failure, morbid obesity, and sepsis. FINDINGS: Left-sided PICC with its tip in the upper SVC. Right subclavian port kinked at its entry site with its tip in the right brachiocephalic vein. Clips right axilla. Overlying ECG monitor leads and a necklace. Tracheostomy tube in unchanged position. Cardiomediastinal shadow difficult to assess due slight rotation but appears unchanged. Patchy opacities both lungs primarily in a perihilar distribution and denser on the right and somewhat more extensive. Latter findings are similar. No pneumothorax. No large pleural effusion. Bones unchanged. Status post left-sided PICC with tip in the upper SVC. Worsening multifocal opacities, greater on the right, and most likely pneumonia. Pulmonary edema should also be considered. Examination otherwise unchanged. Xr Chest Portable    Result Date: 12/30/2019  EXAMINATION: ONE XRAY VIEW OF THE CHEST 12/30/2019 11:54 am COMPARISON: September 14, 2019 HISTORY: ORDERING SYSTEM PROVIDED HISTORY: shortness of breath TECHNOLOGIST PROVIDED HISTORY: shortness of breath Reason for Exam: shortness of breath and chest mara Acuity: Unknown Type of Exam: Initial FINDINGS: Right-sided port in good position. Tracheostomy. Cardiomegaly. Shallow inspiration. Multifocal pulmonary opacities with central congestion worsened from prior exam.  Questionable trace effusions. No definite pneumothorax. Worsening multifocal opacities, possibly pneumonia versus edema.        Assessment :      Primary Problem  <principal problem not specified>    Active Hospital Problems    Diagnosis Date Noted    Pneumonia [J18.9] 01/08/2020       Plan:     Patient status Admit as inpatient in the  Progressive Unit/Step down    Pneumonia/Pulmonary edema  CXR: No significant change in bilateral airspace disease which may present pulmonary edema, inflammatory process or infection  Levaquin 750 mg IV daily  Duonebs  10 L O2 via trach  WBC wnl  Procal pending  CRP pending  Lactic acid pending   BNP pending  Respiratory viral panel pending  S.pneumo antigen pending  Blood cultures pending  Pulmonology on board    Chronic CHF  Bumex 1 mg po benzonatate, bisacodyl, docusate sodium, ipratropium-albuterol, sodium chloride flush, magnesium hydroxide, ondansetron, potassium chloride **OR** potassium alternative oral replacement **OR** potassium chloride, acetaminophen        [facility] [address]    Phone [phone]   Fax: [phone]  Answering Service: [phone]

## 2020-01-08 NOTE — ED NOTES
Dr. Lidya Méndez requesting second IV placement. Unsuccessful IV attempt x1. Pt requesting not to have IV placed in her left wrist.    Pt had right breat cancer and states she doesn't allow IVs in her right arm.        Melinda Morris RN  01/08/20 169 [facility] Jone Buckner RN  01/08/20 1302

## 2020-01-08 NOTE — PROGRESS NOTES
Medication History completed:    New medications: menthol gel    Medications discontinued: B complex vitamin, levofloxacin    Changes to dosing:   Levothyroxine changed to 500 mcg daily    Stated allergies: As listed    Other pertinent information: Medications confirmed with facility MAR.      Thank you,  Keya Peace PharmD, BCPS  [phone]

## 2020-01-08 NOTE — ED PROVIDER NOTES
Tracheostomy tube projects over the tracheal air column. Right subclavian Port-A-Cath appears unchanged with the tip at the level of the proximal SVC. The left PICC line is no longer visualized. Bilateral consolidative opacities are again demonstrated without appreciable change. No pneumothorax or significant effusion. Surgical clips project over the right axilla. 1.  No significant change in bilateral airspace disease, which may represent pulmonary edema, inflammatory process or infection. 2.  Previously seen left PICC line is no longer visualized, which may have been removed or retracted. OUTSTANDING TASKS / RECOMMENDATIONS:    1. Lab work-up    ED COURSE AFTER ASSUMING CARE:     ED Course as of Jan 08 2205   Wed Jan 08, 2020   1609 Spoke to Dr. Aria Ford who accepted admission would like me to page residents. [CK]   1629 Spoke to Dr. Gavin Cruz. He would like us to attempt a 2nd IV line at this time. Aware of admission.     [CK]      ED Course User Index  [CK] Anand Boyd MD        FINAL IMPRESSION:     1.  Pneumonia due to organism        DISPOSITION:         DISPOSITION:  []  Discharge   []  Transfer -    [x]  Admission -     []  Against Medical Advice   []  Cora Reed MD  Emergency Medicine Attending  [facility]       Anand Boyd MD  01/08/20 9504

## 2020-01-08 NOTE — ED NOTES
Mode of arrival (squad #, walk in, police, etc) : wiliam         Chief complaint(s): SOB        Arrival Note (brief scenario, treatment PTA, etc). : pt states she has been sick for close to a month now. Pt states she was admitted recently for pneumonia. Pt states she was sent home on a vent and they don't place her on a vent at [facility]. Pt states they didn't try to wean her first from the vent. Pt states she uses a trach vent at night, but has recently been using it during the day because of SOB and low O2 saturation. Pt denies any chest pain or pain in general. Pt denies any cough. Pt states she is also feeling more weak than normal. Pt is alert and oriented x4.         C= \"Have you ever felt that you should Cut down on your drinking? \"  No  A= \"Have people Annoyed you by criticizing your drinking? \"  No  G= \"Have you ever felt bad or Guilty about your drinking? \"  No  E= \"Have you ever had a drink as an Eye-opener first thing in the morning to steady your nerves or to help a hangover? \"  No      Deferred []      Reason for deferring: N/A    *If yes to two or more: probable alcohol abuse. Jhonathan Powell RN  01/08/20 0563

## 2020-01-08 NOTE — ED PROVIDER NOTES
EMERGENCY DEPARTMENT ENCOUNTER    Pt Name: Dc Davila  MRN: [id number]  [place] 1970  Date of evaluation: 1/8/20  CHIEF COMPLAINT       Chief Complaint   Patient presents with    Shortness of Breath     HISTORY OF PRESENT ILLNESS   The pt presents for evaluation of shortness of breath. Ongoing since her recent admission. Pt was admitted recently for pneumonia. She is severely morbidly obese and is trach dependent and utilizes a vent every night. Pt reports short episodes of hypoxia and feeling like she has some mucous in her chest.  She is concerned about a possible pneumonia. No recent fevers. The history is provided by the patient. Shortness of Breath   Severity:  Moderate  Onset quality:  Gradual  Duration: few days. Timing:  Intermittent  Progression:  Waxing and waning  Chronicity:  New  Relieved by:  Nothing  Worsened by:  Nothing  Ineffective treatments:  None tried  Associated symptoms: cough and sputum production    Associated symptoms: no abdominal pain, no chest pain, no fever, no headaches and no rash        REVIEW OF SYSTEMS     Review of Systems   Constitutional: Negative. Negative for chills and fever. HENT: Positive for congestion. Eyes: Negative. Respiratory: Positive for cough, sputum production and shortness of breath. Cardiovascular: Negative. Negative for chest pain. Gastrointestinal: Negative. Negative for abdominal pain. Genitourinary: Negative. Musculoskeletal: Negative. Negative for back pain. Skin: Negative. Negative for rash. Neurological: Negative. Negative for headaches. All other systems reviewed and are negative.     PASTMEDICAL HISTORY     Past Medical History:   Diagnosis Date    Anemia     Disease of blood and blood forming organ     History of breast cancer     History of chemotherapy     Hypothyroidism     Lymphedema     Chronic    Morbid obesity ([address]     Respiratory failure ([address]     Tracheostomy present ([address] components within normal limits   C-REACTIVE PROTEIN - Abnormal; Notable for the following components:    CRP 28.0 (*)     All other components within normal limits   CULTURE BLOOD #1   CULTURE BLOOD #1   RESPIRATORY VIRUS PCR PANEL   STREP PNEUMONIAE ANTIGEN   LACTIC ACID   PROCALCITONIN   LACTIC ACID   BASIC METABOLIC PANEL W/ REFLEX TO MG FOR LOW K   CBC   BLOOD GAS, ARTERIAL       EMERGENCY DEPARTMENTCOURSE:         Vitals:    Vitals:    01/09/20 0515 01/09/20 0530 01/09/20 0545 01/09/20 0600   BP:    (!) 119/59   Pulse: 84 78 80 84   Resp: 14 13 14 14   Temp:       TempSrc:       SpO2: 97% 96% 96% 96%   Weight:       Height:           The patient was given the following medications while in the emergency department:  Orders Placed This Encounter   Medications    levofloxacin (LEVAQUIN) 750 MG/150ML infusion 750 mg    B complex-vitamin C-folic acid (NEPHRO-YOANDY) tablet 1 tablet    benzonatate (TESSALON) capsule 100 mg    bisacodyl (DULCOLAX) EC tablet 5 mg    bumetanide (BUMEX) tablet 1 mg    carBAMazepine (TEGRETOL) tablet 200 mg    docusate sodium (COLACE) capsule 100 mg    escitalopram (LEXAPRO) tablet 10 mg    ferrous sulfate tablet 325 mg    ipratropium-albuterol (DUONEB) nebulizer solution 3 mL    levothyroxine (SYNTHROID) tablet 300 mcg    sodium chloride flush 0.9 % injection 10 mL    sodium chloride flush 0.9 % injection 10 mL    magnesium hydroxide (MILK OF MAGNESIA) 400 MG/5ML suspension 30 mL    ondansetron (ZOFRAN) injection 4 mg    enoxaparin (LOVENOX) injection 40 mg    OR Linked Order Group     potassium chloride (KLOR-CON M) extended release tablet 40 mEq     potassium bicarb-citric acid (EFFER-K) effervescent tablet 40 mEq     potassium chloride 10 mEq/100 mL IVPB (Peripheral Line)    famotidine (PEPCID) tablet 20 mg    acetaminophen (TYLENOL) tablet 650 mg    miconazole (MICOTIN) 2 % vaginal cream 1 applicator     CONSULTS:  IP CONSULT TO PULMONOLOGY  IP CONSULT TO

## 2020-01-09 ENCOUNTER — APPOINTMENT (OUTPATIENT)
Dept: GENERAL RADIOLOGY | Age: 50
DRG: 145 | End: 2020-01-09
Payer: MEDICAID

## 2020-01-09 LAB
ADENOVIRUS PCR: NOT DETECTED
ANION GAP SERPL CALCULATED.3IONS-SCNC: 12 MMOL/L (ref 9–17)
BORDETELLA PARAPERTUSSIS: NOT DETECTED
BORDETELLA PERTUSSIS PCR: NOT DETECTED
BUN BLDV-MCNC: 15 MG/DL (ref 6–20)
BUN/CREAT BLD: ABNORMAL (ref 9–20)
CALCIUM SERPL-MCNC: 9 MG/DL (ref 8.6–10.4)
CHLAMYDIA PNEUMONIAE BY PCR: NOT DETECTED
CHLORIDE BLD-SCNC: 97 MMOL/L (ref 98–107)
CO2: 29 MMOL/L (ref 20–31)
CORONAVIRUS 229E PCR: NOT DETECTED
CORONAVIRUS HKU1 PCR: NOT DETECTED
CORONAVIRUS NL63 PCR: NOT DETECTED
CORONAVIRUS OC43 PCR: NOT DETECTED
CREAT SERPL-MCNC: 0.94 MG/DL (ref 0.5–0.9)
D-DIMER QUANTITATIVE: 1.74 MG/L FEU (ref 0–0.59)
DIRECT EXAM: NORMAL
EKG ATRIAL RATE: 92 BPM
EKG P AXIS: 42 DEGREES
EKG P-R INTERVAL: 180 MS
EKG Q-T INTERVAL: 368 MS
EKG QRS DURATION: 96 MS
EKG QTC CALCULATION (BAZETT): 455 MS
EKG R AXIS: 65 DEGREES
EKG T AXIS: 41 DEGREES
EKG VENTRICULAR RATE: 92 BPM
GFR AFRICAN AMERICAN: >60 ML/MIN
GFR NON-AFRICAN AMERICAN: >60 ML/MIN
GFR SERPL CREATININE-BSD FRML MDRD: ABNORMAL ML/MIN/{1.73_M2}
GFR SERPL CREATININE-BSD FRML MDRD: ABNORMAL ML/MIN/{1.73_M2}
GLUCOSE BLD-MCNC: 131 MG/DL (ref 70–99)
HCT VFR BLD CALC: 27.4 % (ref 36–46)
HEMOGLOBIN: 8.8 G/DL (ref 12–16)
HUMAN METAPNEUMOVIRUS PCR: NOT DETECTED
INFLUENZA A BY PCR: NOT DETECTED
INFLUENZA A H1 (2009) PCR: NORMAL
INFLUENZA A H1 PCR: NORMAL
INFLUENZA A H3 PCR: NORMAL
INFLUENZA B BY PCR: NOT DETECTED
Lab: NORMAL
MCH RBC QN AUTO: 29.9 PG (ref 26–34)
MCHC RBC AUTO-ENTMCNC: 32 G/DL (ref 31–37)
MCV RBC AUTO: 93.3 FL (ref 80–100)
MYCOPLASMA PNEUMONIAE PCR: NOT DETECTED
NRBC AUTOMATED: ABNORMAL PER 100 WBC
PARAINFLUENZA 1 PCR: NOT DETECTED
PARAINFLUENZA 2 PCR: NOT DETECTED
PARAINFLUENZA 3 PCR: NOT DETECTED
PARAINFLUENZA 4 PCR: NOT DETECTED
PDW BLD-RTO: 15 % (ref 11.5–14.9)
PLATELET # BLD: 219 K/UL (ref 150–450)
PMV BLD AUTO: 7.8 FL (ref 6–12)
POTASSIUM SERPL-SCNC: 4.8 MMOL/L (ref 3.7–5.3)
RBC # BLD: 2.94 M/UL (ref 4–5.2)
RESP SYNCYTIAL VIRUS PCR: NOT DETECTED
RHINO/ENTEROVIRUS PCR: NOT DETECTED
SODIUM BLD-SCNC: 138 MMOL/L (ref 135–144)
SPECIMEN DESCRIPTION: NORMAL
SPECIMEN DESCRIPTION: NORMAL
WBC # BLD: 4.4 K/UL (ref 3.5–11)

## 2020-01-09 PROCEDURE — 71045 X-RAY EXAM CHEST 1 VIEW: CPT

## 2020-01-09 PROCEDURE — 6360000002 HC RX W HCPCS: Performed by: STUDENT IN AN ORGANIZED HEALTH CARE EDUCATION/TRAINING PROGRAM

## 2020-01-09 PROCEDURE — 6370000000 HC RX 637 (ALT 250 FOR IP)

## 2020-01-09 PROCEDURE — 87070 CULTURE OTHR SPECIMN AEROBIC: CPT

## 2020-01-09 PROCEDURE — 97162 PT EVAL MOD COMPLEX 30 MIN: CPT

## 2020-01-09 PROCEDURE — 36415 COLL VENOUS BLD VENIPUNCTURE: CPT

## 2020-01-09 PROCEDURE — 87205 SMEAR GRAM STAIN: CPT

## 2020-01-09 PROCEDURE — 2700000000 HC OXYGEN THERAPY PER DAY

## 2020-01-09 PROCEDURE — 99233 SBSQ HOSP IP/OBS HIGH 50: CPT | Performed by: INTERNAL MEDICINE

## 2020-01-09 PROCEDURE — 6370000000 HC RX 637 (ALT 250 FOR IP): Performed by: INTERNAL MEDICINE

## 2020-01-09 PROCEDURE — 2060000000 HC ICU INTERMEDIATE R&B

## 2020-01-09 PROCEDURE — 97535 SELF CARE MNGMENT TRAINING: CPT

## 2020-01-09 PROCEDURE — 94761 N-INVAS EAR/PLS OXIMETRY MLT: CPT

## 2020-01-09 PROCEDURE — 94640 AIRWAY INHALATION TREATMENT: CPT

## 2020-01-09 PROCEDURE — 94002 VENT MGMT INPAT INIT DAY: CPT

## 2020-01-09 PROCEDURE — 85027 COMPLETE CBC AUTOMATED: CPT

## 2020-01-09 PROCEDURE — 82805 BLOOD GASES W/O2 SATURATION: CPT

## 2020-01-09 PROCEDURE — 2580000003 HC RX 258

## 2020-01-09 PROCEDURE — 5A1945Z RESPIRATORY VENTILATION, 24-96 CONSECUTIVE HOURS: ICD-10-PCS | Performed by: INTERNAL MEDICINE

## 2020-01-09 PROCEDURE — 80048 BASIC METABOLIC PNL TOTAL CA: CPT

## 2020-01-09 PROCEDURE — 94003 VENT MGMT INPAT SUBQ DAY: CPT

## 2020-01-09 PROCEDURE — 97166 OT EVAL MOD COMPLEX 45 MIN: CPT

## 2020-01-09 PROCEDURE — 85379 FIBRIN DEGRADATION QUANT: CPT

## 2020-01-09 RX ORDER — LEVOFLOXACIN 5 MG/ML
500 INJECTION, SOLUTION INTRAVENOUS EVERY 24 HOURS
Status: DISCONTINUED | OUTPATIENT
Start: 2020-01-09 | End: 2020-01-09

## 2020-01-09 RX ORDER — LEVOFLOXACIN 5 MG/ML
750 INJECTION, SOLUTION INTRAVENOUS EVERY 24 HOURS
Status: DISCONTINUED | OUTPATIENT
Start: 2020-01-09 | End: 2020-01-10 | Stop reason: HOSPADM

## 2020-01-09 RX ADMIN — MICONAZOLE NITRATE 1 APPLICATOR: 20 CREAM VAGINAL at 20:13

## 2020-01-09 RX ADMIN — BUMETANIDE 1 MG: 1 TABLET ORAL at 20:14

## 2020-01-09 RX ADMIN — ESCITALOPRAM OXALATE 10 MG: 10 TABLET ORAL at 09:14

## 2020-01-09 RX ADMIN — BUMETANIDE 1 MG: 1 TABLET ORAL at 09:13

## 2020-01-09 RX ADMIN — IPRATROPIUM BROMIDE AND ALBUTEROL SULFATE 3 ML: .5; 3 SOLUTION RESPIRATORY (INHALATION) at 09:04

## 2020-01-09 RX ADMIN — Medication 1 TABLET: at 09:13

## 2020-01-09 RX ADMIN — FERROUS SULFATE TAB 325 MG (65 MG ELEMENTAL FE) 325 MG: 325 (65 FE) TAB at 13:02

## 2020-01-09 RX ADMIN — Medication 10 ML: at 09:18

## 2020-01-09 RX ADMIN — Medication 10 ML: at 20:12

## 2020-01-09 RX ADMIN — LEVOFLOXACIN 750 MG: 5 INJECTION, SOLUTION INTRAVENOUS at 13:02

## 2020-01-09 RX ADMIN — FERROUS SULFATE TAB 325 MG (65 MG ELEMENTAL FE) 325 MG: 325 (65 FE) TAB at 16:33

## 2020-01-09 RX ADMIN — CARBAMAZEPINE 200 MG: 200 TABLET ORAL at 09:14

## 2020-01-09 RX ADMIN — CARBAMAZEPINE 200 MG: 200 TABLET ORAL at 20:14

## 2020-01-09 RX ADMIN — FERROUS SULFATE TAB 325 MG (65 MG ELEMENTAL FE) 325 MG: 325 (65 FE) TAB at 09:14

## 2020-01-09 ASSESSMENT — PAIN SCALES - GENERAL
PAINLEVEL_OUTOF10: 5
PAINLEVEL_OUTOF10: 0
PAINLEVEL_OUTOF10: 5

## 2020-01-09 ASSESSMENT — PAIN DESCRIPTION - PAIN TYPE
TYPE: ACUTE PAIN
TYPE: ACUTE PAIN

## 2020-01-09 ASSESSMENT — PULMONARY FUNCTION TESTS
PIF_VALUE: 26
PIF_VALUE: 27
PIF_VALUE: 14
PIF_VALUE: 27
PIF_VALUE: 28
PIF_VALUE: 25
PIF_VALUE: 27
PIF_VALUE: 26
PIF_VALUE: 27
PIF_VALUE: 26
PIF_VALUE: 35
PIF_VALUE: 26
PIF_VALUE: 28
PIF_VALUE: 27
PIF_VALUE: 26
PIF_VALUE: 28
PIF_VALUE: 26
PIF_VALUE: 27
PIF_VALUE: 26
PIF_VALUE: 27
PIF_VALUE: 28
PIF_VALUE: 29
PIF_VALUE: 26
PIF_VALUE: 27
PIF_VALUE: 27

## 2020-01-09 ASSESSMENT — PAIN DESCRIPTION - PROGRESSION
CLINICAL_PROGRESSION: NOT CHANGED
CLINICAL_PROGRESSION: NOT CHANGED

## 2020-01-09 ASSESSMENT — PAIN DESCRIPTION - FREQUENCY
FREQUENCY: INTERMITTENT
FREQUENCY: INTERMITTENT

## 2020-01-09 ASSESSMENT — PAIN DESCRIPTION - ORIENTATION
ORIENTATION: RIGHT
ORIENTATION: RIGHT

## 2020-01-09 ASSESSMENT — PAIN DESCRIPTION - DESCRIPTORS
DESCRIPTORS: NAGGING
DESCRIPTORS: NAGGING

## 2020-01-09 ASSESSMENT — PAIN DESCRIPTION - ONSET: ONSET: ON-GOING

## 2020-01-09 ASSESSMENT — ENCOUNTER SYMPTOMS
COUGH: 1
VOMITING: 0
SHORTNESS OF BREATH: 1
PHOTOPHOBIA: 0
DIARRHEA: 0
NAUSEA: 0

## 2020-01-09 ASSESSMENT — PAIN DESCRIPTION - LOCATION
LOCATION: ARM
LOCATION: ARM

## 2020-01-09 NOTE — PROGRESS NOTES
Pt refused ABG draw. RN at bedside stated that she is willing to have blood drawn later in the am. I am passing the ABG label onto the dayshift RT.

## 2020-01-09 NOTE — PROGRESS NOTES
Spoke to Dr. Felix Redmond via telephone, updated on increased pt anxiety when placed on CPAP pressure support and increased work of breathing. New orders include vent setting to [facility], [employer].

## 2020-01-09 NOTE — PROGRESS NOTES
Pt placed on cool humidified oxygen with trach mask interface with 10 LPM and 60% O2.  Pt was on trach mask with oxygen tubing directly to flowmeter at 10 LPM.

## 2020-01-09 NOTE — FLOWSHEET NOTE
01/09/20 1257   Encounter Summary   Services provided to: Patient   Referral/Consult From: Rounding   Complexity of Encounter Low   Length of Encounter 15 minutes   Spiritual/Cheondoism   Type Spiritual support   Assessment Sleeping   Intervention Prayer   Outcome Did not respond

## 2020-01-09 NOTE — CONSULTS
denied  sweats. She was concerned about her breathing and she was taken back to  the extended care facility. Her chest x-ray revealed bilateral  processes which could be infiltrates versus congestive changes. It is  about the same from the chest x-ray 01/04, markedly improved from 12/31. The patient is admitted to the internal medicine service to the  intermediate care unit. I have been asked to help assist the patient's  evaluation. PAST MEDICAL HISTORY:  Notable for that stated in the history of present  illness. The patient has a history of breast cancer status post  chemotherapy, history of hypothyroidism, chronic lymphedema, history of  morbid obesity. PAST SURGICAL HISTORY:  The patient had a modified radical mastectomy  back in 2013 on the right, history of a tracheostomy. MEDICATIONS AT HOME:  Include Bumex. The patient has been on Zyvox and  Levaquin in the past.  History of Synthroid. History of use of  enoxaparin twice daily, history of albuterol ipratropium as well. SOCIAL HISTORY:  Nonsmoker past or present. REVIEW OF SYSTEMS:  As per HPI, otherwise systems reviewed and negative. PHYSICAL EXAMINATION:  GENERAL:  The patient is a very pleasant morbidly obese 74-year-old  female. VITAL SIGNS:  Blood pressure 145/95, heart rate 97, respirations 18,  temperature 97.9, O2 saturations on the trach mask 10 liters 98%. Her  BMI is markedly elevated at 105. She reportedly is weighing in at 630  pounds which is 285 kilos. HEENT:  Tracheostomy in place. No supraclavicular lymph node  enlargement. LUNGS:  Some coarse breath sounds posteriorly. No crackles, rales or  wheezes. CARDIOVASCULAR:  Regular rhythm. ABDOMEN:  Soft. EXTREMITIES:  No edema. LABORATORY RESULTS:  BUN and creatinine 17/0. 89. Serum bicarb is 29. Albumin 3.3. White count 4.4, hemoglobin 9.6, hematocrit is 30.4,  platelet count of 446. IMPRESSION:  1. The patient has morbid obesity.   2.  History of severe sleep apnea status post trach. 3.  History of being on nocturnal ventilation, essentially BiPAP  pressure support of 12 with a PEEP of 5, FiO2 40%. 4.  Pneumonia versus congestive heart failure causing hypoxemia. PLAN:  1. Check BNP and check procalcitonin levels. 2.  We will set up the patient for nocturnal vent. 3.  Followup for any signs of respiratory decline. Of note, a 2D echo  done in 09/2019 reveals RVSP of 44 mmHg. [facility]  for the notification and consult.         Deborah Navarro    D: 01/08/2020 17:33:18       T: 01/08/2020 23:22:27     [email or web address]  Job#: [id number]     Doc#: [id number]    CC:

## 2020-01-09 NOTE — PROGRESS NOTES
Pulmonary Progress Note  [facility]      Patient - Rosa Tsai,  Age - 54 y.o.    - 1970      Room Number -    MRN -  [id number]   Acct # - [de-identified]  Date of Admission -  2020  1:47 PM    Consulting 1300 [address], [place]  [address] S, DO     SUBJECTIVE   Patient appears to be stable. No distress. Was placed on vent last night without difficulty. Now on oxygen trach collar. OBJECTIVE   VITALS    height is 5' 5\" (1.651 m) and weight is 630 lb (285.8 kg) (abnormal). Her axillary temperature is 98.4 °F (36.9 °C). Her blood pressure is 150/82 (abnormal) and her pulse is 91. Her respiration is 18 and oxygen saturation is 100%. Body mass index is 104.84 kg/m². Temperature Range: Temp: 98.4 °F (36.9 °C) Temp  Av.3 °F (36.8 °C)  Min: 97.9 °F (36.6 °C)  Max: 98.5 °F (36.9 °C)  BP Range:  Systolic (28XZA), BLM:511 , Min:101 , YTB:086     Diastolic (81WKK), OPO:82, Min:48, Max:95    Pulse Range: Pulse  Av.4  Min: 78  Max: 101  Respiration Range: Resp  Av.4  Min: 12  Max: 24  Current Pulse Ox[de-identified]  SpO2: 100 %  24HR Pulse Ox Range:  SpO2  Av.9 %  Min: 89 %  Max: 100 %  Oxygen Amount and Delivery: O2 Flow Rate (L/min): 10 L/min    Wt Readings from Last 3 Encounters:   20 (!) 630 lb (285.8 kg)   19 (!) 632 lb (286.7 kg)   10/30/19 (!) 625 lb 12.8 oz (283.9 kg)       I/O (24 Hours)    Intake/Output Summary (Last 24 hours) at 2020 1121  Last data filed at 2020 9213  Gross per 24 hour   Intake 150 ml   Output 700 ml   Net -550 ml       EXAM     General Appearance  Awake, alert, oriented, in no acute distress  HEENT - normocephalic, atraumatic. Neck - Supple,  trachea midline   Lungs -coarse breath sounds no crackles rales or wheeze  Heart Exam:PMI normal. No lifts, heaves, or thrills. RRR. No murmurs, clicks, gallops, or rubs  Abdomen Exam: Abdomen soft, non-tender.  BS normal. Extremity Exam: Lymphedema present    MEDS      levofloxacin  750 mg Intravenous Q24H    B complex-vitamin C-folic acid  1 tablet Oral Daily with breakfast    bumetanide  1 mg Oral BID    carBAMazepine  200 mg Oral BID    escitalopram  10 mg Oral Daily    ferrous sulfate  325 mg Oral TID WC    levothyroxine  300 mcg Oral Daily    sodium chloride flush  10 mL Intravenous 2 times per day    enoxaparin  40 mg Subcutaneous BID    famotidine  20 mg Oral BID    miconazole  1 applicator Vaginal Nightly       benzonatate, bisacodyl, docusate sodium, ipratropium-albuterol, sodium chloride flush, magnesium hydroxide, ondansetron, potassium chloride **OR** potassium alternative oral replacement **OR** potassium chloride, acetaminophen    LABS   CBC   Recent Labs     01/09/20  0600   WBC 4.4   HGB 8.8*   HCT 27.4*   MCV 93.3        BMP:   Lab Results   Component Value Date     01/09/2020    K 4.8 01/09/2020    CL 97 01/09/2020    CO2 29 01/09/2020    BUN 15 01/09/2020    LABALBU 3.3 01/08/2020    CREATININE 0.94 01/09/2020    CALCIUM 9.0 01/09/2020    GFRAA >60 01/09/2020    LABGLOM >60 01/09/2020     ABGs:  Lab Results   Component Value Date    PHART 7.351 09/14/2019    PO2ART 160.0 09/14/2019    PRQ0FJP 59.2 09/14/2019      Lab Results   Component Value Date    MODE NOT REPORTED 12/30/2019     Ionized Calcium:  No results found for: IONCA  Magnesium:  No results found for: MG  Phosphorus:  No results found for: PHOS     LIVER PROFILE   Recent Labs     01/08/20  1427   AST 65*   ALT 87*   BILITOT 0.24*   ALKPHOS 149*     INR No results for input(s): INR in the last 72 hours.   PTT   Lab Results   Component Value Date    APTT 38.7 (H) 11/18/2018         RADIOLOGY     (See actual reports for details)    ASSESSMENT/PLAN     Patient Active Problem List   Diagnosis    Shortness of breath    Hypothyroidism    Bronchitis    Acute respiratory failure with hypoxia and hypercapnia (HCC)    Class 3

## 2020-01-09 NOTE — PLAN OF CARE
Problem: Falls - Risk of:  Goal: Will remain free from falls  Description  Will remain free from falls  Outcome: Ongoing     Problem: Falls - Risk of:  Goal: Absence of physical injury  Description  Absence of physical injury  Outcome: Ongoing     Problem: Risk for Impaired Skin Integrity  Goal: Tissue integrity - skin and mucous membranes  Description  Structural intactness and normal physiological function of skin and  mucous membranes.   Outcome: Ongoing     Problem: Infection - Ventilator-Associated Pneumonia:  Goal: Prevent a ventilator associated event (VAE)  Description  Prevent a ventilator associated event (VAE)  Outcome: Ongoing     Problem: Infection - Ventilator-Associated Pneumonia:  Goal: Absence of pulmonary infection  Description  Absence of pulmonary infection  Outcome: Ongoing     Problem: Skin Integrity:  Goal: Will show no infection signs and symptoms  Description  Will show no infection signs and symptoms  Outcome: Ongoing     Problem: Skin Integrity:  Goal: Absence of new skin breakdown  Description  Absence of new skin breakdown  Outcome: Ongoing

## 2020-01-09 NOTE — PROGRESS NOTES
Normotonic     LUE AROM (degrees)  LUE AROM : WFL     Left Hand AROM (degrees)  Left Hand AROM: WFL  RUE Strength  Gross RUE Strength: Exceptions to [facility]  R Hand General: 4/5  RUE Strength Comment: Shoulder 2-/5, elbow 4/5      RUE Tone: Normotonic  RUE PROM (degrees)  RUE PROM: WFL  RUE AROM (degrees)  RUE AROM : Exceptions  RUE General AROM: Shoulder AROM ~20%. Elbow WFL. Right Hand AROM (degrees)  Right Hand AROM: WFL    Fine Motor Skills  Coordination  Movements Are Fluid And Coordinated: No  Coordination and Movement description: Right UE, Gross motor impairments(reports [facility] with R shoulder due to hx of breast cancer)                           Mobility          Balance  Sitting Balance: Unable to assess(comment)(Pt reports too weak at this time)        Bed mobility  Comment: Per RN Eugenia Padron, pt is 2 assist to roll R and 1 assist to roll L. Pt just finished bed mobility to change sheets with nursing - pt declines bed mobility again at this time. Functional Activity Tolerance  Functional Activity Tolerance: Tolerates 10 - 20 min exercise with multiple rests  Additional Comments: Unable to tolerate rolling with therapy due to just performing rolling with nursing for repositioning   Assessment  Performance deficits / Impairments: Decreased functional mobility , Decreased ROM, Decreased ADL status, Decreased strength, Decreased endurance, Decreased balance  Treatment Diagnosis: Impaired self-care status.   Prognosis: Fair  Decision Making: Medium Complexity  REQUIRES OT FOLLOW UP: Yes  Activity Tolerance: Patient Tolerated treatment well, Patient limited by fatigue         Functional Outcome Measures  AM-PAC Daily Activity Inpatient   How much help for putting on and taking off regular lower body clothing?: Total  How much help for Bathing?: A Lot  How much help for Toileting?: Total  How much help for putting on and taking off regular upper body clothing?: A Lot  How much help for taking care of personal grooming?: A Little  How much help for eating meals?: None  AM-PAC Inpatient Daily Activity Raw Score: 13  AM-PAC Inpatient ADL T-Scale Score : 32.03  ADL Inpatient CMS 0-100% Score: 63.03  ADL Inpatient CMS G-Code Modifier : CL       Goals  Patient Goals   Patient goals : To return to [facility] for continued therapy  Short term goals  Time Frame for Short term goals: By discharge  Short term goal 1: Pt will perform UB bathing/dressing with SBA and LB bathing/dressing with Mod A and use of AE/modified techniques as needed. Short term goal 2: Pt will perform rolling to either side with Mod A for increased participation in self-care and repositioning. Short term goal 3: Pt will V/D Good understanding of HEP for BUE strengthening and R shoulder AROM. Short term goal 4: Pt will actively participate in 15-20 minutes of therapeutic exercise/functional activities to promote increased IND with self-care and mobility.     Plan  Safety Devices  Safety Devices in place: Yes  Type of devices: Call light within reach, Patient at risk for falls, Left in bed, Nurse notified     Plan  Times per week: 2-4  Times per day: Daily  Current Treatment Recommendations: Self-Care / ADL, Positioning, Strengthening, ROM, Functional Mobility Training, Endurance Training, Safety Education & Training, Patient/Caregiver Education & Training, Equipment Evaluation, Education, & procurement       Equipment Recommendations  Equipment Needed: (TBD)  OT Individual Minutes  Time In: 2108  Time Out: 0202  Minutes: 29    Electronically signed by Joel Rizvi OT on 1/9/20 at 3:38 PM

## 2020-01-09 NOTE — PROGRESS NOTES
Pt transfer to ICU room 2004 from ED. Pt transported in bed with portable monitor. Pt hooked to portable monitor, vital signs taken, assessment completed.

## 2020-01-09 NOTE — PROGRESS NOTES
surgical history that includes tracheostomy; Mastectomy, modified radical (Right, 2013); and bronchoscopy (N/A, 1/3/2020). Restrictions  Restrictions/Precautions  Restrictions/Precautions: Fall Risk, Contact Precautions(no Blood draws/BP R arm; 630#)  Required Braces or Orthoses?: No  Implants present? : Metal implants(LLE/foot rods/screws from ankle fusion)  Position Activity Restriction  Other position/activity restrictions: up as tolerated  Vision/Hearing  Vision: Within Functional Limits  Hearing: Within functional limits     Subjective  General  Chart Reviewed: Yes  Patient assessed for rehabilitation services?: Yes  Additional Pertinent Hx: chronic trach, nocturnal vent, R mastectomy/breast CA, lymphedema  Family / Caregiver Present: No  Referring Practitioner: Carmela Pope MD  Referral Date : 01/08/20  Diagnosis: PNA  Follows Commands: Within Functional Limits  Subjective  Subjective: Pt in bed, agreeable to PT. Just finished rolling/repositioning to change sheets per RN Daniel Cevallos. Pain Screening  Patient Currently in Pain: Yes  Pain Assessment  Pain Assessment: 0-10  Pain Level: 5  Pain Type: Acute pain  Pain Location: Arm  Pain Orientation: Right  Pain Descriptors: Nagging  Pain Frequency: Intermittent  Pain Onset: On-going  Clinical Progression: Not changed  Non-Pharmaceutical Pain Intervention(s): Ambulation/Increased Activity; Rest;Repositioned  Response to Pain Intervention: Patient Satisfied  Vital Signs  Patient Currently in Pain: Yes  Oxygen Therapy  SpO2: 100 %  O2 Device: Trach mask  Patient Observation  Observations: LLE>RLE edema, multiple skin folds, hardened skin on B LE to ankles, trach mask       Orientation  Orientation  Overall Orientation Status: Within Normal Limits  Social/Functional History  Social/Functional History  Type of Home: Facility([facility])  Home Layout: One level  Receives Help From: Personal care attendant  ADL Assistance: Needs assistance(Light A with bathing/dressing, toileting)  Homemaking Assistance: (facility provides)  Homemaking Responsibilities: No  Ambulation Assistance: (non ambulatory at this time)  Transfer Assistance: Needs assistance(jeanna lift)  Active : No  Additional Comments: Pt is jeanna lifted OOB to bariatric w/c. Showed writer picture of pt standing without support a few months ago. Was having 5-6x/week PT OT at [facility] prior to getting sick. Has not had therapy in past 2 months. Cognition        Objective          AROM RLE (degrees)  RLE General AROM: AAROM limited by R hip pain and large body habitus, ankle WFL  AROM LLE (degrees)  LLE General AROM: AAROM limited by large body habitus, ankle limited d/t h/o fusion per pt  AROM RUE (degrees)  RUE AROM : WFL  RUE General AROM: except R shldr flex 0~90 degrees d/t h/o mastectomy  AROM LUE (degrees)  LUE AROM : WFL  Strength RLE  Comment: limited assessment d/t body habitus, but demo's at least 2-/5  Strength LLE  Comment: limited assessment d/t body habitus, but demo's at least 2-/5  Strength RUE  Strength RUE: WFL  Strength LUE  Strength LUE: WNL     Sensation  Overall Sensation Status: Impaired(R wrist/hand N/T from carpal tunnel)  Bed mobility  Comment: Per RN Quinton Flower, pt is 2 assist to roll R and 1 assist to roll L. Pt just finished bed mobility to change sheets with nursing - pt declines bed mobility again at this time. Transfers  Sit to Stand: Unable to assess  Stand to sit: Unable to assess  Comment: Uses jeanna lift for past 2 months to get OOB at [facility]. Ambulation  Ambulation?: No(pt is non ambulatory)  Stairs/Curb  Stairs?: No(not a goal)     Balance  Posture: Fair  Comments: Fair posture in bed, pt has HOB to ~80 degrees. Further assess balance in future with HOB lowered and pt in long sitting position. Other exercises  Other exercises?: Yes  Other exercises 1: Education on exercises in bed to continue muscle activation and maintain strength.      Plan   Plan  Times per week:

## 2020-01-09 NOTE — PROGRESS NOTES
[address] [facility].    PROGRESS NOTE             1/9/2020    9:08 AM    Name:   Genny Sharif  MRN:     [id number]     Acct:      [de-identified]   Room:   2004/2004-01  IP Day:  1  Admit Date:  1/8/2020  1:47 PM    PCP:  Mike Maguire DO  Code Status:  Full Code    Subjective:     C/C:   Chief Complaint   Patient presents with    Shortness of Breath     Interval History Status: improved. Patient seen and examined at bedside. History taken and physical exam conducted. Findings discussed with attending. Patient refused to get ABGs and other blood drawn for lab work. Patient states that she is doing the same as yesterday but does not complain of shortness of breath. She states that she still has the cough. Patient is resting comfortably in the bed and has no complaints or concerns except for a general diet order. Review of Systems:     Review of Systems   Constitutional: Positive for fatigue. Negative for activity change, appetite change, chills, diaphoresis and fever. Eyes: Negative for photophobia and visual disturbance. Respiratory: Positive for cough and shortness of breath. Cardiovascular: Negative for chest pain and palpitations. Gastrointestinal: Negative for diarrhea, nausea and vomiting. Endocrine: Negative for polyuria. Genitourinary: Negative for decreased urine volume. Musculoskeletal: Negative for arthralgias. Neurological: Negative for dizziness, weakness and headaches. Psychiatric/Behavioral: Negative for agitation, behavioral problems, confusion, decreased concentration and dysphoric mood. Medications: Allergies:     Allergies   Allergen Reactions    Zosyn [Piperacillin-Tazobactam In Dex] Shortness Of Breath     tolerated cefepime 6/2018    Vancomycin Swelling     Lip swelling and redness and itching         Current Meds:   Scheduled Meds:    levofloxacin  750 mg Intravenous Q24H    B complex-vitamin C-folic acid  1 tablet Oral Daily with breakfast    bumetanide  1 mg Oral BID    carBAMazepine  200 mg Oral BID    escitalopram  10 mg Oral Daily    ferrous sulfate  325 mg Oral TID WC    levothyroxine  300 mcg Oral Daily    sodium chloride flush  10 mL Intravenous 2 times per day    enoxaparin  40 mg Subcutaneous BID    famotidine  20 mg Oral BID    miconazole  1 applicator Vaginal Nightly     Continuous Infusions:   PRN Meds: benzonatate, bisacodyl, docusate sodium, ipratropium-albuterol, sodium chloride flush, magnesium hydroxide, ondansetron, potassium chloride **OR** potassium alternative oral replacement **OR** potassium chloride, acetaminophen    Data:     Past Medical History:   has a past medical history of Anemia, Disease of blood and blood forming organ, History of breast cancer, History of chemotherapy, Hypothyroidism, Lymphedema, Morbid obesity ([address] Respiratory failure ([address] and Tracheostomy present ([facility] [address] Social History:   reports that she has never smoked. She has never used smokeless tobacco. She reports that she does not drink alcohol or use drugs. Family History:   Family History   Problem Relation Age of Onset    Breast Cancer Paternal Aunt     Breast Cancer Paternal Cousin 43    Breast Cancer Paternal Cousin 37    Breast Cancer Paternal Cousin 46       Vitals:  /64   Pulse 84   Temp 98.4 °F (36.9 °C) (Axillary)   Resp 15   Ht 5' 5\" (1.651 m)   Wt (!) 630 lb (285.8 kg)   SpO2 98%   .84 kg/m²   Temp (24hrs), Av.3 °F (36.8 °C), Min:97.9 °F (36.6 °C), Max:98.5 °F (36.9 °C)    No results for input(s): POCGLU in the last 72 hours. I/O(24Hr):     Intake/Output Summary (Last 24 hours) at 2020 0908  Last data filed at 2020 0814  Gross per 24 hour   Intake 150 ml   Output 700 ml   Net -550 ml       Labs:    [unfilled]    Lab Results   Component Value Date/Time    SPECIAL NOT REPORTED 2020 05:51 PM     Lab Results   Component Value Date/Time    CULTURE NO GROWTH 13 HOURS 01/08/2020 05:51 PM       [unfilled]    Radiology:    [facility] Device Plmt/replace/removal    Result Date: 12/31/2019  PROCEDURE: ULTRASOUND GUIDED VASCULAR ACCESS. FLUOROSCOPY GUIDED PICC PLACEMENT 12/31/2019. HISTORY: ORDERING SYSTEM PROVIDED HISTORY: Multifocal pneumonia need of antibiotics TECHNOLOGIST PROVIDED HISTORY: Multifocal pneumonia in need of antibiotics Is the patient pregnant?->No Reason for Exam: pneumonia Acuity: Unknown Type of Exam: Unknown History of right mastectomy for breast cancer with axillary lymph node dissection, respiratory failure with superimposed infection. Congestive heart failure. SEDATION: Local anesthesia FLUOROSCOPY DOSE AND TYPE OR TIME AND EXPOSURES: None TECHNIQUE: Patient is morbidly obese and could not be placed on the fluoroscopy table in interventional radiology; the procedure was performed with the patient in bed and portable chest x-rays for position. Left upper extremity access was used due to patient's status post right mastectomy and lymph node dissection. Universal protocol was observed. The left arm was prepped and draped in sterile fashion using maximum sterile barrier technique. Local anesthesia was achieved with lidocaine. A micropuncture needle was used to access the left basilic vein using ultrasound guidance; due to morbid obesity and vein depth, puncture near the antecubital fossa was required. An ultrasound image demonstrating patency of the vein with needle tip located within it. An image was obtained and stored in PACs. A 0.018 guidewire was then used to place a peel-a-way sheath and a 55 cm dual-lumen PICC was advanced with fluoroscopic guidance with the tip at the cavo-atrial junction. No PICC longer and 55 cm is available. The catheter flushed easily and there was a good blood return. The catheter was secured to the skin.   The patient tolerated the procedure well and there were no immediate complications. FINDINGS: Fluoroscopic image demonstrates the tip of the catheter in the upper SVC. Successful ultrasound and fluoroscopy guided bedside PICC placement with portable chest x-rays for determining tip position. Tip in the upper SVC due to limitations patient body habitus and need to use the left arm. PICC is satisfactory for use at this time. Xr Chest Portable    Result Date: 1/9/2020  EXAMINATION: ONE XRAY VIEW OF THE CHEST 1/9/2020 6:05 am COMPARISON: 01/08/2020 HISTORY: ORDERING SYSTEM PROVIDED HISTORY: pneumonia TECHNOLOGIST PROVIDED HISTORY: pneumonia Reason for Exam: follow up pneumonia Acuity: Acute Type of Exam: Initial FINDINGS: Cardiomegaly, pulmonary vascular congestion, and multifocal airspace opacities bilaterally. Small bilateral pleural effusions. Tracheostomy tube terminates within the proximal thoracic trachea. Right-sided central venous catheter terminates overlying the expected location of the SVC. Overall stable examination demonstrating cardiomegaly, pulmonary vascular congestion, and diffuse airspace opacities. Xr Chest Portable    Result Date: 1/8/2020  EXAMINATION: ONE XRAY VIEW OF THE CHEST 1/8/2020 2:12 pm COMPARISON: January 4, 2020, December 31, 2019 HISTORY: ORDERING SYSTEM PROVIDED HISTORY: Chest Pain TECHNOLOGIST PROVIDED HISTORY: Chest Pain Reason for Exam: SOB Acuity: Unknown Type of Exam: Unknown FINDINGS: Tracheostomy tube projects over the tracheal air column. Right subclavian Port-A-Cath appears unchanged with the tip at the level of the proximal SVC. The left PICC line is no longer visualized. Bilateral consolidative opacities are again demonstrated without appreciable change. No pneumothorax or significant effusion. Surgical clips project over the right axilla. 1.  No significant change in bilateral airspace disease, which may represent pulmonary edema, inflammatory process or infection.  2.  Previously seen left PICC line is no longer

## 2020-01-09 NOTE — CARE COORDINATION
CASE MANAGEMENT NOTE:    Admission Date:  1/8/2020 Solange Boston is a 54 y.o.  female    Admitted for : Pneumonia [J18.9]    Met with:  Patient    PCP:  Sherrill Lara                                Insurance:  Medicaid      Current Residence/ Living Arrangements:  in nursing home - [facility]          SNF needed: Will return to [facility]. Is the Patient an [facility] with Readmission Risk Score greater than 14%? No  If yes, pt needs a follow up appointment made within 7 days. Family Members/Caregivers that pt would like involved in their care:    Yes    If yes, list name here:  Father Armando Cobb and Michelle Khan    Transportation Provider:  Ting Lanza             Is patient in Isolation/One on One/Altered Mental Status? Yes  If yes, skip next question. If no, would they like an I-Pad to  use? NA  If yes, call [phone]. Discharge Plan:  1/9 MEDICAID - Chronic trach to [facility]. Patient is from [facility] and will return. LSW notified. ARLENE NEEDS SIGNED AND COMPLETED.                   Electronically signed by: Magui Arias RN on 1/9/2020 at 4:40 PM

## 2020-01-10 ENCOUNTER — APPOINTMENT (OUTPATIENT)
Dept: GENERAL RADIOLOGY | Age: 50
DRG: 145 | End: 2020-01-10
Payer: MEDICAID

## 2020-01-10 VITALS
OXYGEN SATURATION: 99 % | SYSTOLIC BLOOD PRESSURE: 175 MMHG | HEIGHT: 65 IN | BODY MASS INDEX: 48.82 KG/M2 | DIASTOLIC BLOOD PRESSURE: 98 MMHG | WEIGHT: 293 LBS | TEMPERATURE: 98 F | HEART RATE: 85 BPM | RESPIRATION RATE: 20 BRPM

## 2020-01-10 LAB
ANION GAP SERPL CALCULATED.3IONS-SCNC: 10 MMOL/L (ref 9–17)
BUN BLDV-MCNC: 14 MG/DL (ref 6–20)
BUN/CREAT BLD: ABNORMAL (ref 9–20)
CALCIUM SERPL-MCNC: 9.1 MG/DL (ref 8.6–10.4)
CHLORIDE BLD-SCNC: 99 MMOL/L (ref 98–107)
CO2: 30 MMOL/L (ref 20–31)
CREAT SERPL-MCNC: 1 MG/DL (ref 0.5–0.9)
GFR AFRICAN AMERICAN: >60 ML/MIN
GFR NON-AFRICAN AMERICAN: 59 ML/MIN
GFR SERPL CREATININE-BSD FRML MDRD: ABNORMAL ML/MIN/{1.73_M2}
GFR SERPL CREATININE-BSD FRML MDRD: ABNORMAL ML/MIN/{1.73_M2}
GLUCOSE BLD-MCNC: 106 MG/DL (ref 70–99)
HCT VFR BLD CALC: 26.1 % (ref 36–46)
HEMOGLOBIN: 8.5 G/DL (ref 12–16)
MCH RBC QN AUTO: 31.3 PG (ref 26–34)
MCHC RBC AUTO-ENTMCNC: 32.7 G/DL (ref 31–37)
MCV RBC AUTO: 95.8 FL (ref 80–100)
NRBC AUTOMATED: ABNORMAL PER 100 WBC
PDW BLD-RTO: 15.2 % (ref 11.5–14.9)
PLATELET # BLD: 215 K/UL (ref 150–450)
PMV BLD AUTO: 7.5 FL (ref 6–12)
POTASSIUM SERPL-SCNC: 4.5 MMOL/L (ref 3.7–5.3)
RBC # BLD: 2.72 M/UL (ref 4–5.2)
SODIUM BLD-SCNC: 139 MMOL/L (ref 135–144)
WBC # BLD: 4.2 K/UL (ref 3.5–11)

## 2020-01-10 PROCEDURE — 99239 HOSP IP/OBS DSCHRG MGMT >30: CPT | Performed by: INTERNAL MEDICINE

## 2020-01-10 PROCEDURE — 2580000003 HC RX 258

## 2020-01-10 PROCEDURE — 6360000002 HC RX W HCPCS: Performed by: STUDENT IN AN ORGANIZED HEALTH CARE EDUCATION/TRAINING PROGRAM

## 2020-01-10 PROCEDURE — 94640 AIRWAY INHALATION TREATMENT: CPT

## 2020-01-10 PROCEDURE — 94003 VENT MGMT INPAT SUBQ DAY: CPT

## 2020-01-10 PROCEDURE — 6360000002 HC RX W HCPCS

## 2020-01-10 PROCEDURE — 85027 COMPLETE CBC AUTOMATED: CPT

## 2020-01-10 PROCEDURE — 6370000000 HC RX 637 (ALT 250 FOR IP)

## 2020-01-10 PROCEDURE — 36415 COLL VENOUS BLD VENIPUNCTURE: CPT

## 2020-01-10 PROCEDURE — 97110 THERAPEUTIC EXERCISES: CPT

## 2020-01-10 PROCEDURE — 80048 BASIC METABOLIC PNL TOTAL CA: CPT

## 2020-01-10 PROCEDURE — 93970 EXTREMITY STUDY: CPT

## 2020-01-10 PROCEDURE — 2700000000 HC OXYGEN THERAPY PER DAY

## 2020-01-10 PROCEDURE — 71045 X-RAY EXAM CHEST 1 VIEW: CPT

## 2020-01-10 PROCEDURE — 94761 N-INVAS EAR/PLS OXIMETRY MLT: CPT

## 2020-01-10 RX ORDER — LEVOFLOXACIN 750 MG/1
750 TABLET ORAL DAILY
Qty: 14 TABLET | Refills: 0 | Status: SHIPPED | OUTPATIENT
Start: 2020-01-10 | End: 2020-01-24

## 2020-01-10 RX ORDER — IPRATROPIUM BROMIDE AND ALBUTEROL SULFATE 2.5; .5 MG/3ML; MG/3ML
1 SOLUTION RESPIRATORY (INHALATION)
Status: DISCONTINUED | OUTPATIENT
Start: 2020-01-10 | End: 2020-01-10 | Stop reason: HOSPADM

## 2020-01-10 RX ADMIN — CARBAMAZEPINE 200 MG: 200 TABLET ORAL at 09:30

## 2020-01-10 RX ADMIN — BUMETANIDE 1 MG: 1 TABLET ORAL at 09:26

## 2020-01-10 RX ADMIN — Medication 1 TABLET: at 09:26

## 2020-01-10 RX ADMIN — LEVOFLOXACIN 750 MG: 5 INJECTION, SOLUTION INTRAVENOUS at 13:58

## 2020-01-10 RX ADMIN — ESCITALOPRAM OXALATE 10 MG: 10 TABLET ORAL at 09:26

## 2020-01-10 RX ADMIN — FERROUS SULFATE TAB 325 MG (65 MG ELEMENTAL FE) 325 MG: 325 (65 FE) TAB at 13:58

## 2020-01-10 RX ADMIN — ENOXAPARIN SODIUM 40 MG: 40 INJECTION SUBCUTANEOUS at 09:26

## 2020-01-10 RX ADMIN — IPRATROPIUM BROMIDE AND ALBUTEROL SULFATE 3 ML: .5; 3 SOLUTION RESPIRATORY (INHALATION) at 09:40

## 2020-01-10 RX ADMIN — LEVOTHYROXINE SODIUM 300 MCG: 100 TABLET ORAL at 09:25

## 2020-01-10 RX ADMIN — Medication 10 ML: at 09:30

## 2020-01-10 ASSESSMENT — PAIN SCALES - GENERAL
PAINLEVEL_OUTOF10: 0

## 2020-01-10 ASSESSMENT — ENCOUNTER SYMPTOMS
COUGH: 0
WHEEZING: 0
ABDOMINAL PAIN: 0
PHOTOPHOBIA: 0
CHEST TIGHTNESS: 1

## 2020-01-10 NOTE — PROGRESS NOTES
Pt on trach mask interface with cool mist aerosol. Pt reports no resp distress and none apparent on assessment.

## 2020-01-10 NOTE — PROGRESS NOTES
Pt wanted to go on her ventilator for the night. No complications. Pt reported no resp distress and none was evident on assessment. EtCO2 readings were in the 60s so I increased the RR from 12 to 14 to increase the minute ventilation when pt is sleeping. Alarms are appropriate and audible.

## 2020-01-10 NOTE — PROGRESS NOTES
I have been informed that the patient is okay to be transferred back to her skilled nursing facility after discussion with primary team.  Plans for prolonged antibiotic therapy for mycoplasma noted. With this in mind, transfer to skilled nursing facility reasonable  Appreciate Dr. Angus Mariano efforts!      John Winchester MD

## 2020-01-10 NOTE — PROGRESS NOTES
[address] [facility].    PROGRESS NOTE             1/10/2020    1:26 PM    Name:   Corazno Jerez  MRN:     [id number]     Acct:      [de-identified]   Room:   2004/2004-01   Day:  2  Admit Date:  1/8/2020  1:47 PM    PCP:  Jarett Myrick DO  Code Status:  Full Code    Subjective:     C/C:   Chief Complaint   Patient presents with    Shortness of Breath     Interval History Status: improved. Patient seen and examined at bedside. History taken and physical exam conducted. Findings discussed with attending. No acute events overnight. Patient states that she is fatigued and has not able to get proper sleep. Patient refuses blood drawn in the morning. She is hemodynamically stable. Chest x-ray yesterday showed no new changes. Patient is resting comfortably in her bed with trach on ventilator. When I went into the room patient was seen in a happier mood and was smiling. Review of Systems:     Review of Systems   Constitutional: Positive for appetite change and fatigue. Negative for activity change, chills, diaphoresis, fever and unexpected weight change. HENT: Negative for congestion. Eyes: Negative for photophobia and visual disturbance. Respiratory: Positive for chest tightness. Negative for cough and wheezing. Cardiovascular: Negative for chest pain. Gastrointestinal: Negative for abdominal pain. Endocrine: Negative for polyuria. Genitourinary: Negative for dysuria. Musculoskeletal: Negative for arthralgias. Neurological: Negative for dizziness, syncope, weakness and headaches. Psychiatric/Behavioral: Negative for agitation, behavioral problems, confusion and decreased concentration. Medications: Allergies:     Allergies   Allergen Reactions    Zosyn [Piperacillin-Tazobactam In Dex] Shortness Of Breath     tolerated cefepime 6/2018    Vancomycin Swelling     Lip swelling and redness and itching Current Meds:   Scheduled Meds:    ipratropium-albuterol  1 vial Inhalation Q4H WA    levofloxacin  750 mg Intravenous Q24H    B complex-vitamin C-folic acid  1 tablet Oral Daily with breakfast    bumetanide  1 mg Oral BID    carBAMazepine  200 mg Oral BID    escitalopram  10 mg Oral Daily    ferrous sulfate  325 mg Oral TID WC    levothyroxine  300 mcg Oral Daily    sodium chloride flush  10 mL Intravenous 2 times per day    enoxaparin  40 mg Subcutaneous BID    famotidine  20 mg Oral BID    miconazole  1 applicator Vaginal Nightly     Continuous Infusions:   PRN Meds: benzonatate, bisacodyl, docusate sodium, sodium chloride flush, magnesium hydroxide, ondansetron, potassium chloride **OR** potassium alternative oral replacement **OR** potassium chloride, acetaminophen    Data:     Past Medical History:   has a past medical history of Anemia, Disease of blood and blood forming organ, History of breast cancer, History of chemotherapy, Hypothyroidism, Lymphedema, Morbid obesity ([address] Respiratory failure ([address] and Tracheostomy present ([facility] [address] Social History:   reports that she has never smoked. She has never used smokeless tobacco. She reports that she does not drink alcohol or use drugs. Family History:   Family History   Problem Relation Age of Onset    Breast Cancer Paternal Aunt     Breast Cancer Paternal Cousin 43    Breast Cancer Paternal Cousin 37    Breast Cancer Paternal Cousin 46       Vitals:  /84   Pulse 75   Temp 97.9 °F (36.6 °C) (Axillary)   Resp 21   Ht 5' 5\" (1.651 m)   Wt (!) 630 lb (285.8 kg)   SpO2 100%   .84 kg/m²   Temp (24hrs), Av.2 °F (36.8 °C), Min:97.6 °F (36.4 °C), Max:98.7 °F (37.1 °C)    No results for input(s): POCGLU in the last 72 hours. I/O(24Hr):     Intake/Output Summary (Last 24 hours) at 1/10/2020 1326  Last data filed at 1/10/2020 0400  Gross per 24 hour   Intake 400 ml   Output 1250 ml   Net -850 ml and there was a good blood return. The catheter was secured to the skin. The patient tolerated the procedure well and there were no immediate complications. FINDINGS: Fluoroscopic image demonstrates the tip of the catheter in the upper SVC. Successful ultrasound and fluoroscopy guided bedside PICC placement with portable chest x-rays for determining tip position. Tip in the upper SVC due to limitations patient body habitus and need to use the left arm. PICC is satisfactory for use at this time. Xr Chest Portable    Result Date: 1/9/2020  EXAMINATION: ONE XRAY VIEW OF THE CHEST 1/9/2020 6:05 am COMPARISON: 01/08/2020 HISTORY: ORDERING SYSTEM PROVIDED HISTORY: pneumonia TECHNOLOGIST PROVIDED HISTORY: pneumonia Reason for Exam: follow up pneumonia Acuity: Acute Type of Exam: Initial FINDINGS: Cardiomegaly, pulmonary vascular congestion, and multifocal airspace opacities bilaterally. Small bilateral pleural effusions. Tracheostomy tube terminates within the proximal thoracic trachea. Right-sided central venous catheter terminates overlying the expected location of the SVC. Overall stable examination demonstrating cardiomegaly, pulmonary vascular congestion, and diffuse airspace opacities. Xr Chest Portable    Result Date: 1/8/2020  EXAMINATION: ONE XRAY VIEW OF THE CHEST 1/8/2020 2:12 pm COMPARISON: January 4, 2020, December 31, 2019 HISTORY: ORDERING SYSTEM PROVIDED HISTORY: Chest Pain TECHNOLOGIST PROVIDED HISTORY: Chest Pain Reason for Exam: SOB Acuity: Unknown Type of Exam: Unknown FINDINGS: Tracheostomy tube projects over the tracheal air column. Right subclavian Port-A-Cath appears unchanged with the tip at the level of the proximal SVC. The left PICC line is no longer visualized. Bilateral consolidative opacities are again demonstrated without appreciable change. No pneumothorax or significant effusion. Surgical clips project over the right axilla.      1.  No significant change in bilateral airspace disease, which may represent pulmonary edema, inflammatory process or infection. 2.  Previously seen left PICC line is no longer visualized, which may have been removed or retracted. Xr Chest Portable    Result Date: 1/4/2020  EXAMINATION: ONE XRAY VIEW OF THE CHEST 1/4/2020 2:11 pm COMPARISON: 12/31/2019 HISTORY: ORDERING SYSTEM PROVIDED HISTORY: multifocal pneumonia TECHNOLOGIST PROVIDED HISTORY: multifocal pneumonia Reason for Exam: follow up multifocal pneumonia. Acuity: Acute Type of Exam: Initial FINDINGS: Tracheostomy in good position. Left upper extremity PICC terminating near the confluence of innominate vein and SVC. Right subclavian port without kink or discontinuity. Multifocal airspace disease and cardiomegaly, similar to prior exam.  No pneumothorax. Postsurgical changes right axilla. No acute osseous abnormality. Multifocal airspace disease similar to prior exam.     Xr Chest Portable    Result Date: 12/31/2019  EXAMINATION: ONE XRAY VIEW OF THE CHEST 12/31/2019 8:03 am COMPARISON: AP chest from 12/30/2019 HISTORY: ORDERING SYSTEM PROVIDED HISTORY: S/P PICC central line placement TECHNOLOGIST PROVIDED HISTORY: PICC line placement History of breast cancer, congestive heart failure, respiratory failure, morbid obesity, and sepsis. FINDINGS: Left-sided PICC with its tip in the upper SVC. Right subclavian port kinked at its entry site with its tip in the right brachiocephalic vein. Clips right axilla. Overlying ECG monitor leads and a necklace. Tracheostomy tube in unchanged position. Cardiomediastinal shadow difficult to assess due slight rotation but appears unchanged. Patchy opacities both lungs primarily in a perihilar distribution and denser on the right and somewhat more extensive. Latter findings are similar. No pneumothorax. No large pleural effusion. Bones unchanged. Status post left-sided PICC with tip in the upper SVC.   Worsening multifocal jaundiced or pale. Neurological:      Mental Status: She is alert and oriented to person, place, and time. Sensory: No sensory deficit. Psychiatric:         Mood and Affect: Mood normal.         Behavior: Behavior normal.         Thought Content:  Thought content normal.         Judgment: Judgment normal.           Assessment:        Primary Problem  Bronchitis    Active Hospital Problems    Diagnosis Date Noted    RU (obstructive sleep apnea) [G47.33] 01/08/2020    Pneumonia due to organism [J18.9] 01/08/2020    Tracheostomy dependent ([address] [Z93.0] 11/20/2018    Bronchitis [J40] 05/14/2018    Shortness of breath [R06.02] 05/14/2018    Hypothyroidism [E03.9] 05/14/2018       Plan:        Significant last 24 hr data reviewed ;   Vitals:    01/10/20 0600 01/10/20 0800 01/10/20 0940 01/10/20 1116   BP: (!) 133/58 138/84     Pulse: 82 75     Resp: 16 18 20 21   Temp:  97.9 °F (36.6 °C)     TempSrc:  Axillary     SpO2: 96% 95% 100% 100%   Weight:       Height:          Recent Results (from the past 24 hour(s))   Basic Metabolic Panel w/ Reflex to MG    Collection Time: 01/10/20  8:18 AM   Result Value Ref Range    Glucose 106 (H) 70 - 99 mg/dL    BUN 14 6 - 20 mg/dL    CREATININE 1.00 (H) 0.50 - 0.90 mg/dL    Bun/Cre Ratio NOT REPORTED 9 - 20    Calcium 9.1 8.6 - 10.4 mg/dL    Sodium 139 135 - 144 mmol/L    Potassium 4.5 3.7 - 5.3 mmol/L    Chloride 99 98 - 107 mmol/L    CO2 30 20 - 31 mmol/L    Anion Gap 10 9 - 17 mmol/L    GFR Non-African American 59 (L) >60 mL/min    GFR African American >60 >60 mL/min    GFR Comment          GFR Staging NOT REPORTED    CBC    Collection Time: 01/10/20  8:18 AM   Result Value Ref Range    WBC 4.2 3.5 - 11.0 k/uL    RBC 2.72 (L) 4.0 - 5.2 m/uL    Hemoglobin 8.5 (L) 12.0 - 16.0 g/dL    Hematocrit 26.1 (L) 36 - 46 %    MCV 95.8 80 - 100 fL    MCH 31.3 26 - 34 pg    MCHC 32.7 31 - 37 g/dL    RDW 15.2 (H) 11.5 - 14.9 %    Platelets 010 314 - 959 k/uL    MPV 7.5 6.0 - 12.0 fL NRBC Automated NOT REPORTED per 100 WBC     No results for input(s): POCGLU in the last 72 hours. Xr Chest Portable    Result Date: 1/9/2020  EXAMINATION: ONE XRAY VIEW OF THE CHEST 1/9/2020 6:05 am COMPARISON: 01/08/2020 HISTORY: ORDERING SYSTEM PROVIDED HISTORY: pneumonia TECHNOLOGIST PROVIDED HISTORY: pneumonia Reason for Exam: follow up pneumonia Acuity: Acute Type of Exam: Initial FINDINGS: Cardiomegaly, pulmonary vascular congestion, and multifocal airspace opacities bilaterally. Small bilateral pleural effusions. Tracheostomy tube terminates within the proximal thoracic trachea. Right-sided central venous catheter terminates overlying the expected location of the SVC. Overall stable examination demonstrating cardiomegaly, pulmonary vascular congestion, and diffuse airspace opacities. Xr Chest Portable    Result Date: 1/8/2020  EXAMINATION: ONE XRAY VIEW OF THE CHEST 1/8/2020 2:12 pm COMPARISON: January 4, 2020, December 31, 2019 HISTORY: ORDERING SYSTEM PROVIDED HISTORY: Chest Pain TECHNOLOGIST PROVIDED HISTORY: Chest Pain Reason for Exam: SOB Acuity: Unknown Type of Exam: Unknown FINDINGS: Tracheostomy tube projects over the tracheal air column. Right subclavian Port-A-Cath appears unchanged with the tip at the level of the proximal SVC. The left PICC line is no longer visualized. Bilateral consolidative opacities are again demonstrated without appreciable change. No pneumothorax or significant effusion. Surgical clips project over the right axilla. 1.  No significant change in bilateral airspace disease, which may represent pulmonary edema, inflammatory process or infection. 2.  Previously seen left PICC line is no longer visualized, which may have been removed or retracted.             Acute bronchitis  CXR yesterday: Diffuse airspace opacities, pulmonary vascular congestion, overall stable examination  Trach dependant, Vent settings FiO2 40 (nightly)  Levaquin 750 mg IV day

## 2020-01-10 NOTE — FLOWSHEET NOTE
01/10/20 1005   Provider Notification   Reason for Communication Evaluate   Provider Name Dr. Isaak Redmond   Provider Notification Physician   Method of Communication Face to face   Response At bedside   Notification Time 0945   Dr. Isaak Redmond in to see patient. He reviewed d-dimer and ordered dopplers of lower extremities as patient is not going to be able to successfully do a ct or vq scan. He also ordered breathing treatments q4h while awake and a chest xray. He wants patient to stay in intermediate and not to be discharged at this time.

## 2020-01-10 NOTE — PROGRESS NOTES
Comment  Comments: REJI de jesus tx at this time   Pain Screening  Patient Currently in Pain: No  Pain Assessment  Pain Assessment: 0-10  Pain Level: 0  Vital Signs  Patient Currently in Pain: No       Orientation  Orientation  Overall Orientation Status: Within Normal Limits  Cognition      Objective                  Exercises  Heelslides: x10  Ankle Pumps: x10  Other exercises  Other exercises 1: dynamic sitting in bed activity for 3mins, limited to pt fatigue         Comment: rest breaks d/t c/o SOB without O2 desat     Goals  Short term goals  Time Frame for Short term goals: 4-5 treatments  Short term goal 1: Pt to demonstrate mod x1 rolling L/R. Short term goal 2: Pt to actively participate in 30 minute therapy session with vitals WNL. Short term goal 3: Pt to demonstrate Good posture with Good- sitting balance (HOB lowered) and pt in long sitting position. Short term goal 4: Pt to improve B LE AROM by at least 10-15 degrees to reduce edema in B LE and increase mobility. Short term goal 5: Pt to improve B LE strength to atleast 3-/5 B LE. Patient Goals   Patient goals : To get better    Plan    Plan  Times per week: 3-4x/week  Current Treatment Recommendations: Strengthening, ROM, Balance Training, Endurance Training, Positioning, Safety Education & Training, Equipment Evaluation, Education, & procurement  Safety Devices  Type of devices:  All fall risk precautions in place, Call light within reach, Patient at risk for falls, Left in bed     Therapy Time         01/10/20 1525   PT Individual Minutes   Time In 1502   Time Out [address] [place] [address]

## 2020-01-10 NOTE — DISCHARGE INSTR - COC
H&P: No change in H&P    PHYSICIAN SIGNATURE:  Electronically signed by Robby Arthur MD on 1/10/20 at 3:31 PM

## 2020-01-10 NOTE — PROGRESS NOTES
ICU Progress Note (Vent)   [facility]    Patient - Chin Oliver,  Age - 54 y.o.    - 1970      Room Number -    MRN -  [id number]   Acct # - [de-identified]  Date of Admission -  2020  1:47 PM    Events of Past 24 Hours   Patient is resting quietly. She was on the vent last night. She tolerated the vent last night well. Currently on trach collar. She had multiple questions about her breathing. She claims her breathing is not back to her normal.    Vitals    height is 5' 5\" (1.651 m) and weight is 630 lb (285.8 kg) (abnormal). Her axillary temperature is 97.9 °F (36.6 °C). Her blood pressure is 138/84 and her pulse is 75. Her respiration is 20 and oxygen saturation is 100%. Temperature Range: Temp: 97.9 °F (36.6 °C) Temp  Av.2 °F (36.8 °C)  Min: 97.6 °F (36.4 °C)  Max: 98.7 °F (37.1 °C)  BP Range:  Systolic (01WFK), WAQ:402 , Min:127 , IXE:558     Diastolic (41VGX), BDP:91, Min:55, Max:119    Pulse Range: Pulse  Av.5  Min: 75  Max: 100  Respiration Range: Resp  Av  Min: 16  Max: 24  Current Pulse Ox[de-identified]  SpO2: 100 %  24HR Pulse Ox Range:  SpO2  Av.4 %  Min: 93 %  Max: 100 %  Oxygen Amount and Delivery: O2 Flow Rate (L/min): 10 L/min      Wt Readings from Last 3 Encounters:   20 (!) 630 lb (285.8 kg)   19 (!) 632 lb (286.7 kg)   10/30/19 (!) 625 lb 12.8 oz (283.9 kg)     I/O       Intake/Output Summary (Last 24 hours) at 1/10/2020 1044  Last data filed at 1/10/2020 0400  Gross per 24 hour   Intake 400 ml   Output 1250 ml   Net -850 ml     I/O last 3 completed shifts:   In: 400 [P.O.:400]  Out: 1250 [Urine:1250]     DRAIN/TUBE OUTPUT:     Invasive Lines   Chronic trach with nocturnal vent       Mechanical Ventilation Data   SETTINGS (Comprehensive)  Vent Information  $Ventilation: $Subsequent Day  Ventilator Started: Yes  Ventilation Day(s): 1  Skin Assessment: Clean, dry, & intact  Equipment ID: [id number]  Equipment Changed: HME(Per pt request.)  Vent Type: Servo i  Vent Mode: PRVC  Vt Ordered: 550 mL  Rate Set: 14 bmp  FiO2 : 60 %  Sensitivity: 5  PEEP/CPAP: 5  I Time/ I Time %: 1.43 s  Cuff Pressure (cm H2O): 22 cm H2O  Humidification Source: HME  Additional Respiratory  Assessments  Pulse: 75  Resp: 20  SpO2: 100 %  End Tidal CO2: 63 (%)  Humidification Source: HME  Cuff Pressure (cm H2O): 22 cm H2O       ABGs:   Lab Results   Component Value Date    PHART 7.351 09/14/2019    PO2ART 160.0 09/14/2019    UFN3DCP 59.2 09/14/2019       Lab Results   Component Value Date    MODE NOT REPORTED 12/30/2019         Medications   IV     ipratropium-albuterol  1 vial Inhalation Q4H WA    levofloxacin  750 mg Intravenous Q24H    B complex-vitamin C-folic acid  1 tablet Oral Daily with breakfast    bumetanide  1 mg Oral BID    carBAMazepine  200 mg Oral BID    escitalopram  10 mg Oral Daily    ferrous sulfate  325 mg Oral TID WC    levothyroxine  300 mcg Oral Daily    sodium chloride flush  10 mL Intravenous 2 times per day    enoxaparin  40 mg Subcutaneous BID    famotidine  20 mg Oral BID    miconazole  1 applicator Vaginal Nightly       Diet/Nutrition   DIET GENERAL;    Exam   VITALS    height is 5' 5\" (1.651 m) and weight is 630 lb (285.8 kg) (abnormal). Her axillary temperature is 97.9 °F (36.6 °C). Her blood pressure is 138/84 and her pulse is 75. Her respiration is 20 and oxygen saturation is 100%. Ventilator Settings (Basic)  Vent Mode: PRVC Rate Set: 14 bmp/Vt Ordered: 550 mL/ /FiO2 : 60 %    Constitutional - Sedated  General Appearance  well developed, well nourished  HEENT - Life support devices in place (ET,),normocephalic, atraumatic. Lungs - Chest expands equally, no wheezes, rales or rhonchi.   Cardiovascular - Heart sounds are normal.    Abdomen - soft, nontender, nondistended  Extremities - no cyanosis, clubbing or edema    Lab Results   CBC     Lab Results   Component Value Date    WBC 4.2 01/10/2020    RBC 2.72 01/10/2020    HGB 8.5 01/10/2020    HCT 26.1 01/10/2020     01/10/2020    MCV 95.8 01/10/2020    MCH 31.3 01/10/2020    MCHC 32.7 01/10/2020    RDW 15.2 01/10/2020    METASPCT 3 09/10/2019    LYMPHOPCT 22 01/08/2020    MONOPCT 7 01/08/2020    MYELOPCT 1 11/25/2018    BASOPCT 1 01/08/2020    MONOSABS 0.30 01/08/2020    LYMPHSABS 1.00 01/08/2020    EOSABS 0.10 01/08/2020    BASOSABS 0.00 01/08/2020    DIFFTYPE NOT REPORTED 01/08/2020       BMP   Lab Results   Component Value Date     01/10/2020    K 4.5 01/10/2020    CL 99 01/10/2020    CO2 30 01/10/2020    BUN 14 01/10/2020    CREATININE 1.00 01/10/2020    GLUCOSE 106 01/10/2020    CALCIUM 9.1 01/10/2020       LFTS  Lab Results   Component Value Date    ALKPHOS 149 01/08/2020    ALT 87 01/08/2020    AST 65 01/08/2020    PROT 9.0 01/08/2020    BILITOT 0.24 01/08/2020    LABALBU 3.3 01/08/2020       INR  No results for input(s): PROTIME, INR in the last 72 hours. APTT  No results for input(s): APTT in the last 72 hours. Lactic Acid  Lab Results   Component Value Date    LACTA 0.7 01/08/2020    LACTA 2.0 11/17/2018    LACTA 0.8 01/27/2018        BNP   No results for input(s): BNP in the last 72 hours. Cultures   Blood cultures x2 January negative to date    Radiology     Plain Films       P checks x-ray today revealed persistent bilateral patchy processes. See actual reports for details    SYSTEM ASSESSMENT  Mycoplasma pneumonia  Acute upon chronic hypoxic respiratory failure  Chronic respiratory failure with hypercapnia  Obesity hypoventilation syndrome  Very morbid obesity  Patient has no history of asthma or COPD. Chronic lymphedema          Neuro       Respiratory   Wean oxygen as tolerated.  Keep O2 sat 90-92%    Hemodynamics       Gastrointestinal/Nutrition       Renal       Infectious Disease       Hematology/Oncology       Endocrine       Social/Spiritual/DNR/Disposition/Other     Continue with nocturnal vent  Breathing treatments to

## 2020-01-10 NOTE — DISCHARGE SUMMARY
[address]    Discharge Summary     Patient ID: Bree Rodas  :  1970   MRN: [id number]     ACCOUNT:  [de-identified]   Patient's PCP: Amadeo Ku DO  Admit Date: 2020   Discharge Date: 1/10/2020     Length of Stay: 2  Code Status:  Full Code  Admitting Physician: Ngozi Mittal MD  Discharge Physician: Joey Allen MD     Active Discharge Diagnoses:       Primary Problem  [address] Problems    Diagnosis Date Noted    RU (obstructive sleep apnea) [G47.33] 2020    Pneumonia due to organism [J18.9] 2020    Tracheostomy dependent ([address] [Z93.0] 2018    Bronchitis [J40] 2018    Shortness of breath [R06.02] 2018    Hypothyroidism [E03.9] 2018       Admission Condition:  poor     Discharged Condition: stable    Hospital Stay:       Hospital Course:  Bree Rodas is a 54 y.o. female who was admitted for the management of   Bronchitis , presented to ER with Shortness of Breath  Patient came to the ER complaining of shortness of breath and dry productive cough patient stated that her pneumonia had not went away and she had completed 7 days of Levaquin p.o.. Her chest x-ray was normal WBC count and also other labs came out normal including procalcitonin. Res viral panel and s.pneumonia antigen was normal. Levaquin was continued IV. Breathing treatments were given. Patient was put on ventilator through trach. Patient is trach dependent. She was given 10L Oxygen. Patient feeling better today and is discharged on levaquin 750 mg for 14 days.              Significant therapeutic interventions: O2, breathing treatments, Levaquin    Significant Diagnostic Studies:   Labs / Micro:  CBC:   Lab Results   Component Value Date    WBC 4.2 01/10/2020    RBC 2.72 01/10/2020    HGB 8.5 01/10/2020    HCT 26.1 01/10/2020    MCV 95.8 01/10/2020    MCH 31.3 01/10/2020 [facility] 32.7 01/10/2020    RDW 15.2 01/10/2020     01/10/2020       Radiology:    Jose Angel Sampson Cva Device Plmt/replace/removal    Result Date: 12/31/2019  PROCEDURE: ULTRASOUND GUIDED VASCULAR ACCESS. FLUOROSCOPY GUIDED PICC PLACEMENT 12/31/2019. HISTORY: ORDERING SYSTEM PROVIDED HISTORY: Multifocal pneumonia need of antibiotics TECHNOLOGIST PROVIDED HISTORY: Multifocal pneumonia in need of antibiotics Is the patient pregnant?->No Reason for Exam: pneumonia Acuity: Unknown Type of Exam: Unknown History of right mastectomy for breast cancer with axillary lymph node dissection, respiratory failure with superimposed infection. Congestive heart failure. SEDATION: Local anesthesia FLUOROSCOPY DOSE AND TYPE OR TIME AND EXPOSURES: None TECHNIQUE: Patient is morbidly obese and could not be placed on the fluoroscopy table in interventional radiology; the procedure was performed with the patient in bed and portable chest x-rays for position. Left upper extremity access was used due to patient's status post right mastectomy and lymph node dissection. Universal protocol was observed. The left arm was prepped and draped in sterile fashion using maximum sterile barrier technique. Local anesthesia was achieved with lidocaine. A micropuncture needle was used to access the left basilic vein using ultrasound guidance; due to morbid obesity and vein depth, puncture near the antecubital fossa was required. An ultrasound image demonstrating patency of the vein with needle tip located within it. An image was obtained and stored in PACs. A 0.018 guidewire was then used to place a peel-a-way sheath and a 55 cm dual-lumen PICC was advanced with fluoroscopic guidance with the tip at the cavo-atrial junction. No PICC longer and 55 cm is available. The catheter flushed easily and there was a good blood return. The catheter was secured to the skin.   The patient tolerated the procedure well and there were no immediate complications. FINDINGS: Fluoroscopic image demonstrates the tip of the catheter in the upper SVC. Successful ultrasound and fluoroscopy guided bedside PICC placement with portable chest x-rays for determining tip position. Tip in the upper SVC due to limitations patient body habitus and need to use the left arm. PICC is satisfactory for use at this time. Xr Chest Portable    Result Date: 1/10/2020  EXAMINATION: ONE XRAY VIEW OF THE CHEST 1/10/2020 10:29 am COMPARISON: Chest radiograph performed 01/09/2020. HISTORY: ORDERING SYSTEM PROVIDED HISTORY: hypoxia   mycoplasma TECHNOLOGIST PROVIDED HISTORY: hypoxia   mycoplasma Reason for Exam: hypoxia, mycoplasma Acuity: Acute Type of Exam: Ongoing FINDINGS: There are scattered airspace opacities. There is no effusion. There is no pneumothorax. The heart is enlarged. The upper abdomen is unremarkable. The extrathoracic soft tissues are unremarkable. There is a right subclavian central line with the tip in the mid SVC. Cardiomegaly with scattered airspace opacities concerning for pneumonia. Xr Chest Portable    Result Date: 1/9/2020  EXAMINATION: ONE XRAY VIEW OF THE CHEST 1/9/2020 6:05 am COMPARISON: 01/08/2020 HISTORY: ORDERING SYSTEM PROVIDED HISTORY: pneumonia TECHNOLOGIST PROVIDED HISTORY: pneumonia Reason for Exam: follow up pneumonia Acuity: Acute Type of Exam: Initial FINDINGS: Cardiomegaly, pulmonary vascular congestion, and multifocal airspace opacities bilaterally. Small bilateral pleural effusions. Tracheostomy tube terminates within the proximal thoracic trachea. Right-sided central venous catheter terminates overlying the expected location of the SVC. Overall stable examination demonstrating cardiomegaly, pulmonary vascular congestion, and diffuse airspace opacities.      Xr Chest Portable    Result Date: 1/8/2020  EXAMINATION: ONE XRAY VIEW OF THE CHEST 1/8/2020 2:12 pm COMPARISON: January 4, 2020, December 31, 2019 HISTORY: be involved in this patient's care.

## 2020-01-14 LAB
CULTURE: NORMAL
CULTURE: NORMAL
Lab: NORMAL
Lab: NORMAL
SPECIMEN DESCRIPTION: NORMAL
SPECIMEN DESCRIPTION: NORMAL

## 2020-03-09 ENCOUNTER — APPOINTMENT (OUTPATIENT)
Dept: GENERAL RADIOLOGY | Age: 50
End: 2020-03-09
Payer: MEDICAID

## 2020-03-09 ENCOUNTER — HOSPITAL ENCOUNTER (EMERGENCY)
Age: 50
Discharge: HOME | End: 2020-03-09
Attending: EMERGENCY MEDICINE
Payer: MEDICAID

## 2020-03-09 VITALS
OXYGEN SATURATION: 100 % | WEIGHT: 293 LBS | HEIGHT: 65 IN | TEMPERATURE: 97.5 F | DIASTOLIC BLOOD PRESSURE: 94 MMHG | RESPIRATION RATE: 18 BRPM | HEART RATE: 85 BPM | SYSTOLIC BLOOD PRESSURE: 150 MMHG | BODY MASS INDEX: 48.82 KG/M2

## 2020-03-09 LAB
ABSOLUTE EOS #: 0.07 K/UL (ref 0–0.4)
ABSOLUTE IMMATURE GRANULOCYTE: ABNORMAL K/UL (ref 0–0.3)
ABSOLUTE LYMPH #: 0.67 K/UL (ref 1–4.8)
ABSOLUTE MONO #: 0.07 K/UL (ref 0.1–1.3)
ALBUMIN SERPL-MCNC: 3.8 G/DL (ref 3.5–5.2)
ALBUMIN/GLOBULIN RATIO: ABNORMAL (ref 1–2.5)
ALLEN TEST: ABNORMAL
ALP BLD-CCNC: 109 U/L (ref 35–104)
ALT SERPL-CCNC: 40 U/L (ref 5–33)
ANION GAP SERPL CALCULATED.3IONS-SCNC: 11 MMOL/L (ref 9–17)
AST SERPL-CCNC: 30 U/L
BASOPHILS # BLD: 0 % (ref 0–2)
BASOPHILS ABSOLUTE: 0 K/UL (ref 0–0.2)
BILIRUB SERPL-MCNC: 0.18 MG/DL (ref 0.3–1.2)
BNP INTERPRETATION: ABNORMAL
BUN BLDV-MCNC: 24 MG/DL (ref 6–20)
BUN/CREAT BLD: ABNORMAL (ref 9–20)
CALCIUM SERPL-MCNC: 9.5 MG/DL (ref 8.6–10.4)
CARBOXYHEMOGLOBIN: 3.1 % (ref 0–5)
CHLORIDE BLD-SCNC: 100 MMOL/L (ref 98–107)
CO2: 31 MMOL/L (ref 20–31)
CREAT SERPL-MCNC: 0.94 MG/DL (ref 0.5–0.9)
DIFFERENTIAL TYPE: ABNORMAL
DIRECT EXAM: NORMAL
EOSINOPHILS RELATIVE PERCENT: 2 % (ref 0–4)
FIO2: ABNORMAL
GFR AFRICAN AMERICAN: >60 ML/MIN
GFR NON-AFRICAN AMERICAN: >60 ML/MIN
GFR SERPL CREATININE-BSD FRML MDRD: ABNORMAL ML/MIN/{1.73_M2}
GFR SERPL CREATININE-BSD FRML MDRD: ABNORMAL ML/MIN/{1.73_M2}
GLUCOSE BLD-MCNC: 148 MG/DL (ref 70–99)
HCO3 VENOUS: 35.7 MMOL/L (ref 24–30)
HCT VFR BLD CALC: 29.3 % (ref 36–46)
HEMOGLOBIN: 9.2 G/DL (ref 12–16)
IMMATURE GRANULOCYTES: ABNORMAL %
INR BLD: 1
LYMPHOCYTES # BLD: 18 % (ref 24–44)
Lab: NORMAL
MCH RBC QN AUTO: 29.9 PG (ref 26–34)
MCHC RBC AUTO-ENTMCNC: 31.3 G/DL (ref 31–37)
MCV RBC AUTO: 95.3 FL (ref 80–100)
METHEMOGLOBIN: 0.5 % (ref 0–1.9)
MODE: ABNORMAL
MONOCYTES # BLD: 2 % (ref 1–7)
MORPHOLOGY: ABNORMAL
MORPHOLOGY: ABNORMAL
NEGATIVE BASE EXCESS, VEN: ABNORMAL MMOL/L (ref 0–2)
NOTIFICATION TIME: ABNORMAL
NOTIFICATION: ABNORMAL
NRBC AUTOMATED: ABNORMAL PER 100 WBC
O2 DEVICE/FLOW/%: ABNORMAL
O2 SAT, VEN: 59.9 % (ref 60–85)
OXYHEMOGLOBIN: ABNORMAL % (ref 95–98)
PARTIAL THROMBOPLASTIN TIME: 26.6 SEC (ref 24–36)
PATIENT TEMP: 37
PCO2, VEN, TEMP ADJ: ABNORMAL MMHG (ref 39–55)
PCO2, VEN: 65.5 (ref 39–55)
PDW BLD-RTO: 15.9 % (ref 11.5–14.9)
PEEP/CPAP: ABNORMAL
PH VENOUS: 7.34 (ref 7.32–7.42)
PH, VEN, TEMP ADJ: ABNORMAL (ref 7.32–7.42)
PLATELET # BLD: 207 K/UL (ref 150–450)
PLATELET ESTIMATE: ABNORMAL
PMV BLD AUTO: 7.6 FL (ref 6–12)
PO2, VEN, TEMP ADJ: ABNORMAL MMHG (ref 30–50)
PO2, VEN: 30.8 (ref 30–50)
POSITIVE BASE EXCESS, VEN: 10 MMOL/L (ref 0–2)
POTASSIUM SERPL-SCNC: 5.1 MMOL/L (ref 3.7–5.3)
PRO-BNP: 312 PG/ML
PROTHROMBIN TIME: 13 SEC (ref 11.8–14.6)
PSV: ABNORMAL
PT. POSITION: ABNORMAL
RBC # BLD: 3.07 M/UL (ref 4–5.2)
RBC # BLD: ABNORMAL 10*6/UL
RESPIRATORY RATE: 16
SAMPLE SITE: ABNORMAL
SEG NEUTROPHILS: 78 % (ref 36–66)
SEGMENTED NEUTROPHILS ABSOLUTE COUNT: 2.89 K/UL (ref 1.3–9.1)
SET RATE: ABNORMAL
SODIUM BLD-SCNC: 142 MMOL/L (ref 135–144)
SPECIMEN DESCRIPTION: NORMAL
TEXT FOR RESPIRATORY: ABNORMAL
TOTAL HB: ABNORMAL G/DL (ref 12–16)
TOTAL PROTEIN: 8.8 G/DL (ref 6.4–8.3)
TOTAL RATE: ABNORMAL
TROPONIN INTERP: ABNORMAL
TROPONIN INTERP: ABNORMAL
TROPONIN T: ABNORMAL NG/ML
TROPONIN T: ABNORMAL NG/ML
TROPONIN, HIGH SENSITIVITY: 37 NG/L (ref 0–14)
TROPONIN, HIGH SENSITIVITY: 37 NG/L (ref 0–14)
VT: ABNORMAL
WBC # BLD: 3.7 K/UL (ref 3.5–11)
WBC # BLD: ABNORMAL 10*3/UL

## 2020-03-09 PROCEDURE — 80053 COMPREHEN METABOLIC PANEL: CPT

## 2020-03-09 PROCEDURE — 93005 ELECTROCARDIOGRAM TRACING: CPT | Performed by: EMERGENCY MEDICINE

## 2020-03-09 PROCEDURE — 96372 THER/PROPH/DIAG INJ SC/IM: CPT

## 2020-03-09 PROCEDURE — 87804 INFLUENZA ASSAY W/OPTIC: CPT

## 2020-03-09 PROCEDURE — 84484 ASSAY OF TROPONIN QUANT: CPT

## 2020-03-09 PROCEDURE — 85025 COMPLETE CBC W/AUTO DIFF WBC: CPT

## 2020-03-09 PROCEDURE — 83880 ASSAY OF NATRIURETIC PEPTIDE: CPT

## 2020-03-09 PROCEDURE — 2700000000 HC OXYGEN THERAPY PER DAY

## 2020-03-09 PROCEDURE — 71045 X-RAY EXAM CHEST 1 VIEW: CPT

## 2020-03-09 PROCEDURE — 82805 BLOOD GASES W/O2 SATURATION: CPT

## 2020-03-09 PROCEDURE — 6360000002 HC RX W HCPCS: Performed by: EMERGENCY MEDICINE

## 2020-03-09 PROCEDURE — 85610 PROTHROMBIN TIME: CPT

## 2020-03-09 PROCEDURE — 99285 EMERGENCY DEPT VISIT HI MDM: CPT

## 2020-03-09 PROCEDURE — 6370000000 HC RX 637 (ALT 250 FOR IP): Performed by: EMERGENCY MEDICINE

## 2020-03-09 PROCEDURE — 82800 BLOOD PH: CPT

## 2020-03-09 PROCEDURE — 36415 COLL VENOUS BLD VENIPUNCTURE: CPT

## 2020-03-09 PROCEDURE — 85730 THROMBOPLASTIN TIME PARTIAL: CPT

## 2020-03-09 RX ORDER — DOXYCYCLINE HYCLATE 100 MG
100 TABLET ORAL 2 TIMES DAILY
Qty: 20 TABLET | Refills: 0 | Status: SHIPPED | OUTPATIENT
Start: 2020-03-09 | End: 2020-03-19

## 2020-03-09 RX ORDER — IBUPROFEN 800 MG/1
800 TABLET ORAL EVERY 8 HOURS PRN
Qty: 20 TABLET | Refills: 0 | Status: ON HOLD | OUTPATIENT
Start: 2020-03-09 | End: 2020-03-30 | Stop reason: HOSPADM

## 2020-03-09 RX ORDER — KETOROLAC TROMETHAMINE 30 MG/ML
30 INJECTION, SOLUTION INTRAMUSCULAR; INTRAVENOUS ONCE
Status: COMPLETED | OUTPATIENT
Start: 2020-03-09 | End: 2020-03-09

## 2020-03-09 RX ORDER — DOXYCYCLINE 100 MG/1
100 CAPSULE ORAL ONCE
Status: COMPLETED | OUTPATIENT
Start: 2020-03-09 | End: 2020-03-09

## 2020-03-09 RX ADMIN — DOXYCYCLINE 100 MG: 100 CAPSULE ORAL at 16:59

## 2020-03-09 RX ADMIN — KETOROLAC TROMETHAMINE 30 MG: 30 INJECTION, SOLUTION INTRAMUSCULAR at 14:14

## 2020-03-09 ASSESSMENT — PAIN SCALES - GENERAL: PAINLEVEL_OUTOF10: 8

## 2020-03-09 ASSESSMENT — ENCOUNTER SYMPTOMS
COUGH: 0
SHORTNESS OF BREATH: 1
VOMITING: 0
WHEEZING: 0

## 2020-03-09 NOTE — ED NOTES
Report given to Leonidas Edwards RN from ER. Report method in person   The following was reviewed with receiving RN:   Current vital signs:  BP (!) 150/94   Pulse 85   Temp 97.5 °F (36.4 °C) (Axillary)   Resp 18   Ht 5' 5\" (1.651 m)   Wt (!) 625 lb (283.5 kg)   SpO2 100%   .01 kg/m²                    RN told that pt is waiting for transport. Any medication or safety alerts were reviewed. Any pending diagnostics and notifications were also reviewed, as well as any safety concerns or issues, abnormal labs, abnormal imaging, and abnormal assessment findings. Questions were answered.             Trinidad Gomez RN  03/09/20 0256

## 2020-03-09 NOTE — ED PROVIDER NOTES
EMERGENCY DEPARTMENT ENCOUNTER    Pt Name: Rosa Tsai  MRN: [id number]  [place] 1970  Date of evaluation: 3/9/20  CHIEF COMPLAINT       Chief Complaint   Patient presents with    Shortness of Breath     HISTORY OF PRESENT ILLNESS   Presents from [facility] hypoxia. Patient complains of intermittent shortness of breath. Attributes to her trach being dry. Patient rolls to her side to have a bowel movement. Becomes hypoxic, while on her side. Oxygen saturation normalizes afterwards. This is a chronic issue, without change, per the nurse. Nurse practitioner wanted patient evaluated, because it keeps happening. This is the report by EMS. Patient also complains of right arm pain for the past few days. States that this is a recurrent problem since a previous breast surgery. Also had history of carpal tunnel. Worse on attempting to pull herself up with her trapeze bar. Denies associated chest discomfort, nausea, or diaphoresis. The history is provided by the patient and the EMS personnel. Shortness of Breath   Severity:  Mild  Onset quality:  Unable to specify  Duration:  2 days  Timing:  Intermittent  Progression:  Waxing and waning  Chronicity:  Recurrent  Relieved by:  Nothing  Exacerbated by: positional changes. Associated symptoms: no chest pain, no cough, no diaphoresis, no fever, no headaches, no vomiting and no wheezing    Risk factors: obesity        REVIEW OF SYSTEMS     Review of Systems   Constitutional: Negative for diaphoresis and fever. HENT: Negative for congestion. Eyes: Negative for visual disturbance. Respiratory: Positive for shortness of breath. Negative for cough and wheezing. Cardiovascular: Negative for chest pain. Gastrointestinal: Negative for vomiting. Endocrine: Negative for cold intolerance and heat intolerance. Skin: Negative for pallor. Neurological: Negative for headaches. Psychiatric/Behavioral: Negative for decreased concentration. acetaminophen (TYLENOL) 325 MG tablet Take 650 mg by mouth every 6 hours as needed for PainHistorical Med      ipratropium-albuterol (DUONEB) 0.5-2.5 (3) MG/3ML SOLN nebulizer solution Inhale 1 vial into the lungs every 4 hours as needed for Shortness of BreathHistorical Med      LEVOTHYROXINE SODIUM PO Take 500 mcg by mouth daily Historical Med      ferrous sulfate 325 (65 Fe) MG tablet Take 325 mg by mouth 3 times daily (with meals)Historical Med      Multiple Vitamins-Minerals (THERAPEUTIC MULTIVITAMIN-MINERALS) tablet Take 1 tablet by mouth daily (with breakfast)Historical Med           ALLERGIES     is allergic to zosyn [piperacillin-tazobactam in dex] and vancomycin. FAMILY HISTORY     She indicated that the status of her paternal aunt is unknown. SOCIAL HISTORY       Social History     Tobacco Use    Smoking status: Never Smoker    Smokeless tobacco: Never Used   Substance Use Topics    Alcohol use: No    Drug use: No     PHYSICAL EXAM     INITIAL VITALS: BP (!) 150/94   Pulse 85   Temp 97.5 °F (36.4 °C) (Axillary)   Resp 18   Ht 5' 5\" (1.651 m)   Wt (!) 625 lb (283.5 kg)   SpO2 100%   .01 kg/m²    Physical Exam  Vitals signs reviewed. Constitutional:       General: She is not in acute distress. Appearance: She is obese. She is not ill-appearing or diaphoretic. HENT:      Nose: No congestion or rhinorrhea. Mouth/Throat:      Mouth: Mucous membranes are moist.   Eyes:      General: No scleral icterus. Conjunctiva/sclera: Conjunctivae normal.   Cardiovascular:      Rate and Rhythm: Normal rate and regular rhythm. Pulses: Normal pulses. Pulmonary:      Effort: Pulmonary effort is normal. No respiratory distress. Breath sounds: Normal breath sounds. No wheezing, rhonchi or rales. Comments: Exam slightly limited by body habitus  Abdominal:      Palpations: Abdomen is soft. Tenderness: There is no abdominal tenderness. There is no guarding.

## 2020-03-09 NOTE — FLOWSHEET NOTE
Patient is known to writer. Patient gave writer update and was glad to see Gloria Wren. Writer provided listening presence, encouragement,  and prayer. 03/09/20 1901   Encounter Summary   Services provided to: Patient   Referral/Consult From: [facility]   (3-9-20)   Complexity of Encounter Moderate   Length of Encounter 15 minutes   Spiritual Assessment Completed Yes   Routine   Type Initial   Spiritual/Jain   Type Spiritual support   Assessment Calm; Approachable   Intervention Active listening;Explored feelings, thoughts, concerns;Nurtured hope;Prayer;Sustaining presence/ Ministry of presence; Discussed illness/injury and it's impact   Outcome Expressed gratitude;Engaged in conversation;Expressed feelings/needs/concerns;Receptive

## 2020-03-09 NOTE — ED NOTES
Bed: 06  Expected date:   Expected time:   Means of arrival:   Comments:  Franco oChen RN  03/09/20 [id number]

## 2020-03-10 LAB
EKG ATRIAL RATE: 83 BPM
EKG P AXIS: 49 DEGREES
EKG P-R INTERVAL: 184 MS
EKG Q-T INTERVAL: 362 MS
EKG QRS DURATION: 94 MS
EKG QTC CALCULATION (BAZETT): 425 MS
EKG R AXIS: 30 DEGREES
EKG T AXIS: 49 DEGREES
EKG VENTRICULAR RATE: 83 BPM

## 2020-03-10 PROCEDURE — 93010 ELECTROCARDIOGRAM REPORT: CPT | Performed by: INTERNAL MEDICINE

## 2020-03-25 ENCOUNTER — HOSPITAL ENCOUNTER (EMERGENCY)
Age: 50
Discharge: ANOTHER HOSPITAL | End: 2020-03-26
Attending: EMERGENCY MEDICINE
Payer: MEDICAID

## 2020-03-25 ENCOUNTER — APPOINTMENT (OUTPATIENT)
Dept: GENERAL RADIOLOGY | Age: 50
End: 2020-03-25
Payer: MEDICAID

## 2020-03-25 PROBLEM — R06.03 ACUTE RESPIRATORY DISTRESS: Status: ACTIVE | Noted: 2020-03-25

## 2020-03-25 LAB
ABSOLUTE EOS #: 0.06 K/UL (ref 0–0.4)
ABSOLUTE IMMATURE GRANULOCYTE: ABNORMAL K/UL (ref 0–0.3)
ABSOLUTE LYMPH #: 0.28 K/UL (ref 1–4.8)
ABSOLUTE MONO #: 0.11 K/UL (ref 0.1–1.3)
ALLEN TEST: ABNORMAL
ANION GAP SERPL CALCULATED.3IONS-SCNC: 13 MMOL/L (ref 9–17)
BASOPHILS # BLD: 0 % (ref 0–2)
BASOPHILS ABSOLUTE: 0 K/UL (ref 0–0.2)
BUN BLDV-MCNC: 17 MG/DL (ref 6–20)
BUN/CREAT BLD: ABNORMAL (ref 9–20)
C-REACTIVE PROTEIN: 29.3 MG/L (ref 0–5)
CALCIUM SERPL-MCNC: 9.6 MG/DL (ref 8.6–10.4)
CARBOXYHEMOGLOBIN: 1.7 % (ref 0–5)
CHLORIDE BLD-SCNC: 101 MMOL/L (ref 98–107)
CO2: 27 MMOL/L (ref 20–31)
CREAT SERPL-MCNC: 1.01 MG/DL (ref 0.5–0.9)
DIFFERENTIAL TYPE: ABNORMAL
EOSINOPHILS RELATIVE PERCENT: 1 % (ref 0–4)
FIO2: 100
GFR AFRICAN AMERICAN: >60 ML/MIN
GFR NON-AFRICAN AMERICAN: 59 ML/MIN
GFR SERPL CREATININE-BSD FRML MDRD: ABNORMAL ML/MIN/{1.73_M2}
GFR SERPL CREATININE-BSD FRML MDRD: ABNORMAL ML/MIN/{1.73_M2}
GLUCOSE BLD-MCNC: 187 MG/DL (ref 70–99)
HCO3 ARTERIAL: 31.2 MMOL/L (ref 22–26)
HCT VFR BLD CALC: 32.7 % (ref 36–46)
HEMOGLOBIN: 9.9 G/DL (ref 12–16)
IMMATURE GRANULOCYTES: ABNORMAL %
LACTATE DEHYDROGENASE: 296 U/L (ref 135–214)
LYMPHOCYTES # BLD: 5 % (ref 24–44)
MCH RBC QN AUTO: 28.8 PG (ref 26–34)
MCHC RBC AUTO-ENTMCNC: 30.2 G/DL (ref 31–37)
MCV RBC AUTO: 95.6 FL (ref 80–100)
METHEMOGLOBIN: 0 % (ref 0–1.9)
MODE: ABNORMAL
MONOCYTES # BLD: 2 % (ref 1–7)
MORPHOLOGY: ABNORMAL
MORPHOLOGY: ABNORMAL
NEGATIVE BASE EXCESS, ART: ABNORMAL MMOL/L (ref 0–2)
NOTIFICATION TIME: ABNORMAL
NOTIFICATION: ABNORMAL
NRBC AUTOMATED: ABNORMAL PER 100 WBC
O2 DEVICE/FLOW/%: ABNORMAL
O2 SAT, ARTERIAL: 98.4 % (ref 95–98)
OXYHEMOGLOBIN: ABNORMAL % (ref 95–98)
PATIENT TEMP: 37
PCO2 ARTERIAL: 65.8 MMHG (ref 35–45)
PCO2, ART, TEMP ADJ: ABNORMAL (ref 35–45)
PDW BLD-RTO: 16.9 % (ref 11.5–14.9)
PEEP/CPAP: 5
PH ARTERIAL: 7.29 (ref 7.35–7.45)
PH, ART, TEMP ADJ: ABNORMAL (ref 7.35–7.45)
PLATELET # BLD: 187 K/UL (ref 150–450)
PLATELET ESTIMATE: ABNORMAL
PMV BLD AUTO: 9 FL (ref 6–12)
PO2 ARTERIAL: 177 MMHG (ref 80–100)
PO2, ART, TEMP ADJ: ABNORMAL MMHG (ref 80–100)
POSITIVE BASE EXCESS, ART: 4.5 MMOL/L (ref 0–2)
POTASSIUM SERPL-SCNC: 4.7 MMOL/L (ref 3.7–5.3)
PSV: ABNORMAL
PT. POSITION: ABNORMAL
RBC # BLD: 3.42 M/UL (ref 4–5.2)
RBC # BLD: ABNORMAL 10*6/UL
RESPIRATORY RATE: ABNORMAL
SAMPLE SITE: ABNORMAL
SEG NEUTROPHILS: 92 % (ref 36–66)
SEGMENTED NEUTROPHILS ABSOLUTE COUNT: 5.05 K/UL (ref 1.3–9.1)
SET RATE: 16
SODIUM BLD-SCNC: 141 MMOL/L (ref 135–144)
TEXT FOR RESPIRATORY: ABNORMAL
TOTAL HB: ABNORMAL G/DL (ref 12–16)
TOTAL RATE: 28
TROPONIN INTERP: ABNORMAL
TROPONIN INTERP: ABNORMAL
TROPONIN T: ABNORMAL NG/ML
TROPONIN T: ABNORMAL NG/ML
TROPONIN, HIGH SENSITIVITY: 20 NG/L (ref 0–14)
TROPONIN, HIGH SENSITIVITY: 21 NG/L (ref 0–14)
VT: 500
WBC # BLD: 5.5 K/UL (ref 3.5–11)
WBC # BLD: ABNORMAL 10*3/UL

## 2020-03-25 PROCEDURE — 84484 ASSAY OF TROPONIN QUANT: CPT

## 2020-03-25 PROCEDURE — 80048 BASIC METABOLIC PNL TOTAL CA: CPT

## 2020-03-25 PROCEDURE — 86140 C-REACTIVE PROTEIN: CPT

## 2020-03-25 PROCEDURE — 94002 VENT MGMT INPAT INIT DAY: CPT

## 2020-03-25 PROCEDURE — 85025 COMPLETE CBC W/AUTO DIFF WBC: CPT

## 2020-03-25 PROCEDURE — 83615 LACTATE (LD) (LDH) ENZYME: CPT

## 2020-03-25 PROCEDURE — U0002 COVID-19 LAB TEST NON-CDC: HCPCS

## 2020-03-25 PROCEDURE — 36415 COLL VENOUS BLD VENIPUNCTURE: CPT

## 2020-03-25 PROCEDURE — 82805 BLOOD GASES W/O2 SATURATION: CPT

## 2020-03-25 PROCEDURE — 36600 WITHDRAWAL OF ARTERIAL BLOOD: CPT

## 2020-03-25 PROCEDURE — 99285 EMERGENCY DEPT VISIT HI MDM: CPT

## 2020-03-25 PROCEDURE — 93005 ELECTROCARDIOGRAM TRACING: CPT | Performed by: EMERGENCY MEDICINE

## 2020-03-25 PROCEDURE — 71045 X-RAY EXAM CHEST 1 VIEW: CPT

## 2020-03-25 RX ORDER — ALBUTEROL SULFATE 90 UG/1
2 AEROSOL, METERED RESPIRATORY (INHALATION) EVERY 4 HOURS PRN
COMMUNITY

## 2020-03-25 RX ORDER — ESCITALOPRAM OXALATE 20 MG/1
20 TABLET ORAL DAILY
Status: ON HOLD | COMMUNITY
End: 2020-06-29 | Stop reason: HOSPADM

## 2020-03-25 RX ORDER — PREDNISONE 10 MG/1
10 TABLET ORAL DAILY
Status: ON HOLD | COMMUNITY
Start: 2020-03-27 | End: 2020-03-30 | Stop reason: HOSPADM

## 2020-03-25 RX ORDER — PREDNISONE 20 MG/1
20 TABLET ORAL DAILY
Status: ON HOLD | COMMUNITY
Start: 2020-03-26 | End: 2020-03-30 | Stop reason: HOSPADM

## 2020-03-25 RX ORDER — IPRATROPIUM BROMIDE AND ALBUTEROL SULFATE 2.5; .5 MG/3ML; MG/3ML
1 SOLUTION RESPIRATORY (INHALATION) 3 TIMES DAILY
COMMUNITY

## 2020-03-25 RX ORDER — ACETAMINOPHEN AND CODEINE PHOSPHATE 300; 30 MG/1; MG/1
1 TABLET ORAL EVERY 6 HOURS PRN
Status: ON HOLD | COMMUNITY
End: 2020-03-30 | Stop reason: HOSPADM

## 2020-03-25 RX ORDER — ALPRAZOLAM 0.25 MG/1
0.25 TABLET ORAL 2 TIMES DAILY PRN
Status: ON HOLD | COMMUNITY
Start: 2020-03-25 | End: 2020-03-30 | Stop reason: HOSPADM

## 2020-03-25 RX ORDER — LANOLIN ALCOHOL/MO/W.PET/CERES
CREAM (GRAM) TOPICAL 2 TIMES DAILY
COMMUNITY

## 2020-03-25 RX ORDER — PREDNISONE 20 MG/1
40 TABLET ORAL DAILY
Status: ON HOLD | COMMUNITY
Start: 2020-03-25 | End: 2020-03-30 | Stop reason: HOSPADM

## 2020-03-25 ASSESSMENT — PULMONARY FUNCTION TESTS
PIF_VALUE: 33
PIF_VALUE: 37

## 2020-03-25 NOTE — PROGRESS NOTES
Medication History completed:    New medications: hydrocerin cream, prednisone taper, alprazolam, albuterol inhaler, acetaminophen-codeine    Medications discontinued: none    Changes to dosing:   Aquaphor changed to twice daily  Duoneb changed to 3 times daily (scheduled) and every 4 hours as needed  Escitalopram changed to 20 mg daily    Stated allergies: as listed    Other pertinent information: Medications confirmed with facility list. The patient began a 3 day prednisone taper today.      Thank you,  Evie Gray, PharmD, BCPS  [phone]

## 2020-03-26 ENCOUNTER — HOSPITAL ENCOUNTER (INPATIENT)
Age: 50
LOS: 4 days | Discharge: NURSING, REHAB OR LONG-TERM CARE | DRG: 139 | End: 2020-03-30
Attending: INTERNAL MEDICINE | Admitting: INTERNAL MEDICINE
Payer: MEDICAID

## 2020-03-26 ENCOUNTER — APPOINTMENT (OUTPATIENT)
Dept: GENERAL RADIOLOGY | Age: 50
DRG: 139 | End: 2020-03-26
Attending: INTERNAL MEDICINE
Payer: MEDICAID

## 2020-03-26 VITALS
OXYGEN SATURATION: 100 % | SYSTOLIC BLOOD PRESSURE: 125 MMHG | BODY MASS INDEX: 48.82 KG/M2 | WEIGHT: 293 LBS | HEART RATE: 80 BPM | HEIGHT: 65 IN | RESPIRATION RATE: 14 BRPM | TEMPERATURE: 98.4 F | DIASTOLIC BLOOD PRESSURE: 80 MMHG

## 2020-03-26 LAB
ABSOLUTE EOS #: 0.04 K/UL (ref 0–0.44)
ABSOLUTE IMMATURE GRANULOCYTE: 0.07 K/UL (ref 0–0.3)
ABSOLUTE LYMPH #: 1.15 K/UL (ref 1.1–3.7)
ABSOLUTE MONO #: 0.42 K/UL (ref 0.1–1.2)
ADENOVIRUS PCR: NOT DETECTED
ALLEN TEST: POSITIVE
ANION GAP SERPL CALCULATED.3IONS-SCNC: 13 MMOL/L (ref 9–17)
BASOPHILS # BLD: 0 % (ref 0–2)
BASOPHILS ABSOLUTE: <0.03 K/UL (ref 0–0.2)
BORDETELLA PARAPERTUSSIS: NOT DETECTED
BORDETELLA PERTUSSIS PCR: NOT DETECTED
BUN BLDV-MCNC: 16 MG/DL (ref 6–20)
BUN/CREAT BLD: ABNORMAL (ref 9–20)
CALCIUM SERPL-MCNC: 9.1 MG/DL (ref 8.6–10.4)
CHLAMYDIA PNEUMONIAE BY PCR: NOT DETECTED
CHLORIDE BLD-SCNC: 101 MMOL/L (ref 98–107)
CO2: 26 MMOL/L (ref 20–31)
CORONAVIRUS 229E PCR: NOT DETECTED
CORONAVIRUS HKU1 PCR: NOT DETECTED
CORONAVIRUS NL63 PCR: NOT DETECTED
CORONAVIRUS OC43 PCR: NOT DETECTED
CREAT SERPL-MCNC: 0.9 MG/DL (ref 0.5–0.9)
CULTURE: NORMAL
DIFFERENTIAL TYPE: ABNORMAL
DIRECT EXAM: NORMAL
EKG ATRIAL RATE: 80 BPM
EKG P AXIS: 35 DEGREES
EKG P-R INTERVAL: 172 MS
EKG Q-T INTERVAL: 374 MS
EKG QRS DURATION: 92 MS
EKG QTC CALCULATION (BAZETT): 431 MS
EKG R AXIS: 88 DEGREES
EKG T AXIS: 29 DEGREES
EKG VENTRICULAR RATE: 80 BPM
EOSINOPHILS RELATIVE PERCENT: 1 % (ref 1–4)
FIO2: 60
GFR AFRICAN AMERICAN: >60 ML/MIN
GFR NON-AFRICAN AMERICAN: >60 ML/MIN
GFR SERPL CREATININE-BSD FRML MDRD: ABNORMAL ML/MIN/{1.73_M2}
GFR SERPL CREATININE-BSD FRML MDRD: ABNORMAL ML/MIN/{1.73_M2}
GLUCOSE BLD-MCNC: 116 MG/DL (ref 70–99)
HCT VFR BLD CALC: 29.2 % (ref 36.3–47.1)
HEMOGLOBIN: 8.4 G/DL (ref 11.9–15.1)
HUMAN METAPNEUMOVIRUS PCR: NOT DETECTED
IMMATURE GRANULOCYTES: 1 %
INFLUENZA A BY PCR: NOT DETECTED
INFLUENZA A H1 (2009) PCR: NORMAL
INFLUENZA A H1 PCR: NORMAL
INFLUENZA A H3 PCR: NORMAL
INFLUENZA B BY PCR: NOT DETECTED
LACTIC ACID, WHOLE BLOOD: 0.9 MMOL/L (ref 0.7–2.1)
LACTIC ACID: NORMAL MMOL/L
LEGIONELLA PNEUMOPHILIA AG, URINE: NEGATIVE
LYMPHOCYTES # BLD: 23 % (ref 24–43)
Lab: NORMAL
MCH RBC QN AUTO: 29.4 PG (ref 25.2–33.5)
MCHC RBC AUTO-ENTMCNC: 28.8 G/DL (ref 28.4–34.8)
MCV RBC AUTO: 102.1 FL (ref 82.6–102.9)
MODE: ABNORMAL
MONOCYTES # BLD: 8 % (ref 3–12)
MYCOPLASMA PNEUMONIAE PCR: NOT DETECTED
NEGATIVE BASE EXCESS, ART: ABNORMAL (ref 0–2)
NRBC AUTOMATED: 0 PER 100 WBC
O2 DEVICE/FLOW/%: ABNORMAL
PARAINFLUENZA 1 PCR: NOT DETECTED
PARAINFLUENZA 2 PCR: NOT DETECTED
PARAINFLUENZA 3 PCR: NOT DETECTED
PARAINFLUENZA 4 PCR: NOT DETECTED
PATIENT TEMP: ABNORMAL
PDW BLD-RTO: 15.3 % (ref 11.8–14.4)
PLATELET # BLD: ABNORMAL K/UL (ref 138–453)
PLATELET ESTIMATE: ABNORMAL
PLATELET, FLUORESCENCE: 161 K/UL (ref 138–453)
PLATELET, IMMATURE FRACTION: 3.3 % (ref 1.1–10.3)
PMV BLD AUTO: ABNORMAL FL (ref 8.1–13.5)
POC HCO3: 34.9 MMOL/L (ref 21–28)
POC O2 SATURATION: 98 % (ref 94–98)
POC PCO2 TEMP: ABNORMAL MM HG
POC PCO2: 62.7 MM HG (ref 35–48)
POC PH TEMP: ABNORMAL
POC PH: 7.35 (ref 7.35–7.45)
POC PO2 TEMP: ABNORMAL MM HG
POC PO2: 106 MM HG (ref 83–108)
POSITIVE BASE EXCESS, ART: 8 (ref 0–3)
POTASSIUM SERPL-SCNC: 4.7 MMOL/L (ref 3.7–5.3)
RBC # BLD: 2.86 M/UL (ref 3.95–5.11)
RBC # BLD: ABNORMAL 10*6/UL
RESP SYNCYTIAL VIRUS PCR: NOT DETECTED
RHINO/ENTEROVIRUS PCR: NOT DETECTED
SAMPLE SITE: ABNORMAL
SEDIMENTATION RATE, ERYTHROCYTE: 113 MM (ref 0–20)
SEG NEUTROPHILS: 67 % (ref 36–65)
SEGMENTED NEUTROPHILS ABSOLUTE COUNT: 3.41 K/UL (ref 1.5–8.1)
SODIUM BLD-SCNC: 140 MMOL/L (ref 135–144)
SOURCE: NORMAL
SPECIMEN DESCRIPTION: NORMAL
SPECIMEN DESCRIPTION: NORMAL
STREP PNEUMONIAE ANTIGEN: NEGATIVE
TCO2 (CALC), ART: 37 MMOL/L (ref 22–29)
TROPONIN INTERP: ABNORMAL
TROPONIN T: ABNORMAL NG/ML
TROPONIN, HIGH SENSITIVITY: 21 NG/L (ref 0–14)
WBC # BLD: 5.1 K/UL (ref 3.5–11.3)
WBC # BLD: ABNORMAL 10*3/UL

## 2020-03-26 PROCEDURE — 82803 BLOOD GASES ANY COMBINATION: CPT

## 2020-03-26 PROCEDURE — 6370000000 HC RX 637 (ALT 250 FOR IP): Performed by: STUDENT IN AN ORGANIZED HEALTH CARE EDUCATION/TRAINING PROGRAM

## 2020-03-26 PROCEDURE — 87186 SC STD MICRODIL/AGAR DIL: CPT

## 2020-03-26 PROCEDURE — 94770 HC ETCO2 MONITOR DAILY: CPT

## 2020-03-26 PROCEDURE — 94002 VENT MGMT INPAT INIT DAY: CPT

## 2020-03-26 PROCEDURE — 0100U HC RESPIRPTHGN MULT REV TRANS & AMP PRB TECH 21 TRGT: CPT

## 2020-03-26 PROCEDURE — 93005 ELECTROCARDIOGRAM TRACING: CPT | Performed by: STUDENT IN AN ORGANIZED HEALTH CARE EDUCATION/TRAINING PROGRAM

## 2020-03-26 PROCEDURE — 2060000000 HC ICU INTERMEDIATE R&B

## 2020-03-26 PROCEDURE — 85055 RETICULATED PLATELET ASSAY: CPT

## 2020-03-26 PROCEDURE — 2580000003 HC RX 258: Performed by: STUDENT IN AN ORGANIZED HEALTH CARE EDUCATION/TRAINING PROGRAM

## 2020-03-26 PROCEDURE — 89220 SPUTUM SPECIMEN COLLECTION: CPT

## 2020-03-26 PROCEDURE — 87077 CULTURE AEROBIC IDENTIFY: CPT

## 2020-03-26 PROCEDURE — 85025 COMPLETE CBC W/AUTO DIFF WBC: CPT

## 2020-03-26 PROCEDURE — 84484 ASSAY OF TROPONIN QUANT: CPT

## 2020-03-26 PROCEDURE — 87449 NOS EACH ORGANISM AG IA: CPT

## 2020-03-26 PROCEDURE — 5A1935Z RESPIRATORY VENTILATION, LESS THAN 24 CONSECUTIVE HOURS: ICD-10-PCS | Performed by: INTERNAL MEDICINE

## 2020-03-26 PROCEDURE — 2700000000 HC OXYGEN THERAPY PER DAY

## 2020-03-26 PROCEDURE — 71045 X-RAY EXAM CHEST 1 VIEW: CPT

## 2020-03-26 PROCEDURE — 83605 ASSAY OF LACTIC ACID: CPT

## 2020-03-26 PROCEDURE — 36600 WITHDRAWAL OF ARTERIAL BLOOD: CPT

## 2020-03-26 PROCEDURE — 87070 CULTURE OTHR SPECIMN AEROBIC: CPT

## 2020-03-26 PROCEDURE — 6360000002 HC RX W HCPCS: Performed by: STUDENT IN AN ORGANIZED HEALTH CARE EDUCATION/TRAINING PROGRAM

## 2020-03-26 PROCEDURE — 86738 MYCOPLASMA ANTIBODY: CPT

## 2020-03-26 PROCEDURE — 94761 N-INVAS EAR/PLS OXIMETRY MLT: CPT

## 2020-03-26 PROCEDURE — 99291 CRITICAL CARE FIRST HOUR: CPT | Performed by: INTERNAL MEDICINE

## 2020-03-26 PROCEDURE — 99254 IP/OBS CNSLTJ NEW/EST MOD 60: CPT | Performed by: INTERNAL MEDICINE

## 2020-03-26 PROCEDURE — 85651 RBC SED RATE NONAUTOMATED: CPT

## 2020-03-26 PROCEDURE — 87205 SMEAR GRAM STAIN: CPT

## 2020-03-26 PROCEDURE — 80048 BASIC METABOLIC PNL TOTAL CA: CPT

## 2020-03-26 PROCEDURE — 2000000000 HC ICU R&B

## 2020-03-26 PROCEDURE — 94640 AIRWAY INHALATION TREATMENT: CPT

## 2020-03-26 PROCEDURE — 87899 AGENT NOS ASSAY W/OPTIC: CPT

## 2020-03-26 PROCEDURE — 93010 ELECTROCARDIOGRAM REPORT: CPT | Performed by: INTERNAL MEDICINE

## 2020-03-26 RX ORDER — POLYETHYLENE GLYCOL 3350 17 G/17G
17 POWDER, FOR SOLUTION ORAL DAILY PRN
Status: DISCONTINUED | OUTPATIENT
Start: 2020-03-26 | End: 2020-03-30 | Stop reason: HOSPADM

## 2020-03-26 RX ORDER — SODIUM CHLORIDE 0.9 % (FLUSH) 0.9 %
10 SYRINGE (ML) INJECTION EVERY 12 HOURS SCHEDULED
Status: DISCONTINUED | OUTPATIENT
Start: 2020-03-26 | End: 2020-03-30 | Stop reason: HOSPADM

## 2020-03-26 RX ORDER — BUMETANIDE 0.25 MG/ML
1 INJECTION, SOLUTION INTRAMUSCULAR; INTRAVENOUS 2 TIMES DAILY
Status: DISCONTINUED | OUTPATIENT
Start: 2020-03-26 | End: 2020-03-30 | Stop reason: HOSPADM

## 2020-03-26 RX ORDER — IPRATROPIUM BROMIDE AND ALBUTEROL SULFATE 2.5; .5 MG/3ML; MG/3ML
1 SOLUTION RESPIRATORY (INHALATION) EVERY 6 HOURS
Status: DISCONTINUED | OUTPATIENT
Start: 2020-03-26 | End: 2020-03-30 | Stop reason: HOSPADM

## 2020-03-26 RX ORDER — PROMETHAZINE HYDROCHLORIDE 25 MG/1
12.5 TABLET ORAL EVERY 6 HOURS PRN
Status: DISCONTINUED | OUTPATIENT
Start: 2020-03-26 | End: 2020-03-30 | Stop reason: HOSPADM

## 2020-03-26 RX ORDER — BUMETANIDE 1 MG/1
1 TABLET ORAL 2 TIMES DAILY
Status: DISCONTINUED | OUTPATIENT
Start: 2020-03-26 | End: 2020-03-26

## 2020-03-26 RX ORDER — ACETAMINOPHEN 650 MG/1
650 SUPPOSITORY RECTAL EVERY 6 HOURS PRN
Status: DISCONTINUED | OUTPATIENT
Start: 2020-03-26 | End: 2020-03-30 | Stop reason: HOSPADM

## 2020-03-26 RX ORDER — ONDANSETRON 2 MG/ML
4 INJECTION INTRAMUSCULAR; INTRAVENOUS EVERY 6 HOURS PRN
Status: DISCONTINUED | OUTPATIENT
Start: 2020-03-26 | End: 2020-03-30 | Stop reason: HOSPADM

## 2020-03-26 RX ORDER — ACETAMINOPHEN 325 MG/1
650 TABLET ORAL EVERY 6 HOURS PRN
Status: DISCONTINUED | OUTPATIENT
Start: 2020-03-26 | End: 2020-03-30 | Stop reason: HOSPADM

## 2020-03-26 RX ORDER — SODIUM CHLORIDE 0.9 % (FLUSH) 0.9 %
10 SYRINGE (ML) INJECTION PRN
Status: DISCONTINUED | OUTPATIENT
Start: 2020-03-26 | End: 2020-03-30 | Stop reason: HOSPADM

## 2020-03-26 RX ADMIN — BUMETANIDE 1 MG: 1 TABLET ORAL at 10:35

## 2020-03-26 RX ADMIN — CEFEPIME HYDROCHLORIDE 2 G: 2 INJECTION, POWDER, FOR SOLUTION INTRAVENOUS at 10:35

## 2020-03-26 RX ADMIN — IPRATROPIUM BROMIDE AND ALBUTEROL SULFATE 1 AMPULE: .5; 3 SOLUTION RESPIRATORY (INHALATION) at 15:14

## 2020-03-26 RX ADMIN — ENOXAPARIN SODIUM 30 MG: 30 INJECTION SUBCUTANEOUS at 09:21

## 2020-03-26 RX ADMIN — Medication 500 MG: at 08:27

## 2020-03-26 RX ADMIN — SODIUM CHLORIDE, PRESERVATIVE FREE 10 ML: 5 INJECTION INTRAVENOUS at 09:00

## 2020-03-26 RX ADMIN — CEFTRIAXONE SODIUM 1 G: 1 INJECTION, POWDER, FOR SOLUTION INTRAMUSCULAR; INTRAVENOUS at 09:21

## 2020-03-26 RX ADMIN — IPRATROPIUM BROMIDE AND ALBUTEROL SULFATE 1 AMPULE: .5; 3 SOLUTION RESPIRATORY (INHALATION) at 09:05

## 2020-03-26 RX ADMIN — IPRATROPIUM BROMIDE AND ALBUTEROL SULFATE 1 AMPULE: .5; 3 SOLUTION RESPIRATORY (INHALATION) at 20:04

## 2020-03-26 ASSESSMENT — ENCOUNTER SYMPTOMS
RHINORRHEA: 0
ABDOMINAL PAIN: 0
COUGH: 0
SHORTNESS OF BREATH: 1
CONSTIPATION: 0
NAUSEA: 0
VOMITING: 0
COLOR CHANGE: 0
CHEST TIGHTNESS: 1
SHORTNESS OF BREATH: 1
DIARRHEA: 0
EYE PAIN: 0
ABDOMINAL PAIN: 0
BACK PAIN: 0

## 2020-03-26 ASSESSMENT — PAIN - FUNCTIONAL ASSESSMENT
PAIN_FUNCTIONAL_ASSESSMENT: ACTIVITIES ARE NOT PREVENTED

## 2020-03-26 ASSESSMENT — PAIN SCALES - GENERAL
PAINLEVEL_OUTOF10: 0

## 2020-03-26 ASSESSMENT — PULMONARY FUNCTION TESTS
PIF_VALUE: 34
PIF_VALUE: 31
PIF_VALUE: .37

## 2020-03-26 NOTE — H&P
Critical Care - History and Physical Examination    Patient's name:  Tangela Campos Record Number: [id number]  Patient's account/billing number: [de-identified]  Patient's YOB: 1970  Age: 54 y.o. Date of Admission: 3/26/2020  2:51 AM  Date of History and Physical Examination: 3/26/2020      Primary Care Physician: Jemima Bowens DO  Attending Physician: Dr. Elva Duque Status: Full Code    Chief complaint: Shortness of breath. HISTORY OF PRESENT ILLNESS:      History was obtained from chart review and the patient. Julio Gray is a 54 y.o. with PMH of morbid obesity, severe RU, chronic respiratory failure status post tracheostomy (2018), anxiety initially presented to [facility] with chief complaint of shortness of breath and anxiety. Patient lives at a nursing facility, [facility] and she has a chronic trach in place. She is on nocturnal ventilator at night. Patient has significant history of severe RU per chart review. Patient had tracheostomy placed in [place] 2018. She is on nocturnal ventilator. Per Dr. Suhail Escalona was noted on 1/8/2020 patient is on nocturnal CPAP, pressure support of 12, PEEP of 5, FiO2 40% at night. She is on oxygen during the day. Patient also has history of vancomycin and Zosyn allergy and history of MRSA colonization. She has tolerated cefepime in 6/2018 and Rocephin in 9/2019. On 3/25/2020 patient presented to [facility] ED with shortness of breath. Patient was attempted to be placed on BiPAP and CPAP settings with no improvement in shortness of breath. Patient was switched to [facility] setting. Saturations improved after changing the settings to [facility]. Patient was also complaining of some chest discomfort. EKG and chest x-ray were obtained. EKG was nonspecific with no significant signs of ischemia.   Troponin 20, 21  Chest x-ray showed mild progression of multifocal airspace opacities with mild superimposed wheezing appreciated  Cardiovascular: regular rate and rhythm, normal S1, S2, no murmur noted   Abdomen: soft, nontender, nondistended  Extremities:   no pedal edema, no clubbing or cyanosis    MEDICATIONS:  Scheduled Meds:   ipratropium-albuterol  1 ampule Inhalation Q6H    sodium chloride flush  10 mL Intravenous 2 times per day    enoxaparin  30 mg Subcutaneous BID    bumetanide  1 mg Oral BID     Continuous Infusions:    PRN Meds:   sodium chloride flush, 10 mL, PRN  acetaminophen, 650 mg, Q6H PRN    Or  acetaminophen, 650 mg, Q6H PRN  polyethylene glycol, 17 g, Daily PRN  promethazine, 12.5 mg, Q6H PRN    Or  ondansetron, 4 mg, Q6H PRN          ABGs:   Lab Results   Component Value Date    PHART 7.285 03/25/2020    FQM7KLT 65.8 03/25/2020    PO2ART 177.0 03/25/2020    MCO2IGH 31.2 03/25/2020    A7JZZFLL 98.4 03/25/2020    FIO2 100 03/25/2020       DATA:  Complete Blood Count:   Recent Labs     03/25/20  1805   WBC 5.5   RBC 3.42*   HGB 9.9*   HCT 32.7*   MCV 95.6   MCH 28.8   MCHC 30.2*   RDW 16.9*      MPV 9.0        Last 3 Blood Glucose:   Recent Labs     03/25/20  1805   GLUCOSE 187*        PT/INR:    Lab Results   Component Value Date    PROTIME 13.0 03/09/2020    INR 1.0 03/09/2020     PTT:    Lab Results   Component Value Date    APTT 26.6 03/09/2020       Comprehensive Metabolic Profile:   Recent Labs     03/25/20  1805      K 4.7      CO2 27   BUN 17   CREATININE 1.01*   GLUCOSE 187*   CALCIUM 9.6      Magnesium: No results found for: MG  Phosphorus: No results found for: PHOS  Ionized Calcium: No results found for: CAION     Urinalysis:   Lab Results   Component Value Date    NITRU NEGATIVE 05/08/2019    COLORU YELLOW 05/08/2019    PHUR 7.0 05/08/2019    WBCUA 10 TO 20 05/08/2019    RBCUA 0 TO 2 05/08/2019    MUCUS NOT REPORTED 05/08/2019    TRICHOMONAS NOT REPORTED 05/08/2019    YEAST NOT REPORTED 05/08/2019    BACTERIA MODERATE 05/08/2019    SPECGRAV 1.010 05/08/2019 LEUKOCYTESUR MOD 05/08/2019    UROBILINOGEN Normal 05/08/2019    BILIRUBINUR NEGATIVE 05/08/2019    GLUCOSEU NEGATIVE 05/08/2019    KETUA NEGATIVE 05/08/2019    AMORPHOUS NOT REPORTED 05/08/2019       HgBA1c:  No results found for: LABA1C  TSH:    Lab Results   Component Value Date    TSH 3.55 11/17/2018       Lactic Acid:   Lab Results   Component Value Date    LACTA 0.7 01/08/2020    LACTA 2.0 11/17/2018    LACTA 0.8 01/27/2018      Troponin: No results for input(s): TROPONINI in the last 72 hours. Electrocardiogram:       Last Echocardiogram findings:   (See actual reports for details)      Radiological imaging  Xr Chest Portable    Result Date: 3/25/2020  EXAMINATION: ONE XRAY VIEW OF THE CHEST 3/25/2020 6:26 pm COMPARISON: Chest x-ray 03/09/2020 HISTORY: ORDERING SYSTEM PROVIDED HISTORY: left chest pain, hypoxia TECHNOLOGIST PROVIDED HISTORY: left chest pain, hypoxia Reason for Exam: left chest pain, hypoxia Acuity: Unknown Type of Exam: Unknown FINDINGS: Endotracheal tube versus tracheostomy partially visualized tip the level of the mid clavicles satisfactory position. .  Right MediPort device unchanged. Cardiomegaly. Perihilar congestion. Right suprahilar and infrahilar airspace opacities. Patchy bibasilar airspace disease and pleural effusions. No pneumothorax. Mildly increased interstitial opacity throughout both lungs may suggest underlying component of underlying interstitial/pulmonary edema. Mild progression of the multifocal airspace opacities. Suspect mild superimposed pulmonary edema superimposed on underlying airspace disease. Small effusions new from prior. Xr Chest Portable    Result Date: 3/9/2020  EXAMINATION: ONE XRAY VIEW OF THE CHEST 3/9/2020 1:02 pm COMPARISON: Chest radiograph performed 01/10/2020.  HISTORY: ORDERING SYSTEM PROVIDED HISTORY: dyspnea TECHNOLOGIST PROVIDED HISTORY: dyspnea Reason for Exam: Shortness of breath Acuity: Acute Type of Exam: Initial FINDINGS: There are

## 2020-03-26 NOTE — ED PROVIDER NOTES
(HYDROCERIN) CREA cream Apply topically 2 times daily Indications: to bilateral lower extremities Yes Historical Provider, MD   ipratropium-albuterol (DUONEB) 0.5-2.5 (3) MG/3ML SOLN nebulizer solution Inhale 1 vial into the lungs 3 times daily Yes Historical Provider, MD   escitalopram (LEXAPRO) 20 MG tablet Take 20 mg by mouth daily Yes Historical Provider, MD   predniSONE (DELTASONE) 20 MG tablet Take 40 mg by mouth daily For 1 dose on 3/25/20 Yes Historical Provider, MD   predniSONE (DELTASONE) 20 MG tablet Take 20 mg by mouth daily For 1 dose on 3/26/20 Yes Historical Provider, MD   predniSONE (DELTASONE) 10 MG tablet Take 10 mg by mouth daily For 1 dose on 3/27/20 Yes Historical Provider, MD   ALPRAZolam (XANAX) 0.25 MG tablet Take 0.25 mg by mouth 2 times daily as needed for Anxiety. For 14 days starting 3/25/20 Yes Historical Provider, MD   acetaminophen-codeine (TYLENOL #3) 300-30 MG per tablet Take 1 tablet by mouth every 6 hours as needed for Pain.  Yes Historical Provider, MD   ibuprofen (ADVIL;MOTRIN) 800 MG tablet Take 1 tablet by mouth every 8 hours as needed for Pain Yes Kiel Rogers MD   Menthol, Topical Analgesic, (BIOFREEZE) 4 % GEL Apply topically 4 times daily as needed (right arm pain)  Yes Historical Provider, MD   carBAMazepine (TEGRETOL) 200 MG tablet Take 1 tablet by mouth 2 times daily Yes Ariel Garcia MD   bumetanide (BUMEX) 1 MG tablet Take 1 tablet by mouth 2 times daily Yes Rebeka Arredondo MD   bisacodyl (DULCOLAX) 5 MG EC tablet Take 5 mg by mouth 2 times daily as needed for Constipation Yes Historical Provider, MD   docusate sodium (COLACE) 100 MG capsule Take 100 mg by mouth daily as needed for Constipation Yes Historical Provider, MD   loratadine (CLARITIN) 10 MG tablet Take 10 mg by mouth daily Yes Historical Provider, MD   magnesium hydroxide (MILK OF MAGNESIA) 400 MG/5ML suspension Take 30 mLs by mouth daily as needed for Constipation Yes Historical Provider, MD Camphor-Eucalyptus-Menthol (VICKS VAPORUB) 4.7-1.2-2.6 % OINT Apply topically every 6 hours as needed (congestion) Yes Historical Provider, MD   benzonatate (TESSALON) 100 MG capsule Take 100 mg by mouth 2 times daily as needed for Cough Yes Historical Provider, MD   acetaminophen (TYLENOL) 325 MG tablet Take 650 mg by mouth every 6 hours as needed for Pain Yes Historical Provider, MD   ipratropium-albuterol (DUONEB) 0.5-2.5 (3) MG/3ML SOLN nebulizer solution Inhale 1 vial into the lungs every 4 hours as needed for Shortness of Breath Yes Historical Provider, MD   LEVOTHYROXINE SODIUM PO Take 500 mcg by mouth daily  Yes Historical Provider, MD   ferrous sulfate 325 (65 Fe) MG tablet Take 325 mg by mouth 3 times daily (with meals) Yes Historical Provider, MD   Multiple Vitamins-Minerals (THERAPEUTIC MULTIVITAMIN-MINERALS) tablet Take 1 tablet by mouth daily (with breakfast) Yes Historical Provider, MD     Please note that medications prescribed at discharge will auto-populate into this medication list when note is refreshed. Please look at prescription date andprescriber to clarify. Family History:family history includes Breast Cancer in her paternal aunt; Breast Cancer (age of onset: 43) in her paternal cousin; Breast Cancer (age of onset: 37) in her paternal cousin; Breast Cancer (age of onset: 46) in her paternal cousin. Social History: She reports that she has never smoked. She has never used smokeless tobacco. She reports that she does not drink alcohol or use drugs. She reports previously being sexually active. REVIEW OF SYSTEMS    (2-9 systems for level 4, 10 or more for level 5)      Review of Systems   Constitutional: Positive for fatigue. Negative for chills and fever. HENT: Negative for congestion and rhinorrhea. Eyes: Negative for pain and visual disturbance. Respiratory: Positive for chest tightness and shortness of breath. Negative for cough.     Cardiovascular: Positive for chest C-REACTIVE PROTEIN - Abnormal; Notable for the following components:    CRP 29.3 (*)     All other components within normal limits   BLOOD GAS, ARTERIAL - Abnormal; Notable for the following components:    pH, Arterial 7.285 (*)     pCO2, Arterial 65.8 (*)     pO2, Arterial 177.0 (*)     HCO3, Arterial 31.2 (*)     Positive Base Excess, Art 4.5 (*)     O2 Sat, Arterial 98.4 (*)     All other components within normal limits   TROPONIN - Abnormal; Notable for the following components:    Troponin, High Sensitivity 20 (*)     All other components within normal limits   TROPONIN - Abnormal; Notable for the following components:    Troponin, High Sensitivity 21 (*)     All other components within normal limits   COVID-19       RADIOLOGY: All plain film, CT, MRI, and formal ultrasound images (except ED bedside ultrasound) are read by the radiologist, see reports below, unless otherwise noted in ED Course, MDM or here. Xr Chest Portable    Result Date: 3/25/2020  EXAMINATION: ONE XRAY VIEW OF THE CHEST 3/25/2020 6:26 pm COMPARISON: Chest x-ray 03/09/2020 HISTORY: ORDERING SYSTEM PROVIDED HISTORY: left chest pain, hypoxia TECHNOLOGIST PROVIDED HISTORY: left chest pain, hypoxia Reason for Exam: left chest pain, hypoxia Acuity: Unknown Type of Exam: Unknown FINDINGS: Endotracheal tube versus tracheostomy partially visualized tip the level of the mid clavicles satisfactory position. .  Right MediPort device unchanged. Cardiomegaly. Perihilar congestion. Right suprahilar and infrahilar airspace opacities. Patchy bibasilar airspace disease and pleural effusions. No pneumothorax. Mildly increased interstitial opacity throughout both lungs may suggest underlying component of underlying interstitial/pulmonary edema. Mild progression of the multifocal airspace opacities. Suspect mild superimposed pulmonary edema superimposed on underlying airspace disease. Small effusions new from prior.        BEDSIDE ULTRASOUND:  Not clinically indicated at this time. ED COURSE      ED Medication Orders (From admission, onward)    None          EMERGENCYDEPARTMENT COURSE:    Patient has worsening infiltrates however it is very difficult to interpret due to baseline pulmonary edema. Labs show elevated CRP and LDH. New onset lymphopenia. Given her symptoms and these lab findings, I am concerned for CoVID-19. Patient requiring increased vent setting from baseline and givne that she is from nursing facility, can not discharge back home at this time. Discussed with infection control RN. Given patient's possible CoVID and trach on vent,  Patient meets criteria to go to [address] [place] [address].    Spoke to Dr. Taylor Sheets who accepted admission. PROCEDURES:  None     CONSULTS:  None    CRITICAL CARE:  There was significant risk of life threatening deterioration of patient's condition requiring my direct management. Critical care time 30 minutes, excluding any documented procedures. FINAL IMPRESSION      1. Dyspnea and respiratory abnormalities          DISPOSITION / PLAN     DISPOSITION Decision To Transfer 03/25/2020 08:27:07 PM      PATIENT REFERRED TO:  No follow-up provider specified.     DISCHARGE MEDICATIONS:  New Prescriptions    No medications on file       Ashley Palumbo MD  Emergency Medicine Attending      (Please note that portions of this note were completed with a voice recognition program.  Efforts were made to edit the dictations but occasionallywords are mis-transcribed.)         Ashley Palumbo MD  03/26/20 0511

## 2020-03-26 NOTE — ED NOTES
Report given to REJI Woodward from [facility]. V's Covid Unit. Report method by phone   The following was reviewed with receiving RN:   Current vital signs:  /80   Pulse 80   Temp 98.4 °F (36.9 °C) (Axillary)   Resp 14   Ht 5' 5\" (1.651 m)   Wt (!) 625 lb (283.5 kg)   SpO2 100%   .01 kg/m²                MEWS Score: 0     RN told that pt is a bariatric pt from [facility] with a chronic trach. RN told that pt was placed on a ventilator via trach and did well. RN told that pt is a rule out Covid as well. RN told that pt's VSS. RN told that pt's labs were consistent with a potential COVID-19 case per Dr. Jessica Ferrara. RN told that pt is particular with her care but is able to help. RN told that pt denies fevers, cough, or exposure. RN told that pt had her inner cannula changed twice during shift. RN told that pt is very familiar with her care regarding her trach. RN told that pt has been complaining of right arm pain x2 weeks, which is not new for her. Any medication or safety alerts were reviewed. Any pending diagnostics and notifications were also reviewed, as well as any safety concerns or issues, abnormal labs, abnormal imaging, and abnormal assessment findings. Questions were answered.                  Go Lock RN  03/26/20 1571

## 2020-03-26 NOTE — ED NOTES
First attempt to call [facility] Staff said RN is occupied at this time and they were given direct number to ED.           Jessi Wright RN  03/26/20 6156

## 2020-03-26 NOTE — CONSULTS
Not on file     Attends [employer] service: Not on file     Active member of club or organization: Not on file     Attends meetings of clubs or organizations: Not on file     Relationship status: Not on file    Intimate partner violence     Fear of current or ex partner: Not on file     Emotionally abused: Not on file     Physically abused: Not on file     Forced sexual activity: Not on file   Other Topics Concern    Not on file   Social History Narrative    L[facility]        Family History:     Family History   Problem Relation Age of Onset    Breast Cancer Paternal Aunt     Breast Cancer Paternal Cousin 43    Breast Cancer Paternal Cousin 37    Breast Cancer Paternal Cousin 46        Allergies:   Zosyn [piperacillin-tazobactam in dex] and Vancomycin     Review of Systems:     Review of Systems   Constitutional: Positive for activity change. HENT: Negative for congestion. Respiratory: Positive for shortness of breath. Cardiovascular: Negative for chest pain. Gastrointestinal: Negative for abdominal pain. Endocrine: Negative for polydipsia. Genitourinary: Negative for urgency. Musculoskeletal: Negative for arthralgias. Skin: Negative for color change. Allergic/Immunologic: Negative for immunocompromised state. Neurological: Negative for dizziness. Hematological: Negative for adenopathy. Psychiatric/Behavioral: Negative for agitation.        Physical Examination :     Patient Vitals for the past 8 hrs:   BP Temp Temp src Pulse Resp SpO2   03/26/20 1100 121/64 -- -- 84 19 --   03/26/20 1000 119/63 -- -- 84 20 100 %   03/26/20 0945 -- -- -- -- -- 97 %   03/26/20 0923 121/70 -- -- 83 19 100 %   03/26/20 0905 -- -- -- -- 20 100 %   03/26/20 0829 -- -- -- -- 21 100 %   03/26/20 0804 -- -- -- 85 19 99 %   03/26/20 0800 127/67 98.5 °F (36.9 °C) Oral 84 19 99 %   03/26/20 0700 128/68 -- -- 84 19 --   03/26/20 0600 133/74 -- -- 81 15 100 %   03/26/20 0500 127/78 -- -- 84 17 100 % Physical Exam  Constitutional:       Appearance: Normal appearance. HENT:      Head: Normocephalic and atraumatic. Nose: Nose normal. No congestion. Mouth/Throat:      Mouth: Mucous membranes are moist.      Pharynx: Oropharynx is clear. Eyes:      General: No scleral icterus. Pupils: Pupils are equal, round, and reactive to light. Neck:      Musculoskeletal: Neck supple. No neck rigidity. Cardiovascular:      Rate and Rhythm: Normal rate and regular rhythm. Heart sounds: Normal heart sounds. No murmur. Pulmonary:      Breath sounds: Wheezing present. Abdominal:      General: There is no distension. Tenderness: There is no abdominal tenderness. Genitourinary:     Comments: No mesa - external catheter w kamar urine  Musculoskeletal:         General: No swelling or tenderness. Skin:     General: Skin is dry. Coloration: Skin is not jaundiced. Neurological:      General: No focal deficit present. Mental Status: She is alert and oriented to person, place, and time. Psychiatric:         Mood and Affect: Mood normal.         Thought Content: Thought content normal.           Medical Decision Making:   I have independently reviewed/ordered the following labs:    CBC with Differential:   Recent Labs     03/25/20  1805 03/26/20  0848   WBC 5.5 5.1   HGB 9.9* 8.4*   HCT 32.7* 29.2*    See Reflexed IPF Result   LYMPHOPCT 5* 23*   MONOPCT 2 8     BMP:  Recent Labs     03/25/20  1805 03/26/20  0848    140   K 4.7 4.7    101   CO2 27 26   BUN 17 16   CREATININE 1.01* 0.90     Hepatic Function Panel: No results for input(s): PROT, LABALBU, BILIDIR, IBILI, BILITOT, ALKPHOS, ALT, AST in the last 72 hours. No results for input(s): RPR in the last 72 hours. No results for input(s): HIV in the last 72 hours. No results for input(s): BC in the last 72 hours.   Lab Results   Component Value Date    CREATININE 0.90 03/26/2020    GLUCOSE 116 03/26/2020 Detailed results: Thank you for allowing us to participate in the care of this patient. Please call with questions. This note is created with the assistance of a speech recognition program.  While intending to generate adocument that actually reflects the content of the visit, the document can still have some errors including those of syntax and sound a like substitutions which may escape proof reading. It such instances, actual meaningcan be extrapolated by contextual diversion.     Aris Hicks MD  Office: [phone]  Perfect serve / office [phone]

## 2020-03-26 NOTE — PLAN OF CARE
Problem: OXYGENATION/RESPIRATORY FUNCTION  Goal: Patient will maintain patent airway  Outcome: Ongoing  Goal: Patient will achieve/maintain normal respiratory rate/effort  Description: Respiratory rate and effort will be within normal limits for the patient  Outcome: Ongoing     Problem: MECHANICAL VENTILATION  Goal: Patient will maintain patent airway  Outcome: Ongoing  Goal: Oral health is maintained or improved  Outcome: Ongoing  Goal: Tracheostomy will be managed safely  Outcome: Ongoing  Goal: ET tube will be managed safely  Outcome: Ongoing  Goal: Ability to express needs and understand communication  Outcome: Ongoing  Goal: Mobility/activity is maintained at optimum level for patient  Outcome: Ongoing     Problem: SKIN INTEGRITY  Goal: Skin integrity is maintained or improved  Outcome: Ongoing

## 2020-03-27 LAB
ALLEN TEST: ABNORMAL
EKG ATRIAL RATE: 105 BPM
EKG P AXIS: 100 DEGREES
EKG P-R INTERVAL: 170 MS
EKG Q-T INTERVAL: 374 MS
EKG QRS DURATION: 104 MS
EKG QTC CALCULATION (BAZETT): 494 MS
EKG R AXIS: 117 DEGREES
EKG T AXIS: 81 DEGREES
EKG VENTRICULAR RATE: 105 BPM
FIO2: 40
MODE: ABNORMAL
MYCOPLASMA PNEUMONIAE IGM: 2.5
NEGATIVE BASE EXCESS, ART: ABNORMAL (ref 0–2)
O2 DEVICE/FLOW/%: ABNORMAL
PATIENT TEMP: ABNORMAL
POC HCO3: 35.1 MMOL/L (ref 21–28)
POC O2 SATURATION: 92 % (ref 94–98)
POC PCO2 TEMP: ABNORMAL MM HG
POC PCO2: 55.4 MM HG (ref 35–48)
POC PH TEMP: ABNORMAL
POC PH: 7.41 (ref 7.35–7.45)
POC PO2 TEMP: ABNORMAL MM HG
POC PO2: 64.6 MM HG (ref 83–108)
POSITIVE BASE EXCESS, ART: 9 (ref 0–3)
SAMPLE SITE: ABNORMAL
SARS-COV-2, NAA: NOT DETECTED
TCO2 (CALC), ART: 37 MMOL/L (ref 22–29)

## 2020-03-27 PROCEDURE — 94761 N-INVAS EAR/PLS OXIMETRY MLT: CPT

## 2020-03-27 PROCEDURE — 84443 ASSAY THYROID STIM HORMONE: CPT

## 2020-03-27 PROCEDURE — 82803 BLOOD GASES ANY COMBINATION: CPT

## 2020-03-27 PROCEDURE — 85025 COMPLETE CBC W/AUTO DIFF WBC: CPT

## 2020-03-27 PROCEDURE — 6360000002 HC RX W HCPCS: Performed by: STUDENT IN AN ORGANIZED HEALTH CARE EDUCATION/TRAINING PROGRAM

## 2020-03-27 PROCEDURE — 2700000000 HC OXYGEN THERAPY PER DAY

## 2020-03-27 PROCEDURE — 2060000000 HC ICU INTERMEDIATE R&B

## 2020-03-27 PROCEDURE — 83605 ASSAY OF LACTIC ACID: CPT

## 2020-03-27 PROCEDURE — 6370000000 HC RX 637 (ALT 250 FOR IP): Performed by: STUDENT IN AN ORGANIZED HEALTH CARE EDUCATION/TRAINING PROGRAM

## 2020-03-27 PROCEDURE — 2580000003 HC RX 258: Performed by: STUDENT IN AN ORGANIZED HEALTH CARE EDUCATION/TRAINING PROGRAM

## 2020-03-27 PROCEDURE — 94640 AIRWAY INHALATION TREATMENT: CPT

## 2020-03-27 PROCEDURE — 94003 VENT MGMT INPAT SUBQ DAY: CPT

## 2020-03-27 PROCEDURE — 80048 BASIC METABOLIC PNL TOTAL CA: CPT

## 2020-03-27 PROCEDURE — 93010 ELECTROCARDIOGRAM REPORT: CPT | Performed by: INTERNAL MEDICINE

## 2020-03-27 PROCEDURE — 86140 C-REACTIVE PROTEIN: CPT

## 2020-03-27 PROCEDURE — 2500000003 HC RX 250 WO HCPCS: Performed by: INTERNAL MEDICINE

## 2020-03-27 PROCEDURE — 99232 SBSQ HOSP IP/OBS MODERATE 35: CPT | Performed by: INTERNAL MEDICINE

## 2020-03-27 PROCEDURE — 99291 CRITICAL CARE FIRST HOUR: CPT | Performed by: INTERNAL MEDICINE

## 2020-03-27 PROCEDURE — 94770 HC ETCO2 MONITOR DAILY: CPT

## 2020-03-27 RX ORDER — LEVOTHYROXINE SODIUM 0.1 MG/1
200 TABLET ORAL DAILY
Status: DISCONTINUED | OUTPATIENT
Start: 2020-03-27 | End: 2020-03-30 | Stop reason: HOSPADM

## 2020-03-27 RX ORDER — LANOLIN ALCOHOL/MO/W.PET/CERES
325 CREAM (GRAM) TOPICAL
Status: DISCONTINUED | OUTPATIENT
Start: 2020-03-27 | End: 2020-03-30 | Stop reason: HOSPADM

## 2020-03-27 RX ORDER — LEVOTHYROXINE SODIUM 0.12 MG/1
500 TABLET ORAL DAILY
Status: DISCONTINUED | OUTPATIENT
Start: 2020-03-27 | End: 2020-03-27

## 2020-03-27 RX ORDER — CARBAMAZEPINE 200 MG/1
200 TABLET ORAL 2 TIMES DAILY
Status: DISCONTINUED | OUTPATIENT
Start: 2020-03-27 | End: 2020-03-30 | Stop reason: HOSPADM

## 2020-03-27 RX ADMIN — FERROUS SULFATE TAB EC 325 MG (65 MG FE EQUIVALENT) 325 MG: 325 (65 FE) TABLET DELAYED RESPONSE at 14:27

## 2020-03-27 RX ADMIN — BUMETANIDE 1 MG: 0.25 INJECTION INTRAMUSCULAR; INTRAVENOUS at 09:32

## 2020-03-27 RX ADMIN — CARBAMAZEPINE 200 MG: 200 TABLET ORAL at 19:40

## 2020-03-27 RX ADMIN — ENOXAPARIN SODIUM 30 MG: 30 INJECTION SUBCUTANEOUS at 09:31

## 2020-03-27 RX ADMIN — FERROUS SULFATE TAB EC 325 MG (65 MG FE EQUIVALENT) 325 MG: 325 (65 FE) TABLET DELAYED RESPONSE at 16:55

## 2020-03-27 RX ADMIN — FERROUS SULFATE TAB EC 325 MG (65 MG FE EQUIVALENT) 325 MG: 325 (65 FE) TABLET DELAYED RESPONSE at 19:40

## 2020-03-27 RX ADMIN — CARBAMAZEPINE 200 MG: 200 TABLET ORAL at 14:27

## 2020-03-27 RX ADMIN — IPRATROPIUM BROMIDE AND ALBUTEROL SULFATE 1 AMPULE: .5; 3 SOLUTION RESPIRATORY (INHALATION) at 16:09

## 2020-03-27 RX ADMIN — SODIUM CHLORIDE, PRESERVATIVE FREE 10 ML: 5 INJECTION INTRAVENOUS at 09:32

## 2020-03-27 RX ADMIN — ENOXAPARIN SODIUM 30 MG: 30 INJECTION SUBCUTANEOUS at 19:40

## 2020-03-27 RX ADMIN — IPRATROPIUM BROMIDE AND ALBUTEROL SULFATE 1 AMPULE: .5; 3 SOLUTION RESPIRATORY (INHALATION) at 02:00

## 2020-03-27 RX ADMIN — IPRATROPIUM BROMIDE AND ALBUTEROL SULFATE 1 AMPULE: .5; 3 SOLUTION RESPIRATORY (INHALATION) at 20:31

## 2020-03-27 RX ADMIN — CEFEPIME HYDROCHLORIDE 2 G: 2 INJECTION, POWDER, FOR SOLUTION INTRAVENOUS at 12:36

## 2020-03-27 RX ADMIN — IPRATROPIUM BROMIDE AND ALBUTEROL SULFATE 1 AMPULE: .5; 3 SOLUTION RESPIRATORY (INHALATION) at 08:33

## 2020-03-27 ASSESSMENT — PAIN SCALES - GENERAL
PAINLEVEL_OUTOF10: 0

## 2020-03-27 ASSESSMENT — ENCOUNTER SYMPTOMS
SHORTNESS OF BREATH: 1
ABDOMINAL PAIN: 0
COLOR CHANGE: 0

## 2020-03-27 ASSESSMENT — PULMONARY FUNCTION TESTS
PIF_VALUE: 39
PIF_VALUE: 35
PIF_VALUE: 33
PIF_VALUE: 30

## 2020-03-27 NOTE — PLAN OF CARE
Problem: OXYGENATION/RESPIRATORY FUNCTION  Goal: Patient will maintain patent airway  3/26/2020 2017 by Sherrill Bolanos RCP  Outcome: Ongoing  3/26/2020 0909 by Beatris Martin RCP  Outcome: Ongoing  Goal: Patient will achieve/maintain normal respiratory rate/effort  Description: Respiratory rate and effort will be within normal limits for the patient  3/26/2020 2017 by Sherrill Bolanos RCP  Outcome: Ongoing  3/26/2020 0909 by Beatris Martin RCP  Outcome: Ongoing     Problem: MECHANICAL VENTILATION  Goal: Patient will maintain patent airway  3/26/2020 2017 by Sherrill Bolanos RCP  Outcome: Ongoing  3/26/2020 0909 by Beatris Martin RCP  Outcome: Ongoing  Goal: Oral health is maintained or improved  3/26/2020 2017 by Sherrill Bolanos RCP  Outcome: Ongoing  3/26/2020 0909 by Beatris Martin RCP  Outcome: Ongoing  Goal: Tracheostomy will be managed safely  3/26/2020 2017 by Sherrill Bolanos RCP  Outcome: Ongoing  3/26/2020 0909 by Beatris Martin RCP  Outcome: Ongoing  Goal: ET tube will be managed safely  Description:    3/26/2020 2017 by Sherrill Bolanos RCP  Outcome: Completed  Note: The pt never had an ETT.  3/26/2020 0909 by Beatris Martin RCP  Outcome: Ongoing  Goal: Ability to express needs and understand communication  3/26/2020 2017 by Sherrill Bolanos RCP  Outcome: Ongoing  3/26/2020 0909 by Beatris Martin RCP  Outcome: Ongoing  Goal: Mobility/activity is maintained at optimum level for patient  3/26/2020 2017 by Sherrill Bolanos RCP  Outcome: Ongoing  3/26/2020 0909 by Beatris Martin RCP  Outcome: Ongoing     Problem: SKIN INTEGRITY  Goal: Skin integrity is maintained or improved  3/26/2020 2017 by Sherrill Bolanos RCP  Outcome: Ongoing  3/26/2020 0909 by Beatris Martin RCP  Outcome: Ongoing

## 2020-03-27 NOTE — PROGRESS NOTES
[facility] -   Infectious diseases evaluation  admission date 3/26/2020    reason for consultation:   Possible COVID    Impression :   Current:  ·  resp failure - chronic trach  · Morbid obesity  · mycoplasma pneumonia bilat multifocal - progressing x 3/9/20  · No lymphopenia - no fever - no sore throat or HA  · Hx severe allergy to Zosyn and vanco - ok w cefepime    Other:  ·   Discussion / summary of stay / plan of care   ·   Recommendations   Mycoplasma resp infection    · Less likely COVID causing the progression of the infiltrates x 2 weeks  · Cant do a CT due to size but once COVID R. O she will benefit from a bronchoscopy for diagnosis  · Get a sputum cx  · Stop cefepime and  · Keep  zithromax for now    Infection Control Recommendations   · [place] Precautions  · Contact Isolation   · Airborne isolation      Antimicrobial Stewardship Recommendations   · Simplification of therapy  · Targeted therapy      Coordination ofOutpatient Care:   · Estimated Length of IV antimicrobials:  · Patient will need Midline / picc Catheter Insertion:   · Patient will need SNF:  · Patient will need outpatient wound care:     History of Present Illness:   Initial history:  Le Morales is a 54y.o.-year-old female w morbid obesity and trach - gets 3 L O2 baseline, seen in ER 3/9 w increased SOB and the need for 10 L oxygen CXR showed bilat infiltrates - sent back to the facility    Back 3/26 w SOB and lower So2, white phlem and now needing the vent- very anxious and Ox3  No fever and no chills - no HA or SORE THROAT    Lives in [facility]. CXR looks worse than before, same infiltrates still present bilat but worse. Too large for a CT to be done  Resp PCR [sky is neg and COVID testing started  Allergy to Zosyn and vanco and had SOB rash to both individually given.     WBC 5.1  Absolute Lymp count 1.15    Interval changes  3/27/2020   No fever  Feels ok - not SOB  Still on high FIO2    PH 7.4  Po2 64  Pend COVID but the mycoplasma IGM 2.5 high   In general better        Summary of relevant labs:  Labs:  WBC 5.1  ALC 1.15      Micro:  resp PCR panel neg    Imaging:      I have personally reviewed the past medical history, past surgical history, medications, social history, and family history, and I haveupdated the database accordingly.   Past Medical History:     Past Medical History:   Diagnosis Date    Anemia     Disease of blood and blood forming organ     History of breast cancer     History of chemotherapy     Hypothyroidism     Lymphedema     Chronic    Morbid obesity ([facility] [address]     Respiratory failure (HCC)     Tracheostomy present [facility])        Past Surgical  History:     Past Surgical History:   Procedure Laterality Date    BRONCHOSCOPY N/A 1/3/2020    BRONCHOSCOPY ATTEMPTED performed by Paddy Sequeira MD at [place] [address], MODIFIED RADICAL Right 2013    TRACHEOSTOMY         Medications:      carBAMazepine  200 mg Oral BID    ferrous sulfate  325 mg Oral TID WC    levothyroxine  200 mcg Oral Daily    ipratropium-albuterol  1 ampule Inhalation Q6H    sodium chloride flush  10 mL Intravenous 2 times per day    enoxaparin  30 mg Subcutaneous BID    azithromycin  500 mg Intravenous Q24H    cefepime  2 g Intravenous Q8H    bumetanide  1 mg Intravenous BID       Social History:     Social History     Socioeconomic History    Marital status: Single     Spouse name: Not on file    Number of children: Not on file    Years of education: Not on file    Highest education level: Not on file   Occupational History    Not on file   Social Needs    Financial resource strain: Not on file    Food insecurity     Worry: Not on file     Inability: Not on file    Transportation needs     Medical: Not on file     Non-medical: Not on file   Tobacco Use    Smoking status: Never Smoker    Smokeless tobacco: Never Used   Substance and Sexual Activity 03/27/20 0900 123/62 77 19 97 %   03/27/20 0833 -- 77 20 98 %   03/27/20 0800 123/66 89 19 96 %   03/27/20 0700 116/66 78 19 96 %       Physical Exam  Constitutional:       Appearance: Normal appearance. HENT:      Head: Normocephalic and atraumatic. Nose: Nose normal. No congestion. Mouth/Throat:      Mouth: Mucous membranes are moist.      Pharynx: Oropharynx is clear. Eyes:      General: No scleral icterus. Pupils: Pupils are equal, round, and reactive to light. Neck:      Musculoskeletal: Neck supple. No neck rigidity. Cardiovascular:      Rate and Rhythm: Normal rate and regular rhythm. Heart sounds: Normal heart sounds. No murmur. Pulmonary:      Breath sounds: No stridor. Wheezing present. No rhonchi. Abdominal:      General: There is no distension. Tenderness: There is no abdominal tenderness. Genitourinary:     Comments: No mesa - external catheter w kamar urine  Musculoskeletal:         General: No swelling or tenderness. Skin:     General: Skin is dry. Coloration: Skin is not jaundiced. Neurological:      General: No focal deficit present. Mental Status: She is alert and oriented to person, place, and time. Psychiatric:         Mood and Affect: Mood normal.         Thought Content: Thought content normal.           Medical Decision Making:   I have independently reviewed/ordered the following labs:    CBC with Differential:   Recent Labs     03/25/20  1805 03/26/20  0848   WBC 5.5 5.1   HGB 9.9* 8.4*   HCT 32.7* 29.2*    See Reflexed IPF Result   LYMPHOPCT 5* 23*   MONOPCT 2 8     BMP:  Recent Labs     03/25/20  1805 03/26/20  0848    140   K 4.7 4.7    101   CO2 27 26   BUN 17 16   CREATININE 1.01* 0.90     Hepatic Function Panel: No results for input(s): PROT, LABALBU, BILIDIR, IBILI, BILITOT, ALKPHOS, ALT, AST in the last 72 hours. No results for input(s): RPR in the last 72 hours.   No results for input(s): HIV in the last 72 hours. No results for input(s): BC in the last 72 hours. Lab Results   Component Value Date    CREATININE 0.90 03/26/2020    GLUCOSE 116 03/26/2020       Detailed results: Thank you for allowing us to participate in the care of this patient. Please call with questions. This note is created with the assistance of a speech recognition program.  While intending to generate adocument that actually reflects the content of the visit, the document can still have some errors including those of syntax and sound a like substitutions which may escape proof reading. It such instances, actual meaningcan be extrapolated by contextual diversion.     Reny Fernandez MD  Office: [phone]  Perfect serve / office [phone]

## 2020-03-27 NOTE — PROGRESS NOTES
MG  Phosphorus: No results found for: PHOS  S. Calcium:  Recent Labs     03/26/20  0848   CALCIUM 9.1     S. Ionized Calcium:No results for input(s): IONCA in the last 72 hours. Urinalysis:   Lab Results   Component Value Date    NITRU NEGATIVE 05/08/2019    COLORU YELLOW 05/08/2019    PHUR 7.0 05/08/2019    WBCUA 10 TO 20 05/08/2019    RBCUA 0 TO 2 05/08/2019    MUCUS NOT REPORTED 05/08/2019    TRICHOMONAS NOT REPORTED 05/08/2019    YEAST NOT REPORTED 05/08/2019    BACTERIA MODERATE 05/08/2019    SPECGRAV 1.010 05/08/2019    LEUKOCYTESUR MOD 05/08/2019    UROBILINOGEN Normal 05/08/2019    BILIRUBINUR NEGATIVE 05/08/2019    GLUCOSEU NEGATIVE 05/08/2019    KETUA NEGATIVE 05/08/2019    AMORPHOUS NOT REPORTED 05/08/2019       CARDIAC ENZYMES: No results for input(s): CKMB, CKMBINDEX, TROPONINI in the last 72 hours. Invalid input(s): CKTOTAL;3  BNP: No results for input(s): BNP in the last 72 hours. LFTS  No results for input(s): ALKPHOS, ALT, AST, BILITOT, BILIDIR, LABALBU in the last 72 hours. AMYLASE/LIPASE/AMMONIA  No results for input(s): AMYLASE, LIPASE, AMMONIA in the last 72 hours. Last 3 Blood Glucose:   Recent Labs     03/25/20  1805 03/26/20  0848   GLUCOSE 187* 116*      HgBA1c:  No results found for: LABA1C      TSH:    Lab Results   Component Value Date    TSH 3.55 11/17/2018     ANEMIA STUDIES  No results for input(s): LABIRON, TIBC, FERRITIN, [id number], FOLATE, OCCULTBLD in the last 72 hours.             Cultures during this admission:     Blood cultures:                 [] None drawn      [] Negative             []  Positive (Details:  )  Urine Culture:                   [] None drawn      [] Negative             []  Positive (Details:  )  Sputum Culture:               [] None drawn       [] Negative             []  Positive (Details:  )   Endotracheal aspirate:     [] None drawn       [] Negative             []  Positive (Details:  )             Chest Xray (3/27/2020):    ASSESSMENT:

## 2020-03-27 NOTE — PROGRESS NOTES
Patient remains alert and oriented x 4, speaks through her vent, and remains to refuse antibiotics. Patient states to the RN not to enter her room and she hits her call light and request the nurse.

## 2020-03-28 ENCOUNTER — APPOINTMENT (OUTPATIENT)
Dept: GENERAL RADIOLOGY | Age: 50
DRG: 139 | End: 2020-03-28
Attending: INTERNAL MEDICINE
Payer: MEDICAID

## 2020-03-28 PROBLEM — I27.20 PULMONARY HYPERTENSION (HCC): Status: ACTIVE | Noted: 2020-03-28

## 2020-03-28 PROBLEM — D53.9 MACROCYTIC ANEMIA: Status: ACTIVE | Noted: 2020-03-28

## 2020-03-28 LAB
ABSOLUTE EOS #: 0.04 K/UL (ref 0–0.4)
ABSOLUTE IMMATURE GRANULOCYTE: 0.08 K/UL (ref 0–0.3)
ABSOLUTE LYMPH #: 1.22 K/UL (ref 1–4.8)
ABSOLUTE MONO #: 0.25 K/UL (ref 0.1–0.8)
ANION GAP SERPL CALCULATED.3IONS-SCNC: 12 MMOL/L (ref 9–17)
BASOPHILS # BLD: 0 % (ref 0–2)
BASOPHILS ABSOLUTE: 0 K/UL (ref 0–0.2)
BUN BLDV-MCNC: 15 MG/DL (ref 6–20)
BUN/CREAT BLD: ABNORMAL (ref 9–20)
C-REACTIVE PROTEIN: 47.1 MG/L (ref 0–5)
CALCIUM SERPL-MCNC: 8.9 MG/DL (ref 8.6–10.4)
CHLORIDE BLD-SCNC: 102 MMOL/L (ref 98–107)
CO2: 27 MMOL/L (ref 20–31)
CREAT SERPL-MCNC: 0.95 MG/DL (ref 0.5–0.9)
DIFFERENTIAL TYPE: ABNORMAL
EOSINOPHILS RELATIVE PERCENT: 1 % (ref 1–4)
GFR AFRICAN AMERICAN: >60 ML/MIN
GFR NON-AFRICAN AMERICAN: >60 ML/MIN
GFR SERPL CREATININE-BSD FRML MDRD: ABNORMAL ML/MIN/{1.73_M2}
GFR SERPL CREATININE-BSD FRML MDRD: ABNORMAL ML/MIN/{1.73_M2}
GLUCOSE BLD-MCNC: 121 MG/DL (ref 70–99)
HCT VFR BLD CALC: 30.3 % (ref 36.3–47.1)
HEMOGLOBIN: 8.5 G/DL (ref 11.9–15.1)
IMMATURE GRANULOCYTES: 2 %
LACTIC ACID, WHOLE BLOOD: 0.9 MMOL/L (ref 0.7–2.1)
LACTIC ACID: NORMAL MMOL/L
LYMPHOCYTES # BLD: 29 % (ref 24–44)
MCH RBC QN AUTO: 30.2 PG (ref 25.2–33.5)
MCHC RBC AUTO-ENTMCNC: 28.1 G/DL (ref 28.4–34.8)
MCV RBC AUTO: 107.8 FL (ref 82.6–102.9)
MONOCYTES # BLD: 6 % (ref 1–7)
MORPHOLOGY: ABNORMAL
MORPHOLOGY: ABNORMAL
NRBC AUTOMATED: 0.5 PER 100 WBC
PDW BLD-RTO: 15.8 % (ref 11.8–14.4)
PLATELET # BLD: 150 K/UL (ref 138–453)
PLATELET ESTIMATE: ABNORMAL
PMV BLD AUTO: 10.9 FL (ref 8.1–13.5)
POTASSIUM SERPL-SCNC: 4.5 MMOL/L (ref 3.7–5.3)
RBC # BLD: 2.81 M/UL (ref 3.95–5.11)
RBC # BLD: ABNORMAL 10*6/UL
SEG NEUTROPHILS: 62 % (ref 36–66)
SEGMENTED NEUTROPHILS ABSOLUTE COUNT: 2.61 K/UL (ref 1.8–7.7)
SODIUM BLD-SCNC: 141 MMOL/L (ref 135–144)
TSH SERPL DL<=0.05 MIU/L-ACNC: 4.44 MIU/L (ref 0.3–5)
WBC # BLD: 4.2 K/UL (ref 3.5–11.3)
WBC # BLD: ABNORMAL 10*3/UL

## 2020-03-28 PROCEDURE — 2060000000 HC ICU INTERMEDIATE R&B

## 2020-03-28 PROCEDURE — 6360000002 HC RX W HCPCS: Performed by: STUDENT IN AN ORGANIZED HEALTH CARE EDUCATION/TRAINING PROGRAM

## 2020-03-28 PROCEDURE — 6370000000 HC RX 637 (ALT 250 FOR IP): Performed by: STUDENT IN AN ORGANIZED HEALTH CARE EDUCATION/TRAINING PROGRAM

## 2020-03-28 PROCEDURE — 2580000003 HC RX 258: Performed by: STUDENT IN AN ORGANIZED HEALTH CARE EDUCATION/TRAINING PROGRAM

## 2020-03-28 PROCEDURE — 94761 N-INVAS EAR/PLS OXIMETRY MLT: CPT

## 2020-03-28 PROCEDURE — 36415 COLL VENOUS BLD VENIPUNCTURE: CPT

## 2020-03-28 PROCEDURE — 2500000003 HC RX 250 WO HCPCS: Performed by: INTERNAL MEDICINE

## 2020-03-28 PROCEDURE — 83605 ASSAY OF LACTIC ACID: CPT

## 2020-03-28 PROCEDURE — 94770 HC ETCO2 MONITOR DAILY: CPT

## 2020-03-28 PROCEDURE — 84443 ASSAY THYROID STIM HORMONE: CPT

## 2020-03-28 PROCEDURE — 2700000000 HC OXYGEN THERAPY PER DAY

## 2020-03-28 PROCEDURE — 99222 1ST HOSP IP/OBS MODERATE 55: CPT | Performed by: INTERNAL MEDICINE

## 2020-03-28 PROCEDURE — 80048 BASIC METABOLIC PNL TOTAL CA: CPT

## 2020-03-28 PROCEDURE — 73030 X-RAY EXAM OF SHOULDER: CPT

## 2020-03-28 PROCEDURE — 94003 VENT MGMT INPAT SUBQ DAY: CPT

## 2020-03-28 PROCEDURE — 85025 COMPLETE CBC W/AUTO DIFF WBC: CPT

## 2020-03-28 PROCEDURE — 86140 C-REACTIVE PROTEIN: CPT

## 2020-03-28 PROCEDURE — 94640 AIRWAY INHALATION TREATMENT: CPT

## 2020-03-28 PROCEDURE — 99232 SBSQ HOSP IP/OBS MODERATE 35: CPT | Performed by: INTERNAL MEDICINE

## 2020-03-28 RX ADMIN — Medication 500 MG: at 21:06

## 2020-03-28 RX ADMIN — IPRATROPIUM BROMIDE AND ALBUTEROL SULFATE 1 AMPULE: .5; 3 SOLUTION RESPIRATORY (INHALATION) at 21:31

## 2020-03-28 RX ADMIN — IPRATROPIUM BROMIDE AND ALBUTEROL SULFATE 1 AMPULE: .5; 3 SOLUTION RESPIRATORY (INHALATION) at 12:08

## 2020-03-28 RX ADMIN — FERROUS SULFATE TAB EC 325 MG (65 MG FE EQUIVALENT) 325 MG: 325 (65 FE) TABLET DELAYED RESPONSE at 21:05

## 2020-03-28 RX ADMIN — BUMETANIDE 1 MG: 0.25 INJECTION INTRAMUSCULAR; INTRAVENOUS at 21:05

## 2020-03-28 RX ADMIN — LEVOTHYROXINE SODIUM 200 MCG: 100 TABLET ORAL at 09:46

## 2020-03-28 RX ADMIN — IPRATROPIUM BROMIDE AND ALBUTEROL SULFATE 1 AMPULE: .5; 3 SOLUTION RESPIRATORY (INHALATION) at 02:43

## 2020-03-28 RX ADMIN — CARBAMAZEPINE 200 MG: 200 TABLET ORAL at 21:05

## 2020-03-28 RX ADMIN — CEFEPIME HYDROCHLORIDE 2 G: 2 INJECTION, POWDER, FOR SOLUTION INTRAVENOUS at 21:06

## 2020-03-28 RX ADMIN — CARBAMAZEPINE 200 MG: 200 TABLET ORAL at 09:46

## 2020-03-28 RX ADMIN — FERROUS SULFATE TAB EC 325 MG (65 MG FE EQUIVALENT) 325 MG: 325 (65 FE) TABLET DELAYED RESPONSE at 12:00

## 2020-03-28 RX ADMIN — ENOXAPARIN SODIUM 30 MG: 30 INJECTION SUBCUTANEOUS at 21:05

## 2020-03-28 RX ADMIN — FERROUS SULFATE TAB EC 325 MG (65 MG FE EQUIVALENT) 325 MG: 325 (65 FE) TABLET DELAYED RESPONSE at 09:46

## 2020-03-28 RX ADMIN — BUMETANIDE 1 MG: 0.25 INJECTION INTRAMUSCULAR; INTRAVENOUS at 09:56

## 2020-03-28 RX ADMIN — ENOXAPARIN SODIUM 30 MG: 30 INJECTION SUBCUTANEOUS at 09:47

## 2020-03-28 RX ADMIN — CEFEPIME HYDROCHLORIDE 2 G: 2 INJECTION, POWDER, FOR SOLUTION INTRAVENOUS at 12:21

## 2020-03-28 RX ADMIN — SODIUM CHLORIDE, PRESERVATIVE FREE 10 ML: 5 INJECTION INTRAVENOUS at 21:06

## 2020-03-28 ASSESSMENT — PAIN SCALES - GENERAL
PAINLEVEL_OUTOF10: 0

## 2020-03-28 ASSESSMENT — ENCOUNTER SYMPTOMS
NAUSEA: 0
VOMITING: 0
ABDOMINAL PAIN: 0
COLOR CHANGE: 0
WHEEZING: 1
SHORTNESS OF BREATH: 1
SHORTNESS OF BREATH: 0
COUGH: 1

## 2020-03-28 ASSESSMENT — PULMONARY FUNCTION TESTS
PIF_VALUE: 34
PIF_VALUE: 29
PIF_VALUE: 35
PIF_VALUE: 34
PIF_VALUE: 31

## 2020-03-28 NOTE — H&P
Detected     Results for Chandler Negron (MRN [id number]) as of 3/28/2020 13:56   Ref. Range 3/26/2020 08:25   Strep pneumo Ag Unknown NEGATIVE     CXR 3/26  Persistent bilateral airspace disease/pneumonia. Echo 9/2019  Summary  Contrast (Definity) was utilized on this technically difficult study. Left ventricle is moderately enlarged. Normal LV wall thickness. Global left ventricular systolic function is normal. Estimated LV EF 55 %. Right ventricle is mildly enlarged. RV systolic function appears normal.  Mild tricuspid regurgitation. Mild pulmonary hypertension. Estimated right ventricular systolic pressure  is 44 mmHg. Past Medical History:     Past Medical History:   Diagnosis Date    Anemia     Disease of blood and blood forming organ     History of breast cancer     History of chemotherapy     Hypothyroidism     Lymphedema     Chronic    Morbid obesity ([facility] [address]     Respiratory failure (HCC)     Tracheostomy present (HCC)       Legionella Pneumophilia Ag, Urine 03/26/2020  8:21 AM [address] [place] [address]    L. pneumophila serogroup 1 antigen not detected. Past Surgical History:     Past Surgical History:   Procedure Laterality Date    BRONCHOSCOPY N/A 1/3/2020    BRONCHOSCOPY ATTEMPTED performed by Jimi Peralta MD at [place] [address], MODIFIED RADICAL Right 2013    TRACHEOSTOMY          Medications Prior to Admission:     Prior to Admission medications    Medication Sig Start Date End Date Taking? Authorizing Provider   albuterol sulfate HFA (VENTOLIN HFA) 108 (90 Base) MCG/ACT inhaler Inhale 2 puffs into the lungs every 4 hours as needed for Wheezing or Shortness of Breath    Historical Provider, MD   mineral oil-hydrophilic petrolatum (AQUAPHOR) ointment Apply topically 2 times daily    Historical Provider, MD   Skin Protectants, Misc.  (HYDROCERIN) CREA cream Apply topically 2 times daily Indications: to bilateral lower extremities    Historical Provider, MD ipratropium-albuterol (DUONEB) 0.5-2.5 (3) MG/3ML SOLN nebulizer solution Inhale 1 vial into the lungs 3 times daily    Historical Provider, MD   escitalopram (LEXAPRO) 20 MG tablet Take 20 mg by mouth daily    Historical Provider, MD   predniSONE (DELTASONE) 20 MG tablet Take 40 mg by mouth daily For 1 dose on 3/25/20 3/25/20   Historical Provider, MD   predniSONE (DELTASONE) 20 MG tablet Take 20 mg by mouth daily For 1 dose on 3/26/20 3/26/20   Historical Provider, MD   predniSONE (DELTASONE) 10 MG tablet Take 10 mg by mouth daily For 1 dose on 3/27/20 3/27/20   Historical Provider, MD   ALPRAZolam Malka Rios) 0.25 MG tablet Take 0.25 mg by mouth 2 times daily as needed for Anxiety. For 14 days starting 3/25/20 3/25/20 4/8/20  Historical Provider, MD   acetaminophen-codeine (TYLENOL #3) 300-30 MG per tablet Take 1 tablet by mouth every 6 hours as needed for Pain.     Historical Provider, MD   ibuprofen (ADVIL;MOTRIN) 800 MG tablet Take 1 tablet by mouth every 8 hours as needed for Pain 3/9/20 3/25/20  Joyce Orozco MD   Menthol, Topical Analgesic, (BIOFREEZE) 4 % GEL Apply topically 4 times daily as needed (right arm pain)     Historical Provider, MD   carBAMazepine (TEGRETOL) 200 MG tablet Take 1 tablet by mouth 2 times daily 9/15/19   Gege Rayo MD   bumetanide (BUMEX) 1 MG tablet Take 1 tablet by mouth 2 times daily 9/15/19   Gege Rayo MD   bisacodyl (DULCOLAX) 5 MG EC tablet Take 5 mg by mouth 2 times daily as needed for Constipation    Historical Provider, MD   docusate sodium (COLACE) 100 MG capsule Take 100 mg by mouth daily as needed for Constipation    Historical Provider, MD   loratadine (CLARITIN) 10 MG tablet Take 10 mg by mouth daily    Historical Provider, MD   magnesium hydroxide (MILK OF MAGNESIA) 400 MG/5ML suspension Take 30 mLs by mouth daily as needed for Constipation    Historical Provider, MD   Camphor-Eucalyptus-Menthol (VICKS VAPORUB) 4.7-1.2-2.6 % OINT Apply topically every 6 hours as needed Metabolic Panel w/ Reflex to MG    Collection Time: 03/28/20  9:59 AM   Result Value Ref Range    Glucose 121 (H) 70 - 99 mg/dL    BUN 15 6 - 20 mg/dL    CREATININE 0.95 (H) 0.50 - 0.90 mg/dL    Bun/Cre Ratio NOT REPORTED 9 - 20    Calcium 8.9 8.6 - 10.4 mg/dL    Sodium 141 135 - 144 mmol/L    Potassium 4.5 3.7 - 5.3 mmol/L    Chloride 102 98 - 107 mmol/L    CO2 27 20 - 31 mmol/L    Anion Gap 12 9 - 17 mmol/L    GFR Non-African American >60 >60 mL/min    GFR African American >60 >60 mL/min    GFR Comment          GFR Staging NOT REPORTED    CBC auto differential    Collection Time: 03/28/20  9:59 AM   Result Value Ref Range    WBC 4.2 3.5 - 11.3 k/uL    RBC 2.81 (L) 3.95 - 5.11 m/uL    Hemoglobin 8.5 (L) 11.9 - 15.1 g/dL    Hematocrit 30.3 (L) 36.3 - 47.1 %    .8 (H) 82.6 - 102.9 fL    MCH 30.2 25.2 - 33.5 pg    MCHC 28.1 (L) 28.4 - 34.8 g/dL    RDW 15.8 (H) 11.8 - 14.4 %    Platelets 818 235 - 187 k/uL    MPV 10.9 8.1 - 13.5 fL    NRBC Automated 0.5 (H) 0.0 per 100 WBC    Differential Type NOT REPORTED     WBC Morphology NOT REPORTED     RBC Morphology NOT REPORTED     Platelet Estimate NOT REPORTED     Immature Granulocytes 2 (H) 0 %    Seg Neutrophils 62 36 - 66 %    Lymphocytes 29 24 - 44 %    Monocytes 6 1 - 7 %    Eosinophils % 1 1 - 4 %    Basophils 0 0 - 2 %    Absolute Immature Granulocyte 0.08 0.00 - 0.30 k/uL    Segs Absolute 2.61 1.8 - 7.7 k/uL    Absolute Lymph # 1.22 1.0 - 4.8 k/uL    Absolute Mono # 0.25 0.1 - 0.8 k/uL    Absolute Eos # 0.04 0.0 - 0.4 k/uL    Basophils Absolute 0.00 0.0 - 0.2 k/uL    Morphology ANISOCYTOSIS PRESENT     Morphology MACROCYTOSIS PRESENT    Lactic acid, plasma    Collection Time: 03/28/20  9:59 AM   Result Value Ref Range    Lactic Acid NOT REPORTED mmol/L    Lactic Acid, Whole Blood 0.9 0.7 - 2.1 mmol/L   C-Reactive Protein    Collection Time: 03/28/20  9:59 AM   Result Value Ref Range    CRP 47.1 (H) 0.0 - 5.0 mg/L   TSH with Reflex    Collection Time: 03/28/20 9:59 AM   Result Value Ref Range    TSH 4.44 0.30 - 5.00 mIU/L       Imaging/Diagnostics:    Xr Chest Portable    Result Date: 3/26/2020  Persistent bilateral airspace disease/pneumonia. Xr Chest Portable    Result Date: 3/25/2020  Mild progression of the multifocal airspace opacities. Suspect mild superimposed pulmonary edema superimposed on underlying airspace disease. Small effusions new from prior. Assessment :      Primary Problem  Acute respiratory failure with hypoxia and hypercapnia [facility])    Active Hospital Problems    Diagnosis Date Noted    Pulmonary hypertension ([facility] [address] [I27.20] 03/28/2020    Acute respiratory distress [R06.03] 03/25/2020    RU (obstructive sleep apnea) [G47.33] 01/08/2020    Mycoplasma pneumonia [J15.7] 01/02/2020    Chronic respiratory failure (HCC) [J96.10] 11/20/2018    Tracheostomy dependent ([facility] [address] [Z93.0] 11/20/2018    Lymphedema [I89.0] 11/20/2018    Acute respiratory failure with hypoxia and hypercapnia (HCC) [J96.01, J96.02] 05/14/2018    Hypothyroidism [E03.9] 05/14/2018       Plan:     Antibiotics per Infectious Disease service:  Zithromax, Cefepime    Respiratory Therapy and Bronchodilators prn  Vent support as needed  Pulmonary evaluation   Oxygen  Risk factor management / weight loss    Thyroid replacement titrated to normalize thyroid hormones   Encourage compliance to meds, labs etc  Diet as tolerated  Activity as tolerated   DVT prophylaxis   DC planning  Will return to [facility] upon discharge     Consultations:   [address] [place] [address]   Patient is admitted as inpatient status because of co-morbidities listed above, severity of signs and symptoms as outlined, requirement for current medical therapies and most importantly because of direct risk to patient if care not provided in a hospital setting.     Kevin Gabriel DO  3/28/2020  2:31 PM    Copy sent to Dr. Sheeba Sena DO

## 2020-03-28 NOTE — PROGRESS NOTES
Rn does not feel patient is turning herself in the bed adequately. Pt refuses to let nurse turn her. States she is fine. RN educated patient multiple times about the importance of turning to avoid skin breakdown and pressure wounds. Patient states she understands education but still does not want nurse turning her. States she is doing it herself.

## 2020-03-28 NOTE — FLOWSHEET NOTE
Pt safely transported on monitor and portable vent to room 401 with 2 RN's and RT. Vitals remain stable. Care handed over to new RN.

## 2020-03-28 NOTE — PROGRESS NOTES
Chronic Hypoxic Hypercapnic Respiratory Failure  -Strep pneumonia, Legionella, Covid negative, & Viral PCR negative. -Mycoplasma IgM elevated.   -Azithromycin for Mycoplasma. Continue Cefepime until final cultures. -Ventilator setting prvc/14/500/8/40%  -At [facility] normally on NC during day and CPAP settings at night  -Droplet precautions for now  -Appreciate ID recommendations  -Allergy to Zosyn and Vancomycin  -Bronchodilators    2. Hypothyroidism  -Continue Synthroid  -Follow up TSH    3. Diastolic CHF  -IV bumex 1 mg BID for now. Transition to oral.       Above mentioned assessment and plan was discussed by me with the admitting medicine resident. The medicine team assigned to the patient by medicine admitting resident will be following up the patient from now onwards on the floor.      Goldy Saucedo MD  Internal Medicine, PGY2  [facility]

## 2020-03-28 NOTE — PLAN OF CARE
[address] [place] [address]       Second Visit Note  For more detailed information please refer to the progress note of the day      3/28/2020    3:08 PM    Name:   Sue Hendrickson  MRN:     [id number]     Carol:      [de-identified]   Room:   [address] [place] [address] Day:  2  Admit Date:  3/26/2020  2:51 AM    PCP:   Ethel Carmona DO  Code Status:  Full Code    Patient has been seen  The patient is reporting chronic right shoulder pain and stiffness for several months  No apparent trauma or injury      CURRENT MEDS:   albuterol (PROVENTIL) nebulizer solution 2.5 mg, Q6H PRN  carBAMazepine (TEGRETOL) tablet 200 mg, BID  ferrous sulfate (FE TABS 325) EC tablet 325 mg, TID WC  levothyroxine (SYNTHROID) tablet 200 mcg, Daily  ipratropium-albuterol (DUONEB) nebulizer solution 1 ampule, Q6H  sodium chloride flush 0.9 % injection 10 mL, 2 times per day  sodium chloride flush 0.9 % injection 10 mL, PRN  acetaminophen (TYLENOL) tablet 650 mg, Q6H PRN    Or  acetaminophen (TYLENOL) suppository 650 mg, Q6H PRN  polyethylene glycol (GLYCOLAX) packet 17 g, Daily PRN  promethazine (PHENERGAN) tablet 12.5 mg, Q6H PRN    Or  ondansetron (ZOFRAN) injection 4 mg, Q6H PRN  enoxaparin (LOVENOX) injection 30 mg, BID  azithromycin (ZITHROMAX) 500 mg in dextrose 5% 250 mL IVPB, Q24H  cefepime (MAXIPIME) 2 g IVPB minibag, Q8H  bumetanide (BUMEX) injection 1 mg, BID        VITALS:  BP (!) 142/78   Pulse 92   Temp 99 °F (37.2 °C) (Axillary)   Resp 21   Ht 5' 5\" (1.651 m)   Wt (!) 617 lb 8.1 oz (280.1 kg)   SpO2 98%   .76 kg/m²     LABS:  Labs:  Hematology:  Recent Labs     03/25/20  1805 03/26/20  0848 03/28/20  0959   WBC 5.5 5.1 4.2   RBC 3.42* 2.86* 2.81*   HGB 9.9* 8.4* 8.5*   HCT 32.7* 29.2* 30.3*   MCV 95.6 102.1 107.8*   MCH 28.8 29.4 30.2   MCHC 30.2* 28.8 28.1*   RDW 16.9* 15.3* 15.8*    See Reflexed IPF Result 150   MPV 9.0 NOT REPORTED 10.9   SEDRATE  --  113*  --    CRP 29.3*  --  47.1* Chemistry:  Recent Labs     03/25/20  1805 03/25/20  1935 03/26/20  0848 03/28/20  0959     --  140 141   K 4.7  --  4.7 4.5     --  101 102   CO2 27  --  26 27   GLUCOSE 187*  --  116* 121*   BUN 17  --  16 15   CREATININE 1.01*  --  0.90 0.95*   ANIONGAP 13  --  13 12   LABGLOM 59*  --  >60 >60   GFRAA >60  --  >60 >60   CALCIUM 9.6  --  9.1 8.9   TROPHS 20* 21* 21*  --    LACTACIDWB  --   --  0.9 0.9     Recent Labs     03/25/20  1805 03/28/20  0959   TSH  --  4.44   *  --        PROBLEMS:  Principal Problem:    Acute respiratory failure with hypoxia and hypercapnia (HCC)  Active Problems:    Hypothyroidism    Chronic respiratory failure (HCC)    Tracheostomy dependent (HCC)    Lymphedema    Mycoplasma pneumonia    RU (obstructive sleep apnea)    Acute respiratory distress    Pulmonary hypertension (HCC)    Macrocytic anemia  Resolved Problems:    * No resolved hospital problems. *      UPDATED PLAN:  See current orders  X-ray right shoulder  PT/OT  Anemia w/u on outpatient basis is suggested      Labs 12/2019:  Results for Rubina Jose (MRN [id number]) as of 3/28/2020 15:09   Ref.  Range 12/31/2019 05:59   Ferritin Latest Ref Range: 13 - 150 ug/L 611 (H)   Iron Latest Ref Range: 37 - 145 ug/dL 35 (L)   Iron Saturation Latest Ref Range: 20 - 55 % 23   UIBC Latest Ref Range: 112 - 347 ug/dL 118   TIBC Latest Ref Range: 250 - 450 ug/dL 153 (L)   Folate Latest Ref Range: >4.8 ng/mL 20.0   Vitamin B-12 Latest Ref Range: 232 - 1245 pg/mL 475         IP CONSULT TO INFECTIOUS DISEASES    Leo Leonard DO  3/28/2020  3:08 PM

## 2020-03-29 ENCOUNTER — APPOINTMENT (OUTPATIENT)
Dept: GENERAL RADIOLOGY | Age: 50
DRG: 139 | End: 2020-03-29
Attending: INTERNAL MEDICINE
Payer: MEDICAID

## 2020-03-29 PROBLEM — M24.411 CHRONIC DISLOCATION OF RIGHT SHOULDER: Status: ACTIVE | Noted: 2020-03-29

## 2020-03-29 LAB
ABSOLUTE EOS #: 0.16 K/UL (ref 0–0.44)
ABSOLUTE IMMATURE GRANULOCYTE: 0.06 K/UL (ref 0–0.3)
ABSOLUTE LYMPH #: 1.39 K/UL (ref 1.1–3.7)
ABSOLUTE MONO #: 0.53 K/UL (ref 0.1–1.2)
ANION GAP SERPL CALCULATED.3IONS-SCNC: 10 MMOL/L (ref 9–17)
BASOPHILS # BLD: 0 % (ref 0–2)
BASOPHILS ABSOLUTE: <0.03 K/UL (ref 0–0.2)
BUN BLDV-MCNC: 15 MG/DL (ref 6–20)
BUN/CREAT BLD: ABNORMAL (ref 9–20)
CALCIUM SERPL-MCNC: 8.7 MG/DL (ref 8.6–10.4)
CHLORIDE BLD-SCNC: 100 MMOL/L (ref 98–107)
CO2: 28 MMOL/L (ref 20–31)
CREAT SERPL-MCNC: 0.94 MG/DL (ref 0.5–0.9)
DIFFERENTIAL TYPE: ABNORMAL
EOSINOPHILS RELATIVE PERCENT: 4 % (ref 1–4)
GFR AFRICAN AMERICAN: >60 ML/MIN
GFR NON-AFRICAN AMERICAN: >60 ML/MIN
GFR SERPL CREATININE-BSD FRML MDRD: ABNORMAL ML/MIN/{1.73_M2}
GFR SERPL CREATININE-BSD FRML MDRD: ABNORMAL ML/MIN/{1.73_M2}
GLUCOSE BLD-MCNC: 113 MG/DL (ref 70–99)
HCT VFR BLD CALC: 28 % (ref 36.3–47.1)
HEMOGLOBIN: 8 G/DL (ref 11.9–15.1)
IMMATURE GRANULOCYTES: 1 %
LACTIC ACID, WHOLE BLOOD: 1.6 MMOL/L (ref 0.7–2.1)
LACTIC ACID: NORMAL MMOL/L
LYMPHOCYTES # BLD: 30 % (ref 24–43)
MCH RBC QN AUTO: 30 PG (ref 25.2–33.5)
MCHC RBC AUTO-ENTMCNC: 28.6 G/DL (ref 28.4–34.8)
MCV RBC AUTO: 104.9 FL (ref 82.6–102.9)
MONOCYTES # BLD: 12 % (ref 3–12)
NRBC AUTOMATED: 0.4 PER 100 WBC
PDW BLD-RTO: 15.9 % (ref 11.8–14.4)
PLATELET # BLD: 171 K/UL (ref 138–453)
PLATELET ESTIMATE: ABNORMAL
PMV BLD AUTO: 10.7 FL (ref 8.1–13.5)
POTASSIUM SERPL-SCNC: 4 MMOL/L (ref 3.7–5.3)
RBC # BLD: 2.67 M/UL (ref 3.95–5.11)
RBC # BLD: ABNORMAL 10*6/UL
SEG NEUTROPHILS: 53 % (ref 36–65)
SEGMENTED NEUTROPHILS ABSOLUTE COUNT: 2.45 K/UL (ref 1.5–8.1)
SODIUM BLD-SCNC: 138 MMOL/L (ref 135–144)
WBC # BLD: 4.6 K/UL (ref 3.5–11.3)
WBC # BLD: ABNORMAL 10*3/UL

## 2020-03-29 PROCEDURE — 73060 X-RAY EXAM OF HUMERUS: CPT

## 2020-03-29 PROCEDURE — 80048 BASIC METABOLIC PNL TOTAL CA: CPT

## 2020-03-29 PROCEDURE — 99233 SBSQ HOSP IP/OBS HIGH 50: CPT | Performed by: INTERNAL MEDICINE

## 2020-03-29 PROCEDURE — 94761 N-INVAS EAR/PLS OXIMETRY MLT: CPT

## 2020-03-29 PROCEDURE — 94640 AIRWAY INHALATION TREATMENT: CPT

## 2020-03-29 PROCEDURE — 73020 X-RAY EXAM OF SHOULDER: CPT

## 2020-03-29 PROCEDURE — 2580000003 HC RX 258: Performed by: STUDENT IN AN ORGANIZED HEALTH CARE EDUCATION/TRAINING PROGRAM

## 2020-03-29 PROCEDURE — 83605 ASSAY OF LACTIC ACID: CPT

## 2020-03-29 PROCEDURE — 99232 SBSQ HOSP IP/OBS MODERATE 35: CPT | Performed by: INTERNAL MEDICINE

## 2020-03-29 PROCEDURE — 6360000002 HC RX W HCPCS: Performed by: STUDENT IN AN ORGANIZED HEALTH CARE EDUCATION/TRAINING PROGRAM

## 2020-03-29 PROCEDURE — 6370000000 HC RX 637 (ALT 250 FOR IP): Performed by: STUDENT IN AN ORGANIZED HEALTH CARE EDUCATION/TRAINING PROGRAM

## 2020-03-29 PROCEDURE — 73030 X-RAY EXAM OF SHOULDER: CPT

## 2020-03-29 PROCEDURE — 36415 COLL VENOUS BLD VENIPUNCTURE: CPT

## 2020-03-29 PROCEDURE — 6370000000 HC RX 637 (ALT 250 FOR IP): Performed by: INTERNAL MEDICINE

## 2020-03-29 PROCEDURE — 2500000003 HC RX 250 WO HCPCS: Performed by: INTERNAL MEDICINE

## 2020-03-29 PROCEDURE — 85025 COMPLETE CBC W/AUTO DIFF WBC: CPT

## 2020-03-29 PROCEDURE — 2700000000 HC OXYGEN THERAPY PER DAY

## 2020-03-29 PROCEDURE — 2060000000 HC ICU INTERMEDIATE R&B

## 2020-03-29 RX ORDER — LORAZEPAM 0.5 MG/1
0.5 TABLET ORAL
Status: COMPLETED | OUTPATIENT
Start: 2020-03-29 | End: 2020-03-29

## 2020-03-29 RX ADMIN — IPRATROPIUM BROMIDE AND ALBUTEROL SULFATE 1 AMPULE: .5; 3 SOLUTION RESPIRATORY (INHALATION) at 12:55

## 2020-03-29 RX ADMIN — IPRATROPIUM BROMIDE AND ALBUTEROL SULFATE 1 AMPULE: .5; 3 SOLUTION RESPIRATORY (INHALATION) at 21:40

## 2020-03-29 RX ADMIN — Medication 500 MG: at 20:42

## 2020-03-29 RX ADMIN — CARBAMAZEPINE 200 MG: 200 TABLET ORAL at 09:10

## 2020-03-29 RX ADMIN — ENOXAPARIN SODIUM 30 MG: 30 INJECTION SUBCUTANEOUS at 20:42

## 2020-03-29 RX ADMIN — SODIUM CHLORIDE, PRESERVATIVE FREE 10 ML: 5 INJECTION INTRAVENOUS at 09:11

## 2020-03-29 RX ADMIN — CARBAMAZEPINE 200 MG: 200 TABLET ORAL at 20:42

## 2020-03-29 RX ADMIN — IPRATROPIUM BROMIDE AND ALBUTEROL SULFATE 1 AMPULE: .5; 3 SOLUTION RESPIRATORY (INHALATION) at 08:12

## 2020-03-29 RX ADMIN — BUMETANIDE 1 MG: 0.25 INJECTION INTRAMUSCULAR; INTRAVENOUS at 20:42

## 2020-03-29 RX ADMIN — IPRATROPIUM BROMIDE AND ALBUTEROL SULFATE 1 AMPULE: .5; 3 SOLUTION RESPIRATORY (INHALATION) at 05:02

## 2020-03-29 RX ADMIN — FERROUS SULFATE TAB EC 325 MG (65 MG FE EQUIVALENT) 325 MG: 325 (65 FE) TABLET DELAYED RESPONSE at 20:42

## 2020-03-29 RX ADMIN — LORAZEPAM 0.5 MG: 0.5 TABLET ORAL at 11:13

## 2020-03-29 RX ADMIN — BUMETANIDE 1 MG: 0.25 INJECTION INTRAMUSCULAR; INTRAVENOUS at 09:09

## 2020-03-29 RX ADMIN — SODIUM CHLORIDE, PRESERVATIVE FREE 10 ML: 5 INJECTION INTRAVENOUS at 20:42

## 2020-03-29 RX ADMIN — FERROUS SULFATE TAB EC 325 MG (65 MG FE EQUIVALENT) 325 MG: 325 (65 FE) TABLET DELAYED RESPONSE at 12:42

## 2020-03-29 RX ADMIN — LEVOTHYROXINE SODIUM 200 MCG: 100 TABLET ORAL at 09:10

## 2020-03-29 ASSESSMENT — PAIN SCALES - GENERAL
PAINLEVEL_OUTOF10: 0

## 2020-03-29 ASSESSMENT — PULMONARY FUNCTION TESTS
PIF_VALUE: 34
PIF_VALUE: 36
PIF_VALUE: 34
PIF_VALUE: 23
PIF_VALUE: 28

## 2020-03-29 ASSESSMENT — ENCOUNTER SYMPTOMS
SHORTNESS OF BREATH: 1
ABDOMINAL PAIN: 0
NAUSEA: 0
VOMITING: 0
WHEEZING: 0
COUGH: 1
STRIDOR: 0

## 2020-03-29 ASSESSMENT — PAIN - FUNCTIONAL ASSESSMENT: PAIN_FUNCTIONAL_ASSESSMENT: ACTIVITIES ARE NOT PREVENTED

## 2020-03-29 NOTE — PROGRESS NOTES
Infusions:  PRN Meds:albuterol, sodium chloride flush, acetaminophen **OR** acetaminophen, polyethylene glycol, promethazine **OR** ondansetron    Objective:      Physical Exam:  Vitals: /65   Pulse 74   Temp 98.8 °F (37.1 °C) (Oral)   Resp 22   Ht 5' 5\" (1.651 m)   Wt (!) 620 lb (281.2 kg)   SpO2 100%   .17 kg/m²   24 hour intake/output:    Intake/Output Summary (Last 24 hours) at 3/29/2020 1542  Last data filed at 3/29/2020 0909  Gross per 24 hour   Intake 1630 ml   Output 1201 ml   Net 429 ml     Last 3 weights: Wt Readings from Last 3 Encounters:   03/29/20 (!) 620 lb (281.2 kg)   03/25/20 (!) 625 lb (283.5 kg)   03/09/20 (!) 625 lb (283.5 kg)         Physical Examination:   PHYSICAL EXAMINATION:  Vitals:    03/29/20 0700 03/29/20 0813 03/29/20 1231 03/29/20 1253   BP: 108/65      Pulse: 72 74     Resp: 17   22   Temp: 97.8 °F (36.6 °C)  98.8 °F (37.1 °C)    TempSrc: Oral  Oral    SpO2:  100%     Weight:       Height:         Constitutional: This is a well developed, well nourished, Greater than 40 - Morbid Obesity / Extreme Obesity / Grade III 54y.o. year old female who is alert, oriented, cooperative and in no apparent distress. Head:normocephalic and atraumatic. EENT:  [place]. No conjunctival injections. Septum was midline, mucosa was without erythema, exudates or cobblestoning. No thrush was noted. Mallampati  III (soft palate, base of uvula visible)  Neck: Supple without thyromegaly. No elevated JVP. Trachea was midline. Tracheostomy site is clean  Respiratory: Chest was symmetrical without dullness to percussion. Breath sounds bilaterally were clear to auscultation. There were no wheezes, rhonchi or rales. There is no intercostal retraction or use of accessory muscles. No egophony noted. Cardiovascular: Regular without murmur, clicks, gallops or rubs. Abdomen: Slightly rounded and soft without organomegaly. No rebound tenderness, rigidity or guarding was appreciated. Urinalysis: No results for input(s): BACTERIA, BLOODU, CLARITYU, COLORU, PHUR, PROTEINU, RBCUA, SPECGRAV, BILIRUBINUR, NITRU, WBCUA, LEUKOCYTESUR, GLUCOSEU in the last 72 hours. CULTURES:          Assessment:    Principal Problem:    Acute respiratory failure with hypoxia and hypercapnia (HCC)  Active Problems:    Hypothyroidism    Chronic respiratory failure (HCC)    Tracheostomy dependent (HCC)    Lymphedema    Mycoplasma pneumonia    RU (obstructive sleep apnea)    Acute respiratory distress    Pulmonary hypertension (HCC)    Macrocytic anemia    Chronic dislocation of right shoulder  Resolved Problems:    * No resolved hospital problems. *  Morbid obesity  Obesity hypoventilation syndrome  Anemia, stable      Plan:  Meds reviewed- changes made as appropriate. Patient is on azithromycin  Lovenox  Bronchodilators  Increase activity as permitted and tolerated. Questions patient had were answered to  her satisfaction. Continue supplemental oxygen  Patient was informed of the need for using oxygen. Patient was educated on the importance of compliance and the benefits of oxygen use. Complications of oxygen use, including dryness of the nostrils, epistaxis and also the fire hazard were explained to the patient. Patient willingly accepts to use  the oxygen as recommended  Vent data reviewed. Changes made as needed. Patient currently on 50% oxygen  Cxr reviewed. None today  Diet reviewed. Oral diet  D/c instructions provided as appropriate. Thank you for having us involved in the care of your patient. Please call us if you have any questions or concerns.     Kenny Espino MD      3/29/2020, 3:42 PM    Pulmonary & Critical Care

## 2020-03-29 NOTE — PROGRESS NOTES
file    Stress: Not on file   Relationships    Social connections     Talks on phone: Not on file     Gets together: Not on file     Attends Gnosticism service: Not on file     Active member of club or organization: Not on file     Attends meetings of clubs or organizations: Not on file     Relationship status: Not on file    Intimate partner violence     Fear of current or ex partner: Not on file     Emotionally abused: Not on file     Physically abused: Not on file     Forced sexual activity: Not on file   Other Topics Concern    Not on file   Social History Narrative    L[facility] [address]        Family History:     Family History   Problem Relation Age of Onset    Breast Cancer Paternal Aunt     Breast Cancer Paternal Cousin 43    Breast Cancer Paternal Cousin 37    Breast Cancer Paternal Cousin 46    Diabetes Mother     Thyroid Disease Father         Allergies:   Zosyn [piperacillin-tazobactam in dex] and Vancomycin     Review of Systems:     Review of Systems   Constitutional: Positive for activity change. HENT: Negative for congestion. Respiratory: Negative for shortness of breath. Cardiovascular: Negative for chest pain. Gastrointestinal: Negative for abdominal pain. Endocrine: Negative for polydipsia. Genitourinary: Negative for dysuria, flank pain and urgency. Musculoskeletal: Negative for arthralgias. Skin: Negative for color change. Allergic/Immunologic: Negative for immunocompromised state. Neurological: Negative for dizziness and numbness. Hematological: Negative for adenopathy. Psychiatric/Behavioral: Negative for agitation.        Physical Examination :     Patient Vitals for the past 8 hrs:   BP Temp Temp src Pulse Resp SpO2   03/28/20 2132 -- -- -- 98 29 98 %   03/28/20 2119 (!) 146/91 -- -- 102 -- 95 %   03/28/20 1753 137/80 97.6 °F (36.4 °C) Axillary 94 16 --   03/28/20 1612 -- -- -- 85 -- 100 %       Physical Exam  Constitutional:       Appearance: Normal Detailed results: Thank you for allowing us to participate in the care of this patient. Please call with questions. This note is created with the assistance of a speech recognition program.  While intending to generate adocument that actually reflects the content of the visit, the document can still have some errors including those of syntax and sound a like substitutions which may escape proof reading. It such instances, actual meaningcan be extrapolated by contextual diversion.     Marisel Brown MD  Office: [phone]  Perfect serve / office [phone]

## 2020-03-29 NOTE — PROGRESS NOTES
[facility]    Progress Note    3/29/2020    10:50 AM    Name:   Sue Hendrickson  MRN:     [id number]     Acct:      [de-identified]   Room:   [address] [place] [address] Day:  3  Admit Date:  3/26/2020  2:51 AM    PCP:   Ethel Carmona DO  Code Status:  Full Code    Subjective:     C/C:   Shortness of breath    Interval History Status:   Still short of breath at times  On ventilator through the night  Cough and secretions still problematic  Poor appetite  Not sleeping well  Anxious at times    Data Base Updates:  Glucose 113High     Lactic Acid, Whole Blood 1.6     CRP 47.1     Shoulder x-ray:  Impression:   Inferior subluxation or dislocation right humerus. WBC 4.6 k/uL RBC 2.67Low m/uL Hemoglobin 8.0Low   % . 9High     Brief History:     The patient is a 54 y.o. female who is admitted to the hospital for the management of:  Mycoplasma pneumonia     As documented in the medical record: \"On 3/25/2020 patient presented to ED with shortness of breath.  Patient was attempted to be placed on BiPAP and CPAP settings with no improvement in shortness of breath.  Patient was switched to [facility] setting.  Saturations improved after changing the settings to [facility].    Patient was also complaining of some chest discomfort.  EKG and chest x-ray were obtained.  EKG was nonspecific with no significant signs of ischemia.   Troponin 20, 21  Chest x-ray showed mild progression of multifocal airspace opacities with mild superimposed pulmonary edema on underlying airspace disease and new small effusions.   Patient also has elevated CRP 29.3 and  and new onset lymphopenia.  Due to current finding decision was made to transfer patient to [facility] for further work-up and COVID rule out.   No leukocytosis.  WBC 5.5.  Hemoglobin 9.9.  Platelets 104.  Lymphocytes 5   ABG  pH 7.28, PCO2 65.8, PO2 177, bicarb 31\"      Subsequent database has revealed:  Results Orthopedics consulted    Medications: Allergies: Allergies   Allergen Reactions    Zosyn [Piperacillin-Tazobactam In Dex] Shortness Of Breath     tolerated cefepime 2018    Vancomycin Swelling     Lip swelling and redness and itching         Current Meds:   Scheduled Meds:    carBAMazepine  200 mg Oral BID    ferrous sulfate  325 mg Oral TID WC    levothyroxine  200 mcg Oral Daily    ipratropium-albuterol  1 ampule Inhalation Q6H    sodium chloride flush  10 mL Intravenous 2 times per day    enoxaparin  30 mg Subcutaneous BID    azithromycin  500 mg Intravenous Q24H    bumetanide  1 mg Intravenous BID     Continuous Infusions:   PRN Meds: LORazepam, albuterol, sodium chloride flush, acetaminophen **OR** acetaminophen, polyethylene glycol, promethazine **OR** ondansetron    Data:     Past Medical History:   has a past medical history of Anemia, Disease of blood and blood forming organ, History of breast cancer, History of chemotherapy, Hypothyroidism, Lymphedema, Morbid obesity ([address] Respiratory failure ([address] and Tracheostomy present ([facility] Social History:   reports that she has never smoked. She has never used smokeless tobacco. She reports that she does not drink alcohol or use drugs. Family History:   Family History   Problem Relation Age of Onset    Breast Cancer Paternal Aunt     Breast Cancer Paternal Cousin 43    Breast Cancer Paternal Cousin 37    Breast Cancer Paternal Cousin 46    Diabetes Mother     Thyroid Disease Father        Vitals:  /65   Pulse 74   Temp 97.8 °F (36.6 °C) (Oral)   Resp 17   Ht 5' 5\" (1.651 m)   Wt (!) 620 lb (281.2 kg)   SpO2 100%   .17 kg/m²   Temp (24hrs), Av.8 °F (36.6 °C), Min:97.6 °F (36.4 °C), Max:97.9 °F (36.6 °C)    No results for input(s): POCGLU in the last 72 hours. I/O (24Hr):     Intake/Output Summary (Last 24 hours) at 3/29/2020 1050  Last data filed at 3/29/2020 0909  Gross per 24 hour   Intake 1630 ml alert and oriented to person, place, and time. Psychiatric:      Comments: Anxious at times           Labs:  Hematology:  Recent Labs     03/28/20  0959 03/29/20  0434   WBC 4.2 4.6   RBC 2.81* 2.67*   HGB 8.5* 8.0*   HCT 30.3* 28.0*   .8* 104.9*   MCH 30.2 30.0   MCHC 28.1* 28.6   RDW 15.8* 15.9*    171   MPV 10.9 10.7   CRP 47.1*  --      Chemistry:  Recent Labs     03/28/20  0959 03/29/20  0434    138   K 4.5 4.0    100   CO2 27 28   GLUCOSE 121* 113*   BUN 15 15   CREATININE 0.95* 0.94*   ANIONGAP 12 10   LABGLOM >60 >60   GFRAA >60 >60   CALCIUM 8.9 8.7   LACTACIDWB 0.9 1.6     Recent Labs     03/28/20  0959   TSH 4.44         Radiology:    Xr Shoulder Right (min 2 Views)    Result Date: 3/28/2020  Inferior subluxation or dislocation right humerus. Xr Chest Portable    Result Date: 3/26/2020  Persistent bilateral airspace disease/pneumonia. Xr Chest Portable    Result Date: 3/25/2020  Mild progression of the multifocal airspace opacities. Suspect mild superimposed pulmonary edema superimposed on underlying airspace disease. Small effusions new from prior.        Assessment:        Primary Problem  Acute respiratory failure with hypoxia and hypercapnia ([address]    Active Hospital Problems    Diagnosis Date Noted    Chronic dislocation of right shoulder [M24.411] 03/29/2020    Pulmonary hypertension ([address] [I27.20] 03/28/2020    Macrocytic anemia [D53.9] 03/28/2020    Acute respiratory distress [R06.03] 03/25/2020    RU (obstructive sleep apnea) [G47.33] 01/08/2020    Mycoplasma pneumonia [J15.7] 01/02/2020    Chronic respiratory failure ([address] [J96.10] 11/20/2018    Tracheostomy dependent ([facility] [Z93.0] 11/20/2018    Lymphedema [I89.0] 11/20/2018    Acute respiratory failure with hypoxia and hypercapnia (HCC) [J96.01, J96.02] 05/14/2018    Hypothyroidism [E03.9] 05/14/2018       Plan:        Orthopedic consultation for right shoulder dislocation  Antibiotics per

## 2020-03-30 VITALS
OXYGEN SATURATION: 100 % | HEIGHT: 65 IN | SYSTOLIC BLOOD PRESSURE: 149 MMHG | DIASTOLIC BLOOD PRESSURE: 86 MMHG | WEIGHT: 293 LBS | BODY MASS INDEX: 48.82 KG/M2 | RESPIRATION RATE: 22 BRPM | HEART RATE: 81 BPM | TEMPERATURE: 98.8 F

## 2020-03-30 PROBLEM — E66.2 OBESITY HYPOVENTILATION SYNDROME (HCC): Status: ACTIVE | Noted: 2020-03-30

## 2020-03-30 PROBLEM — S43.001A SUBLUXATION OF RIGHT SHOULDER JOINT: Status: ACTIVE | Noted: 2020-03-29

## 2020-03-30 LAB
ABSOLUTE EOS #: 0 K/UL (ref 0–0.44)
ABSOLUTE IMMATURE GRANULOCYTE: 0.04 K/UL (ref 0–0.3)
ABSOLUTE LYMPH #: 1.34 K/UL (ref 1.1–3.7)
ABSOLUTE MONO #: 0.42 K/UL (ref 0.1–1.2)
ANION GAP SERPL CALCULATED.3IONS-SCNC: 11 MMOL/L (ref 9–17)
BASOPHILS # BLD: 1 % (ref 0–2)
BASOPHILS ABSOLUTE: 0.04 K/UL (ref 0–0.2)
BUN BLDV-MCNC: 14 MG/DL (ref 6–20)
BUN/CREAT BLD: ABNORMAL (ref 9–20)
CALCIUM SERPL-MCNC: 8.5 MG/DL (ref 8.6–10.4)
CHLORIDE BLD-SCNC: 98 MMOL/L (ref 98–107)
CO2: 27 MMOL/L (ref 20–31)
CREAT SERPL-MCNC: 0.88 MG/DL (ref 0.5–0.9)
CULTURE: ABNORMAL
CULTURE: ABNORMAL
DIFFERENTIAL TYPE: ABNORMAL
DIRECT EXAM: ABNORMAL
EOSINOPHILS RELATIVE PERCENT: 0 % (ref 1–4)
GFR AFRICAN AMERICAN: >60 ML/MIN
GFR NON-AFRICAN AMERICAN: >60 ML/MIN
GFR SERPL CREATININE-BSD FRML MDRD: ABNORMAL ML/MIN/{1.73_M2}
GFR SERPL CREATININE-BSD FRML MDRD: ABNORMAL ML/MIN/{1.73_M2}
GLUCOSE BLD-MCNC: 112 MG/DL (ref 70–99)
HCT VFR BLD CALC: 29.5 % (ref 36.3–47.1)
HEMOGLOBIN: 8 G/DL (ref 11.9–15.1)
IMMATURE GRANULOCYTES: 1 %
INTERVENTION: NORMAL
LACTIC ACID, WHOLE BLOOD: 0.8 MMOL/L (ref 0.7–2.1)
LACTIC ACID: NORMAL MMOL/L
LYMPHOCYTES # BLD: 32 % (ref 24–43)
Lab: ABNORMAL
MCH RBC QN AUTO: 29.3 PG (ref 25.2–33.5)
MCHC RBC AUTO-ENTMCNC: 27.1 G/DL (ref 28.4–34.8)
MCV RBC AUTO: 108.1 FL (ref 82.6–102.9)
MONOCYTES # BLD: 10 % (ref 3–12)
MORPHOLOGY: ABNORMAL
MORPHOLOGY: ABNORMAL
NRBC AUTOMATED: 0 PER 100 WBC
PDW BLD-RTO: 15.9 % (ref 11.8–14.4)
PLATELET # BLD: 182 K/UL (ref 138–453)
PLATELET ESTIMATE: ABNORMAL
PMV BLD AUTO: 10.6 FL (ref 8.1–13.5)
POTASSIUM SERPL-SCNC: 4.1 MMOL/L (ref 3.7–5.3)
RBC # BLD: 2.73 M/UL (ref 3.95–5.11)
RBC # BLD: ABNORMAL 10*6/UL
SEG NEUTROPHILS: 56 % (ref 36–65)
SEGMENTED NEUTROPHILS ABSOLUTE COUNT: 2.36 K/UL (ref 1.5–8.1)
SODIUM BLD-SCNC: 136 MMOL/L (ref 135–144)
SPECIMEN DESCRIPTION: ABNORMAL
WBC # BLD: 4.2 K/UL (ref 3.5–11.3)
WBC # BLD: ABNORMAL 10*3/UL

## 2020-03-30 PROCEDURE — 80048 BASIC METABOLIC PNL TOTAL CA: CPT

## 2020-03-30 PROCEDURE — 2700000000 HC OXYGEN THERAPY PER DAY

## 2020-03-30 PROCEDURE — 2580000003 HC RX 258: Performed by: STUDENT IN AN ORGANIZED HEALTH CARE EDUCATION/TRAINING PROGRAM

## 2020-03-30 PROCEDURE — 85025 COMPLETE CBC W/AUTO DIFF WBC: CPT

## 2020-03-30 PROCEDURE — 94761 N-INVAS EAR/PLS OXIMETRY MLT: CPT

## 2020-03-30 PROCEDURE — 36415 COLL VENOUS BLD VENIPUNCTURE: CPT

## 2020-03-30 PROCEDURE — 83605 ASSAY OF LACTIC ACID: CPT

## 2020-03-30 PROCEDURE — 6370000000 HC RX 637 (ALT 250 FOR IP): Performed by: INTERNAL MEDICINE

## 2020-03-30 PROCEDURE — 6370000000 HC RX 637 (ALT 250 FOR IP): Performed by: STUDENT IN AN ORGANIZED HEALTH CARE EDUCATION/TRAINING PROGRAM

## 2020-03-30 PROCEDURE — 6360000002 HC RX W HCPCS: Performed by: STUDENT IN AN ORGANIZED HEALTH CARE EDUCATION/TRAINING PROGRAM

## 2020-03-30 PROCEDURE — 6360000002 HC RX W HCPCS: Performed by: INTERNAL MEDICINE

## 2020-03-30 PROCEDURE — 99239 HOSP IP/OBS DSCHRG MGMT >30: CPT | Performed by: INTERNAL MEDICINE

## 2020-03-30 PROCEDURE — 97166 OT EVAL MOD COMPLEX 45 MIN: CPT

## 2020-03-30 PROCEDURE — 97162 PT EVAL MOD COMPLEX 30 MIN: CPT

## 2020-03-30 PROCEDURE — 2500000003 HC RX 250 WO HCPCS: Performed by: INTERNAL MEDICINE

## 2020-03-30 PROCEDURE — 97530 THERAPEUTIC ACTIVITIES: CPT

## 2020-03-30 PROCEDURE — 99233 SBSQ HOSP IP/OBS HIGH 50: CPT | Performed by: INTERNAL MEDICINE

## 2020-03-30 PROCEDURE — 94640 AIRWAY INHALATION TREATMENT: CPT

## 2020-03-30 PROCEDURE — 94003 VENT MGMT INPAT SUBQ DAY: CPT

## 2020-03-30 RX ORDER — DOCUSATE SODIUM 100 MG/1
100 CAPSULE, LIQUID FILLED ORAL DAILY PRN
Status: DISCONTINUED | OUTPATIENT
Start: 2020-03-30 | End: 2020-03-30 | Stop reason: HOSPADM

## 2020-03-30 RX ORDER — LEVOTHYROXINE SODIUM 0.2 MG/1
200 TABLET ORAL DAILY
Qty: 30 TABLET | Refills: 3 | Status: ON HOLD | DISCHARGE
Start: 2020-03-31 | End: 2020-06-29 | Stop reason: HOSPADM

## 2020-03-30 RX ORDER — CETIRIZINE HYDROCHLORIDE 10 MG/1
10 TABLET ORAL DAILY
Status: DISCONTINUED | OUTPATIENT
Start: 2020-03-30 | End: 2020-03-30 | Stop reason: HOSPADM

## 2020-03-30 RX ORDER — BENZONATATE 100 MG/1
100 CAPSULE ORAL 2 TIMES DAILY PRN
Status: DISCONTINUED | OUTPATIENT
Start: 2020-03-30 | End: 2020-03-30 | Stop reason: HOSPADM

## 2020-03-30 RX ORDER — AZITHROMYCIN 500 MG/1
500 TABLET, FILM COATED ORAL DAILY
Qty: 10 TABLET | Refills: 0 | DISCHARGE
Start: 2020-03-31 | End: 2020-04-10

## 2020-03-30 RX ORDER — AZITHROMYCIN 250 MG/1
500 TABLET, FILM COATED ORAL DAILY
Status: DISCONTINUED | OUTPATIENT
Start: 2020-03-30 | End: 2020-03-30 | Stop reason: HOSPADM

## 2020-03-30 RX ORDER — PREDNISONE 20 MG/1
40 TABLET ORAL DAILY
Qty: 20 TABLET | Refills: 0 | DISCHARGE
Start: 2020-03-31 | End: 2020-04-10

## 2020-03-30 RX ORDER — CIPROFLOXACIN 500 MG/1
500 TABLET, FILM COATED ORAL 2 TIMES DAILY
Qty: 20 TABLET | Refills: 0 | DISCHARGE
Start: 2020-03-30 | End: 2020-04-09

## 2020-03-30 RX ORDER — ESCITALOPRAM OXALATE 10 MG/1
20 TABLET ORAL DAILY
Status: DISCONTINUED | OUTPATIENT
Start: 2020-03-30 | End: 2020-03-30 | Stop reason: HOSPADM

## 2020-03-30 RX ORDER — CIPROFLOXACIN 2 MG/ML
400 INJECTION, SOLUTION INTRAVENOUS EVERY 12 HOURS
Status: DISCONTINUED | OUTPATIENT
Start: 2020-03-30 | End: 2020-03-30 | Stop reason: HOSPADM

## 2020-03-30 RX ORDER — LANOLIN ALCOHOL/MO/W.PET/CERES
CREAM (GRAM) TOPICAL 2 TIMES DAILY
Status: DISCONTINUED | OUTPATIENT
Start: 2020-03-30 | End: 2020-03-30 | Stop reason: HOSPADM

## 2020-03-30 RX ORDER — PREDNISONE 20 MG/1
40 TABLET ORAL DAILY
Status: DISCONTINUED | OUTPATIENT
Start: 2020-03-30 | End: 2020-03-30 | Stop reason: HOSPADM

## 2020-03-30 RX ADMIN — IPRATROPIUM BROMIDE AND ALBUTEROL SULFATE 1 AMPULE: .5; 3 SOLUTION RESPIRATORY (INHALATION) at 12:45

## 2020-03-30 RX ADMIN — CARBAMAZEPINE 200 MG: 200 TABLET ORAL at 09:19

## 2020-03-30 RX ADMIN — AZITHROMYCIN 500 MG: 250 TABLET, FILM COATED ORAL at 11:42

## 2020-03-30 RX ADMIN — Medication: at 12:24

## 2020-03-30 RX ADMIN — CIPROFLOXACIN 400 MG: 2 INJECTION, SOLUTION INTRAVENOUS at 12:24

## 2020-03-30 RX ADMIN — FERROUS SULFATE TAB EC 325 MG (65 MG FE EQUIVALENT) 325 MG: 325 (65 FE) TABLET DELAYED RESPONSE at 09:04

## 2020-03-30 RX ADMIN — LEVOTHYROXINE SODIUM 200 MCG: 100 TABLET ORAL at 05:35

## 2020-03-30 RX ADMIN — CETIRIZINE HYDROCHLORIDE 10 MG: 10 TABLET ORAL at 12:24

## 2020-03-30 RX ADMIN — FERROUS SULFATE TAB EC 325 MG (65 MG FE EQUIVALENT) 325 MG: 325 (65 FE) TABLET DELAYED RESPONSE at 11:41

## 2020-03-30 RX ADMIN — PREDNISONE 40 MG: 20 TABLET ORAL at 12:24

## 2020-03-30 RX ADMIN — BUMETANIDE 1 MG: 0.25 INJECTION INTRAMUSCULAR; INTRAVENOUS at 09:03

## 2020-03-30 RX ADMIN — ESCITALOPRAM OXALATE 20 MG: 10 TABLET ORAL at 12:24

## 2020-03-30 RX ADMIN — SODIUM CHLORIDE, PRESERVATIVE FREE 10 ML: 5 INJECTION INTRAVENOUS at 09:04

## 2020-03-30 RX ADMIN — ENOXAPARIN SODIUM 30 MG: 30 INJECTION SUBCUTANEOUS at 09:03

## 2020-03-30 RX ADMIN — IPRATROPIUM BROMIDE AND ALBUTEROL SULFATE 1 AMPULE: .5; 3 SOLUTION RESPIRATORY (INHALATION) at 03:01

## 2020-03-30 RX ADMIN — IPRATROPIUM BROMIDE AND ALBUTEROL SULFATE 1 AMPULE: .5; 3 SOLUTION RESPIRATORY (INHALATION) at 08:25

## 2020-03-30 ASSESSMENT — PAIN SCALES - GENERAL
PAINLEVEL_OUTOF10: 0
PAINLEVEL_OUTOF10: 0

## 2020-03-30 ASSESSMENT — ENCOUNTER SYMPTOMS
VOMITING: 0
NAUSEA: 0
COUGH: 1
WHEEZING: 0
ABDOMINAL PAIN: 0
SHORTNESS OF BREATH: 1
STRIDOR: 0

## 2020-03-30 ASSESSMENT — PULMONARY FUNCTION TESTS
PIF_VALUE: 19
PIF_VALUE: 35

## 2020-03-30 NOTE — CARE COORDINATION
Transitional Planning  Call to [facility] ([phone]), spoke to Ellapatricio Nick, notified that she is now on the vent continuously. Confirmed that they are only licensed to take nighttime vents. Patient will need to go to [facility] until able to wean to night only vent. Spoke with patient at bedside. Informed on LTACH provided and she is agreeable. Given choice, referral sent to [facility]. 1155 - Spoke to [facility] at [facility], they can accept patient today, but need to order bariatric bed first. Will notify when bed can be delivered so transport can be arranged. AVS with completed ARLENE faxed to [facility]. Transport arranged at 3 pm.   Call to [facility], spoke to Taiwo, notified of [facility] to [facility] until weaned from daytime vent. RN to call report to [phone].

## 2020-03-30 NOTE — DISCHARGE INSTR - COC
Continuity of Care Form    Patient Name: Alberto Laguna   :  1970  MRN:  [id number]    Admit date:  3/26/2020  Discharge date:2020      Code Status Order: Full Code   Advance Directives:   [facility]vance Care Flowsheet Documentation     Date/Time Healthcare Directive Type of Healthcare Directive Copy in [address] Agent's Name Healthcare Agent's Phone Number    [phone]  No, patient does not have an advance directive for healthcare treatment -- -- -- -- --          Admitting Physician:  Elle Seo MD  PCP: Jose Martin Stephens DO    Discharging Nurse: [facility] Unit/Room#: 5506/5456-05  Discharging Unit Phone Number: [phone]    Emergency Contact:   Extended Emergency Contact Information  Primary Emergency Contact: Joselyn Ellis  Address: [address] [place] [address]. [address] Phone: [phone]  Mobile Phone: [phone]  Relation: Parent  Secondary Emergency Contact: Zaria Atkinson   [address] Phone: [phone]  Mobile Phone: [phone]  Relation: Other  Hearing or visual needs: None  Other needs: None  Preferred language: English   needed? No    Past Surgical History:  Past Surgical History:   Procedure Laterality Date    BRONCHOSCOPY N/A 1/3/2020    BRONCHOSCOPY ATTEMPTED performed by Codie Marin MD at [place] [address], MODIFIED RADICAL Right 2013    TRACHEOSTOMY         Immunization History: There is no immunization history for the selected administration types on file for this patient.     Active Problems:  Patient Active Problem List   Diagnosis Code    Hypothyroidism E03.9    Acute respiratory failure with hypoxia and hypercapnia (HCC) J96.01, J96.02    Class 3 obesity due to excess calories with serious comorbidity and body mass index (BMI) greater than or equal to 70 in adult [id number]    Acute congestive heart failure (HCC) I50.9    Chronic [phone]    Dialysis Facility (if applicable)   · Name:  · Address:  · Dialysis Schedule:  · Phone:  · Fax:    / signature: Electronically signed by Jagdish Mann RN on 3/30/20 at 11:17 AM EDT    PHYSICIAN SECTION    Prognosis: Fair    Condition at Discharge: Stable    Rehab Potential (if transferring to Rehab): Fair    Recommended Labs or Other Treatments After Discharge:   Orthopedic consultation for right shoulder dislocation:  F/U Dr Miriam Farrar added for Pseudomonas coverage  Antibiotics per Infectious Disease service  Respiratory Therapy and Bronchodilators prn  Vent support as needed  Pulmonary evaluation and f/u Dr Eric Armstrong requirements have been high   Suspect obesity hypoventilation syndrome  Continue to observe for right heart failure  Risk factor management / weight loss    Thyroid replacement titrated to normalize thyroid hormones   Encourage compliance to meds, labs etc  Diet as tolerated  Activity as tolerated   PT/OT  DVT prophylaxis       Physician Certification: I certify the above information and transfer of Haley Allen  is necessary for the continuing treatment of the diagnosis listed and that she requires LTAC for less 30 days. Update Admission H&P:   Principal Problem:    Acute respiratory failure with hypoxia and hypercapnia (HCC)  Active Problems:    Hypothyroidism    Chronic respiratory failure (HCC)    Tracheostomy dependent (HCC)    Lymphedema    Mycoplasma pneumonia    RU (obstructive sleep apnea)    Acute respiratory distress    Pulmonary hypertension (HCC)    Macrocytic anemia    Subluxation of right shoulder joint    Obesity hypoventilation syndrome (HCC)  Resolved Problems:    * No resolved hospital problems.  *       PHYSICIAN SIGNATURE:  Electronically signed by John Padilla DO on 3/30/20 at 12:40 PM EDT

## 2020-03-30 NOTE — CONSULTS
Orthopedic Surgery Consult  (Dr. Loy Merrill)      CC/Reason for consult:  Right shoulder dislocation    HPI:      The patient is a 54 y.o. female who presented to [facility] 3/28 with SOB and found to have PNA. On admission, pt also reporting chronic right shoulder pain. Initial XRs obtained of right shoulder demonstrated possible inferior subluxation of right shoulder. Pt reports multiple year history of right shoulder pain, worsened over the last month. One month ago, in addition to shoulder pain, pt also unable to move the shoulder after awaking from sleep. Pt denies falls, trauma at that time. Today, pt reports minimal improvement in ROM. Continue mild pain to mainly lateral aspect of shoulder. Pain not worse with any particular activity. Pt reports numbness, tingling to right 1-3 digits, hx of carpal tunnel. Pt denies neck pain, pain with ROM neck. Pt reports filling of right shoulder going in and out from a few years now. Again, she has only had issue with decreased ROM this last month. Pt has not received therapy or had injections to the right shoulder. Pt is left handed. Past Medical History:    Past Medical History:   Diagnosis Date    Anemia     Disease of blood and blood forming organ     History of breast cancer     History of chemotherapy     Hypothyroidism     Lymphedema     Chronic    Morbid obesity ([facility]     Respiratory failure (HCC)     Tracheostomy present [facility])        Past Surgical History:    Past Surgical History:   Procedure Laterality Date    BRONCHOSCOPY N/A 1/3/2020    BRONCHOSCOPY ATTEMPTED performed by Eric Melgoza MD at [place] [address], MODIFIED RADICAL Right 2013    TRACHEOSTOMY         Medications Prior to Admission:   Prior to Admission medications    Medication Sig Start Date End Date Taking?  Authorizing Provider   albuterol sulfate HFA (VENTOLIN HFA) 108 (90 Base) MCG/ACT inhaler Inhale 2 puffs into the lungs every 4 hours as needed for Wheezing or Shortness of Breath    Historical Provider, MD   mineral oil-hydrophilic petrolatum (AQUAPHOR) ointment Apply topically 2 times daily    Historical Provider, MD   Skin Protectants, Misc. (HYDROCERIN) CREA cream Apply topically 2 times daily Indications: to bilateral lower extremities    Historical Provider, MD   ipratropium-albuterol (DUONEB) 0.5-2.5 (3) MG/3ML SOLN nebulizer solution Inhale 1 vial into the lungs 3 times daily    Historical Provider, MD   escitalopram (LEXAPRO) 20 MG tablet Take 20 mg by mouth daily    Historical Provider, MD   predniSONE (DELTASONE) 20 MG tablet Take 40 mg by mouth daily For 1 dose on 3/25/20 3/25/20   Historical Provider, MD   predniSONE (DELTASONE) 20 MG tablet Take 20 mg by mouth daily For 1 dose on 3/26/20 3/26/20   Historical Provider, MD   predniSONE (DELTASONE) 10 MG tablet Take 10 mg by mouth daily For 1 dose on 3/27/20 3/27/20   Historical Provider, MD   ALPRAZolam (XANAX) 0.25 MG tablet Take 0.25 mg by mouth 2 times daily as needed for Anxiety. For 14 days starting 3/25/20 3/25/20 4/8/20  Historical Provider, MD   acetaminophen-codeine (TYLENOL #3) 300-30 MG per tablet Take 1 tablet by mouth every 6 hours as needed for Pain.     Historical Provider, MD   ibuprofen (ADVIL;MOTRIN) 800 MG tablet Take 1 tablet by mouth every 8 hours as needed for Pain 3/9/20 3/25/20  Miriam Yee MD   Menthol, Topical Analgesic, (BIOFREEZE) 4 % GEL Apply topically 4 times daily as needed (right arm pain)     Historical Provider, MD   carBAMazepine (TEGRETOL) 200 MG tablet Take 1 tablet by mouth 2 times daily 9/15/19   Josiane Harris MD   bumetanide (BUMEX) 1 MG tablet Take 1 tablet by mouth 2 times daily 9/15/19   Josiane Harris MD   bisacodyl (DULCOLAX) 5 MG EC tablet Take 5 mg by mouth 2 times daily as needed for Constipation    Historical Provider, MD   docusate sodium (COLACE) 100 MG capsule Take 100 mg by mouth daily as needed for Constipation    Historical Provider, MD   loratadine (CLARITIN) 10 MG tablet

## 2020-03-30 NOTE — PROGRESS NOTES
level  Bathroom Shower/Tub: (sponge baths )  Home Equipment: Wheelchair-manual  ADL Assistance: Needs assistance(pt reported requiring assistance for bathing back but able to wash front of self)  Homemaking Assistance: Needs assistance  Homemaking Responsibilities: No(pt reported doesn't have to complete IADLs)  Ambulation Assistance: Needs assistance(pt reported doesn't walker but will get into w/c )  Transfer Assistance: Needs assistance(pt reported was able to stand pivot to chair previously )  Cognition   Objective      AROM RLE (degrees)  RLE AROM: WFL  AROM LLE (degrees)  LLE AROM : WFL  AROM RUE (degrees)  RUE AROM : WFL  AROM LUE (degrees)  LUE AROM : WFL  Strength RLE  Strength RLE: WFL  Strength LLE  Strength LLE: WFL  Strength RUE  Strength RUE: WFL  Strength LUE  Strength LUE: WFL     Sensation  Overall Sensation Status: WFL  Bed mobility  Rolling to Left: Maximum assistance;2 Person assistance  Rolling to Right: Maximum assistance;2 Person assistance     Ambulation  Ambulation?: No  Ambulation 1  Distance: Rolling R and L with max assist x 2  Comments:  The pt declined any other functional mobility      Plan   Plan  Times per week: 5-6x wk  Current Treatment Recommendations: Strengthening, Balance Training, Endurance Training, Gait Training, Functional Mobility Training, ROM, Safety Education & Training  Safety Devices  Type of devices: Left in bed, Nurse notified, Call light within reach, Patient at risk for falls    G-Code    OutComes Score     -PAC Score  -PAC Inpatient Mobility Raw Score : 8 (03/30/20 1127)  AM-PAC Inpatient T-Scale Score : 28.52 (03/30/20 1127)  Mobility Inpatient CMS 0-100% Score: 86.62 (03/30/20 1127)  Mobility Inpatient CMS G-Code Modifier : CM (03/30/20 1127)     Goals  Short term goals  Time Frame for Short term goals: 10 visits  Short term goal 1: sit to stand with mod assist   Short term goal 2: bed to chair with mod assist  Short term goal 3: exercise program with min

## 2020-03-30 NOTE — PLAN OF CARE
[address] [place] [address]       Second Visit Note  For more detailed information please refer to the progress note of the day      3/30/2020    12:46 PM    Name:   Julio Gray  MRN:     [id number]     Carol:      [de-identified]   Room:   [address] [place] [address] Day:  4  Admit Date:  3/26/2020  2:51 AM    PCP:   Jemima Bowens DO  Code Status:  Full Code    Patient has been seen  Has been accepted at [facility]:   azithromycin (ZITHROMAX) tablet 500 mg, Daily  ciprofloxacin (CIPRO) IVPB 400 mg, Q12H  benzonatate (TESSALON) capsule 100 mg, BID PRN  bisacodyl (DULCOLAX) EC tablet 5 mg, BID PRN  docusate sodium (COLACE) capsule 100 mg, Daily PRN  escitalopram (LEXAPRO) tablet 20 mg, Daily  cetirizine (ZYRTEC) tablet 10 mg, Daily  magnesium hydroxide (MILK OF MAGNESIA) 400 MG/5ML suspension 30 mL, Daily PRN  Hydrocerin cream CREA, BID  predniSONE (DELTASONE) tablet 40 mg, Daily  albuterol (PROVENTIL) nebulizer solution 2.5 mg, Q6H PRN  carBAMazepine (TEGRETOL) tablet 200 mg, BID  ferrous sulfate (FE TABS 325) EC tablet 325 mg, TID WC  levothyroxine (SYNTHROID) tablet 200 mcg, Daily  ipratropium-albuterol (DUONEB) nebulizer solution 1 ampule, Q6H  sodium chloride flush 0.9 % injection 10 mL, 2 times per day  sodium chloride flush 0.9 % injection 10 mL, PRN  acetaminophen (TYLENOL) tablet 650 mg, Q6H PRN    Or  acetaminophen (TYLENOL) suppository 650 mg, Q6H PRN  polyethylene glycol (GLYCOLAX) packet 17 g, Daily PRN  promethazine (PHENERGAN) tablet 12.5 mg, Q6H PRN    Or  ondansetron (ZOFRAN) injection 4 mg, Q6H PRN  enoxaparin (LOVENOX) injection 30 mg, BID  bumetanide (BUMEX) injection 1 mg, BID        VITALS:  BP (!) 149/86   Pulse 85   Temp 98.8 °F (37.1 °C) (Oral)   Resp 20   Ht 5' 5\" (1.651 m)   Wt (!) 677 lb 14.6 oz (307.5 kg)   SpO2 99%   .81 kg/m²     LABS:  Labs:  Hematology:  Recent Labs     03/28/20  0959 03/29/20  0434 03/30/20  0540   WBC 4.2 4.6 4.2   RBC 2.81* 2.67*

## 2020-03-30 NOTE — PROGRESS NOTES
Pulmonary Daily Progress Note      Date and time: 3/30/2020 12:34 PM  Patient's name:  Tangela Campos Record Number: [id number]  Patient's account/billing number: [de-identified]  Patient's YOB: 1970  Age: 54 y.o. Date of Admission: 3/26/2020  2:51 AM  Length of stay during current admission: 4      Primary Care Physician: Alexander Donaldson DO    Code Status: Full Code        SUBJECTIVE:     OVERNIGHT EVENTS:         No acute overnight events. Patient remains hemodynamically stable. Remains on the ventilator: Ventilator setting PRVC respiratory rate 16, tidal volume 500, PEEP 8, FiO2 45%  Afebrile T-max 98.8  She has Pseudomonas in tracheal aspirate and she is currently on ciprofloxacin. According to patient she wanted to try get off ventilator during the daytime as she use ventilator/CPAP at night but at the same time wanted to keep her oxygen greater than 96%. She denies any increasing shortness of breath. She denies increased cough or increased tracheal secretions. Denies chest pain fever hemoptysis. Her appetite is good.        Subjective:   Review of Systems -   General ROS: Completed and except as mentioned above were negative   Psychological ROS:  Completed and except as mentioned above were negative  Allergy and Immunology ROS:  Completed and except as mentioned above were negative  Hematological and Lymphatic ROS:  Completed and except as mentioned above were negative  Respiratory ROS:  Completed and except as mentioned above were negative  Cardiovascular ROS:  Completed and except as mentioned above were negative  Gastrointestinal ROS: Completed and except as mentioned above were negative  Genito-Urinary ROS:  Completed and except as mentioned above were negative  Musculoskeletal ROS:  Completed and except as mentioned above were negative  Neurological ROS:  Completed and except as mentioned above were negative  Dermatological ROS:  Completed and except as mentioned above were mg Oral Daily    ciprofloxacin  400 mg Intravenous Q12H    escitalopram  20 mg Oral Daily    cetirizine  10 mg Oral Daily    Hydrocerin   Topical BID    predniSONE  40 mg Oral Daily    carBAMazepine  200 mg Oral BID    ferrous sulfate  325 mg Oral TID WC    levothyroxine  200 mcg Oral Daily    ipratropium-albuterol  1 ampule Inhalation Q6H    sodium chloride flush  10 mL Intravenous 2 times per day    enoxaparin  30 mg Subcutaneous BID    bumetanide  1 mg Intravenous BID     Continuous Infusions:    PRN Meds:   benzonatate, 100 mg, BID PRN  bisacodyl, 5 mg, BID PRN  docusate sodium, 100 mg, Daily PRN  magnesium hydroxide, 30 mL, Daily PRN  albuterol, 2.5 mg, Q6H PRN  sodium chloride flush, 10 mL, PRN  acetaminophen, 650 mg, Q6H PRN    Or  acetaminophen, 650 mg, Q6H PRN  polyethylene glycol, 17 g, Daily PRN  promethazine, 12.5 mg, Q6H PRN    Or  ondansetron, 4 mg, Q6H PRN        SUPPORT DEVICES: [x] Ventilator [] BIPAP  [] Nasal Cannula [] Room Air    VENT SETTINGS (Comprehensive) (if applicable):    Vent Information  $Ventilation: $Subsequent Day  Skin Assessment: Clean, dry, & intact  Vent Type: Servo i  Vent Mode: PRVC  Pressure low: 500 cmH2O  Vt Ordered: 500 mL  Rate Set: 16 bmp  FiO2 : 40 %  Sensitivity: 1  PEEP/CPAP: 8  I Time/ I Time %: 0.9 s  Humidification Source: HME  Nitric Oxide/Epoprostenol In Use?: No  Additional Respiratory  Assessments  Pulse: 85  Resp: 20  SpO2: 99 %  End Tidal CO2: 53 (%)  Position: Dash's  Humidification Source: HME  Oral Care Completed?: Yes  Oral Care: Mouth suctioned, Mouth swabbed, Teeth brushed  Subglottic Suction Done?: Yes    ABGs:     Lab Results   Component Value Date    PHART 7.285 03/25/2020    HBU4CXJ 65.8 03/25/2020    PO2ART 177.0 03/25/2020    HOD6RZK 31.2 03/25/2020    DHY6QIS 37 03/27/2020    V9DTDJFP 98.4 03/25/2020    FIO2 40.0 03/27/2020     Lactic Acid:   Lab Results   Component Value Date    LACTA NOT REPORTED 03/30/2020    LACTA NOT REPORTED dictation software. Although every effort was made to ensure the accuracy of this automated transcription, some errors in transcription may have occurred.     Grace Bonds MD  3/30/2020 12:34 PM

## 2020-03-30 NOTE — PROGRESS NOTES
to normalize thyroid hormones   Encourage compliance to meds, labs etc  Diet as tolerated  Activity as tolerated   DVT prophylaxis   DC planning  Will return to [facility] upon discharge      Shoulder x-ray:  Impression:   Inferior subluxation or dislocation right humerus. Orthopedics consulted    Cipro added for Pseudomonas coverage  Susceptibility     Pseudomonas aeruginosa (1)     Antibiotic Interpretation PINKY Status   amikacin   Final    NOT REPORTED   ceFAZolin   Final    NOT REPORTED   cefepime Sensitive  Final    4  SUSCEPTIBLE   ciprofloxacin Sensitive  Final    0.5  SUSCEPTIBLE   gentamicin Sensitive  Final    <=1  SUSCEPTIBLE   meropenem   Final    NOT REPORTED   tigecycline   Final    NOT REPORTED   tobramycin Sensitive  Final    <=1  SUSCEPTIBLE   piperacillin-tazobactam Sensitive  Final    16  SUSCEPTIBLE   Lab and Collection     Culture, Respiratory - 3/26/2020   Result History     Culture, Respiratory on 3/30/2020         Medications: Allergies:     Allergies   Allergen Reactions    Zosyn [Piperacillin-Tazobactam In Dex] Shortness Of Breath     tolerated cefepime 6/2018    Vancomycin Swelling     Lip swelling and redness and itching         Current Meds:   Scheduled Meds:    azithromycin  500 mg Oral Daily    ciprofloxacin  400 mg Intravenous Q12H    carBAMazepine  200 mg Oral BID    ferrous sulfate  325 mg Oral TID WC    levothyroxine  200 mcg Oral Daily    ipratropium-albuterol  1 ampule Inhalation Q6H    sodium chloride flush  10 mL Intravenous 2 times per day    enoxaparin  30 mg Subcutaneous BID    bumetanide  1 mg Intravenous BID     Continuous Infusions:   PRN Meds: albuterol, sodium chloride flush, acetaminophen **OR** acetaminophen, polyethylene glycol, promethazine **OR** ondansetron    Data:     Past Medical History:   has a past medical history of Anemia, Disease of blood and blood forming organ, History of breast cancer, History of chemotherapy, Hypothyroidism,

## 2020-03-30 NOTE — PROGRESS NOTES
4-/5      Plan   Plan  Times per week: 3-4x/wk    AM-PAC Score   AM-PAC Inpatient Daily Activity Raw Score: 15 (03/30/20 1409)  AM-PAC Inpatient ADL T-Scale Score : 34.69 (03/30/20 1409)  ADL Inpatient CMS 0-100% Score: 56.46 (03/30/20 1409)  ADL Inpatient CMS G-Code Modifier : CK (03/30/20 1409)    Goals  Short term goals  Time Frame for Short term goals: pt will, by discharge  Short term goal 1: complete LB and UB ADLs with min A ,set up and AE, as needed  Short term goal 2: dem mod A during bed mobility in order to increased independence   Short term goal 3: dem ~5 minutes static/dynamic sitting balance on eOB with min A in order to increase ability to participate in daily tasks  Short term goal 4: increase activity tolerance to 15+ minutes in order to participate in daily tasks  Short term goal 5: incorporate use of R UE into functional tasks with min A in order to increase ROM and strength     Therapy Time   Individual Concurrent Group Co-treatment   Time In 0920         Time Out 0933         Minutes 13               Wandy Andrade, OTR/L

## 2020-03-31 NOTE — DISCHARGE SUMMARY
[facility]    Discharge Summary     Patient ID: Le Morales  :  1970   MRN: [id number]     ACCOUNT:  [de-identified]   Patient's PCP: Dimas Habermann, DO  Admit Date: 3/26/2020   Discharge Date: 3/30/2020    Discharge Physician: Joce Samuel DO     The patient was seen and examined on day of discharge and this discharge summary is in conjunction with any daily progress note from day of discharge. Active Discharge Diagnoses:     Primary Problem  Acute respiratory failure with hypoxia and hypercapnia [facility])      [facility]    Diagnosis Date Noted    Obesity hypoventilation syndrome ([facility] [E66.2] 2020    Subluxation of right shoulder joint [S43.001A] 2020    Pulmonary hypertension ([facility] [address] [I27.20] 2020    Macrocytic anemia [D53.9] 2020    Acute respiratory distress [R06.03] 2020    RU (obstructive sleep apnea) [G47.33] 2020    Mycoplasma pneumonia [J15.7] 2020    Chronic respiratory failure ([address] [J96.10] 2018    Tracheostomy dependent ([facility] [address] [Z93.0] 2018    Lymphedema [I89.0] 2018    Acute respiratory failure with hypoxia and hypercapnia (HCC) [J96.01, J96.02] 2018    Hypothyroidism [E03.9] 2018         Hospital Course:     Brief History:  The patient is a 54 y.o. female who is admitted to the hospital for the management of:  Mycoplasma pneumonia     As documented in the medical record: \"On 3/25/2020 patient presented to ED with shortness of breath.  Patient was attempted to be placed on BiPAP and CPAP settings with no improvement in shortness of breath.  Patient was switched to [facility] setting.  Saturations improved after changing the settings to [facility].    Patient was also complaining of some chest discomfort.  EKG and chest x-ray were obtained.  EKG was nonspecific with no significant signs of ischemia.   Troponin 20, 21  Chest x-ray showed mild progression of multifocal airspace opacities with mild superimposed pulmonary edema on underlying airspace disease and new small effusions.   Patient also has elevated CRP 29.3 and  and new onset lymphopenia.  Due to current finding decision was made to transfer patient to [address] [place] [address] for further work-up and COVID rule out.   No leukocytosis.  WBC 5.5.  Hemoglobin 9.9.  Platelets 469.  Lymphocytes 5   ABG  pH 7.28, PCO2 65.8, PO2 177, bicarb 31\"      Subsequent database has revealed:  Results for Steve Correa (MRN [id number]) as of 3/28/2020 13:56    Ref. Range 12/31/2019 05:59 3/26/2020 15:54   Mycoplasma pneumo IgM Latest Ref Range: <0.91  1.24 (H) 2.50 (H)      Specimen Description . TRACHEAL ASPIRATE Special Requests NOT REPORTED Direct Exam < 10 EPITHELIAL CELLS/LPF <10 NEUTROPHILS/LPF NO SIGNIFICANT PATHOGENS SEEN Culture NORMAL RESPIRATORY YAHAIRA HEAVY GROWTH      [id number]       NOT DETECTED      Results for Steve Correa (MRN [id number]) as of 3/28/2020 13:56    Ref. Range 1/8/2020 17:51 1/9/2020 01:21 3/26/2020 08:25 3/26/2020 16:07   Chlamydia pneumoniae By PCR Latest Ref Range: Not Detected    Not Detected Not Detected     Rhino/Enterovirus PCR Latest Ref Range: Not Detected    Not Detected Not Detected        NEGATIVE for Influenza A + B antigens. Results for Steve Correa (MRN [id number]) as of 3/28/2020 13:56    Ref.  Range 3/26/2020 08:25   Adenovirus PCR Latest Ref Range: Not Detected  Not Detected   B Pertussis by PCR Latest Ref Range: Not Detected  Not Detected   Chlamydia pneumoniae By PCR Latest Ref Range: Not Detected  Not Detected   Coronavirus 229E PCR Latest Ref Range: Not Detected  Not Detected   Coronavirus HKU1 PCR Latest Ref Range: Not Detected  Not Detected   Coronavirus NL63 PCR Latest Ref Range: Not Detected  Not Detected   Coronavirus OC Latest Ref Range: Not Detected  Not Detected   Human Metapneumovirus PCR Latest Ref Range: Not COMPARISON: None. HISTORY: ORDERING SYSTEM PROVIDED HISTORY: Pain and stiffness TECHNOLOGIST PROVIDED HISTORY: Pain and stiffness Reason for Exam: pt states stiff rt shoulder best films obtainable due to pt weight FINDINGS: Humerus appears subluxed or dislocated inferiorly. No obvious fracture. Visualized right ribs appear intact. Poor visualization spine. Clips right axilla. Right-sided subclavian port. Overlying ECG monitor leads and gown snaps. Increased opacity right lung, compatible with known airspace disease. Inferior subluxation or dislocation right humerus. Xr Humerus Right (min 2 Views)    Result Date: 3/29/2020  EXAMINATION: ONE XRAY VIEW OF THE RIGHT SHOULDER; TWO XRAY VIEWS OF THE RIGHT HUMERUS 3/29/2020 1:26 pm COMPARISON: Shoulder series today. HISTORY: ORDERING SYSTEM PROVIDED HISTORY: axillary view TECHNOLOGIST PROVIDED HISTORY: Inferior subluxation appears chronic from prior imaging back to 2018. Axillary view for further evaluation. axillary view; ORDERING SYSTEM PROVIDED HISTORY: possible shoulder dislocation TECHNOLOGIST PROVIDED HISTORY: possible shoulder dislocation FINDINGS: SHOULDER: An axial view is provided demonstrating no evidence for dislocation. No discrete fracture identified. HUMERUS: Apparent inferior subluxation of the humeral head is noted, similar compared to the prior shoulder series. No fracture identified. Apparent inferior glenohumeral subluxation again noted on the humerus series, however no dislocation is demonstrated on the axillary view. Xr Shoulder Left (min 2 Views)    Result Date: 3/29/2020  EXAMINATION: THREE XRAY VIEWS OF THE LEFT SHOULDER 3/29/2020 1:26 pm COMPARISON: None. HISTORY: ORDERING SYSTEM PROVIDED HISTORY: comparison films TECHNOLOGIST PROVIDED HISTORY: comparison films FINDINGS: Suboptimal evaluation due to patient body habitus. No acute fracture. Normal glenohumeral and acromioclavicular alignment.      No acute osseous abnormality of the left shoulder. Xr Chest Portable    Result Date: 3/26/2020  EXAMINATION: ONE XRAY VIEW OF THE CHEST 3/26/2020 7:23 am COMPARISON: 03/25/2020 HISTORY: ORDERING SYSTEM PROVIDED HISTORY: COVID rule out TECHNOLOGIST PROVIDED HISTORY: COVID rule out Reason for Exam: uprt port  best film posssible  grid used Acuity: Unknown Type of Exam: Unknown FINDINGS: Tracheostomy tube is in place. Right subclavian central venous catheter is in place. Bilateral airspace disease persists. Persistent bilateral airspace disease/pneumonia. Xr Chest Portable    Result Date: 3/25/2020  EXAMINATION: ONE XRAY VIEW OF THE CHEST 3/25/2020 6:26 pm COMPARISON: Chest x-ray 03/09/2020 HISTORY: ORDERING SYSTEM PROVIDED HISTORY: left chest pain, hypoxia TECHNOLOGIST PROVIDED HISTORY: left chest pain, hypoxia Reason for Exam: left chest pain, hypoxia Acuity: Unknown Type of Exam: Unknown FINDINGS: Endotracheal tube versus tracheostomy partially visualized tip the level of the mid clavicles satisfactory position. .  Right MediPort device unchanged. Cardiomegaly. Perihilar congestion. Right suprahilar and infrahilar airspace opacities. Patchy bibasilar airspace disease and pleural effusions. No pneumothorax. Mildly increased interstitial opacity throughout both lungs may suggest underlying component of underlying interstitial/pulmonary edema. Mild progression of the multifocal airspace opacities. Suspect mild superimposed pulmonary edema superimposed on underlying airspace disease. Small effusions new from prior. Xr Chest Portable    Result Date: 3/9/2020  EXAMINATION: ONE XRAY VIEW OF THE CHEST 3/9/2020 1:02 pm COMPARISON: Chest radiograph performed 01/10/2020. HISTORY: ORDERING SYSTEM PROVIDED HISTORY: dyspnea TECHNOLOGIST PROVIDED HISTORY: dyspnea Reason for Exam: Shortness of breath Acuity: Acute Type of Exam: Initial FINDINGS: There are bilateral airspace opacities. There is no effusion. There is no pneumothorax. The heart is enlarged. The upper abdomen is unremarkable. The extrathoracic soft tissues are unremarkable. There is a tracheostomy tube. There is a right subclavian central line in stable position. Cardiomegaly with bilateral airspace opacities representing pneumonia versus degree of edema. Consultations:    Consults:     Final Specialist Recommendations/Findings:   IP CONSULT TO INFECTIOUS DISEASES  IP CONSULT TO ORTHOPEDIC SURGERY  IP CONSULT TO SOCIAL WORK        Discharged Condition:    Stable     Disposition: Advanced Specialty     Physician Follow Up:   DO Malissa Saenz 72. [facility] [address] of [facility] [address] [facility] [id number] [phone]           Activity:  activity as tolerated    Diet:  regular diet    Discharge Medications:      Medication List      START taking these medications    azithromycin 500 MG tablet  Commonly known as:  ZITHROMAX  Take 1 tablet by mouth daily for 10 days     ciprofloxacin 500 MG tablet  Commonly known as:  CIPRO  Take 1 tablet by mouth 2 times daily for 10 days     enoxaparin 30 MG/0.3ML injection  Commonly known as:  LOVENOX  Inject 0.3 mLs into the skin 2 times daily        CHANGE how you take these medications    levothyroxine 200 MCG tablet  Commonly known as:  SYNTHROID  Take 1 tablet by mouth daily  What changed:    · medication strength  · how much to take     predniSONE 20 MG tablet  Commonly known as:  DELTASONE  Take 2 tablets by mouth daily for 10 days  What changed:    · additional instructions  · Another medication with the same name was removed. Continue taking this medication, and follow the directions you see here.         CONTINUE taking these medications    acetaminophen 325 MG tablet  Commonly known as:  TYLENOL     benzonatate 100 MG capsule  Commonly known as:  TESSALON     bisacodyl 5 MG EC tablet  Commonly known as:  DULCOLAX     bumetanide 1 MG Emmy Cotton DO  3/31/2020  9:22 AM      Thank you Dr. Hilary Siddiqui DO for the opportunity to be involved in this patient's care.

## 2020-05-10 ENCOUNTER — HOSPITAL ENCOUNTER (INPATIENT)
Age: 50
LOS: 5 days | Discharge: NURSING, REHAB OR LONG-TERM CARE | DRG: 720 | End: 2020-05-15
Attending: INTERNAL MEDICINE | Admitting: INTERNAL MEDICINE
Payer: MEDICAID

## 2020-05-10 ENCOUNTER — APPOINTMENT (OUTPATIENT)
Dept: GENERAL RADIOLOGY | Age: 50
End: 2020-05-10
Payer: MEDICAID

## 2020-05-10 ENCOUNTER — HOSPITAL ENCOUNTER (EMERGENCY)
Age: 50
Discharge: OTHER INSTITUTION | End: 2020-05-10
Attending: EMERGENCY MEDICINE
Payer: MEDICAID

## 2020-05-10 VITALS
HEART RATE: 97 BPM | HEIGHT: 65 IN | RESPIRATION RATE: 25 BRPM | SYSTOLIC BLOOD PRESSURE: 118 MMHG | TEMPERATURE: 97.8 F | BODY MASS INDEX: 48.82 KG/M2 | DIASTOLIC BLOOD PRESSURE: 68 MMHG | WEIGHT: 293 LBS | OXYGEN SATURATION: 100 %

## 2020-05-10 PROBLEM — U07.1 COVID-19 VIRUS INFECTION: Status: ACTIVE | Noted: 2020-05-10

## 2020-05-10 PROBLEM — U07.1 COVID-19: Status: ACTIVE | Noted: 2020-05-10

## 2020-05-10 LAB
ABSOLUTE EOS #: 0.1 K/UL (ref 0–0.4)
ABSOLUTE IMMATURE GRANULOCYTE: ABNORMAL K/UL (ref 0–0.3)
ABSOLUTE LYMPH #: 1.1 K/UL (ref 1–4.8)
ABSOLUTE MONO #: 0.4 K/UL (ref 0.1–1.3)
ALBUMIN SERPL-MCNC: 3.5 G/DL (ref 3.5–5.2)
ALBUMIN/GLOBULIN RATIO: ABNORMAL (ref 1–2.5)
ALLEN TEST: ABNORMAL
ALP BLD-CCNC: 91 U/L (ref 35–104)
ALT SERPL-CCNC: 62 U/L (ref 5–33)
ANION GAP SERPL CALCULATED.3IONS-SCNC: 9 MMOL/L (ref 9–17)
AST SERPL-CCNC: 91 U/L
BASOPHILS # BLD: 1 % (ref 0–2)
BASOPHILS ABSOLUTE: 0 K/UL (ref 0–0.2)
BILIRUB SERPL-MCNC: 0.16 MG/DL (ref 0.3–1.2)
BNP INTERPRETATION: NORMAL
BUN BLDV-MCNC: 30 MG/DL (ref 6–20)
BUN/CREAT BLD: ABNORMAL (ref 9–20)
CALCIUM SERPL-MCNC: 9.2 MG/DL (ref 8.6–10.4)
CARBOXYHEMOGLOBIN: 3.3 % (ref 0–5)
CHLORIDE BLD-SCNC: 98 MMOL/L (ref 98–107)
CO2: 32 MMOL/L (ref 20–31)
CREAT SERPL-MCNC: 1.2 MG/DL (ref 0.5–0.9)
DIFFERENTIAL TYPE: ABNORMAL
EOSINOPHILS RELATIVE PERCENT: 2 % (ref 0–4)
FIO2: ABNORMAL
GFR AFRICAN AMERICAN: 58 ML/MIN
GFR NON-AFRICAN AMERICAN: 48 ML/MIN
GFR SERPL CREATININE-BSD FRML MDRD: ABNORMAL ML/MIN/{1.73_M2}
GFR SERPL CREATININE-BSD FRML MDRD: ABNORMAL ML/MIN/{1.73_M2}
GLUCOSE BLD-MCNC: 99 MG/DL (ref 70–99)
HCO3 VENOUS: 35.4 MMOL/L (ref 24–30)
HCT VFR BLD CALC: 33.1 % (ref 36–46)
HEMOGLOBIN: 10.3 G/DL (ref 12–16)
IMMATURE GRANULOCYTES: ABNORMAL %
LACTATE DEHYDROGENASE: 316 U/L (ref 135–214)
LYMPHOCYTES # BLD: 29 % (ref 24–44)
MCH RBC QN AUTO: 29.6 PG (ref 26–34)
MCHC RBC AUTO-ENTMCNC: 31 G/DL (ref 31–37)
MCV RBC AUTO: 95.3 FL (ref 80–100)
METHEMOGLOBIN: 0 % (ref 0–1.9)
MODE: ABNORMAL
MONOCYTES # BLD: 10 % (ref 1–7)
NEGATIVE BASE EXCESS, VEN: ABNORMAL MMOL/L (ref 0–2)
NOTIFICATION TIME: ABNORMAL
NOTIFICATION: ABNORMAL
NRBC AUTOMATED: ABNORMAL PER 100 WBC
O2 DEVICE/FLOW/%: ABNORMAL
O2 SAT, VEN: 86.7 % (ref 60–85)
OXYHEMOGLOBIN: ABNORMAL % (ref 95–98)
PATIENT TEMP: 37
PCO2, VEN, TEMP ADJ: ABNORMAL MMHG (ref 39–55)
PCO2, VEN: 66.3 (ref 39–55)
PDW BLD-RTO: 15.7 % (ref 11.5–14.9)
PEEP/CPAP: ABNORMAL
PH VENOUS: 7.34 (ref 7.32–7.42)
PH, VEN, TEMP ADJ: ABNORMAL (ref 7.32–7.42)
PLATELET # BLD: 187 K/UL (ref 150–450)
PLATELET ESTIMATE: ABNORMAL
PMV BLD AUTO: 8.5 FL (ref 6–12)
PO2, VEN, TEMP ADJ: ABNORMAL MMHG (ref 30–50)
PO2, VEN: 57.2 (ref 30–50)
POSITIVE BASE EXCESS, VEN: 9.6 MMOL/L (ref 0–2)
POTASSIUM SERPL-SCNC: 4.8 MMOL/L (ref 3.7–5.3)
PRO-BNP: 240 PG/ML
PSV: ABNORMAL
PT. POSITION: ABNORMAL
RBC # BLD: 3.47 M/UL (ref 4–5.2)
RBC # BLD: ABNORMAL 10*6/UL
RESPIRATORY RATE: ABNORMAL
SAMPLE SITE: ABNORMAL
SARS-COV-2, PCR: ABNORMAL
SARS-COV-2, RAPID: DETECTED
SARS-COV-2: ABNORMAL
SEG NEUTROPHILS: 58 % (ref 36–66)
SEGMENTED NEUTROPHILS ABSOLUTE COUNT: 2.3 K/UL (ref 1.3–9.1)
SET RATE: ABNORMAL
SODIUM BLD-SCNC: 139 MMOL/L (ref 135–144)
SOURCE: ABNORMAL
TEXT FOR RESPIRATORY: ABNORMAL
TOTAL HB: ABNORMAL G/DL (ref 12–16)
TOTAL PROTEIN: 8 G/DL (ref 6.4–8.3)
TOTAL RATE: ABNORMAL
TROPONIN INTERP: ABNORMAL
TROPONIN INTERP: ABNORMAL
TROPONIN T: ABNORMAL NG/ML
TROPONIN T: ABNORMAL NG/ML
TROPONIN, HIGH SENSITIVITY: 19 NG/L (ref 0–14)
TROPONIN, HIGH SENSITIVITY: 20 NG/L (ref 0–14)
VT: ABNORMAL
WBC # BLD: 3.9 K/UL (ref 3.5–11)
WBC # BLD: ABNORMAL 10*3/UL

## 2020-05-10 PROCEDURE — 94770 HC ETCO2 MONITOR DAILY: CPT

## 2020-05-10 PROCEDURE — 2000000000 HC ICU R&B

## 2020-05-10 PROCEDURE — 83735 ASSAY OF MAGNESIUM: CPT

## 2020-05-10 PROCEDURE — 87040 BLOOD CULTURE FOR BACTERIA: CPT

## 2020-05-10 PROCEDURE — 86738 MYCOPLASMA ANTIBODY: CPT

## 2020-05-10 PROCEDURE — 85379 FIBRIN DEGRADATION QUANT: CPT

## 2020-05-10 PROCEDURE — 80053 COMPREHEN METABOLIC PANEL: CPT

## 2020-05-10 PROCEDURE — 93005 ELECTROCARDIOGRAM TRACING: CPT | Performed by: EMERGENCY MEDICINE

## 2020-05-10 PROCEDURE — 82805 BLOOD GASES W/O2 SATURATION: CPT

## 2020-05-10 PROCEDURE — 83615 LACTATE (LD) (LDH) ENZYME: CPT

## 2020-05-10 PROCEDURE — 99285 EMERGENCY DEPT VISIT HI MDM: CPT

## 2020-05-10 PROCEDURE — 36415 COLL VENOUS BLD VENIPUNCTURE: CPT

## 2020-05-10 PROCEDURE — 94761 N-INVAS EAR/PLS OXIMETRY MLT: CPT

## 2020-05-10 PROCEDURE — U0002 COVID-19 LAB TEST NON-CDC: HCPCS

## 2020-05-10 PROCEDURE — 71045 X-RAY EXAM CHEST 1 VIEW: CPT

## 2020-05-10 PROCEDURE — 83605 ASSAY OF LACTIC ACID: CPT

## 2020-05-10 PROCEDURE — 82800 BLOOD PH: CPT

## 2020-05-10 PROCEDURE — 83880 ASSAY OF NATRIURETIC PEPTIDE: CPT

## 2020-05-10 PROCEDURE — 84484 ASSAY OF TROPONIN QUANT: CPT

## 2020-05-10 PROCEDURE — 82330 ASSAY OF CALCIUM: CPT

## 2020-05-10 PROCEDURE — 85610 PROTHROMBIN TIME: CPT

## 2020-05-10 PROCEDURE — 85025 COMPLETE CBC W/AUTO DIFF WBC: CPT

## 2020-05-10 PROCEDURE — 94002 VENT MGMT INPAT INIT DAY: CPT

## 2020-05-10 PROCEDURE — 2700000000 HC OXYGEN THERAPY PER DAY

## 2020-05-10 PROCEDURE — 0100U HC RESPIRPTHGN MULT REV TRANS & AMP PRB TECH 21 TRGT: CPT

## 2020-05-10 PROCEDURE — 82728 ASSAY OF FERRITIN: CPT

## 2020-05-10 RX ORDER — SODIUM CHLORIDE 0.9 % (FLUSH) 0.9 %
10 SYRINGE (ML) INJECTION PRN
Status: DISCONTINUED | OUTPATIENT
Start: 2020-05-10 | End: 2020-05-15 | Stop reason: HOSPADM

## 2020-05-10 RX ORDER — ACETAMINOPHEN 650 MG/1
650 SUPPOSITORY RECTAL EVERY 6 HOURS PRN
Status: DISCONTINUED | OUTPATIENT
Start: 2020-05-10 | End: 2020-05-15 | Stop reason: HOSPADM

## 2020-05-10 RX ORDER — LEVOTHYROXINE SODIUM 0.1 MG/1
200 TABLET ORAL DAILY
Status: DISCONTINUED | OUTPATIENT
Start: 2020-05-11 | End: 2020-05-15 | Stop reason: HOSPADM

## 2020-05-10 RX ORDER — SODIUM CHLORIDE 0.9 % (FLUSH) 0.9 %
10 SYRINGE (ML) INJECTION EVERY 12 HOURS SCHEDULED
Status: DISCONTINUED | OUTPATIENT
Start: 2020-05-10 | End: 2020-05-15 | Stop reason: HOSPADM

## 2020-05-10 RX ORDER — ACETAMINOPHEN 325 MG/1
650 TABLET ORAL EVERY 6 HOURS PRN
Status: DISCONTINUED | OUTPATIENT
Start: 2020-05-10 | End: 2020-05-15 | Stop reason: HOSPADM

## 2020-05-10 RX ORDER — ONDANSETRON 2 MG/ML
4 INJECTION INTRAMUSCULAR; INTRAVENOUS EVERY 6 HOURS PRN
Status: DISCONTINUED | OUTPATIENT
Start: 2020-05-10 | End: 2020-05-15 | Stop reason: HOSPADM

## 2020-05-10 RX ORDER — DOCUSATE SODIUM 100 MG/1
100 CAPSULE, LIQUID FILLED ORAL DAILY PRN
Status: DISCONTINUED | OUTPATIENT
Start: 2020-05-10 | End: 2020-05-15 | Stop reason: HOSPADM

## 2020-05-10 RX ORDER — BUMETANIDE 1 MG/1
1 TABLET ORAL 2 TIMES DAILY
Status: DISCONTINUED | OUTPATIENT
Start: 2020-05-10 | End: 2020-05-11

## 2020-05-10 RX ORDER — IPRATROPIUM BROMIDE AND ALBUTEROL SULFATE 2.5; .5 MG/3ML; MG/3ML
1 SOLUTION RESPIRATORY (INHALATION) 3 TIMES DAILY
Status: DISCONTINUED | OUTPATIENT
Start: 2020-05-10 | End: 2020-05-11

## 2020-05-10 RX ORDER — HYDROXYCHLOROQUINE SULFATE 200 MG/1
400 TABLET, FILM COATED ORAL 2 TIMES DAILY
Status: COMPLETED | OUTPATIENT
Start: 2020-05-10 | End: 2020-05-11

## 2020-05-10 RX ORDER — PROMETHAZINE HYDROCHLORIDE 25 MG/1
12.5 TABLET ORAL EVERY 6 HOURS PRN
Status: DISCONTINUED | OUTPATIENT
Start: 2020-05-10 | End: 2020-05-15 | Stop reason: HOSPADM

## 2020-05-10 RX ORDER — HYDROXYCHLOROQUINE SULFATE 200 MG/1
200 TABLET, FILM COATED ORAL 2 TIMES DAILY
Status: COMPLETED | OUTPATIENT
Start: 2020-05-11 | End: 2020-05-15

## 2020-05-10 RX ORDER — POLYETHYLENE GLYCOL 3350 17 G/17G
17 POWDER, FOR SOLUTION ORAL DAILY PRN
Status: DISCONTINUED | OUTPATIENT
Start: 2020-05-10 | End: 2020-05-15 | Stop reason: HOSPADM

## 2020-05-10 RX ORDER — CARBAMAZEPINE 200 MG/1
200 TABLET ORAL 2 TIMES DAILY
Status: DISCONTINUED | OUTPATIENT
Start: 2020-05-10 | End: 2020-05-11

## 2020-05-10 ASSESSMENT — ENCOUNTER SYMPTOMS
SHORTNESS OF BREATH: 1
ABDOMINAL PAIN: 0
SORE THROAT: 0
TROUBLE SWALLOWING: 0
DIARRHEA: 0
BACK PAIN: 0
BLOOD IN STOOL: 0
VOMITING: 0
CONSTIPATION: 0
COUGH: 1
NAUSEA: 0
COLOR CHANGE: 0

## 2020-05-10 ASSESSMENT — PAIN SCALES - GENERAL
PAINLEVEL_OUTOF10: 3
PAINLEVEL_OUTOF10: 8

## 2020-05-10 ASSESSMENT — PULMONARY FUNCTION TESTS: PIF_VALUE: 34

## 2020-05-10 NOTE — ED PROVIDER NOTES
tenderness. Musculoskeletal:         General: No tenderness. Lymphadenopathy:      Cervical: No cervical adenopathy. Skin:     General: Skin is warm and dry. Findings: No rash. Neurological:      Mental Status: She is alert and oriented to person, place, and time. Psychiatric:         Judgment: Judgment normal.           DIFFERENTIAL DIAGNOSIS/MDM:   28-year-old female morbidly obese and trach dependent presents with increased shortness of breath and cough. She is afebrile here, nontoxic, mild tachypnea but otherwise vital signs normal.  She is 100% on room air however has her trach. She is actively coughing when I am in the room. We will get blood work, EKG, chest x-ray. We will do rapid test were COVID-19. She did have a negative COVID-19 test at the end of March. Other differential includes pneumonia, pulmonary edema, viral syndrome,    DIAGNOSTIC RESULTS     EKG: All EKG's are interpreted by the Emergency Department Physician who either signs or Co-signs this chart in the absence of a cardiologist.        RADIOLOGY:   I directly visualized the following  images and reviewed the radiologist interpretations:  XR CHEST PORTABLE   Final Result   Diffuse pulmonary opacities again noted.   Differential considerations would   include diffuse pneumonia, or potentially metastatic disease                 ED BEDSIDE ULTRASOUND:      LABS:  Labs Reviewed   CBC WITH AUTO DIFFERENTIAL - Abnormal; Notable for the following components:       Result Value    RBC 3.47 (*)     Hemoglobin 10.3 (*)     Hematocrit 33.1 (*)     RDW 15.7 (*)     Monocytes 10 (*)     All other components within normal limits   COMPREHENSIVE METABOLIC PANEL - Abnormal; Notable for the following components:    BUN 30 (*)     CREATININE 1.20 (*)     CO2 32 (*)     ALT 62 (*)     AST 91 (*)     Total Bilirubin 0.16 (*)     GFR Non- 48 (*)     GFR  58 (*)     All other components within normal limits LACTATE DEHYDROGENASE - Abnormal; Notable for the following components:     (*)     All other components within normal limits   TROPONIN - Abnormal; Notable for the following components:    Troponin, High Sensitivity 20 (*)     All other components within normal limits   COVID-19 - Abnormal; Notable for the following components:    SARS-CoV-2, Rapid DETECTED (*)     All other components within normal limits   BLOOD GAS, VENOUS - Abnormal; Notable for the following components:    pCO2, Arthur 66.3 (*)     pO2, Arthur 57.2 (*)     HCO3, Venous 35.4 (*)     Positive Base Excess, Arthur 9.6 (*)     O2 Sat, Arthur 86.7 (*)     All other components within normal limits   BRAIN NATRIURETIC PEPTIDE   TROPONIN         EMERGENCY DEPARTMENT COURSE:   Vitals:    Vitals:    05/10/20 1830 05/10/20 1845 05/10/20 1900 05/10/20 1915   BP: (!) 46/33 119/77  124/74   Pulse: 98 105 99    Resp: 22 15 28    Temp:       TempSrc:       SpO2: 100%  100%    Weight:       Height:         7:08 PM EDT  Patient positive for COVID-19 infection. Patient has not lymphopenic. She does have an elevation of her LDH. Chest x-ray shows pulmonary opacities similar to prior. [facility] due to positive COVID infection. 7:35 PM EDT  I spoke with intensivist at [facility] Dr. Destiny Murrieta who accepted patient to [facility] unit for admission due to increased work of breathing. CRITICALCARE:      CONSULTS:  None      PROCEDURES:      FINAL IMPRESSION      1. COVID-19 virus detected    2. LILIAN (acute kidney injury) [facility])            DISPOSITION/PLAN   DISPOSITION Decision To Transfer 05/10/2020 06:47:16 PM          PATIENT REFERRED TO:  No follow-up provider specified.     DISCHARGE MEDICATIONS:  New Prescriptions    No medications on file       (Please note that portions ofthis note were completed with a voice recognition program.  Efforts were made to edit the dictations but occasionally words are mis-transcribed.)    Keri Espino

## 2020-05-11 ENCOUNTER — ANESTHESIA (OUTPATIENT)
Dept: ICU | Age: 50
DRG: 720 | End: 2020-05-11
Payer: MEDICAID

## 2020-05-11 ENCOUNTER — ANESTHESIA EVENT (OUTPATIENT)
Dept: ICU | Age: 50
DRG: 720 | End: 2020-05-11
Payer: MEDICAID

## 2020-05-11 ENCOUNTER — APPOINTMENT (OUTPATIENT)
Dept: GENERAL RADIOLOGY | Age: 50
DRG: 720 | End: 2020-05-11
Attending: INTERNAL MEDICINE
Payer: MEDICAID

## 2020-05-11 LAB
ABSOLUTE EOS #: <0.03 K/UL (ref 0–0.44)
ABSOLUTE IMMATURE GRANULOCYTE: 0.04 K/UL (ref 0–0.3)
ABSOLUTE LYMPH #: 1.32 K/UL (ref 1.1–3.7)
ABSOLUTE MONO #: 0.41 K/UL (ref 0.1–1.2)
ADENOVIRUS PCR: NOT DETECTED
ALLEN TEST: ABNORMAL
ALLEN TEST: ABNORMAL
ANION GAP SERPL CALCULATED.3IONS-SCNC: 12 MMOL/L (ref 9–17)
BASOPHILS # BLD: 1 % (ref 0–2)
BASOPHILS ABSOLUTE: <0.03 K/UL (ref 0–0.2)
BORDETELLA PARAPERTUSSIS: NOT DETECTED
BORDETELLA PERTUSSIS PCR: NOT DETECTED
BUN BLDV-MCNC: 29 MG/DL (ref 6–20)
BUN/CREAT BLD: ABNORMAL (ref 9–20)
C-REACTIVE PROTEIN: 22.5 MG/L (ref 0–5)
CALCIUM IONIZED: 1.17 MMOL/L (ref 1.13–1.33)
CALCIUM SERPL-MCNC: 9 MG/DL (ref 8.6–10.4)
CHLAMYDIA PNEUMONIAE BY PCR: NOT DETECTED
CHLORIDE BLD-SCNC: 102 MMOL/L (ref 98–107)
CO2: 31 MMOL/L (ref 20–31)
CORONAVIRUS 229E PCR: NOT DETECTED
CORONAVIRUS HKU1 PCR: NOT DETECTED
CORONAVIRUS NL63 PCR: NOT DETECTED
CORONAVIRUS OC43 PCR: NOT DETECTED
CREAT SERPL-MCNC: 1.1 MG/DL (ref 0.5–0.9)
D-DIMER QUANTITATIVE: 1.65 MG/L FEU
DIFFERENTIAL TYPE: ABNORMAL
EKG ATRIAL RATE: 100 BPM
EKG P AXIS: 35 DEGREES
EKG P-R INTERVAL: 182 MS
EKG Q-T INTERVAL: 350 MS
EKG QRS DURATION: 86 MS
EKG QTC CALCULATION (BAZETT): 451 MS
EKG R AXIS: 98 DEGREES
EKG T AXIS: 41 DEGREES
EKG VENTRICULAR RATE: 100 BPM
EOSINOPHILS RELATIVE PERCENT: 0 % (ref 1–4)
FERRITIN: 292 UG/L (ref 13–150)
FIO2: 40
FIO2: 50
GFR AFRICAN AMERICAN: >60 ML/MIN
GFR NON-AFRICAN AMERICAN: 53 ML/MIN
GFR SERPL CREATININE-BSD FRML MDRD: ABNORMAL ML/MIN/{1.73_M2}
GFR SERPL CREATININE-BSD FRML MDRD: ABNORMAL ML/MIN/{1.73_M2}
GLUCOSE BLD-MCNC: 102 MG/DL (ref 74–100)
GLUCOSE BLD-MCNC: 130 MG/DL (ref 70–99)
HCT VFR BLD CALC: 33.5 % (ref 36.3–47.1)
HEMOGLOBIN: 9.7 G/DL (ref 11.9–15.1)
HUMAN METAPNEUMOVIRUS PCR: NOT DETECTED
IMMATURE GRANULOCYTES: 1 %
INFLUENZA A BY PCR: NOT DETECTED
INFLUENZA A H1 (2009) PCR: NORMAL
INFLUENZA A H1 PCR: NORMAL
INFLUENZA A H3 PCR: NORMAL
INFLUENZA B BY PCR: NOT DETECTED
INR BLD: 1
LACTIC ACID, WHOLE BLOOD: 1.2 MMOL/L (ref 0.7–2.1)
LEGIONELLA PNEUMOPHILIA AG, URINE: NEGATIVE
LYMPHOCYTES # BLD: 32 % (ref 24–43)
MAGNESIUM: 1.7 MG/DL (ref 1.6–2.6)
MCH RBC QN AUTO: 29.7 PG (ref 25.2–33.5)
MCHC RBC AUTO-ENTMCNC: 29 G/DL (ref 28.4–34.8)
MCV RBC AUTO: 102.4 FL (ref 82.6–102.9)
MODE: ABNORMAL
MODE: ABNORMAL
MONOCYTES # BLD: 10 % (ref 3–12)
MYCOPLASMA PNEUMONIAE IGM: 3.1
MYCOPLASMA PNEUMONIAE PCR: NOT DETECTED
MYOGLOBIN: 49 NG/ML (ref 25–58)
NEGATIVE BASE EXCESS, ART: ABNORMAL (ref 0–2)
NEGATIVE BASE EXCESS, ART: ABNORMAL (ref 0–2)
NRBC AUTOMATED: 0 PER 100 WBC
O2 DEVICE/FLOW/%: ABNORMAL
O2 DEVICE/FLOW/%: ABNORMAL
PARAINFLUENZA 1 PCR: NOT DETECTED
PARAINFLUENZA 2 PCR: NOT DETECTED
PARAINFLUENZA 3 PCR: NOT DETECTED
PARAINFLUENZA 4 PCR: NOT DETECTED
PATIENT TEMP: ABNORMAL
PATIENT TEMP: ABNORMAL
PDW BLD-RTO: 14.6 % (ref 11.8–14.4)
PLATELET # BLD: 179 K/UL (ref 138–453)
PLATELET ESTIMATE: ABNORMAL
PMV BLD AUTO: 10.3 FL (ref 8.1–13.5)
POC HCO3: 33.4 MMOL/L (ref 21–28)
POC HCO3: 36.4 MMOL/L (ref 21–28)
POC O2 SATURATION: 98 % (ref 94–98)
POC O2 SATURATION: 99 % (ref 94–98)
POC PCO2 TEMP: ABNORMAL MM HG
POC PCO2 TEMP: ABNORMAL MM HG
POC PCO2: 52.4 MM HG (ref 35–48)
POC PCO2: 74.9 MM HG (ref 35–48)
POC PH TEMP: ABNORMAL
POC PH TEMP: ABNORMAL
POC PH: 7.29 (ref 7.35–7.45)
POC PH: 7.41 (ref 7.35–7.45)
POC PO2 TEMP: ABNORMAL MM HG
POC PO2 TEMP: ABNORMAL MM HG
POC PO2: 116.6 MM HG (ref 83–108)
POC PO2: 127.1 MM HG (ref 83–108)
POSITIVE BASE EXCESS, ART: 8 (ref 0–3)
POSITIVE BASE EXCESS, ART: 8 (ref 0–3)
POTASSIUM SERPL-SCNC: 4.8 MMOL/L (ref 3.7–5.3)
PROTHROMBIN TIME: 10 SEC (ref 9–12)
RBC # BLD: 3.27 M/UL (ref 3.95–5.11)
RBC # BLD: ABNORMAL 10*6/UL
RESP SYNCYTIAL VIRUS PCR: NOT DETECTED
RHINO/ENTEROVIRUS PCR: NOT DETECTED
SAMPLE SITE: ABNORMAL
SAMPLE SITE: ABNORMAL
SEG NEUTROPHILS: 56 % (ref 36–65)
SEGMENTED NEUTROPHILS ABSOLUTE COUNT: 2.38 K/UL (ref 1.5–8.1)
SODIUM BLD-SCNC: 145 MMOL/L (ref 135–144)
SOURCE: NORMAL
SPECIMEN DESCRIPTION: NORMAL
STREP PNEUMONIAE ANTIGEN: NEGATIVE
TCO2 (CALC), ART: 35 MMOL/L (ref 22–29)
TCO2 (CALC), ART: 39 MMOL/L (ref 22–29)
WBC # BLD: 4.2 K/UL (ref 3.5–11.3)
WBC # BLD: ABNORMAL 10*3/UL

## 2020-05-11 PROCEDURE — 51702 INSERT TEMP BLADDER CATH: CPT

## 2020-05-11 PROCEDURE — 2580000003 HC RX 258: Performed by: STUDENT IN AN ORGANIZED HEALTH CARE EDUCATION/TRAINING PROGRAM

## 2020-05-11 PROCEDURE — 82330 ASSAY OF CALCIUM: CPT

## 2020-05-11 PROCEDURE — 2000000000 HC ICU R&B

## 2020-05-11 PROCEDURE — 87040 BLOOD CULTURE FOR BACTERIA: CPT

## 2020-05-11 PROCEDURE — 94761 N-INVAS EAR/PLS OXIMETRY MLT: CPT

## 2020-05-11 PROCEDURE — 36620 INSERTION CATHETER ARTERY: CPT

## 2020-05-11 PROCEDURE — 80048 BASIC METABOLIC PNL TOTAL CA: CPT

## 2020-05-11 PROCEDURE — 82947 ASSAY GLUCOSE BLOOD QUANT: CPT

## 2020-05-11 PROCEDURE — 82803 BLOOD GASES ANY COMBINATION: CPT

## 2020-05-11 PROCEDURE — 71045 X-RAY EXAM CHEST 1 VIEW: CPT

## 2020-05-11 PROCEDURE — 87070 CULTURE OTHR SPECIMN AEROBIC: CPT

## 2020-05-11 PROCEDURE — 2500000003 HC RX 250 WO HCPCS: Performed by: STUDENT IN AN ORGANIZED HEALTH CARE EDUCATION/TRAINING PROGRAM

## 2020-05-11 PROCEDURE — 94640 AIRWAY INHALATION TREATMENT: CPT

## 2020-05-11 PROCEDURE — 93005 ELECTROCARDIOGRAM TRACING: CPT | Performed by: STUDENT IN AN ORGANIZED HEALTH CARE EDUCATION/TRAINING PROGRAM

## 2020-05-11 PROCEDURE — 6370000000 HC RX 637 (ALT 250 FOR IP): Performed by: STUDENT IN AN ORGANIZED HEALTH CARE EDUCATION/TRAINING PROGRAM

## 2020-05-11 PROCEDURE — 94003 VENT MGMT INPAT SUBQ DAY: CPT

## 2020-05-11 PROCEDURE — 83874 ASSAY OF MYOGLOBIN: CPT

## 2020-05-11 PROCEDURE — 86738 MYCOPLASMA ANTIBODY: CPT

## 2020-05-11 PROCEDURE — 37799 UNLISTED PX VASCULAR SURGERY: CPT

## 2020-05-11 PROCEDURE — 87899 AGENT NOS ASSAY W/OPTIC: CPT

## 2020-05-11 PROCEDURE — 83605 ASSAY OF LACTIC ACID: CPT

## 2020-05-11 PROCEDURE — 36556 INSERT NON-TUNNEL CV CATH: CPT

## 2020-05-11 PROCEDURE — 99254 IP/OBS CNSLTJ NEW/EST MOD 60: CPT | Performed by: INTERNAL MEDICINE

## 2020-05-11 PROCEDURE — 82728 ASSAY OF FERRITIN: CPT

## 2020-05-11 PROCEDURE — 36620 INSERTION CATHETER ARTERY: CPT | Performed by: NURSE ANESTHETIST, CERTIFIED REGISTERED

## 2020-05-11 PROCEDURE — 6360000002 HC RX W HCPCS: Performed by: STUDENT IN AN ORGANIZED HEALTH CARE EDUCATION/TRAINING PROGRAM

## 2020-05-11 PROCEDURE — 94770 HC ETCO2 MONITOR DAILY: CPT

## 2020-05-11 PROCEDURE — 85025 COMPLETE CBC W/AUTO DIFF WBC: CPT

## 2020-05-11 PROCEDURE — 93010 ELECTROCARDIOGRAM REPORT: CPT | Performed by: INTERNAL MEDICINE

## 2020-05-11 PROCEDURE — 87205 SMEAR GRAM STAIN: CPT

## 2020-05-11 PROCEDURE — 85379 FIBRIN DEGRADATION QUANT: CPT

## 2020-05-11 PROCEDURE — 86140 C-REACTIVE PROTEIN: CPT

## 2020-05-11 PROCEDURE — 5A1945Z RESPIRATORY VENTILATION, 24-96 CONSECUTIVE HOURS: ICD-10-PCS | Performed by: INTERNAL MEDICINE

## 2020-05-11 PROCEDURE — 85610 PROTHROMBIN TIME: CPT

## 2020-05-11 PROCEDURE — 99291 CRITICAL CARE FIRST HOUR: CPT | Performed by: INTERNAL MEDICINE

## 2020-05-11 PROCEDURE — 2700000000 HC OXYGEN THERAPY PER DAY

## 2020-05-11 PROCEDURE — 87449 NOS EACH ORGANISM AG IA: CPT

## 2020-05-11 PROCEDURE — 6370000000 HC RX 637 (ALT 250 FOR IP): Performed by: INTERNAL MEDICINE

## 2020-05-11 PROCEDURE — 83735 ASSAY OF MAGNESIUM: CPT

## 2020-05-11 PROCEDURE — 36556 INSERT NON-TUNNEL CV CATH: CPT | Performed by: SURGERY

## 2020-05-11 RX ORDER — METHYLPREDNISOLONE SODIUM SUCCINATE 40 MG/ML
40 INJECTION, POWDER, LYOPHILIZED, FOR SOLUTION INTRAMUSCULAR; INTRAVENOUS EVERY 12 HOURS
Status: DISCONTINUED | OUTPATIENT
Start: 2020-05-11 | End: 2020-05-12

## 2020-05-11 RX ORDER — DEXMEDETOMIDINE HYDROCHLORIDE 4 UG/ML
0.2 INJECTION, SOLUTION INTRAVENOUS CONTINUOUS
Status: DISCONTINUED | OUTPATIENT
Start: 2020-05-11 | End: 2020-05-15 | Stop reason: HOSPADM

## 2020-05-11 RX ORDER — IPRATROPIUM BROMIDE AND ALBUTEROL SULFATE 2.5; .5 MG/3ML; MG/3ML
1 SOLUTION RESPIRATORY (INHALATION) 4 TIMES DAILY
Status: DISCONTINUED | OUTPATIENT
Start: 2020-05-11 | End: 2020-05-11

## 2020-05-11 RX ORDER — KETAMINE HYDROCHLORIDE 100 MG/ML
1 INJECTION, SOLUTION INTRAMUSCULAR; INTRAVENOUS ONCE
Status: COMPLETED | OUTPATIENT
Start: 2020-05-11 | End: 2020-05-11

## 2020-05-11 RX ORDER — KETAMINE HYDROCHLORIDE 100 MG/ML
0.5 INJECTION, SOLUTION INTRAMUSCULAR; INTRAVENOUS ONCE
Status: DISCONTINUED | OUTPATIENT
Start: 2020-05-11 | End: 2020-05-15 | Stop reason: HOSPADM

## 2020-05-11 RX ORDER — MAGNESIUM SULFATE 1 G/100ML
1 INJECTION INTRAVENOUS ONCE
Status: COMPLETED | OUTPATIENT
Start: 2020-05-11 | End: 2020-05-11

## 2020-05-11 RX ORDER — IPRATROPIUM BROMIDE AND ALBUTEROL SULFATE 2.5; .5 MG/3ML; MG/3ML
1 SOLUTION RESPIRATORY (INHALATION) EVERY 6 HOURS
Status: DISCONTINUED | OUTPATIENT
Start: 2020-05-11 | End: 2020-05-15 | Stop reason: HOSPADM

## 2020-05-11 RX ORDER — BUMETANIDE 1 MG/1
1 TABLET ORAL DAILY
Status: DISCONTINUED | OUTPATIENT
Start: 2020-05-12 | End: 2020-05-15 | Stop reason: HOSPADM

## 2020-05-11 RX ADMIN — KETAMINE HYDROCHLORIDE 280 MG: 100 INJECTION, SOLUTION, CONCENTRATE INTRAMUSCULAR; INTRAVENOUS at 02:46

## 2020-05-11 RX ADMIN — LEVOTHYROXINE SODIUM 200 MCG: 100 TABLET ORAL at 10:08

## 2020-05-11 RX ADMIN — AZITHROMYCIN MONOHYDRATE 500 MG: 500 INJECTION, POWDER, LYOPHILIZED, FOR SOLUTION INTRAVENOUS at 09:00

## 2020-05-11 RX ADMIN — ENOXAPARIN SODIUM 30 MG: 100 INJECTION SUBCUTANEOUS at 10:08

## 2020-05-11 RX ADMIN — ENOXAPARIN SODIUM 30 MG: 100 INJECTION SUBCUTANEOUS at 03:57

## 2020-05-11 RX ADMIN — MAGNESIUM SULFATE HEPTAHYDRATE 1 G: 1 INJECTION, SOLUTION INTRAVENOUS at 05:02

## 2020-05-11 RX ADMIN — DEXTROSE AND SODIUM CHLORIDE: 5; 900 INJECTION, SOLUTION INTRAVENOUS at 04:02

## 2020-05-11 RX ADMIN — SODIUM CHLORIDE, PRESERVATIVE FREE 10 ML: 5 INJECTION INTRAVENOUS at 04:00

## 2020-05-11 RX ADMIN — HYDROXYCHLOROQUINE SULFATE 400 MG: 200 TABLET ORAL at 04:34

## 2020-05-11 RX ADMIN — IPRATROPIUM BROMIDE AND ALBUTEROL SULFATE 1 AMPULE: .5; 3 SOLUTION RESPIRATORY (INHALATION) at 08:55

## 2020-05-11 RX ADMIN — Medication 10 MG/HR: at 03:48

## 2020-05-11 RX ADMIN — CEFEPIME HYDROCHLORIDE 2 G: 2 INJECTION, POWDER, FOR SOLUTION INTRAVENOUS at 13:22

## 2020-05-11 RX ADMIN — SODIUM CHLORIDE, PRESERVATIVE FREE 10 ML: 5 INJECTION INTRAVENOUS at 10:09

## 2020-05-11 RX ADMIN — CEFEPIME HYDROCHLORIDE 2 G: 2 INJECTION, POWDER, FOR SOLUTION INTRAVENOUS at 04:05

## 2020-05-11 RX ADMIN — METHYLPREDNISOLONE SODIUM SUCCINATE 40 MG: 40 INJECTION, POWDER, FOR SOLUTION INTRAMUSCULAR; INTRAVENOUS at 13:22

## 2020-05-11 RX ADMIN — SODIUM CHLORIDE, PRESERVATIVE FREE 10 ML: 5 INJECTION INTRAVENOUS at 20:08

## 2020-05-11 RX ADMIN — HYDROXYCHLOROQUINE SULFATE 200 MG: 200 TABLET, FILM COATED ORAL at 21:26

## 2020-05-11 RX ADMIN — HYDROXYCHLOROQUINE SULFATE 400 MG: 200 TABLET ORAL at 10:08

## 2020-05-11 RX ADMIN — IPRATROPIUM BROMIDE AND ALBUTEROL SULFATE 1 AMPULE: .5; 3 SOLUTION RESPIRATORY (INHALATION) at 14:30

## 2020-05-11 RX ADMIN — DEXMEDETOMIDINE HYDROCHLORIDE 0.2 MCG/KG/HR: 400 INJECTION INTRAVENOUS at 11:46

## 2020-05-11 RX ADMIN — IPRATROPIUM BROMIDE AND ALBUTEROL SULFATE 1 AMPULE: .5; 3 SOLUTION RESPIRATORY (INHALATION) at 20:29

## 2020-05-11 RX ADMIN — ONDANSETRON 4 MG: 2 INJECTION, SOLUTION INTRAMUSCULAR; INTRAVENOUS at 03:30

## 2020-05-11 ASSESSMENT — PULMONARY FUNCTION TESTS
PIF_VALUE: 36
PIF_VALUE: 28
PIF_VALUE: 37

## 2020-05-11 ASSESSMENT — PAIN SCALES - GENERAL: PAINLEVEL_OUTOF10: 0

## 2020-05-11 NOTE — H&P
Critical Care - History and Physical Examination    Patient's name:  Tangela Campos Record Number: [id number]  Patient's account/billing number:   Patient's YOB: 1970  Age: 54 y.o. Date of Admission: No admission date for patient encounter. Date of History and Physical Examination: 5/10/2020      Primary Care Physician: Jett Medeiros DO  Attending Physician:    Code Status: Prior    Chief complaint:  COVID-19 positive test (U07.1, COVID-19) with Acute Pneumonia (J12.89, Other viral pneumonia)  Shortness of breath    HISTORY OF PRESENT ILLNESS:        Miguel Crockett is a 54 y.o. female  presented initially at [facility] with increasing shortness of breath since last 2 weeks. Patient was recently discharged 2 weeks back from hospital after she was admitted to the hospital for suspected [place] was negative but she found to have bilateral infiltrates and mild mycoplasma pneumonia she was treated with antibiotics, improved and discharged to living facility,     Patient is morbidly obese, has a history of chronic trach, trach dependent because of chronic hypoxic hypercapnic respiratory failure, RU, and using nocturnal ventilator, she is living at [facility], a weight loss facility. At [facility] test was positive,   Chest x-ray showed diffuse pulmonary opacities, when compared with previous chest x-ray 2 weeks back, aeration looks improved but the opacities persist,  VBG is at [facility] showed pH 7.33/60 6/50 7/35.4, proBNP 240, , creatinine 1.2, that is slightly higher than her baseline. Patient was placed on mechanical ventilation and was admitted to [place] [address]. Currently patient is sedated Versed, hemodynamically low stable with systolic blood pressure 600X,. Saturation 100%. Patient received ceftriaxone on last visit and tolerated although the allergy listed shortness of breath with penicillins.        Past Medical History:        Diagnosis EC tablet Take 5 mg by mouth 2 times daily as needed for Constipation    Historical Provider, MD   docusate sodium (COLACE) 100 MG capsule Take 100 mg by mouth daily as needed for Constipation    Historical Provider, MD   loratadine (CLARITIN) 10 MG tablet Take 10 mg by mouth daily    Historical Provider, MD   magnesium hydroxide (MILK OF MAGNESIA) 400 MG/5ML suspension Take 30 mLs by mouth daily as needed for Constipation    Historical Provider, MD   Camphor-Eucalyptus-Menthol (VICKS VAPORUB) 4.7-1.2-2.6 % OINT Apply topically every 6 hours as needed (congestion)    Historical Provider, MD   benzonatate (TESSALON) 100 MG capsule Take 100 mg by mouth 2 times daily as needed for Cough    Historical Provider, MD   acetaminophen (TYLENOL) 325 MG tablet Take 650 mg by mouth every 6 hours as needed for Pain    Historical Provider, MD   ipratropium-albuterol (DUONEB) 0.5-2.5 (3) MG/3ML SOLN nebulizer solution Inhale 1 vial into the lungs every 4 hours as needed for Shortness of Breath    Historical Provider, MD   ferrous sulfate 325 (65 Fe) MG tablet Take 325 mg by mouth 3 times daily (with meals)    Historical Provider, MD   Multiple Vitamins-Minerals (THERAPEUTIC MULTIVITAMIN-MINERALS) tablet Take 1 tablet by mouth daily (with breakfast)    Historical Provider, MD       Social History:   TOBACCO:   reports that she has never smoked. She has never used smokeless tobacco.  ETOH:   reports no history of alcohol use. DRUGS:  reports no history of drug use. OCCUPATION:      Family History:       Problem Relation Age of Onset    Breast Cancer Paternal Aunt     Breast Cancer Paternal Cousin 43    Breast Cancer Paternal Cousin 37    Breast Cancer Paternal Cousin 46    Diabetes Mother     Thyroid Disease Father            REVIEW OF SYSTEMS (ROS):  Chronic tracheostomy tube, mechanical ventilation and sedated       Physical Exam:    Vitals: There were no vitals taken for this visit.     Last Body weight:   Wt Readings from Last 3 Encounters:   05/10/20 (!) 622 lb (282.1 kg)   03/30/20 (!) 677 lb 14.6 oz (307.5 kg)   03/25/20 (!) 625 lb (283.5 kg)       Body Mass Index : There is no height or weight on file to calculate BMI. PHYSICAL EXAMINATION :  · General appearance: awake, alert, cooperative chronic tracheostomy, mechanical ventilation and sedated with Versed, morbidly obese,   · HEENT: Atraumatic, normocephalic, neck supple, normal EOM, thyroid normal, no lymphadenopathy   · Lungs: diminished bilateral lung sounds and crepitation  · Heart: regular rate and rhythm, S1, S2 normal, no murmur  · Abdomen: soft, non-tender; bowel sounds normal; no masses,  no organomegaly  · Extremities: No cyanosis or edema  · Neurological: Able to follow commands, moving all 4 extremities, sedated l  · Psych-sedated      Laboratory findings:-    CBC:   Recent Labs     05/10/20  1730   WBC 3.9   HGB 10.3*        BMP:    Recent Labs     05/10/20  1730      K 4.8   CL 98   CO2 32*   BUN 30*   CREATININE 1.20*   GLUCOSE 99     S. Calcium:   Recent Labs     05/10/20  1730   CALCIUM 9.2     S. Ionized Calcium:No results for input(s): IONCA in the last 72 hours. S. Magnesium:No results for input(s): MG in the last 72 hours. S. Phosphorus:No results for input(s): PHOS in the last 72 hours. S. Glucose:No results for input(s): POCGLU in the last 72 hours. Glycosylated hemoglobin A1C: No results for input(s): LABA1C in the last 72 hours. INR: No results for input(s): INR in the last 72 hours. Hepatic functions:   Recent Labs     05/10/20  1730   ALKPHOS 91   ALT 62*   AST 91*   PROT 8.0   BILITOT 0.16*   LABALBU 3.5     Pancreatic functions:No results for input(s): LACTA, AMYLASE in the last 72 hours. S. Lactic Acid: No results for input(s): LACTA in the last 72 hours. Cardiac enzymes:No results for input(s): CKTOTAL, CKMB, CKMBINDEX, TROPONINI in the last 72 hours. BNP:No results for input(s): BNP in the last 72 hours.   Lipid profile: No results for input(s): CHOL, TRIG, HDL, LDLCALC in the last 72 hours.     Invalid input(s): LDL  Blood Gases: No results found for: PH, PCO2, PO2, HCO3, O2SAT  Thyroid functions:   Lab Results   Component Value Date    TSH 4.44 03/28/2020      Urinalysis:     Microbiology:    Cultures during this admission:     Blood cultures:                 [] None drawn      [] Negative             []  Positive (Details:  )  Urine Culture:                   [] None drawn      [] Negative             []  Positive (Details:  )  Sputum Culture:               [] None drawn       [] Negative             []  Positive (Details:  )   Endotracheal aspirate:     [] None drawn       [] Negative             []  Positive (Details:  )     Assessment and Plan       Patient Active Problem List   Diagnosis    Hypothyroidism    Acute respiratory failure with hypoxia and hypercapnia (HCC)    Class 3 obesity due to excess calories with serious comorbidity and body mass index (BMI) greater than or equal to 70 in adult    Acute congestive heart failure (HCC)    Chronic respiratory failure ([address]    Tracheostomy dependent ([address]    Lymphedema    Left leg cellulitis    Bandemia    Multifocal pneumonia    Mycoplasma pneumonia    RU (obstructive sleep apnea)    Acute respiratory distress    Pulmonary hypertension (HCC)    Macrocytic anemia    Subluxation of right shoulder joint    Obesity hypoventilation syndrome ([address]    COVID-19         Additional assessment:    · COVID-19 positive test (U07.1, COVID-19) with Acute Pneumonia (J12.89, Other viral pneumonia)  · Acute on chronic hypoxic hypercapnic respiratory failure  · Morbid obesity  · Chronic tracheostomy tube At LTAC normally on NC during day and CPAP settings at night  · CKD  · Seizure disorder  · Hypothyroidism  -  Plan:    · Start on full support mechanical ventilation, no need of ARDS protocol  · Repeat ABG after 1 hour, and change ventilatory settings accordingly  · Sedation in coordination of care during bedside rounds and discussion of patient care in detail. Mark Abdalla M.D.   Pulmonary and Critical Care Medicine

## 2020-05-11 NOTE — ED NOTES
Report given to Sanna Morse RN at [address] from [facility] [place]. Report method by phone   The following was reviewed with receiving RN:   Current vital signs:  /68   Pulse 97   Temp 97.8 °F (36.6 °C) (Oral)   Resp 25   Ht 5' 5\" (1.651 m)   Wt (!) 622 lb (282.1 kg)   SpO2 100%   .51 kg/m²                MEWS Score: 2     Any medication or safety alerts were reviewed. Any pending diagnostics and notifications were also reviewed, as well as any safety concerns or issues, abnormal labs, abnormal imaging, and abnormal assessment findings. Questions were answered.             Marquis Curt RN  05/10/20 6645

## 2020-05-11 NOTE — PROGRESS NOTES
Nutrition Assessment    Type and Reason for Visit: Initial, Positive Nutrition Screen(Wounds)    Nutrition Recommendations:   - As able, start bolus Tube Feedings of Vital AF 1.2 (semi-elemental) of 250 mL x 4 per day + 2 Proteinex protein modulars. Will provide 1408 kcals and 127 gm protein per day. - Monitor TF/modular tolerance - modify as needed to appropriately meet estimated needs. - Will monitor labs, bowel function, and plan of care. Nutrition Assessment: Pt with trach in place and currently on ventilator. Unable to speak with pt. Admitted with c/o increasing SOB.  +COVID. RN reports pt with areas of excoriation to her abdominal pannus and leg folds present - unsure if pt with wounds to her back. Discussed start of bolus tube feedings. Noted pt chart review, pt reported 40 # weight gain d/t fluid overloaded. Per EHR review previous weight gain of 45# from December 2019 to March 2020 with weight loss since March. Malnutrition Assessment:  · Malnutrition Status: At risk for malnutrition  · Context: Acute illness or injury  · Findings of the 6 clinical characteristics of malnutrition (Minimum of 2 out of 6 clinical characteristics is required to make the diagnosis of moderate or severe Protein Calorie Malnutrition based on AND/ASPEN Guidelines):  1. Energy Intake-Unable to assess, Unable to assess    2. Weight Loss-Unable to assess, unable to assess  3. Fat Loss-No significant subcutaneous fat loss,    4. Muscle Loss-No significant muscle mass loss,    5.  Fluid Accumulation-No significant fluid accumulation,      Nutrition Risk Level: High    Nutrient Needs:  · Estimated Daily Total Kcal: 6064-3218 kcals/day  · Estimated Daily Protein (g): 110-145 gm pro/day    Nutrition Diagnosis:   · Problem: Inadequate oral intake  · Etiology: related to Impaired respiratory function-inability to consume food     Signs and symptoms:  as evidenced by NPO status due to medical condition(need for nutrition support - EN)    Objective Information:  · Wound Type: (Areas of excoriation to abdominal pannus and leg folds.)  · Current Nutrition Therapies:  · Oral Diet Orders: NPO   · Anthropometric Measures:  · Ht: 5' 5\" (165.1 cm)   · Current Body Wt: 622 lb (282.1 kg)  · Admission Body Wt: 622 lb (282.1 kg)  · % Weight Change: 45# weight gain (d/t fluid overloaded) from Dec 2019 to March 2020. Per EHR possible weight loss of 55# since March 2020 (may be d/t fluids, ? accuracy)  · Ideal Body Wt: 125 lb (56.7 kg), % Ideal Body 498%  · BMI Classification: BMI > or equal to 40.0 Obese Class III    Nutrition Interventions:   Continue NPO, Start Tube Feeding(Suggest bolus Tube Feedings of Vital AF 1.2 (semi-elemental) of 250 mL x 4 per day + 2 Proteinex protein modulars.)  Continued Inpatient Monitoring, Education Not Indicated    Nutrition Evaluation:   · Evaluation: Goals set   · Goals: Meet % of estimated nutrition needs.     · Monitoring: TF Intake, TF Tolerance, Skin Integrity, I&O, Weight, Pertinent Labs, Monitor Hemodynamic Status, Monitor Bowel Function    Electronically signed by Heike Mao RD, LD on 5/11/20 at 1:56 PM EDT    Contact Number: [phone]

## 2020-05-11 NOTE — PROCEDURES
Procedure Note          PROCEDURE NOTE - CENTRAL VENOUS LINE PLACEMENT    PATIENT NAME: Amee [facility] N RECORD NO. [id number]  DATE: 5/11/2020  SURGEON: Dr. Josias Peng: Fatoumata Cuelalr DO    PREOPERATIVE DIAGNOSIS:  Need for IV access  POSTOPERATIVE DIAGNOSIS:  Same  PROCEDURE PERFORMED:  Left Internal Jugular Vein Central Line Insertion  SURGEON: Fernando Jain PGY 5  ANESTHESIA:  Local utilizing 1% lidocaine, ketamine  ESTIMATED BLOOD LOSS:  Less than 25 ml  COMPLICATIONS:  None immediately appreciated. OPERATIVE NOTE PREPARED BY: Jay Hinojosa     DISCUSSION:  Valencia Ro is a 54y.o.-year-old female who requires additional IV access and central pressure monitoring. The history and physical examination were reviewed and confirmed. The diagnoses, proposed procedure, risks, possible complications, benefits and alternatives were discussed with the patient or family. She was given the opportunity to ask questions, and once answered, informed consent was obtained. The patient was then prepared for the procedure. PROCEDURE:  A timeout was initiated by the bedside nurse and was confirmed by those present. The patient was placed in a supine position. The skin overlying the Left Internal Jugular Vein was prepped with chlorhexadine and draped in sterile fashion. The skin was infiltrated with local anesthetic. Through the anesthetized region, the introducer needle was inserted into the internal jugular vein returning dark red non pulsatile blood. A guidewire was placed through the center of the needle with no resistance. A small incision made in the skin with a #11 scalpel blade. The dilator was inserted into the skin and vein over guidewire using Seldinger technique. The dilator was then removed and the catheter was placed in the vein over the guidewire using Seldinger technique. The guidewire was then removed and all ports aspirated and flushed appropriately.  The catheter then secured using silk suture and a temporary sterile dressing was applied. No immediate complication was evident. All sponge, instrument and needle counts were correct at the completion of the procedure. Postprocedural chest x-ray showed good position of the catheter with no evidence of hemothorax or pneumothorax. The patient tolerated the procedure well with no immediate complication evident. Jay Hinojosa,   5/11/20    I was present for the critical portions of the procedure.     Electronically signed by Kaylene Knapp MD on 5/11/2020

## 2020-05-12 LAB
ABSOLUTE EOS #: <0.03 K/UL (ref 0–0.44)
ABSOLUTE IMMATURE GRANULOCYTE: 0.04 K/UL (ref 0–0.3)
ABSOLUTE LYMPH #: 0.76 K/UL (ref 1.1–3.7)
ABSOLUTE MONO #: 0.19 K/UL (ref 0.1–1.2)
ALLEN TEST: ABNORMAL
ANION GAP SERPL CALCULATED.3IONS-SCNC: 13 MMOL/L (ref 9–17)
BASOPHILS # BLD: 0 % (ref 0–2)
BASOPHILS ABSOLUTE: <0.03 K/UL (ref 0–0.2)
BUN BLDV-MCNC: 26 MG/DL (ref 6–20)
BUN/CREAT BLD: ABNORMAL (ref 9–20)
CALCIUM IONIZED: 1.14 MMOL/L (ref 1.13–1.33)
CALCIUM SERPL-MCNC: 8.8 MG/DL (ref 8.6–10.4)
CHLORIDE BLD-SCNC: 100 MMOL/L (ref 98–107)
CO2: 28 MMOL/L (ref 20–31)
CREAT SERPL-MCNC: 1.06 MG/DL (ref 0.5–0.9)
DIFFERENTIAL TYPE: ABNORMAL
EKG ATRIAL RATE: 98 BPM
EKG P AXIS: 32 DEGREES
EKG P-R INTERVAL: 176 MS
EKG Q-T INTERVAL: 344 MS
EKG QRS DURATION: 92 MS
EKG QTC CALCULATION (BAZETT): 439 MS
EKG R AXIS: 71 DEGREES
EKG T AXIS: 31 DEGREES
EKG VENTRICULAR RATE: 98 BPM
EOSINOPHILS RELATIVE PERCENT: 0 % (ref 1–4)
FIO2: ABNORMAL
GFR AFRICAN AMERICAN: >60 ML/MIN
GFR NON-AFRICAN AMERICAN: 56 ML/MIN
GFR SERPL CREATININE-BSD FRML MDRD: ABNORMAL ML/MIN/{1.73_M2}
GFR SERPL CREATININE-BSD FRML MDRD: ABNORMAL ML/MIN/{1.73_M2}
GLUCOSE BLD-MCNC: 176 MG/DL (ref 70–99)
HCT VFR BLD CALC: 30.6 % (ref 36.3–47.1)
HEMOGLOBIN: 9.1 G/DL (ref 11.9–15.1)
IMMATURE GRANULOCYTES: 1 %
LYMPHOCYTES # BLD: 21 % (ref 24–43)
MAGNESIUM: 1.9 MG/DL (ref 1.6–2.6)
MCH RBC QN AUTO: 30.1 PG (ref 25.2–33.5)
MCHC RBC AUTO-ENTMCNC: 29.7 G/DL (ref 28.4–34.8)
MCV RBC AUTO: 101.3 FL (ref 82.6–102.9)
MODE: ABNORMAL
MONOCYTES # BLD: 5 % (ref 3–12)
MYOGLOBIN: 51 NG/ML (ref 25–58)
NEGATIVE BASE EXCESS, ART: ABNORMAL (ref 0–2)
NRBC AUTOMATED: 0 PER 100 WBC
O2 DEVICE/FLOW/%: ABNORMAL
PATIENT TEMP: ABNORMAL
PDW BLD-RTO: 14.7 % (ref 11.8–14.4)
PLATELET # BLD: 160 K/UL (ref 138–453)
PLATELET ESTIMATE: ABNORMAL
PMV BLD AUTO: 10.4 FL (ref 8.1–13.5)
POC HCO3: 33.2 MMOL/L (ref 21–28)
POC O2 SATURATION: 99 % (ref 94–98)
POC PCO2 TEMP: ABNORMAL MM HG
POC PCO2: 56.2 MM HG (ref 35–48)
POC PH TEMP: ABNORMAL
POC PH: 7.38 (ref 7.35–7.45)
POC PO2 TEMP: ABNORMAL MM HG
POC PO2: 135.6 MM HG (ref 83–108)
POSITIVE BASE EXCESS, ART: 7 (ref 0–3)
POTASSIUM SERPL-SCNC: 4.6 MMOL/L (ref 3.7–5.3)
RBC # BLD: 3.02 M/UL (ref 3.95–5.11)
RBC # BLD: ABNORMAL 10*6/UL
SAMPLE SITE: ABNORMAL
SEG NEUTROPHILS: 72 % (ref 36–65)
SEGMENTED NEUTROPHILS ABSOLUTE COUNT: 2.59 K/UL (ref 1.5–8.1)
SODIUM BLD-SCNC: 141 MMOL/L (ref 135–144)
TCO2 (CALC), ART: 35 MMOL/L (ref 22–29)
WBC # BLD: 3.6 K/UL (ref 3.5–11.3)
WBC # BLD: ABNORMAL 10*3/UL

## 2020-05-12 PROCEDURE — 97530 THERAPEUTIC ACTIVITIES: CPT

## 2020-05-12 PROCEDURE — 2000000000 HC ICU R&B

## 2020-05-12 PROCEDURE — 94761 N-INVAS EAR/PLS OXIMETRY MLT: CPT

## 2020-05-12 PROCEDURE — 97535 SELF CARE MNGMENT TRAINING: CPT

## 2020-05-12 PROCEDURE — 97162 PT EVAL MOD COMPLEX 30 MIN: CPT

## 2020-05-12 PROCEDURE — 80048 BASIC METABOLIC PNL TOTAL CA: CPT

## 2020-05-12 PROCEDURE — 94003 VENT MGMT INPAT SUBQ DAY: CPT

## 2020-05-12 PROCEDURE — 2700000000 HC OXYGEN THERAPY PER DAY

## 2020-05-12 PROCEDURE — 82803 BLOOD GASES ANY COMBINATION: CPT

## 2020-05-12 PROCEDURE — 83874 ASSAY OF MYOGLOBIN: CPT

## 2020-05-12 PROCEDURE — 6370000000 HC RX 637 (ALT 250 FOR IP): Performed by: INTERNAL MEDICINE

## 2020-05-12 PROCEDURE — 2580000003 HC RX 258: Performed by: STUDENT IN AN ORGANIZED HEALTH CARE EDUCATION/TRAINING PROGRAM

## 2020-05-12 PROCEDURE — 94640 AIRWAY INHALATION TREATMENT: CPT

## 2020-05-12 PROCEDURE — 6360000002 HC RX W HCPCS: Performed by: STUDENT IN AN ORGANIZED HEALTH CARE EDUCATION/TRAINING PROGRAM

## 2020-05-12 PROCEDURE — 93010 ELECTROCARDIOGRAM REPORT: CPT | Performed by: INTERNAL MEDICINE

## 2020-05-12 PROCEDURE — 94770 HC ETCO2 MONITOR DAILY: CPT

## 2020-05-12 PROCEDURE — 97166 OT EVAL MOD COMPLEX 45 MIN: CPT

## 2020-05-12 PROCEDURE — 85025 COMPLETE CBC W/AUTO DIFF WBC: CPT

## 2020-05-12 PROCEDURE — 99233 SBSQ HOSP IP/OBS HIGH 50: CPT | Performed by: INTERNAL MEDICINE

## 2020-05-12 PROCEDURE — 37799 UNLISTED PX VASCULAR SURGERY: CPT

## 2020-05-12 PROCEDURE — 82330 ASSAY OF CALCIUM: CPT

## 2020-05-12 PROCEDURE — 99291 CRITICAL CARE FIRST HOUR: CPT | Performed by: INTERNAL MEDICINE

## 2020-05-12 PROCEDURE — 83735 ASSAY OF MAGNESIUM: CPT

## 2020-05-12 PROCEDURE — 6370000000 HC RX 637 (ALT 250 FOR IP): Performed by: STUDENT IN AN ORGANIZED HEALTH CARE EDUCATION/TRAINING PROGRAM

## 2020-05-12 RX ORDER — DOXYCYCLINE HYCLATE 100 MG
100 TABLET ORAL EVERY 12 HOURS SCHEDULED
Status: DISCONTINUED | OUTPATIENT
Start: 2020-05-12 | End: 2020-05-15 | Stop reason: HOSPADM

## 2020-05-12 RX ORDER — METHYLPREDNISOLONE SODIUM SUCCINATE 40 MG/ML
40 INJECTION, POWDER, LYOPHILIZED, FOR SOLUTION INTRAMUSCULAR; INTRAVENOUS DAILY
Status: DISCONTINUED | OUTPATIENT
Start: 2020-05-13 | End: 2020-05-14

## 2020-05-12 RX ORDER — ALBUTEROL SULFATE 90 UG/1
2 AEROSOL, METERED RESPIRATORY (INHALATION) EVERY 6 HOURS PRN
Status: DISCONTINUED | OUTPATIENT
Start: 2020-05-12 | End: 2020-05-15 | Stop reason: HOSPADM

## 2020-05-12 RX ADMIN — LEVOTHYROXINE SODIUM 200 MCG: 100 TABLET ORAL at 08:46

## 2020-05-12 RX ADMIN — CEFEPIME HYDROCHLORIDE 2 G: 2 INJECTION, POWDER, FOR SOLUTION INTRAVENOUS at 01:20

## 2020-05-12 RX ADMIN — IPRATROPIUM BROMIDE AND ALBUTEROL SULFATE 1 AMPULE: .5; 3 SOLUTION RESPIRATORY (INHALATION) at 03:06

## 2020-05-12 RX ADMIN — SODIUM CHLORIDE, PRESERVATIVE FREE 10 ML: 5 INJECTION INTRAVENOUS at 21:06

## 2020-05-12 RX ADMIN — CEFEPIME HYDROCHLORIDE 2 G: 2 INJECTION, POWDER, FOR SOLUTION INTRAVENOUS at 13:00

## 2020-05-12 RX ADMIN — METHYLPREDNISOLONE SODIUM SUCCINATE 40 MG: 40 INJECTION, POWDER, FOR SOLUTION INTRAMUSCULAR; INTRAVENOUS at 01:20

## 2020-05-12 RX ADMIN — AZITHROMYCIN MONOHYDRATE 500 MG: 500 INJECTION, POWDER, LYOPHILIZED, FOR SOLUTION INTRAVENOUS at 08:45

## 2020-05-12 RX ADMIN — SODIUM CHLORIDE, PRESERVATIVE FREE 10 ML: 5 INJECTION INTRAVENOUS at 08:46

## 2020-05-12 RX ADMIN — IPRATROPIUM BROMIDE AND ALBUTEROL SULFATE 1 AMPULE: .5; 3 SOLUTION RESPIRATORY (INHALATION) at 06:49

## 2020-05-12 RX ADMIN — BUMETANIDE 1 MG: 1 TABLET ORAL at 08:45

## 2020-05-12 RX ADMIN — IPRATROPIUM BROMIDE AND ALBUTEROL SULFATE 1 AMPULE: .5; 3 SOLUTION RESPIRATORY (INHALATION) at 20:42

## 2020-05-12 RX ADMIN — ENOXAPARIN SODIUM 30 MG: 100 INJECTION SUBCUTANEOUS at 08:45

## 2020-05-12 RX ADMIN — DOXYCYCLINE HYCLATE 100 MG: 100 TABLET, COATED ORAL at 21:05

## 2020-05-12 RX ADMIN — HYDROXYCHLOROQUINE SULFATE 200 MG: 200 TABLET, FILM COATED ORAL at 21:45

## 2020-05-12 RX ADMIN — HYDROXYCHLOROQUINE SULFATE 200 MG: 200 TABLET, FILM COATED ORAL at 08:46

## 2020-05-12 RX ADMIN — IPRATROPIUM BROMIDE AND ALBUTEROL SULFATE 1 AMPULE: .5; 3 SOLUTION RESPIRATORY (INHALATION) at 14:58

## 2020-05-12 RX ADMIN — ENOXAPARIN SODIUM 30 MG: 100 INJECTION SUBCUTANEOUS at 21:05

## 2020-05-12 ASSESSMENT — PULMONARY FUNCTION TESTS
PIF_VALUE: 32
PIF_VALUE: 16
PIF_VALUE: 35

## 2020-05-12 ASSESSMENT — PAIN SCALES - GENERAL: PAINLEVEL_OUTOF10: 0

## 2020-05-12 NOTE — PROGRESS NOTES
(Last 24 hours) at 5/12/2020 0811  Last data filed at 5/12/2020 0530  Gross per 24 hour   Intake 10 ml   Output 1230 ml   Net -1220 ml     Date 05/12/20 0000 - 05/12/20 2359   Shift 8594-7231 6076-7562 6310-3906 24 Hour Total   INTAKE   Shift Total(mL/kg)       OUTPUT   Urine(mL/kg/hr) 430(0.2)   430   Shift Total(mL/kg) 430(1.5)   430(1.5)   Weight (kg) 277.9 277.9 277.9 277.9     Wt Readings from Last 3 Encounters:   05/12/20 (!) 612 lb 10.5 oz (277.9 kg)   05/10/20 (!) 622 lb (282.1 kg)   03/30/20 (!) 677 lb 14.6 oz (307.5 kg)     Body mass index is 101.95 kg/m². PHYSICAL EXAM:    · General appearance: awake, alert, on ventilator with chronic trach, morbidly obese  · HEENT: Atraumatic, normocephalic, neck supple, normal EOM, thyroid normal, no lymphadenopathy   · Lungs: scattered crepitation bilateral and breath sounds diminished, diminished  · Heart: regular rate and rhythm, S1, S2 normal, no murmur  · Abdomen: soft, non-tender; bowel sounds normal; no masses,  no organomegaly  · Extremities: No cyanosis or edema  · Neurological:  Awake, alert, . Cranial nerves II-XII are grossly intact.  Reflexes move all 4 extremities and follow commands  · Psych; calm, nonagitated    MEDICATIONS:  Scheduled Meds:   cefepime  2 g Intravenous Q12H    ketamine  0.5 mg/kg Intramuscular Once    methylPREDNISolone  40 mg Intravenous Q12H    bumetanide  1 mg Oral Daily    ipratropium-albuterol  1 ampule Inhalation Q6H    sodium chloride flush  10 mL Intravenous 2 times per day    azithromycin  500 mg Intravenous Q24H    enoxaparin  30 mg Subcutaneous BID    levothyroxine  200 mcg Oral Daily    hydroxychloroquine  200 mg Oral BID     Continuous Infusions:   dexmedetomidine 0.2 mcg/kg/hr (05/11/20 1146)     PRN Meds:   sodium chloride flush, 10 mL, PRN  acetaminophen, 650 mg, Q6H PRN    Or  acetaminophen, 650 mg, Q6H PRN  polyethylene glycol, 17 g, Daily PRN  promethazine, 12.5 mg, Q6H PRN    Or  ondansetron, 4 mg, Q6H PRN  docusate sodium, 100 mg, Daily PRN        SUPPORT DEVICES: [x] Ventilator [] BIPAP  [] Nasal Cannula [] Room Air    VENT SETTINGS (Comprehensive) (if applicable):   PRVC mode, FiO2 40%, PEEP 6, Respiratory Rate 22, Tidal Volume 450  Vent Information  $Ventilation: $Subsequent Day  Skin Assessment: Clean, dry, & intact  Equipment ID: [id number]  Equipment Changed: HME  Vent Type: Servo i  Vent Mode: PRVC  Vt Ordered: 450 mL  Rate Set: 22 bmp  FiO2 : (S) 40 %(decreased per abg)  SpO2: 100 %  SpO2/FiO2 ratio: 222.22  Sensitivity: 3  PEEP/CPAP: (S) 6(decreased per abg)  I Time/ I Time %: 0.9 s  Humidification Source: HME  Additional Respiratory  Assessments  Pulse: 70  Resp: 22  SpO2: 100 %  Humidification Source: HME    ABGs:   Lab Results   Component Value Date    PHART 7.285 03/25/2020    VIY5UBJ 65.8 03/25/2020    PO2ART 177.0 03/25/2020    XQY1RZD 31.2 03/25/2020    NTB8NSU 35 05/12/2020    H5IWZHWJ 98.4 03/25/2020    FIO2 NOT REPORTED 05/12/2020         DATA:  Complete Blood Count:   Recent Labs     05/10/20  1730 05/11/20  0105   WBC 3.9 4.2   RBC 3.47* 3.27*   HGB 10.3* 9.7*   HCT 33.1* 33.5*   MCV 95.3 102.4   MCH 29.6 29.7   MCHC 31.0 29.0   RDW 15.7* 14.6*    179   MPV 8.5 10.3        Last 3 Blood Glucose:   Recent Labs     05/10/20  1730 05/11/20  0536   GLUCOSE 99 130*        PT/INR:    Lab Results   Component Value Date    PROTIME 10.0 05/11/2020    INR 1.0 05/11/2020     PTT:    Lab Results   Component Value Date    APTT 26.6 03/09/2020       Comprehensive Metabolic Profile:   Recent Labs     05/10/20  1730 05/11/20  0536    145*   K 4.8 4.8   CL 98 102   CO2 32* 31   BUN 30* 29*   CREATININE 1.20* 1.10*   GLUCOSE 99 130*   CALCIUM 9.2 9.0   PROT 8.0  --    LABALBU 3.5  --    BILITOT 0.16*  --    ALKPHOS 91  --    AST 91*  --    ALT 62*  --       Magnesium:   Lab Results   Component Value Date    MG 1.7 05/11/2020     Phosphorus: No results found for: PHOS  Ionized Calcium:   Lab Results   Component Value Date    CAION 1.17 05/11/2020        Urinalysis:   Lab Results   Component Value Date    NITRU NEGATIVE 05/08/2019    COLORU YELLOW 05/08/2019    PHUR 7.0 05/08/2019    WBCUA 10 TO 20 05/08/2019    RBCUA 0 TO 2 05/08/2019    MUCUS NOT REPORTED 05/08/2019    TRICHOMONAS NOT REPORTED 05/08/2019    YEAST NOT REPORTED 05/08/2019    BACTERIA MODERATE 05/08/2019    SPECGRAV 1.010 05/08/2019    LEUKOCYTESUR MOD 05/08/2019    UROBILINOGEN Normal 05/08/2019    BILIRUBINUR NEGATIVE 05/08/2019    GLUCOSEU NEGATIVE 05/08/2019    KETUA NEGATIVE 05/08/2019    AMORPHOUS NOT REPORTED 05/08/2019       HgBA1c:  No results found for: LABA1C  TSH:    Lab Results   Component Value Date    TSH 4.44 03/28/2020     Lactic Acid:   Lab Results   Component Value Date    LACTA NOT REPORTED 03/30/2020    LACTA NOT REPORTED 03/29/2020    LACTA NOT REPORTED 03/28/2020      Troponin: No results for input(s): TROPONINI in the last 72 hours.     ASSESSMENT:     Patient Active Problem List    Diagnosis Date Noted    COVID-19 05/10/2020    COVID-19 virus infection 05/10/2020    Obesity hypoventilation syndrome ([address] 03/30/2020    Subluxation of right shoulder joint 03/29/2020    Pulmonary hypertension ([address] 03/28/2020    Macrocytic anemia 03/28/2020    Acute respiratory distress 03/25/2020    RU (obstructive sleep apnea) 01/08/2020    Mycoplasma pneumonia 01/02/2020    Multifocal pneumonia 09/10/2019    Left leg cellulitis     Bandemia     Chronic respiratory failure ([address] 11/20/2018    Tracheostomy dependent ([address] 11/20/2018    Lymphedema 11/20/2018    Acute congestive heart failure ([address] 11/17/2018    Class 3 obesity due to excess calories with serious comorbidity and body mass index (BMI) greater than or equal to 70 in adult 05/16/2018    Hypothyroidism 05/14/2018    Acute respiratory failure with hypoxia and hypercapnia ([address] 05/14/2018          PLAN:     WEAN PER PROTOCOL:  [] No   [x] Yes  []

## 2020-05-12 NOTE — PROGRESS NOTES
A x2 for rolling   Transfers  Comment: [facility] d/t pt refusal. Pt reports occasionally performing stand pivot transfer with 2 assist  Ambulation  Ambulation?: No  Stairs/Curb  Stairs?: No     Balance  Comments: Pt declined sitting EOB this date      Upper extremity exercises: Bicep curl, shoulder flexion/extension, punches, tricep curl, shoulder abduction/adduction.  Reps: 10x green tband LUE, AAROM 10x RUE    Plan   Plan  Times per week: 3-5x/wk  Current Treatment Recommendations: Strengthening, ROM, Balance Training, Transfer Training, Safety Education & Training, Home Exercise Program, Patient/Caregiver Education & Training, Endurance Training, Functional Mobility Training  Safety Devices  Type of devices: Nurse notified, Call light within reach, Left in bed  Restraints  Initially in place: No      AM-PAC Score  AM-PAC Inpatient Mobility Raw Score : 7 (05/12/20 1608)  AM-PAC Inpatient T-Scale Score : 26.42 (05/12/20 1608)  Mobility Inpatient CMS 0-100% Score: 92.36 (05/12/20 1608)  Mobility Inpatient CMS G-Code Modifier : CM (05/12/20 1608)          Goals  Short term goals  Time Frame for Short term goals: 14 visits  Short term goal 1: Perform bed mobility with Max A  Short term goal 2: Sit EOB for 10 minutes with SBA   Short term goal 3: Perform sit to stand with Mod A x2  Short term goal 4: AAROM BLE to promote strengthening  Short term goal 5: Participate in 30 minutes of therapy to demo increased acitivty tolerance       Therapy Time   Individual Concurrent Group Co-treatment   Time In 1345         Time Out 1419         Minutes 34         Timed Code Treatment Minutes: 10 Minutes       Loralie Leyden, PT

## 2020-05-12 NOTE — PROGRESS NOTES
Occupational Therapy   Occupational Therapy Initial Assessment  Date: 2020   Patient Name: Cabrera Manzo  MRN: [id number]     : 1970    Date of Service: 2020    Discharge Recommendations:    Further therapy recommended at discharge. Assessment   Performance deficits / Impairments: Decreased functional mobility ; Decreased strength;Decreased endurance;Decreased ADL status; Decreased ROM; Decreased high-level IADLs  Treatment Diagnosis: COVID   Prognosis: Good  Decision Making: Medium Complexity  Patient Education: pt ed on POC, purpose of eval, importance of movement, safety during functional transfers/functional mobility, benefits of therapy, exercises with theraband and resistance sponge. good return   REQUIRES OT FOLLOW UP: Yes  Activity Tolerance  Activity Tolerance: Patient Tolerated treatment well  Safety Devices  Safety Devices in place: Yes  Type of devices: Call light within reach; Left in bed;Nurse notified  Restraints  Initially in place: No         Patient Diagnosis(es): There were no encounter diagnoses. has a past medical history of Anemia, Disease of blood and blood forming organ, History of breast cancer, History of chemotherapy, Hypothyroidism, Lymphedema, Morbid obesity ([address] Respiratory failure ([address] and Tracheostomy present ([address] has a past surgical history that includes tracheostomy; Mastectomy, modified radical (Right, 2013); and bronchoscopy (N/A, 1/3/2020). Treatment Diagnosis: COVID       Restrictions  Restrictions/Precautions  Required Braces or Orthoses?: No  Position Activity Restriction  Other position/activity restrictions: chronic trach, high BMI    Subjective   General  Patient assessed for rehabilitation services?: Yes  Family / Caregiver Present: No  Diagnosis: COVID   General Comment  Comments: RN ok'd for therapy this afternoon. Pt agreeable to participate in UE exercises and ROM, no EOB activity.    Patient Currently in Pain: Denies    Oxygen Therapy  O2 Device: Ventilator    Social/Functional History  Social/Functional History  Lives With: Other (comment)  Type of Home: Facility  Home Equipment: Wheelchair-manual  ADL Assistance: Needs assistance(pt reported completing bed bath with assist from staff )  Homemaking Assistance: Needs assistance  Homemaking Responsibilities: No  Ambulation Assistance: Needs assistance(pt used w/c )  Transfer Assistance: Needs assistance(pt reported was completing stand pivot transfers to/from w/c with 2 therapists )  Additional Comments: pt reported receiving PT/OT everyday. OT was working on [employer] and PT working on transfers.  pt reported getting into w/c 3 times a week     Objective   Vision: Within Functional Limits  Hearing: Within functional limits    Orientation  Overall Orientation Status: Within Functional Limits     ADL  Feeding: Modified independent   Grooming: Modified independent (pt observed washing face and brushed teeth upon arrival to room )  UE Bathing: Minimal assistance  LE Bathing: Maximum assistance  UE Dressing: Minimal assistance  LE Dressing: Maximum assistance  Toileting: Maximum assistance  Tone RUE  RUE Tone: Normotonic  Tone LUE  LUE Tone: Normotonic  Coordination  Movements Are Fluid And Coordinated: No  Coordination and Movement description: Right UE;Gross motor impairments  Quality of Movement Other  Comment: pt reported has been having issues with R shoulder/UE for over 3 months, with limited use of R UE     Bed mobility  Comment: pt declined all bed mobility, during previous admission pt max A x2 for rolling         Cognition  Overall Cognitive Status: WFL        Sensation  Overall Sensation Status: Impaired  Additional Comments: pt reported N/T to R UE which has been going on for ~3 months       LUE AROM : WFL  Left Hand AROM: WFL  RUE PROM: Exceptions  R Elbow Flexion 0-145: WFL  R Wrist Flex 0-80: WFL  R Wrist Ext 0-70: WFL  RUE AROM : Exceptions  RUE General AROM: no active movement to pt

## 2020-05-12 NOTE — CONSULTS
Vitals for the past 8 hrs:   BP Temp Temp src Pulse Resp SpO2   05/11/20 2000 (!) 142/77 98.1 °F (36.7 °C) CORE -- -- --   05/11/20 1900 -- -- -- 97 24 (!) 88 %   05/11/20 1845 -- -- -- 87 21 98 %   05/11/20 1830 -- -- -- 68 22 100 %   05/11/20 1815 -- -- -- 77 23 100 %   05/11/20 1800 -- -- -- 81 22 --   05/11/20 1745 -- -- -- 82 23 --   05/11/20 1730 -- -- -- 83 22 --   05/11/20 1715 -- -- -- 80 22 --   05/11/20 1700 -- -- -- 85 23 --   05/11/20 1645 -- -- -- 89 25 --   05/11/20 1630 -- -- -- 76 22 --   05/11/20 1615 -- -- -- 75 23 --   05/11/20 1600 -- -- -- 85 24 --   05/11/20 1545 -- -- -- 84 26 --   05/11/20 1530 -- -- -- 91 27 --   05/11/20 1515 -- -- -- 88 20 --   05/11/20 1500 -- -- -- 79 22 --   05/11/20 1445 -- -- -- 76 17 --   05/11/20 1430 -- -- -- 59 22 98 %   05/11/20 1427 -- -- -- -- 22 --   05/11/20 1415 -- -- -- 62 22 --   05/11/20 1400 -- -- -- 66 22 --   05/11/20 1345 -- -- -- 64 22 --   05/11/20 1330 -- -- -- 65 22 --   05/11/20 1315 -- -- -- 68 22 --   05/11/20 1300 -- -- -- 67 22 --   05/11/20 1245 -- -- -- 69 22 --     General Appearance: Morbid obesity. Sedated ,on vent  Head:  Normocephalic, no trauma  Eyes: Pupils equal, round, reactive to light; sclera anicteric; conjunctivae pink. No embolic phenomena. ENT: Oropharynx clear, without erythema, exudate, or thrush. No tenderness of sinuses. Mouth/throat: mucosa pink and moist. No lesions. Dentition in good repair. Neck:Supple, without lymphadenopathy. Thyroid normal, No bruits. Tracheostomy in place  Pulmonary/Chest: Coarse, distant sounds  Cardiovascular: Regular rate and rhythm without murmurs, rubs, or gallops. Abdomen: Soft, non tender. Bowel sounds normal. No organomegaly  All four Extremities: No cyanosis, clubbing, edema, or effusions. Neurologic: No gross sensory or motor deficits. Skin: Warm and dry with good turgor. Signs of peripheral venous insufficiency. No ulcerations. No open wounds.     Medical Decision Making

## 2020-05-12 NOTE — PROGRESS NOTES
[facility] -Progress Note  COVID 19 Patient  Today's Date and Time: 5/12/2020, 1:44 PM    Impression :     · COVID 19 Confirmed Infection  · COVID pneumonia  · Mycoplasma pneumoniae infection    Recommendations:   · Hydroxychloroquine 400 mg po X 2, followed by 200 mg X 8 more doses. Stop date 5-14-20. · Doxycycline 100 mg po BID Stop date. 5-25-20. Medical Decision Making/Summary/Discussion:5/12/2020     · Patient admitted with suspected COVID 19 infection  · Covid test confirmed positive. Infection Control Recommendations   · Universal Precautions  · Airborne isolation  · Droplet Isolation    Antimicrobial Stewardship Recommendations     · Simplification of therapy  · Targeted therapy    Coordination of Outpatient Care:   · Estimated Length of IV antimicrobials:TBD  · Patient will need Midline Catheter Insertion: TBD  · Patient will need PICC line Insertion: No  · Patient will need: Home IV , [place],  SNF,  LTAC:TBD  · Patient will need outpatient wound care:No    Chief complaint/reason for consultation:   · Concern for COVID infection      History of Present Illness:   Tessie Harper is a 54y.o.-year-old  female who was initially admitted on 5/10/2020. Patient seen at the request of . INITIAL HISTORY:    Patient presented through ER at [facility] with complaints of worsening SOB over the preceding two weeks. The patient had been admitted on 3-28-20 because of suspicion of COVID. At that time she tested negative. She was found to have a subacute pneumonia with associated Mycoplasma pneumoniae infection. She received Zithromax from 3-26-20 through 3-30-20 and was discharge on 3-28-20 back to [facility] where she resides because of underlying morbid obesity. The patient has a chronic tracheostomy because of of chronic hypercapneic and hypoxic respiratory failure, RU, nocturnal ventilation.     On current admission the bruising. Neurologic: No headache, weakness, numbness, or tingling. Integument: No rash, no ulcers. Psychiatric: No depression. Endocrine: No polyuria, no polydipsia, no polyphagia. Physical Examination :     Patient Vitals for the past 8 hrs:   Resp   05/12/20 0833 16   05/12/20 0719 22     General Appearance: Morbid obesity. Sedated ,on vent  Head:  Normocephalic, no trauma  Eyes: Pupils equal, round, reactive to light; sclera anicteric; conjunctivae pink. No embolic phenomena. ENT: Oropharynx clear, without erythema, exudate, or thrush. No tenderness of sinuses. Mouth/throat: mucosa pink and moist. No lesions. Dentition in good repair. Neck:Supple, without lymphadenopathy. Thyroid normal, No bruits. Tracheostomy in place  Pulmonary/Chest: Coarse, distant sounds  Cardiovascular: Regular rate and rhythm without murmurs, rubs, or gallops. Abdomen: Soft, non tender. Bowel sounds normal. No organomegaly  All four Extremities: No cyanosis, clubbing, edema, or effusions. Neurologic: No gross sensory or motor deficits. Skin: Warm and dry with good turgor. Signs of peripheral venous insufficiency. No ulcerations. No open wounds. Medical Decision Making -Laboratory:   I have independently reviewed/ordered the following labs:    CBC with Differential:   Recent Labs     05/11/20  0105 05/12/20  0839   WBC 4.2 3.6   HGB 9.7* 9.1*   HCT 33.5* 30.6*    160   LYMPHOPCT 32 21*   MONOPCT 10 5     BMP:   Recent Labs     05/11/20  0056 05/11/20  0536 05/12/20  0839   NA  --  145* 141   K  --  4.8 4.6   CL  --  102 100   CO2  --  31 28   BUN  --  29* 26*   CREATININE  --  1.10* 1.06*   MG 1.7  --  1.9     Hepatic Function Panel:   Recent Labs     05/10/20  1730   PROT 8.0   LABALBU 3.5   BILITOT 0.16*   ALKPHOS 91   ALT 62*   AST 91*     No results for input(s): RPR in the last 72 hours. No results for input(s): HIV in the last 72 hours. No results for input(s): BC in the last 72 hours.   Lab Results Ref Range: Not Detected    Not Detected   Coronavirus HKU1 PCR Latest Ref Range: Not Detected    Not Detected   Coronavirus NL63 PCR Latest Ref Range: Not Detected    Not Detected   Coronavirus OC Latest Ref Range: Not Detected    Not Detected   Influenza A by PCR Latest Ref Range: Not Detected    Not Detected   Influenza A H1 (2009) PCR Latest Ref Range: Not Detected    NOT REPORTED   Influenza A H3 PCR Latest Ref Range: Not Detected    NOT REPORTED   Influenza B by PCR Latest Ref Range: Not Detected    Not Detected   Parainfluenza 1 PCR Latest Ref Range: Not Detected    Not Detected   Parainfluenza 2 PCR Latest Ref Range: Not Detected    Not Detected   Parainfluenza 3 PCR Latest Ref Range: Not Detected    Not Detected   Parainfluenza 4 PCR Latest Ref Range: Not Detected    Not Detected   Resp Syncytial Virus PCR Latest Ref Range: Not Detected    Not Detected   Mycoplasma pneumo by PCR Latest Ref Range: Not Detected    Not Detected   SARS-CoV-2, [place] Latest Ref Range: Not Detected  Not Detected       Results for Joseph Donald (MRN [id number]) as of 5/12/2020 13:49   Ref. Range 5/10/2020 06:56 5/10/2020 18:20   Chlamydia pneumoniae By PCR Latest Ref Range: Not Detected  Not Detected    Rhino/Enterovirus PCR Latest Ref Range: Not Detected  Not Detected    Specimen Description Unknown . NASOPHARYNGEAL SWAB    Human Metapneumovirus PCR Latest Ref Range: Not Detected  Not Detected    Influenza A H1 PCR Latest Ref Range: Not Detected  NOT REPORTED    Adenovirus PCR Latest Ref Range: Not Detected  Not Detected    B Pertussis by PCR Latest Ref Range: Not Detected  Not Detected    Coronavirus 229E PCR Latest Ref Range: Not Detected  Not Detected    Coronavirus HKU1 PCR Latest Ref Range: Not Detected  Not Detected    Coronavirus NL63 PCR Latest Ref Range: Not Detected  Not Detected    Coronavirus OC Latest Ref Range: Not Detected  Not Detected    Influenza A by PCR Latest Ref Range: Not Detected  Not Detected    Influenza A

## 2020-05-12 NOTE — PROGRESS NOTES
Patient is awake and alert. Assisted with trach care but patient performed the care. Changed White to trach size. Small white secretions.

## 2020-05-13 PROBLEM — Z99.11 CHRONIC RESPIRATORY FAILURE REQUIRING USE OF NOCTURNAL MECHANICAL VENTILATION THROUGH TRACHEOSTOMY (HCC): Chronic | Status: ACTIVE | Noted: 2018-11-20

## 2020-05-13 PROBLEM — E66.01 MORBID OBESITY WITH BMI OF 70 AND OVER, ADULT (HCC): Status: ACTIVE | Noted: 2020-03-30

## 2020-05-13 PROBLEM — Z93.0 CHRONIC RESPIRATORY FAILURE REQUIRING USE OF NOCTURNAL MECHANICAL VENTILATION THROUGH TRACHEOSTOMY (HCC): Chronic | Status: ACTIVE | Noted: 2018-11-20

## 2020-05-13 LAB
-: NORMAL
ABSOLUTE EOS #: 0.05 K/UL (ref 0–0.44)
ABSOLUTE IMMATURE GRANULOCYTE: 0.05 K/UL (ref 0–0.3)
ABSOLUTE LYMPH #: 1.15 K/UL (ref 1.1–3.7)
ABSOLUTE MONO #: 0.38 K/UL (ref 0.1–1.2)
ALLEN TEST: ABNORMAL
ANION GAP SERPL CALCULATED.3IONS-SCNC: 15 MMOL/L (ref 9–17)
BASOPHILS # BLD: 1 % (ref 0–2)
BASOPHILS ABSOLUTE: <0.03 K/UL (ref 0–0.2)
BUN BLDV-MCNC: 26 MG/DL (ref 6–20)
BUN/CREAT BLD: ABNORMAL (ref 9–20)
C-REACTIVE PROTEIN: 13.5 MG/L (ref 0–5)
CALCIUM IONIZED: 1.2 MMOL/L (ref 1.13–1.33)
CALCIUM SERPL-MCNC: 9 MG/DL (ref 8.6–10.4)
CHLORIDE BLD-SCNC: 100 MMOL/L (ref 98–107)
CO2: 28 MMOL/L (ref 20–31)
CREAT SERPL-MCNC: 1.03 MG/DL (ref 0.5–0.9)
CULTURE: ABNORMAL
DIFFERENTIAL TYPE: ABNORMAL
DIRECT EXAM: ABNORMAL
EOSINOPHILS RELATIVE PERCENT: 1 % (ref 1–4)
FIO2: 50
GFR AFRICAN AMERICAN: >60 ML/MIN
GFR NON-AFRICAN AMERICAN: 57 ML/MIN
GFR SERPL CREATININE-BSD FRML MDRD: ABNORMAL ML/MIN/{1.73_M2}
GFR SERPL CREATININE-BSD FRML MDRD: ABNORMAL ML/MIN/{1.73_M2}
GLUCOSE BLD-MCNC: 132 MG/DL (ref 70–99)
HCT VFR BLD CALC: 26.5 % (ref 36.3–47.1)
HEMOGLOBIN: 7.6 G/DL (ref 11.9–15.1)
IMMATURE GRANULOCYTES: 1 %
LYMPHOCYTES # BLD: 30 % (ref 24–43)
Lab: ABNORMAL
Lab: ABNORMAL
MCH RBC QN AUTO: 29.8 PG (ref 25.2–33.5)
MCHC RBC AUTO-ENTMCNC: 28.7 G/DL (ref 28.4–34.8)
MCV RBC AUTO: 103.9 FL (ref 82.6–102.9)
MODE: ABNORMAL
MONOCYTES # BLD: 10 % (ref 3–12)
MYOGLOBIN: 65 NG/ML (ref 25–58)
NEGATIVE BASE EXCESS, ART: ABNORMAL (ref 0–2)
NRBC AUTOMATED: 0 PER 100 WBC
O2 DEVICE/FLOW/%: ABNORMAL
PATIENT TEMP: ABNORMAL
PDW BLD-RTO: 14.8 % (ref 11.8–14.4)
PLATELET # BLD: 139 K/UL (ref 138–453)
PLATELET ESTIMATE: ABNORMAL
PMV BLD AUTO: 10 FL (ref 8.1–13.5)
POC HCO3: 36 MMOL/L (ref 21–28)
POC O2 SATURATION: 99 % (ref 94–98)
POC PCO2 TEMP: ABNORMAL MM HG
POC PCO2: 69.1 MM HG (ref 35–48)
POC PH TEMP: ABNORMAL
POC PH: 7.33 (ref 7.35–7.45)
POC PO2 TEMP: ABNORMAL MM HG
POC PO2: 147 MM HG (ref 83–108)
POSITIVE BASE EXCESS, ART: 8 (ref 0–3)
POTASSIUM SERPL-SCNC: 4.4 MMOL/L (ref 3.7–5.3)
RBC # BLD: 2.55 M/UL (ref 3.95–5.11)
RBC # BLD: ABNORMAL 10*6/UL
REASON FOR REJECTION: NORMAL
SAMPLE SITE: ABNORMAL
SEG NEUTROPHILS: 57 % (ref 36–65)
SEGMENTED NEUTROPHILS ABSOLUTE COUNT: 2.17 K/UL (ref 1.5–8.1)
SODIUM BLD-SCNC: 143 MMOL/L (ref 135–144)
SPECIMEN DESCRIPTION: ABNORMAL
SPECIMEN DESCRIPTION: ABNORMAL
TCO2 (CALC), ART: 38 MMOL/L (ref 22–29)
WBC # BLD: 3.8 K/UL (ref 3.5–11.3)
WBC # BLD: ABNORMAL 10*3/UL
ZZ NTE CLEAN UP: ORDERED TEST: NORMAL
ZZ NTE WITH NAME CLEAN UP: SPECIMEN SOURCE: NORMAL

## 2020-05-13 PROCEDURE — 6360000002 HC RX W HCPCS: Performed by: STUDENT IN AN ORGANIZED HEALTH CARE EDUCATION/TRAINING PROGRAM

## 2020-05-13 PROCEDURE — 94640 AIRWAY INHALATION TREATMENT: CPT

## 2020-05-13 PROCEDURE — 6370000000 HC RX 637 (ALT 250 FOR IP): Performed by: INTERNAL MEDICINE

## 2020-05-13 PROCEDURE — 99233 SBSQ HOSP IP/OBS HIGH 50: CPT | Performed by: INTERNAL MEDICINE

## 2020-05-13 PROCEDURE — 82330 ASSAY OF CALCIUM: CPT

## 2020-05-13 PROCEDURE — 37799 UNLISTED PX VASCULAR SURGERY: CPT

## 2020-05-13 PROCEDURE — 82803 BLOOD GASES ANY COMBINATION: CPT

## 2020-05-13 PROCEDURE — 94003 VENT MGMT INPAT SUBQ DAY: CPT

## 2020-05-13 PROCEDURE — 85025 COMPLETE CBC W/AUTO DIFF WBC: CPT

## 2020-05-13 PROCEDURE — 86140 C-REACTIVE PROTEIN: CPT

## 2020-05-13 PROCEDURE — 94770 HC ETCO2 MONITOR DAILY: CPT

## 2020-05-13 PROCEDURE — 99291 CRITICAL CARE FIRST HOUR: CPT | Performed by: INTERNAL MEDICINE

## 2020-05-13 PROCEDURE — 83874 ASSAY OF MYOGLOBIN: CPT

## 2020-05-13 PROCEDURE — 80048 BASIC METABOLIC PNL TOTAL CA: CPT

## 2020-05-13 PROCEDURE — 94761 N-INVAS EAR/PLS OXIMETRY MLT: CPT

## 2020-05-13 PROCEDURE — 6370000000 HC RX 637 (ALT 250 FOR IP): Performed by: STUDENT IN AN ORGANIZED HEALTH CARE EDUCATION/TRAINING PROGRAM

## 2020-05-13 PROCEDURE — 2580000003 HC RX 258: Performed by: STUDENT IN AN ORGANIZED HEALTH CARE EDUCATION/TRAINING PROGRAM

## 2020-05-13 PROCEDURE — APPSS60 APP SPLIT SHARED TIME 46-60 MINUTES: Performed by: PHYSICIAN ASSISTANT

## 2020-05-13 PROCEDURE — 6370000000 HC RX 637 (ALT 250 FOR IP): Performed by: PHYSICIAN ASSISTANT

## 2020-05-13 PROCEDURE — 2000000000 HC ICU R&B

## 2020-05-13 PROCEDURE — 2700000000 HC OXYGEN THERAPY PER DAY

## 2020-05-13 RX ORDER — LANOLIN ALCOHOL/MO/W.PET/CERES
325 CREAM (GRAM) TOPICAL 2 TIMES DAILY WITH MEALS
Status: DISCONTINUED | OUTPATIENT
Start: 2020-05-13 | End: 2020-05-15 | Stop reason: HOSPADM

## 2020-05-13 RX ORDER — DEXTROSE MONOHYDRATE 25 G/50ML
12.5 INJECTION, SOLUTION INTRAVENOUS PRN
Status: DISCONTINUED | OUTPATIENT
Start: 2020-05-13 | End: 2020-05-15 | Stop reason: HOSPADM

## 2020-05-13 RX ORDER — NICOTINE POLACRILEX 4 MG
15 LOZENGE BUCCAL PRN
Status: DISCONTINUED | OUTPATIENT
Start: 2020-05-13 | End: 2020-05-15 | Stop reason: HOSPADM

## 2020-05-13 RX ORDER — DEXTROSE MONOHYDRATE 50 MG/ML
100 INJECTION, SOLUTION INTRAVENOUS PRN
Status: DISCONTINUED | OUTPATIENT
Start: 2020-05-13 | End: 2020-05-15 | Stop reason: HOSPADM

## 2020-05-13 RX ORDER — ESCITALOPRAM OXALATE 10 MG/1
20 TABLET ORAL DAILY
Status: DISCONTINUED | OUTPATIENT
Start: 2020-05-13 | End: 2020-05-15 | Stop reason: HOSPADM

## 2020-05-13 RX ORDER — CETIRIZINE HYDROCHLORIDE 10 MG/1
10 TABLET ORAL DAILY
Status: DISCONTINUED | OUTPATIENT
Start: 2020-05-13 | End: 2020-05-15 | Stop reason: HOSPADM

## 2020-05-13 RX ADMIN — IPRATROPIUM BROMIDE AND ALBUTEROL SULFATE 1 AMPULE: .5; 3 SOLUTION RESPIRATORY (INHALATION) at 15:25

## 2020-05-13 RX ADMIN — LEVOTHYROXINE SODIUM 200 MCG: 100 TABLET ORAL at 10:44

## 2020-05-13 RX ADMIN — ESCITALOPRAM OXALATE 20 MG: 10 TABLET ORAL at 11:29

## 2020-05-13 RX ADMIN — HYDROXYCHLOROQUINE SULFATE 200 MG: 200 TABLET, FILM COATED ORAL at 21:00

## 2020-05-13 RX ADMIN — IPRATROPIUM BROMIDE AND ALBUTEROL SULFATE 1 AMPULE: .5; 3 SOLUTION RESPIRATORY (INHALATION) at 03:16

## 2020-05-13 RX ADMIN — FERROUS SULFATE TAB EC 325 MG (65 MG FE EQUIVALENT) 325 MG: 325 (65 FE) TABLET DELAYED RESPONSE at 17:39

## 2020-05-13 RX ADMIN — CEFEPIME HYDROCHLORIDE 2 G: 2 INJECTION, POWDER, FOR SOLUTION INTRAVENOUS at 05:00

## 2020-05-13 RX ADMIN — BUMETANIDE 1 MG: 1 TABLET ORAL at 10:44

## 2020-05-13 RX ADMIN — DOXYCYCLINE HYCLATE 100 MG: 100 TABLET, COATED ORAL at 10:44

## 2020-05-13 RX ADMIN — HYDROXYCHLOROQUINE SULFATE 200 MG: 200 TABLET, FILM COATED ORAL at 10:44

## 2020-05-13 RX ADMIN — CETIRIZINE HYDROCHLORIDE 10 MG: 10 TABLET ORAL at 10:00

## 2020-05-13 RX ADMIN — SODIUM CHLORIDE, PRESERVATIVE FREE 10 ML: 5 INJECTION INTRAVENOUS at 08:00

## 2020-05-13 RX ADMIN — METHYLPREDNISOLONE SODIUM SUCCINATE 40 MG: 40 INJECTION, POWDER, FOR SOLUTION INTRAMUSCULAR; INTRAVENOUS at 10:45

## 2020-05-13 RX ADMIN — DOXYCYCLINE HYCLATE 100 MG: 100 TABLET, COATED ORAL at 20:57

## 2020-05-13 RX ADMIN — ACETAMINOPHEN 650 MG: 325 TABLET ORAL at 03:49

## 2020-05-13 RX ADMIN — IPRATROPIUM BROMIDE AND ALBUTEROL SULFATE 1 AMPULE: .5; 3 SOLUTION RESPIRATORY (INHALATION) at 09:40

## 2020-05-13 RX ADMIN — SODIUM CHLORIDE, PRESERVATIVE FREE 10 ML: 5 INJECTION INTRAVENOUS at 21:19

## 2020-05-13 ASSESSMENT — PULMONARY FUNCTION TESTS
PIF_VALUE: 21
PIF_VALUE: 27
PIF_VALUE: 26
PIF_VALUE: 23

## 2020-05-13 ASSESSMENT — PAIN SCALES - GENERAL
PAINLEVEL_OUTOF10: 10
PAINLEVEL_OUTOF10: 0

## 2020-05-13 ASSESSMENT — PAIN DESCRIPTION - PROGRESSION: CLINICAL_PROGRESSION: NOT CHANGED

## 2020-05-13 NOTE — PROGRESS NOTES
[facility] -Progress Note  COVID 19 Patient  Today's Date and Time: 5/13/2020, 4:30 PM    Impression :     · COVID 19 Confirmed Infection  · COVID pneumonia  · Mycoplasma pneumoniae infection    Recommendations:   · Hydroxychloroquine 400 mg po X 2, followed by 200 mg X 8 more doses. Stop date 5-14-20. · Doxycycline 100 mg po BID Stop date. 5-25-20. · 5-13 stop Cefepime    Medical Decision Making/Summary/Discussion:5/13/2020     · Patient admitted with suspected COVID 19 infection  · Covid test confirmed positive. Infection Control Recommendations   · Universal Precautions  · Airborne isolation  · Droplet Isolation    Antimicrobial Stewardship Recommendations     · Simplification of therapy  · Targeted therapy    Coordination of Outpatient Care:   · Estimated Length of IV antimicrobials:5-13  · Patient will need Midline Catheter Insertion: TBD  · Patient will need PICC line Insertion: No  · Patient will need: Home IV , [place],  SNF,  LTAC:TBD  · Patient will need outpatient wound care:No    Chief complaint/reason for consultation:   · Concern for COVID infection      History of Present Illness:   Le Morales is a 54y.o.-year-old  female who was initially admitted on 5/10/2020. Patient seen at the request of . INITIAL HISTORY:    Patient presented through ER at [address] [place] [address] with complaints of worsening SOB over the preceding two weeks. The patient had been admitted on 3-28-20 because of suspicion of COVID. At that time she tested negative. She was found to have a subacute pneumonia with associated Mycoplasma pneumoniae infection. She received Zithromax from 3-26-20 through 3-30-20 and was discharge on 3-28-20 back to [facility] where she resides because of underlying morbid obesity. The patient has a chronic tracheostomy because of of chronic hypercapneic and hypoxic respiratory failure, RU, nocturnal ventilation.     On current admission the patient was found to be hypoxic, with associated diffuse opacities on CXR. The opacities are slightly improved compared to last admission. She has shown CXR abnormalities as far back as 3-9-20. The COVID test was positive on 5-11-20. CURRENT EVALUATION : 5/13/2020    Afebrile  VS stable    Patient exhibiting respiratory distress. Yes  Respiratory secretions: Scant    Patient receiving supplemental oxygen. Yes  02 sat 100  RR 26-->16      % FIO2: 45-->40->30  PEEP:  8-->10->6    QTc: 451->444    (Definition of High Risk Comorbid Conditions: Asthma, COPD, Heart Failure of any cause, DM, Hematologic malignancy, Immunocompromised taking >20 mg/day Prednisone). Baseline Number of High Risk Comorbid conditions:    3  Respiratory Support Level Score: (Room Air= 1 ;Supplemental 02 1L to 15 L/min= 2; Intubation and mechanical Ventilation = 3; Failure to support Ventilatory needs resulting in death =4)   3        NEWS Score: 0-4 Low risk group; 5-6: Medium risk group; 7 or above: High risk group  Parameters 3 2 1 0 1 2 3   Age    < 65   ? 65   RR ? 8  9-11 12-20  21-24 ? 25   O2 Sats ?  91 92-93 94-95 ? 96      Suppl O2  Yes  No      SBP ? 90  101-110 111-219   ? 220   HR ? 40  41-50 51-90  111-130 ? 131   Consciousness    Alert   Drowsiness, lethargy, or confusion   Temperature ? 35.0 C (95.0 F)  35.1-36.0 C 95.1-96.9 F 36.1-38.0 C 97.0-100.4 F 38.1-39.0 C 100.5-102.3 F ? 39.1 C ? 102.4 F      NEWS Score:   5-11-20: 8 High Risk    Overall Daily Picture:    Unchanged    Presence of secondary bacterial Infection:  Yes mycoplasma  Additional antibiotics: Doxycyline    Labs, X rays reviewed: 5/13/2020    BUN:29-->26  Cr:1.1-->1.06->1.03    WBC: 4.2-->3.6->3.8  Hb:9.7-->9.1->7.6  Plat: 179-->160->139    Absolute Neutrophils: 2.38  Absolute Lymphocytes:1.32  Neutrophil/Lymphocyte Ratio: 1.80 Low risk    CRP: 22.5  Ferritin:  LDH: 49    Pro Calcitonin:  Mycoplasma IgM: 3.10    Cultures:  Urine:  ·   Blood:  · 5-11-20: 1/2 Diptheroids - likely contaminant  Sputum :  · 5-11-20: Normal clinton  Wound:       CXR:   · 5-11-20: Bilateral multifocal alveolar edema. Pneumonia vs CHF  CAT:    COVID tests:  · 5-10-20; Pos  · 3-25-20: Neg    Discussed with patient, RN, CC, IM. I have personally reviewed the past medical history, past surgical history, medications, social history, and family history, and I have updated the database accordingly.   Past Medical History:     Past Medical History:   Diagnosis Date    Anemia     Disease of blood and blood forming organ     History of breast cancer     History of chemotherapy     Hypothyroidism     Lymphedema     Chronic    [address]bid [address]     Respiratory failure ([facility]     Tracheostomy present ([facility])        Past Surgical  History:     Past Surgical History:   Procedure Laterality Date    BRONCHOSCOPY N/A 1/3/2020    BRONCHOSCOPY ATTEMPTED performed by Wilfred Mckinney MD at [place] [address], MODIFIED RADICAL Right 2013    TRACHEOSTOMY         Medications:      escitalopram  20 mg Oral Daily    ferrous sulfate  325 mg Oral BID WC    cetirizine  10 mg Oral Daily    insulin lispro  0-6 Units Subcutaneous TID WC    insulin lispro  0-3 Units Subcutaneous Nightly    methylPREDNISolone  40 mg Intravenous Daily    doxycycline hyclate  100 mg Oral 2 times per day    ketamine  0.5 mg/kg Intramuscular Once    bumetanide  1 mg Oral Daily    ipratropium-albuterol  1 ampule Inhalation Q6H    sodium chloride flush  10 mL Intravenous 2 times per day    enoxaparin  30 mg Subcutaneous BID    levothyroxine  200 mcg Oral Daily    hydroxychloroquine  200 mg Oral BID       Social History:     Social History     Socioeconomic History    Marital status: Single     Spouse name: Not on file    Number of children: Not on file    Years of education: Not on file    Highest education level: Not on file   Occupational History    Not on CREATININE  --    < > 1.06* 1.03*   MG 1.7  --  1.9  --     < > = values in this interval not displayed. Hepatic Function Panel:   Recent Labs     05/10/20  1730   PROT 8.0   LABALBU 3.5   BILITOT 0.16*   ALKPHOS 91   ALT 62*   AST 91*     No results for input(s): RPR in the last 72 hours. No results for input(s): HIV in the last 72 hours. No results for input(s): BC in the last 72 hours. Lab Results   Component Value Date    MUCUS NOT REPORTED 2019    RBC 2.55 2020    TRICHOMONAS NOT REPORTED 2019    WBC 3.8 2020    YEAST NOT REPORTED 2019    TURBIDITY CLEAR 2019     Lab Results   Component Value Date    CREATININE 1.03 2020    GLUCOSE 132 2020       Medical Decision Making-Imagin/11/2020 3:50 am       COMPARISON:   Chest portable May 10, 2020 at 1829 hours       HISTORY:   ORDERING SYSTEM PROVIDED HISTORY: line placement   TECHNOLOGIST PROVIDED HISTORY:   line placement   Reason for Exam: upr,covid19 pt,line placement       FINDINGS:   Tracheostomy tube is noted in place without evidence of malpositioning   identified.       Right internal jugular approach central venous port catheter is again noted   with distal tip remaining within the SVC.       The heart is normal in size and configuration.  The mediastinal contours are   within normal limits.       Multifocal dense bilateral lung consolidation is not significantly changed. The pleural surfaces are normal and no evidence of a pleural effusion is seen.       Bones and soft tissues are unremarkable.           Impression   Stable multifocal dense bilateral lung consolidation consistent with   pneumonia versus alveolar edema.             Medical Decision [id number]:   Results for Brian Laws (MRN [id number]) as of 2020 13:49   Ref.  Range 3/25/2020 21:04 3/26/2020 08:21 3/26/2020 08:25   Chlamydia pneumoniae By PCR Latest Ref Range: Not Detected    Not Detected Rhino/Enterovirus PCR Latest Ref Range: Not Detected    Not Detected   Specimen Description Unknown   . NASOPHARYNGEAL SWAB   Human Metapneumovirus PCR Latest Ref Range: Not Detected    Not Detected   Influenza A H1 PCR Latest Ref Range: Not Detected    NOT REPORTED   Adenovirus PCR Latest Ref Range: Not Detected    Not Detected   B Pertussis by PCR Latest Ref Range: Not Detected    Not Detected   Coronavirus 229E PCR Latest Ref Range: Not Detected    Not Detected   Coronavirus HKU1 PCR Latest Ref Range: Not Detected    Not Detected   Coronavirus NL63 PCR Latest Ref Range: Not Detected    Not Detected   Coronavirus OC Latest Ref Range: Not Detected    Not Detected   Influenza A by PCR Latest Ref Range: Not Detected    Not Detected   Influenza A H1 (2009) PCR Latest Ref Range: Not Detected    NOT REPORTED   Influenza A H3 PCR Latest Ref Range: Not Detected    NOT REPORTED   Influenza B by PCR Latest Ref Range: Not Detected    Not Detected   Parainfluenza 1 PCR Latest Ref Range: Not Detected    Not Detected   Parainfluenza 2 PCR Latest Ref Range: Not Detected    Not Detected   Parainfluenza 3 PCR Latest Ref Range: Not Detected    Not Detected   Parainfluenza 4 PCR Latest Ref Range: Not Detected    Not Detected   Resp Syncytial Virus PCR Latest Ref Range: Not Detected    Not Detected   Mycoplasma pneumo by PCR Latest Ref Range: Not Detected    Not Detected   SARS-CoV-2, [place] Latest Ref Range: Not Detected  Not Detected       Results for Jackson Bak (MRN [id number]) as of 5/12/2020 13:49   Ref. Range 5/10/2020 06:56 5/10/2020 18:20   Chlamydia pneumoniae By PCR Latest Ref Range: Not Detected  Not Detected    Rhino/Enterovirus PCR Latest Ref Range: Not Detected  Not Detected    Specimen Description Unknown . NASOPHARYNGEAL SWAB    Human Metapneumovirus PCR Latest Ref Range: Not Detected  Not Detected    Influenza A H1 PCR Latest Ref Range: Not Detected  NOT REPORTED    Adenovirus PCR Latest Ref Range: Not Detected  Not

## 2020-05-13 NOTE — CARE COORDINATION
Covid+, chronic trach, ventilator FiO2 40%, from [facility], they can only accommodate nocturnal ventilator, not full-time.   [address] following

## 2020-05-13 NOTE — PROGRESS NOTES
BACTERIA MODERATE 05/08/2019    SPECGRAV 1.010 05/08/2019    LEUKOCYTESUR MOD 05/08/2019    UROBILINOGEN Normal 05/08/2019    BILIRUBINUR NEGATIVE 05/08/2019    GLUCOSEU NEGATIVE 05/08/2019    KETUA NEGATIVE 05/08/2019    AMORPHOUS NOT REPORTED 05/08/2019       HgBA1c:  No results found for: LABA1C  TSH:    Lab Results   Component Value Date    TSH 4.44 03/28/2020     Lactic Acid:   Lab Results   Component Value Date    LACTA NOT REPORTED 03/30/2020    LACTA NOT REPORTED 03/29/2020    LACTA NOT REPORTED 03/28/2020      Troponin: No results for input(s): TROPONINI in the last 72 hours. ASSESSMENT:     Patient Active Problem List    Diagnosis Date Noted    Acute on chronic respiratory failure with hypoxemia ([address]     COVID-19 05/10/2020    COVID-19 virus infection 05/10/2020    Morbid obesity with BMI of 70 and over, adult ([address] 03/30/2020    Subluxation of right shoulder joint 03/29/2020    Pulmonary hypertension ([address] 03/28/2020    Macrocytic anemia 03/28/2020    Acute respiratory distress 03/25/2020    RU (obstructive sleep apnea) 01/08/2020    Mycoplasma pneumonia 01/02/2020    Multifocal pneumonia 09/10/2019    Left leg cellulitis     Bandemia     Chronic respiratory failure requiring use of nocturnal mechanical ventilation through tracheostomy ([address] 11/20/2018    Tracheostomy dependent ([address] 11/20/2018    Lymphedema 11/20/2018    Acute congestive heart failure ([address] 11/17/2018    Class 3 obesity due to excess calories with serious comorbidity and body mass index (BMI) greater than or equal to 70 in adult 05/16/2018    Hypothyroidism 05/14/2018    Acute respiratory failure with hypoxia and hypercapnia ([address] 05/14/2018          PLAN:     WEAN PER PROTOCOL:  [] No   [x] Yes  [] N/A    ICU PROPHYLAXIS:  Stress ulcer:  [] PPI Agent  [x] [id number] [] Sucralfate  [] Other:  VTE:   [x] Enoxaparin  [] Unfract.  Heparin Subcut  [] EPC Cuffs    NUTRITION:  [] NPO [] Tube Feeding (Specify: ) [] TPN  [x]

## 2020-05-14 LAB
ABSOLUTE EOS #: 0.12 K/UL (ref 0–0.44)
ABSOLUTE IMMATURE GRANULOCYTE: 0.05 K/UL (ref 0–0.3)
ABSOLUTE LYMPH #: 1.5 K/UL (ref 1.1–3.7)
ABSOLUTE MONO #: 0.42 K/UL (ref 0.1–1.2)
BASOPHILS # BLD: 0 % (ref 0–2)
BASOPHILS ABSOLUTE: <0.03 K/UL (ref 0–0.2)
CALCIUM IONIZED: 1.2 MMOL/L (ref 1.13–1.33)
DIFFERENTIAL TYPE: ABNORMAL
EOSINOPHILS RELATIVE PERCENT: 3 % (ref 1–4)
GLUCOSE BLD-MCNC: 106 MG/DL (ref 65–105)
GLUCOSE BLD-MCNC: 107 MG/DL (ref 65–105)
GLUCOSE BLD-MCNC: 133 MG/DL (ref 65–105)
HCT VFR BLD CALC: 31.3 % (ref 36.3–47.1)
HEMOGLOBIN: 9.2 G/DL (ref 11.9–15.1)
IMMATURE GRANULOCYTES: 1 %
LYMPHOCYTES # BLD: 33 % (ref 24–43)
MCH RBC QN AUTO: 30.2 PG (ref 25.2–33.5)
MCHC RBC AUTO-ENTMCNC: 29.4 G/DL (ref 28.4–34.8)
MCV RBC AUTO: 102.6 FL (ref 82.6–102.9)
MONOCYTES # BLD: 9 % (ref 3–12)
MYOGLOBIN: 50 NG/ML (ref 25–58)
NRBC AUTOMATED: 0 PER 100 WBC
PDW BLD-RTO: 14.7 % (ref 11.8–14.4)
PLATELET # BLD: 177 K/UL (ref 138–453)
PLATELET ESTIMATE: ABNORMAL
PMV BLD AUTO: 10 FL (ref 8.1–13.5)
RBC # BLD: 3.05 M/UL (ref 3.95–5.11)
RBC # BLD: ABNORMAL 10*6/UL
SARS-COV-2, PCR: NORMAL
SARS-COV-2, RAPID: NORMAL
SARS-COV-2: NOT DETECTED
SEG NEUTROPHILS: 54 % (ref 36–65)
SEGMENTED NEUTROPHILS ABSOLUTE COUNT: 2.4 K/UL (ref 1.5–8.1)
SOURCE: NORMAL
WBC # BLD: 4.5 K/UL (ref 3.5–11.3)
WBC # BLD: ABNORMAL 10*3/UL

## 2020-05-14 PROCEDURE — 94770 HC ETCO2 MONITOR DAILY: CPT

## 2020-05-14 PROCEDURE — 6360000002 HC RX W HCPCS: Performed by: STUDENT IN AN ORGANIZED HEALTH CARE EDUCATION/TRAINING PROGRAM

## 2020-05-14 PROCEDURE — 83874 ASSAY OF MYOGLOBIN: CPT

## 2020-05-14 PROCEDURE — APPSS60 APP SPLIT SHARED TIME 46-60 MINUTES: Performed by: PHYSICIAN ASSISTANT

## 2020-05-14 PROCEDURE — 6370000000 HC RX 637 (ALT 250 FOR IP): Performed by: INTERNAL MEDICINE

## 2020-05-14 PROCEDURE — 99233 SBSQ HOSP IP/OBS HIGH 50: CPT | Performed by: INTERNAL MEDICINE

## 2020-05-14 PROCEDURE — 2580000003 HC RX 258: Performed by: STUDENT IN AN ORGANIZED HEALTH CARE EDUCATION/TRAINING PROGRAM

## 2020-05-14 PROCEDURE — 94761 N-INVAS EAR/PLS OXIMETRY MLT: CPT

## 2020-05-14 PROCEDURE — 82947 ASSAY GLUCOSE BLOOD QUANT: CPT

## 2020-05-14 PROCEDURE — 2700000000 HC OXYGEN THERAPY PER DAY

## 2020-05-14 PROCEDURE — 94003 VENT MGMT INPAT SUBQ DAY: CPT

## 2020-05-14 PROCEDURE — 97110 THERAPEUTIC EXERCISES: CPT

## 2020-05-14 PROCEDURE — 94640 AIRWAY INHALATION TREATMENT: CPT

## 2020-05-14 PROCEDURE — 6370000000 HC RX 637 (ALT 250 FOR IP): Performed by: PHYSICIAN ASSISTANT

## 2020-05-14 PROCEDURE — 82330 ASSAY OF CALCIUM: CPT

## 2020-05-14 PROCEDURE — 99291 CRITICAL CARE FIRST HOUR: CPT | Performed by: INTERNAL MEDICINE

## 2020-05-14 PROCEDURE — 2000000000 HC ICU R&B

## 2020-05-14 PROCEDURE — 6370000000 HC RX 637 (ALT 250 FOR IP): Performed by: STUDENT IN AN ORGANIZED HEALTH CARE EDUCATION/TRAINING PROGRAM

## 2020-05-14 PROCEDURE — U0003 INFECTIOUS AGENT DETECTION BY NUCLEIC ACID (DNA OR RNA); SEVERE ACUTE RESPIRATORY SYNDROME CORONAVIRUS 2 (SARS-COV-2) (CORONAVIRUS DISEASE [COVID-19]), AMPLIFIED PROBE TECHNIQUE, MAKING USE OF HIGH THROUGHPUT TECHNOLOGIES AS DESCRIBED BY CMS-2020-01-R: HCPCS

## 2020-05-14 PROCEDURE — 85025 COMPLETE CBC W/AUTO DIFF WBC: CPT

## 2020-05-14 RX ORDER — METHYLPREDNISOLONE SODIUM SUCCINATE 40 MG/ML
40 INJECTION, POWDER, LYOPHILIZED, FOR SOLUTION INTRAMUSCULAR; INTRAVENOUS DAILY
Status: COMPLETED | OUTPATIENT
Start: 2020-05-15 | End: 2020-05-15

## 2020-05-14 RX ORDER — PANTOPRAZOLE SODIUM 40 MG/1
40 TABLET, DELAYED RELEASE ORAL
Status: DISCONTINUED | OUTPATIENT
Start: 2020-05-14 | End: 2020-05-15 | Stop reason: HOSPADM

## 2020-05-14 RX ADMIN — ESCITALOPRAM OXALATE 20 MG: 10 TABLET ORAL at 09:05

## 2020-05-14 RX ADMIN — HYDROXYCHLOROQUINE SULFATE 200 MG: 200 TABLET, FILM COATED ORAL at 09:05

## 2020-05-14 RX ADMIN — HYDROXYCHLOROQUINE SULFATE 200 MG: 200 TABLET, FILM COATED ORAL at 22:20

## 2020-05-14 RX ADMIN — IPRATROPIUM BROMIDE AND ALBUTEROL SULFATE 1 AMPULE: .5; 3 SOLUTION RESPIRATORY (INHALATION) at 20:32

## 2020-05-14 RX ADMIN — DOXYCYCLINE HYCLATE 100 MG: 100 TABLET, COATED ORAL at 09:06

## 2020-05-14 RX ADMIN — LEVOTHYROXINE SODIUM 200 MCG: 100 TABLET ORAL at 09:06

## 2020-05-14 RX ADMIN — CETIRIZINE HYDROCHLORIDE 10 MG: 10 TABLET ORAL at 09:06

## 2020-05-14 RX ADMIN — PANTOPRAZOLE SODIUM 40 MG: 40 TABLET, DELAYED RELEASE ORAL at 09:09

## 2020-05-14 RX ADMIN — ACETAMINOPHEN 650 MG: 325 TABLET ORAL at 11:15

## 2020-05-14 RX ADMIN — FERROUS SULFATE TAB EC 325 MG (65 MG FE EQUIVALENT) 325 MG: 325 (65 FE) TABLET DELAYED RESPONSE at 09:07

## 2020-05-14 RX ADMIN — FERROUS SULFATE TAB EC 325 MG (65 MG FE EQUIVALENT) 325 MG: 325 (65 FE) TABLET DELAYED RESPONSE at 16:54

## 2020-05-14 RX ADMIN — SODIUM CHLORIDE, PRESERVATIVE FREE 10 ML: 5 INJECTION INTRAVENOUS at 09:00

## 2020-05-14 RX ADMIN — SODIUM CHLORIDE, PRESERVATIVE FREE 10 ML: 5 INJECTION INTRAVENOUS at 23:30

## 2020-05-14 RX ADMIN — IPRATROPIUM BROMIDE AND ALBUTEROL SULFATE 1 AMPULE: .5; 3 SOLUTION RESPIRATORY (INHALATION) at 09:10

## 2020-05-14 RX ADMIN — METHYLPREDNISOLONE SODIUM SUCCINATE 40 MG: 40 INJECTION, POWDER, FOR SOLUTION INTRAMUSCULAR; INTRAVENOUS at 09:04

## 2020-05-14 RX ADMIN — DOXYCYCLINE HYCLATE 100 MG: 100 TABLET, COATED ORAL at 22:20

## 2020-05-14 RX ADMIN — IPRATROPIUM BROMIDE AND ALBUTEROL SULFATE 1 AMPULE: .5; 3 SOLUTION RESPIRATORY (INHALATION) at 16:12

## 2020-05-14 RX ADMIN — BUMETANIDE 1 MG: 1 TABLET ORAL at 09:07

## 2020-05-14 ASSESSMENT — PAIN DESCRIPTION - LOCATION: LOCATION: ELBOW

## 2020-05-14 ASSESSMENT — PAIN DESCRIPTION - ORIENTATION: ORIENTATION: RIGHT

## 2020-05-14 ASSESSMENT — PULMONARY FUNCTION TESTS
PIF_VALUE: 23
PIF_VALUE: 20

## 2020-05-14 ASSESSMENT — PAIN SCALES - GENERAL: PAINLEVEL_OUTOF10: 10

## 2020-05-14 ASSESSMENT — PAIN SCALES - WONG BAKER: WONGBAKER_NUMERICALRESPONSE: 4

## 2020-05-14 NOTE — PROGRESS NOTES
INTENSIVE CARE UNIT  Resident Physician Progress Note    Patient - Valencia Ro  Date of Admission -  5/10/2020 10:16 PM  Date of Evaluation -  2020  Room and Bed Number -  1888/6177-09   Hospital Day - 4      SUBJECTIVE:     OVERNIGHT EVENTS:      No acute events overnight. Vent settings unchanged. Pt resting comfortably. She remains afebrile and hemodynamically stable.      AWAKE & FOLLOWING COMMANDS:  [] No   [x] Yes    SECRETIONS Amount:  [] Small [] Moderate  [] Large  [x] None  Color:     [] White [] Colored  [] Bloody    SEDATION:  RAAS Score:  [] Propofol gtt  [] Versed gtt  [] Ativan gtt   [x] No Sedation    PARALYZED:  [x] No    [] Yes    VASOPRESSORS:  [x] No    [] Yes  [] Levophed [] Dopamine [] Vasopressin  [] Dobutamine [] Phenylephrine [] Epinephrine      OBJECTIVE:     VITAL SIGNS:  BP (!) 130/59   Pulse 85   Temp 98.7 °F (37.1 °C) (Axillary)   Resp 21   Ht 5' 5\" (1.651 m)   Wt (!) 607 lb 9.4 oz (275.6 kg)   SpO2 99%   .11 kg/m²   Tmax over 24 hours:  Temp (24hrs), Av.4 °F (36.9 °C), Min:98.1 °F (36.7 °C), Max:98.7 °F (37.1 °C)      Patient Vitals for the past 8 hrs:   Temp Temp src Pulse Resp SpO2   20 0526 -- -- 85 21 99 %   20 0500 -- -- 85 21 99 %   20 0400 -- -- 89 21 99 %   20 0356 -- -- 84 21 98 %   20 0300 -- -- 88 20 98 %   20 0200 -- -- 92 24 100 %   20 0100 -- -- 94 24 100 %   20 0000 98.7 °F (37.1 °C) Axillary 90 (!) 6 100 %         Intake/Output Summary (Last 24 hours) at 2020 0758  Last data filed at 2020 0615  Gross per 24 hour   Intake 450 ml   Output 2400 ml   Net -1950 ml     Date 20 0000 - 20 2359   Shift 0618-0627 9240-9728 0710-6572 24 Hour Total   INTAKE   P.O. 450   450   Shift Total(mL/kg) 450(1.6)   450(1.6)   OUTPUT   Urine(mL/kg/hr) 700   700   Shift Total(mL/kg) 700(2.5)   700(2.5)   Weight (kg) 275.6 275.6 275.6 275.6     Wt Readings from Last 3 Encounters:   20 (!) 607 lb 9.4 oz (275.6 kg)   05/10/20 (!) 622 lb (282.1 kg)   03/30/20 (!) 677 lb 14.6 oz (307.5 kg)     Body mass index is 101.11 kg/m². PHYSICAL EXAM:    Due to the current efforts to prevent transmission of COVID-19 and also the need to preserve PPE for other caregivers, a face-to-face encounter with the patient was not performed. That being said, all relevant records and diagnostic tests were reviewed, including laboratory results and imaging. Please reference any relevant documentation elsewhere. Care will be coordinated with the primary service. MEDICATIONS:  Scheduled Meds:   escitalopram  20 mg Oral Daily    ferrous sulfate  325 mg Oral BID WC    cetirizine  10 mg Oral Daily    insulin lispro  0-6 Units Subcutaneous TID WC    insulin lispro  0-3 Units Subcutaneous Nightly    methylPREDNISolone  40 mg Intravenous Daily    doxycycline hyclate  100 mg Oral 2 times per day    ketamine  0.5 mg/kg Intramuscular Once    bumetanide  1 mg Oral Daily    ipratropium-albuterol  1 ampule Inhalation Q6H    sodium chloride flush  10 mL Intravenous 2 times per day    enoxaparin  30 mg Subcutaneous BID    levothyroxine  200 mcg Oral Daily    hydroxychloroquine  200 mg Oral BID     Continuous Infusions:   dextrose      dexmedetomidine Stopped (05/12/20 1900)     PRN Meds:   glucose, 15 g, PRN  dextrose, 12.5 g, PRN  glucagon (rDNA), 1 mg, PRN  dextrose, 100 mL/hr, PRN  albuterol sulfate HFA, 2 puff, Q6H PRN  sodium chloride flush, 10 mL, PRN  acetaminophen, 650 mg, Q6H PRN    Or  acetaminophen, 650 mg, Q6H PRN  polyethylene glycol, 17 g, Daily PRN  promethazine, 12.5 mg, Q6H PRN    Or  ondansetron, 4 mg, Q6H PRN  docusate sodium, 100 mg, Daily PRN        SUPPORT DEVICES: [x] Ventilator [] BIPAP  [] Nasal Cannula [] Room Air    VENT SETTINGS (Comprehensive) (if applicable):   PRVC mode, FiO2 40%, PEEP 6, Respiratory Rate 22, Tidal Volume 450  Vent Information  $Ventilation: $Subsequent Day  Skin Assessment: Clean, dry, & intact  Suction Catheter Diameter: 14  Equipment ID: [id number]  Equipment Changed: HME  Vent Type: Servo i  Vent Mode: PRVC  Vt Ordered: 450 mL  Rate Set: 22 bmp  FiO2 : 40 %  SpO2: 99 %  SpO2/FiO2 ratio: 247.5  Sensitivity: 3  PEEP/CPAP: 6  I Time/ I Time %: 0.9 s  Humidification Source: HME  Additional Respiratory  Assessments  Pulse: 85  Resp: 21  SpO2: 99 %  End Tidal CO2: 50 (%)  Position: Semi-Dash's  Humidification Source: HME  Oral Care Completed?: Yes  Oral Care: Mouthwash, Mouth moisturizer, Mouth suctioned, Teeth brushed    ABGs:   Lab Results   Component Value Date    PHART 7.285 03/25/2020    VUW7EFL 65.8 03/25/2020    PO2ART 177.0 03/25/2020    WNH6LNJ 31.2 03/25/2020    CZL0VYL 38 05/13/2020    U3PUDQPQ 98.4 03/25/2020    FIO2 50.0 05/13/2020         DATA:  Complete Blood Count:   Recent Labs     05/12/20  0839 05/13/20  0416 05/14/20  0636   WBC 3.6 3.8 4.5   RBC 3.02* 2.55* 3.05*   HGB 9.1* 7.6* 9.2*   HCT 30.6* 26.5* 31.3*   .3 103.9* 102.6   MCH 30.1 29.8 30.2   MCHC 29.7 28.7 29.4   RDW 14.7* 14.8* 14.7*    139 177   MPV 10.4 10.0 10.0        Last 3 Blood Glucose:   Recent Labs     05/12/20  0839 05/13/20  0330   GLUCOSE 176* 132*        PT/INR:    Lab Results   Component Value Date    PROTIME 10.0 05/11/2020    INR 1.0 05/11/2020     PTT:    Lab Results   Component Value Date    APTT 26.6 03/09/2020       Comprehensive Metabolic Profile:   Recent Labs     05/12/20  0839 05/13/20  0330    143   K 4.6 4.4    100   CO2 28 28   BUN 26* 26*   CREATININE 1.06* 1.03*   GLUCOSE 176* 132*   CALCIUM 8.8 9.0      Magnesium:   Lab Results   Component Value Date    MG 1.9 05/12/2020    MG 1.7 05/11/2020     Phosphorus: No results found for: PHOS  Ionized Calcium:   Lab Results   Component Value Date    CAION 1.20 05/14/2020    CAION 1.20 05/13/2020    CAION 1.14 05/12/2020        Urinalysis:   Lab Results   Component Value Date    NITRU NEGATIVE 05/08/2019 COLORU YELLOW 05/08/2019    PHUR 7.0 05/08/2019    WBCUA 10 TO 20 05/08/2019    RBCUA 0 TO 2 05/08/2019    MUCUS NOT REPORTED 05/08/2019    TRICHOMONAS NOT REPORTED 05/08/2019    YEAST NOT REPORTED 05/08/2019    BACTERIA MODERATE 05/08/2019    SPECGRAV 1.010 05/08/2019    LEUKOCYTESUR MOD 05/08/2019    UROBILINOGEN Normal 05/08/2019    BILIRUBINUR NEGATIVE 05/08/2019    GLUCOSEU NEGATIVE 05/08/2019    KETUA NEGATIVE 05/08/2019    AMORPHOUS NOT REPORTED 05/08/2019       HgBA1c:  No results found for: LABA1C  TSH:    Lab Results   Component Value Date    TSH 4.44 03/28/2020     Lactic Acid:   Lab Results   Component Value Date    LACTA NOT REPORTED 03/30/2020    LACTA NOT REPORTED 03/29/2020    LACTA NOT REPORTED 03/28/2020      Troponin: No results for input(s): TROPONINI in the last 72 hours.     ASSESSMENT:     Patient Active Problem List    Diagnosis Date Noted    Acute on chronic respiratory failure with hypoxemia ([address]     COVID-19 05/10/2020    COVID-19 virus infection 05/10/2020    Morbid obesity with BMI of 70 and over, adult ([address] 03/30/2020    Subluxation of right shoulder joint 03/29/2020    Pulmonary hypertension ([address] 03/28/2020    Macrocytic anemia 03/28/2020    Acute respiratory distress 03/25/2020    RU (obstructive sleep apnea) 01/08/2020    Mycoplasma pneumonia 01/02/2020    Multifocal pneumonia 09/10/2019    Left leg cellulitis     Bandemia     Chronic respiratory failure requiring use of nocturnal mechanical ventilation through tracheostomy ([address] 11/20/2018    Tracheostomy dependent ([address] 11/20/2018    Lymphedema 11/20/2018    Acute congestive heart failure ([address] 11/17/2018    Class 3 obesity due to excess calories with serious comorbidity and body mass index (BMI) greater than or equal to 70 in adult 05/16/2018    Hypothyroidism 05/14/2018    Acute respiratory failure with hypoxia and hypercapnia ([address] 05/14/2018          PLAN:     WEAN PER PROTOCOL:  [] No   [x] Yes  [] N/A    ICU PROPHYLAXIS:  Stress ulcer:  [] PPI Agent  [x] Z0Clejo [] Sucralfate  [] Other:  VTE:   [x] Enoxaparin  [] Unfract. Heparin Subcut  [] EPC Cuffs    NUTRITION:  [] NPO [] Tube Feeding (Specify: ) [] TPN  [x] PO    HOME MEDS RECONCILED: [x] No  [] Yes    CONSULTATION NEEDED:  [] No  [x] Yes    FAMILY UPDATED:    [x] No  [] Yes    TRANSFER OUT OF ICU:   [x] No  [] Yes        Additional Assessment:  ·   Additional assessment:     · COVID-19 positive test (U07.1, COVID-19) with Acute Pneumonia (J12.89, Other viral pneumonia)  · Acute on chronic hypoxic hypercapnic respiratory failure  · Morbid obesity  · Chronic tracheostomy tube   · CKD  · Seizure disorder  · Hypothyroidism    Plan:     #acute on chronic hypoxic hypercapnic respiratory failure secondary to covid-19 pneumonia  - wean as tolerated  - covid + on 5/10, repeat today  - continue solumedrol 40 mg daily x 5 days (stop 5/15)  - continue plaquenil until 5/14  - on doxycycline for mycoplasma, ID following.   - maintain negative fluid balance  - continue home dose of bumex 1 mg daily    #CKD  - creatinine near baseline, continue to monitor    #Chronic tracheostomy  - requires CPAP on vent at night    #Anemia   - likely multifactorial with chronic disease, acute illness, iron deficiency.    - continue iron supplementation  - continue to monitor     - lovenox DVT prophylaxis  - monitor blood glucose while on steroids, insulin sliding scale  - discharge planning to LTAC   - remove mesa when appropriate     Electronically signed by Venu James PA-C on 5/13/20 at 11:50 AM EDT      5/14/2020 7:58 AM

## 2020-05-14 NOTE — PROGRESS NOTES
Occupational Therapy  Facility/Department: [facility]  Daily Treatment Note  NAME: Chau Simpson  : 1970  MRN: [id number]    Date of Service: 2020    Discharge Recommendations:    Further therapy recommended at discharge. Assessment   Performance deficits / Impairments: Decreased functional mobility ; Decreased strength;Decreased endurance;Decreased ADL status; Decreased ROM; Decreased high-level IADLs  Prognosis: Good  Decision Making: Medium Complexity  Patient Education: educated on AAROM exercises, use of pink foam cube, digit exercises, importance of therapy participation, importance of completing exercises on own time when able to complete independently - fair return   REQUIRES OT FOLLOW UP: Yes  Activity Tolerance  Activity Tolerance: Patient Tolerated treatment well  Safety Devices  Safety Devices in place: Yes  Type of devices: Call light within reach; Left in bed  Restraints  Initially in place: No       Patient Diagnosis(es): There were no encounter diagnoses. has a past medical history of Anemia, Disease of blood and blood forming organ, History of breast cancer, History of chemotherapy, Hypothyroidism, Lymphedema, Morbid obesity ([address] Respiratory failure ([address] and Tracheostomy present ([address] has a past surgical history that includes tracheostomy; Mastectomy, modified radical (Right, ); and bronchoscopy (N/A, 1/3/2020). Restrictions  Restrictions/Precautions  Restrictions/Precautions: Fall Risk, Isolation(COVID +)  Required Braces or Orthoses?: No  Position Activity Restriction  Other position/activity restrictions: chronic trach  Subjective   General  Patient assessed for rehabilitation services?: Yes  Family / Caregiver Present: No  Diagnosis: COVID   General Comment  Comments: RN ok'd for therapy this afternoon. Pt agreeable to participate in UE exercises and ROM, no EOB activity.    Pain Assessment  Pain Assessment: Faces  Baird-Baker Pain Rating: Hurts little more  Pain

## 2020-05-14 NOTE — PROGRESS NOTES
Physical Therapy  DATE: 2020  NAME: Adalgisa Estrada  MRN: [id number]   : 1970    Discharge Recommendations: Continue to Assess (pending progress)     Subjective: Pt and RN agreeable to PT. Pt alert in bed upon arrival with ART line in L UE. Pt c/o headache at this time, pleasant and cooperative with treatment but agreeable to supine exs only this date. Pain: 8/10 headache  Patient follows: All Commands  Is patient on ventilator: Yes (trach)  Is patient on sedation: No  Restrictions/Precautions: Fall Risk, Isolation(COVID +)  Required Braces or Orthoses?: No  Position Activity Restriction  Other position/activity restrictions: chronic trach    Therapeutic exercises:  B LE A/AAROM in all planes x 15 reps  Mini abdominal crunches x 15 reps  bilat gastrocnemius stretching 3  reps x 30 seconds    Goals  Short Term Goals  Short term goal 1: Perform bed mobility with Max A  Short term goal 2: Sit EOB for 10 minutes with SBA   Short term goal 3: Perform sit to stand with Mod A x2  Short term goal 4: AAROM BLE to promote strengthening  Short term goal 5: Participate in 30 minutes of therapy to demo increased acitivty tolerance    Plan: Progress functional mobility as medically appropriate.    Time In: 1033  Time Out: 1046  Time Coded Minutes (treatment minutes): 13  Rehab Potential: fair  Treatments/week: 3-5x/wk    Jamison Schuster PTA

## 2020-05-14 NOTE — PROGRESS NOTES
Xbvins[facility]:   Results for Cate Abrams (MRN [id number]) as of 5/12/2020 13:49   Ref. Range 3/25/2020 21:04 3/26/2020 08:21 3/26/2020 08:25   Chlamydia pneumoniae By PCR Latest Ref Range: Not Detected    Not Detected   Rhino/Enterovirus PCR Latest Ref Range: Not Detected    Not Detected   Specimen Description Unknown   . NASOPHARYNGEAL SWAB   Human Metapneumovirus PCR Latest Ref Range: Not Detected    Not Detected   Influenza A H1 PCR Latest Ref Range: Not Detected    NOT REPORTED   Adenovirus PCR Latest Ref Range: Not Detected    Not Detected   B Pertussis by PCR Latest Ref Range: Not Detected    Not Detected   Coronavirus 229E PCR Latest Ref Range: Not Detected    Not Detected   Coronavirus HKU1 PCR Latest Ref Range: Not Detected    Not Detected   Coronavirus NL63 PCR Latest Ref Range: Not Detected    Not Detected   Coronavirus OC Latest Ref Range: Not Detected    Not Detected   Influenza A by PCR Latest Ref Range: Not Detected    Not Detected   Influenza A H1 (2009) PCR Latest Ref Range: Not Detected    NOT REPORTED   Influenza A H3 PCR Latest Ref Range: Not Detected    NOT REPORTED   Influenza B by PCR Latest Ref Range: Not Detected    Not Detected   Parainfluenza 1 PCR Latest Ref Range: Not Detected    Not Detected   Parainfluenza 2 PCR Latest Ref Range: Not Detected    Not Detected   Parainfluenza 3 PCR Latest Ref Range: Not Detected    Not Detected   Parainfluenza 4 PCR Latest Ref Range: Not Detected    Not Detected   Resp Syncytial Virus PCR Latest Ref Range: Not Detected    Not Detected   Mycoplasma pneumo by PCR Latest Ref Range: Not Detected    Not Detected   SARS-CoV-2, [place] Latest Ref Range: Not Detected  Not Detected       Results for Cate Abrams (MRN [id number]) as of 5/12/2020 13:49   Ref.  Range 5/10/2020 06:56 5/10/2020 18:20   Chlamydia pneumoniae By PCR Latest Ref Range: Not Detected  Not Detected    Rhino/Enterovirus PCR Latest Ref Range: Not Detected  Not Detected    Specimen Description Unknown . NASOPHARYNGEAL SWAB    Human Metapneumovirus PCR Latest Ref Range: Not Detected  Not Detected    Influenza A H1 PCR Latest Ref Range: Not Detected  NOT REPORTED    Adenovirus PCR Latest Ref Range: Not Detected  Not Detected    B Pertussis by PCR Latest Ref Range: Not Detected  Not Detected    Coronavirus 229E PCR Latest Ref Range: Not Detected  Not Detected    Coronavirus HKU1 PCR Latest Ref Range: Not Detected  Not Detected    Coronavirus NL63 PCR Latest Ref Range: Not Detected  Not Detected    Coronavirus OC Latest Ref Range: Not Detected  Not Detected    Influenza A by PCR Latest Ref Range: Not Detected  Not Detected    Influenza A H1 (2009) PCR Latest Ref Range: Not Detected  NOT REPORTED    Influenza A H3 PCR Latest Ref Range: Not Detected  NOT REPORTED    Influenza B by PCR Latest Ref Range: Not Detected  Not Detected    Parainfluenza 1 PCR Latest Ref Range: Not Detected  Not Detected    Parainfluenza 2 PCR Latest Ref Range: Not Detected  Not Detected    Parainfluenza 3 PCR Latest Ref Range: Not Detected  Not Detected    Parainfluenza 4 PCR Latest Ref Range: Not Detected  Not Detected    Resp Syncytial Virus PCR Latest Ref Range: Not Detected  Not Detected    Mycoplasma pneumo by PCR Latest Ref Range: Not Detected  Not Detected    COVID-19 Unknown Positive Rpt (A)       Medical Decision Making-Other:     Note:  · Labs, medications, radiologic studies were reviewed with personal review of films  · Large amounts of data were reviewed  · Discussed with nursing Staff, Discharge planner  · Infection Control and Prevention measures reviewed  · All prior entries were reviewed  · Administer medications as ordered  · Prognosis: Guarded  · Discharge planning reviewed  · Follow up as outpatient. Thank you for allowing us to participate in the care of this patient. Please call with questions.     Lakshmi Giles MD  Pager: [phone] - Office: [phone]

## 2020-05-15 VITALS
HEIGHT: 65 IN | HEART RATE: 56 BPM | BODY MASS INDEX: 48.82 KG/M2 | RESPIRATION RATE: 27 BRPM | OXYGEN SATURATION: 100 % | SYSTOLIC BLOOD PRESSURE: 146 MMHG | TEMPERATURE: 98.3 F | DIASTOLIC BLOOD PRESSURE: 73 MMHG | WEIGHT: 293 LBS

## 2020-05-15 LAB
ABSOLUTE EOS #: 0.08 K/UL (ref 0–0.44)
ABSOLUTE IMMATURE GRANULOCYTE: 0.06 K/UL (ref 0–0.3)
ABSOLUTE LYMPH #: 1.62 K/UL (ref 1.1–3.7)
ABSOLUTE MONO #: 0.43 K/UL (ref 0.1–1.2)
ANION GAP SERPL CALCULATED.3IONS-SCNC: 12 MMOL/L (ref 9–17)
BASOPHILS # BLD: 0 % (ref 0–2)
BASOPHILS ABSOLUTE: <0.03 K/UL (ref 0–0.2)
BUN BLDV-MCNC: 25 MG/DL (ref 6–20)
BUN/CREAT BLD: ABNORMAL (ref 9–20)
CALCIUM IONIZED: 1.21 MMOL/L (ref 1.13–1.33)
CALCIUM SERPL-MCNC: 9.1 MG/DL (ref 8.6–10.4)
CHLORIDE BLD-SCNC: 101 MMOL/L (ref 98–107)
CO2: 28 MMOL/L (ref 20–31)
CREAT SERPL-MCNC: 0.88 MG/DL (ref 0.5–0.9)
DIFFERENTIAL TYPE: ABNORMAL
EKG ATRIAL RATE: 84 BPM
EKG P AXIS: 53 DEGREES
EKG P-R INTERVAL: 168 MS
EKG Q-T INTERVAL: 380 MS
EKG QRS DURATION: 96 MS
EKG QTC CALCULATION (BAZETT): 449 MS
EKG R AXIS: 73 DEGREES
EKG T AXIS: 36 DEGREES
EKG VENTRICULAR RATE: 84 BPM
EOSINOPHILS RELATIVE PERCENT: 2 % (ref 1–4)
GFR AFRICAN AMERICAN: >60 ML/MIN
GFR NON-AFRICAN AMERICAN: >60 ML/MIN
GFR SERPL CREATININE-BSD FRML MDRD: ABNORMAL ML/MIN/{1.73_M2}
GFR SERPL CREATININE-BSD FRML MDRD: ABNORMAL ML/MIN/{1.73_M2}
GLUCOSE BLD-MCNC: 98 MG/DL (ref 70–99)
HCT VFR BLD CALC: 30.8 % (ref 36.3–47.1)
HEMOGLOBIN: 9 G/DL (ref 11.9–15.1)
IMMATURE GRANULOCYTES: 1 %
LYMPHOCYTES # BLD: 36 % (ref 24–43)
MCH RBC QN AUTO: 29.8 PG (ref 25.2–33.5)
MCHC RBC AUTO-ENTMCNC: 29.2 G/DL (ref 28.4–34.8)
MCV RBC AUTO: 102 FL (ref 82.6–102.9)
MONOCYTES # BLD: 10 % (ref 3–12)
NRBC AUTOMATED: 0 PER 100 WBC
PDW BLD-RTO: 14.6 % (ref 11.8–14.4)
PLATELET # BLD: 178 K/UL (ref 138–453)
PLATELET ESTIMATE: ABNORMAL
PMV BLD AUTO: 10.5 FL (ref 8.1–13.5)
POTASSIUM SERPL-SCNC: 4.7 MMOL/L (ref 3.7–5.3)
RBC # BLD: 3.02 M/UL (ref 3.95–5.11)
RBC # BLD: ABNORMAL 10*6/UL
SARS-COV-2, PCR: NORMAL
SARS-COV-2, RAPID: NORMAL
SARS-COV-2: NOT DETECTED
SEG NEUTROPHILS: 51 % (ref 36–65)
SEGMENTED NEUTROPHILS ABSOLUTE COUNT: 2.34 K/UL (ref 1.5–8.1)
SODIUM BLD-SCNC: 141 MMOL/L (ref 135–144)
SOURCE: NORMAL
WBC # BLD: 4.5 K/UL (ref 3.5–11.3)
WBC # BLD: ABNORMAL 10*3/UL

## 2020-05-15 PROCEDURE — U0003 INFECTIOUS AGENT DETECTION BY NUCLEIC ACID (DNA OR RNA); SEVERE ACUTE RESPIRATORY SYNDROME CORONAVIRUS 2 (SARS-COV-2) (CORONAVIRUS DISEASE [COVID-19]), AMPLIFIED PROBE TECHNIQUE, MAKING USE OF HIGH THROUGHPUT TECHNOLOGIES AS DESCRIBED BY CMS-2020-01-R: HCPCS

## 2020-05-15 PROCEDURE — 2580000003 HC RX 258: Performed by: STUDENT IN AN ORGANIZED HEALTH CARE EDUCATION/TRAINING PROGRAM

## 2020-05-15 PROCEDURE — 6370000000 HC RX 637 (ALT 250 FOR IP): Performed by: INTERNAL MEDICINE

## 2020-05-15 PROCEDURE — 93010 ELECTROCARDIOGRAM REPORT: CPT | Performed by: INTERNAL MEDICINE

## 2020-05-15 PROCEDURE — 6370000000 HC RX 637 (ALT 250 FOR IP): Performed by: PHYSICIAN ASSISTANT

## 2020-05-15 PROCEDURE — 6360000002 HC RX W HCPCS: Performed by: PHYSICIAN ASSISTANT

## 2020-05-15 PROCEDURE — 94640 AIRWAY INHALATION TREATMENT: CPT

## 2020-05-15 PROCEDURE — 2700000000 HC OXYGEN THERAPY PER DAY

## 2020-05-15 PROCEDURE — 93005 ELECTROCARDIOGRAM TRACING: CPT | Performed by: STUDENT IN AN ORGANIZED HEALTH CARE EDUCATION/TRAINING PROGRAM

## 2020-05-15 PROCEDURE — APPSS45 APP SPLIT SHARED TIME 31-45 MINUTES: Performed by: PHYSICIAN ASSISTANT

## 2020-05-15 PROCEDURE — 94761 N-INVAS EAR/PLS OXIMETRY MLT: CPT

## 2020-05-15 PROCEDURE — 6370000000 HC RX 637 (ALT 250 FOR IP): Performed by: STUDENT IN AN ORGANIZED HEALTH CARE EDUCATION/TRAINING PROGRAM

## 2020-05-15 PROCEDURE — 99233 SBSQ HOSP IP/OBS HIGH 50: CPT | Performed by: INTERNAL MEDICINE

## 2020-05-15 PROCEDURE — 80048 BASIC METABOLIC PNL TOTAL CA: CPT

## 2020-05-15 PROCEDURE — 85025 COMPLETE CBC W/AUTO DIFF WBC: CPT

## 2020-05-15 PROCEDURE — 94003 VENT MGMT INPAT SUBQ DAY: CPT

## 2020-05-15 PROCEDURE — 6360000002 HC RX W HCPCS: Performed by: STUDENT IN AN ORGANIZED HEALTH CARE EDUCATION/TRAINING PROGRAM

## 2020-05-15 PROCEDURE — 82330 ASSAY OF CALCIUM: CPT

## 2020-05-15 PROCEDURE — 99291 CRITICAL CARE FIRST HOUR: CPT | Performed by: INTERNAL MEDICINE

## 2020-05-15 PROCEDURE — 94770 HC ETCO2 MONITOR DAILY: CPT

## 2020-05-15 RX ORDER — CARBAMAZEPINE 200 MG/1
200 TABLET ORAL 2 TIMES DAILY
Status: DISCONTINUED | OUTPATIENT
Start: 2020-05-15 | End: 2020-05-15 | Stop reason: HOSPADM

## 2020-05-15 RX ORDER — DOXYCYCLINE HYCLATE 100 MG
100 TABLET ORAL EVERY 12 HOURS SCHEDULED
Qty: 20 TABLET | Refills: 0 | DISCHARGE
Start: 2020-05-15 | End: 2020-05-25

## 2020-05-15 RX ADMIN — BUMETANIDE 1 MG: 1 TABLET ORAL at 10:04

## 2020-05-15 RX ADMIN — ESCITALOPRAM OXALATE 20 MG: 10 TABLET ORAL at 10:12

## 2020-05-15 RX ADMIN — ENOXAPARIN SODIUM 30 MG: 100 INJECTION SUBCUTANEOUS at 10:13

## 2020-05-15 RX ADMIN — IPRATROPIUM BROMIDE AND ALBUTEROL SULFATE 1 AMPULE: .5; 3 SOLUTION RESPIRATORY (INHALATION) at 15:20

## 2020-05-15 RX ADMIN — IPRATROPIUM BROMIDE AND ALBUTEROL SULFATE 1 AMPULE: .5; 3 SOLUTION RESPIRATORY (INHALATION) at 09:13

## 2020-05-15 RX ADMIN — CARBAMAZEPINE 200 MG: 200 TABLET ORAL at 10:11

## 2020-05-15 RX ADMIN — METHYLPREDNISOLONE SODIUM SUCCINATE 40 MG: 40 INJECTION, POWDER, FOR SOLUTION INTRAMUSCULAR; INTRAVENOUS at 10:14

## 2020-05-15 RX ADMIN — FERROUS SULFATE TAB EC 325 MG (65 MG FE EQUIVALENT) 325 MG: 325 (65 FE) TABLET DELAYED RESPONSE at 10:12

## 2020-05-15 RX ADMIN — LEVOTHYROXINE SODIUM 200 MCG: 100 TABLET ORAL at 10:05

## 2020-05-15 RX ADMIN — PANTOPRAZOLE SODIUM 40 MG: 40 TABLET, DELAYED RELEASE ORAL at 10:05

## 2020-05-15 RX ADMIN — HYDROXYCHLOROQUINE SULFATE 200 MG: 200 TABLET, FILM COATED ORAL at 10:13

## 2020-05-15 RX ADMIN — CETIRIZINE HYDROCHLORIDE 10 MG: 10 TABLET ORAL at 10:11

## 2020-05-15 RX ADMIN — DOXYCYCLINE HYCLATE 100 MG: 100 TABLET, COATED ORAL at 10:12

## 2020-05-15 RX ADMIN — SODIUM CHLORIDE, PRESERVATIVE FREE 10 ML: 5 INJECTION INTRAVENOUS at 10:15

## 2020-05-15 ASSESSMENT — PAIN SCALES - GENERAL: PAINLEVEL_OUTOF10: 0

## 2020-05-15 ASSESSMENT — PULMONARY FUNCTION TESTS
PIF_VALUE: 28
PIF_VALUE: 24

## 2020-05-15 NOTE — PROGRESS NOTES
INTENSIVE CARE UNIT  Resident Physician Progress Note    Patient - Valencia Ro  Date of Admission -  5/10/2020 10:16 PM  Date of Evaluation -  5/15/2020  Room and Bed Number -  6961/1562-43   Hospital Day -       SUBJECTIVE:     OVERNIGHT EVENTS:      No acute events overnight. FiO2 reduced from 40 to 35%. Vent settings otherwise unchanged. Pt resting comfortably. Remains afebrile and hemodynamically stable. Case discussed with nursing.     AWAKE & FOLLOWING COMMANDS:  [] No   [x] Yes    SECRETIONS Amount:  [] Small [] Moderate  [] Large  [x] None  Color:     [] White [] Colored  [] Bloody    SEDATION:  RAAS Score:  [] Propofol gtt  [] Versed gtt  [] Ativan gtt   [x] No Sedation    PARALYZED:  [x] No    [] Yes    VASOPRESSORS:  [x] No    [] Yes  [] Levophed [] Dopamine [] Vasopressin  [] Dobutamine [] Phenylephrine [] Epinephrine      OBJECTIVE:     VITAL SIGNS:  BP (!) 130/59   Pulse 75   Temp 98.2 °F (36.8 °C) (Axillary)   Resp 23   Ht 5' 5\" (1.651 m)   Wt (!) 607 lb 9.4 oz (275.6 kg)   SpO2 99%   .11 kg/m²   Tmax over 24 hours:  Temp (24hrs), Av.2 °F (36.8 °C), Min:98.2 °F (36.8 °C), Max:98.2 °F (36.8 °C)      Patient Vitals for the past 8 hrs:   Pulse Resp SpO2   05/15/20 0913 75 23 99 %   05/15/20 0600 82 24 100 %   05/15/20 0500 68 18 98 %   05/15/20 0424 75 19 97 %   05/15/20 0400 79 21 98 %   05/15/20 0300 82 23 98 %         Intake/Output Summary (Last 24 hours) at 5/15/2020 1032  Last data filed at 5/15/2020 0500  Gross per 24 hour   Intake --   Output 850 ml   Net -850 ml     Date 05/15/20 0000 - 05/15/20 2359   Shift 9335-5518 1776-1463 4748-0552 24 Hour Total   INTAKE   Shift Total(mL/kg)       OUTPUT   Urine(mL/kg/hr) 350(0.2)   350   Shift Total(mL/kg) 350(1.3)   350(1.3)   Weight (kg) 275.6 275.6 275.6 275.6     Wt Readings from Last 3 Encounters:   20 (!) 607 lb 9.4 oz (275.6 kg)   05/10/20 (!) 622 lb (282.1 kg)   20 (!) 677 lb 14.6 oz (307.5 kg)     Body mass index is 101.11 kg/m². PHYSICAL EXAM:    Due to the current efforts to prevent transmission of COVID-19 and also the need to preserve PPE for other caregivers, a face-to-face encounter with the patient was not performed. That being said, all relevant records and diagnostic tests were reviewed, including laboratory results and imaging. Please reference any relevant documentation elsewhere. Care will be coordinated with the primary service. MEDICATIONS:  Scheduled Meds:   carBAMazepine  200 mg Oral BID    pantoprazole  40 mg Oral QAM AC    escitalopram  20 mg Oral Daily    ferrous sulfate  325 mg Oral BID WC    cetirizine  10 mg Oral Daily    insulin lispro  0-6 Units Subcutaneous TID WC    insulin lispro  0-3 Units Subcutaneous Nightly    doxycycline hyclate  100 mg Oral 2 times per day    ketamine  0.5 mg/kg Intramuscular Once    bumetanide  1 mg Oral Daily    ipratropium-albuterol  1 ampule Inhalation Q6H    sodium chloride flush  10 mL Intravenous 2 times per day    enoxaparin  30 mg Subcutaneous BID    levothyroxine  200 mcg Oral Daily     Continuous Infusions:   dextrose      dexmedetomidine Stopped (05/12/20 1900)     PRN Meds:   glucose, 15 g, PRN  dextrose, 12.5 g, PRN  glucagon (rDNA), 1 mg, PRN  dextrose, 100 mL/hr, PRN  albuterol sulfate HFA, 2 puff, Q6H PRN  sodium chloride flush, 10 mL, PRN  acetaminophen, 650 mg, Q6H PRN    Or  acetaminophen, 650 mg, Q6H PRN  polyethylene glycol, 17 g, Daily PRN  promethazine, 12.5 mg, Q6H PRN    Or  ondansetron, 4 mg, Q6H PRN  docusate sodium, 100 mg, Daily PRN        SUPPORT DEVICES: [x] Ventilator [] BIPAP  [] Nasal Cannula [] Room Air    VENT SETTINGS (Comprehensive) (if applicable):   PRVC mode, FiO2 40%, PEEP 6, Respiratory Rate 22, Tidal Volume 450  Vent Information  $Ventilation: $Subsequent Day  Skin Assessment: Clean, dry, & intact  Suction Catheter Diameter: 14  Equipment ID: [id number]  Equipment Changed: HME  Vent Type: Servo i  Vent Unfract.  Heparin Subcut  [] EPC Cuffs    NUTRITION:  [] NPO [] Tube Feeding (Specify: ) [] TPN  [x] PO    HOME MEDS RECONCILED: [x] No  [] Yes    CONSULTATION NEEDED:  [] No  [x] Yes    FAMILY UPDATED:    [x] No  [] Yes    TRANSFER OUT OF ICU:   [x] No  [] Yes        Additional Assessment:  ·   Additional assessment:     · COVID-19 positive test (U07.1, COVID-19) with Acute Pneumonia (J12.89, Other viral pneumonia)  · Acute on chronic hypoxic hypercapnic respiratory failure  · Morbid obesity  · Chronic tracheostomy tube   · CKD  · Seizure disorder  · Hypothyroidism    Plan:     #acute on chronic hypoxic hypercapnic respiratory failure secondary to covid-19 pneumonia  - wean as tolerated  - covid - yesterday, will repeat today  - solumedrol completed today  - plaquenil completed 5/14  - on doxycycline for mycoplasma, ID following.   - maintain negative fluid balance  - continue home dose of bumex 1 mg daily    #CKD  - creatinine near baseline, continue to monitor    #Chronic tracheostomy  - requires CPAP on vent at night    #Anemia   - likely multifactorial with chronic disease, acute illness, iron deficiency   - continue iron supplementation  - continue to monitor     - lovenox DVT prophylaxis  - monitor blood glucose, insulin sliding scale  - discharge planning to LTAC   - remove mesa when appropriate    Electronically signed by Tera Patel PA-C on 5/15/20 at 10:34 AM EDT      5/15/2020 10:32 AM

## 2020-05-15 NOTE — DISCHARGE SUMMARY
Raul Power 19    Discharge Summary     Patient ID: Israel Lujan  :  1970   MRN: [id number]     ACCOUNT:  [de-identified]   Patient's PCP: King Oliveira DO  Admit Date: 5/10/2020   Discharge Date: 5/15/2020   Length of Stay: 5  Code Status:  Full Code  Admitting Physician: Deepak Long MD  Discharge Physician: Ivonne Nance PA-C     Active Discharge Diagnoses:     Hospital Problem Lists:  Active Problems:    Chronic respiratory failure requiring use of nocturnal mechanical ventilation through tracheostomy ([facility]    Morbid obesity with BMI of 70 and over, adult ([facility]    COVID-19    COVID-19 virus infection    Acute on chronic respiratory failure with hypoxemia ([facility]  Resolved Problems:    * No resolved hospital problems. *      Admission Condition:  poor     Discharged Condition: fair    Hospital Stay:     Hospital Course:      Per admission H&P:    Ehsan Marquez is a 54 y.o. female  presented initially at [facility] with increasing shortness of breath since last 2 weeks. Patient was recently discharged 2 weeks back from hospital after she was admitted to the hospital for suspected Don Crimes was negative but she found to have bilateral infiltrates and mild mycoplasma pneumonia she was treated with antibiotics, improved and discharged to living facility,      Patient is morbidly obese, has a history of chronic trach, trach dependent because of chronic hypoxic hypercapnic respiratory failure, RU, and using nocturnal ventilator, she is living at [facility], a weight loss facility. At [facility] test was positive,   Chest x-ray showed diffuse pulmonary opacities, when compared with previous chest x-ray 2 weeks back, aeration looks improved but the opacities persist,  VBG is at [facility] showed pH 7.33/60 6/50 7/35.4, proBNP 240, , creatinine 1.2, that is slightly higher than her baseline.   Patient was placed on mechanical ventilation and was admitted to [place] [address]. Currently patient is sedated Versed, hemodynamically low stable with systolic blood pressure 372V,. Saturation 100%. Patient received ceftriaxone on last visit and tolerated although the allergy listed shortness of breath with penicillins. \"    Pt admitted to [facility] ICU for acute on chronic hypoxic/hypercapnic respiratory failure. At the time of admission, she was tachycardic and tachypneic, with respiratory failure suggesting sepsis secondary to covid-19 pneumonia. She typically only requires ventilatory support at night with CPAP. During her stay, she remained on the ventilator continuously. At the time of discharge her settings were as follows: PRVC, rate 22, Vt 450, PEEP 5, FiO2 35%. ID was consulted and she was treated with a 5 day course of plaquenil and received a 5 day course of solumedrol. She remained afebrile and hemodynamically stable, sepsis resolved. She was discharged to [facility]. Notably, she had titers suggesting mycoplasma pneumonia and was started on doxycycline per infectious disease. Doxycycline to be continued at a dose of 100 mg bid until 5/25. Covid test negative on 5/14 and 5/15.     Significant Diagnostic Studies:   Labs / Micro:  CBC:   Lab Results   Component Value Date    WBC 4.5 05/15/2020    RBC 3.02 05/15/2020    HGB 9.0 05/15/2020    HCT 30.8 05/15/2020    .0 05/15/2020    MCH 29.8 05/15/2020    MCHC 29.2 05/15/2020    RDW 14.6 05/15/2020     05/15/2020     BMP:    Lab Results   Component Value Date    GLUCOSE 98 05/15/2020     05/15/2020    K 4.7 05/15/2020     05/15/2020    CO2 28 05/15/2020    ANIONGAP 12 05/15/2020    BUN 25 05/15/2020    CREATININE 0.88 05/15/2020    BUNCRER NOT REPORTED 05/15/2020    CALCIUM 9.1 05/15/2020    LABGLOM >60 05/15/2020    GFRAA >60 05/15/2020    GFR      05/15/2020    GFR NOT REPORTED 05/15/2020     HFP:    Lab Results   Component Value Date

## 2020-05-15 NOTE — DISCHARGE INSTR - COC
internal jugular, insertion date: 05/11/2020    Nursing Mobility/ADLs:  Walking   Dependent  Transfer  [address]  Dependent  Med Delivery   whole    Wound Care Documentation and Therapy:        Elimination:  Continence:   · Bowel:  Yes  · Bladder: Yes  Urinary Catheter: None   Colostomy/Ileostomy/Ileal Conduit: No       Date of Last BM: 5/14/2020    Intake/Output Summary (Last 24 hours) at 5/15/2020 1246  Last data filed at 5/15/2020 1000  Gross per 24 hour   Intake 250 ml   Output 1125 ml   Net -875 ml     I/O last 3 completed shifts:  In: -   Out: Guidogen 38 [Urine:1050]    Safety Concerns:     Aspiration Risk    Impairments/Disabilities:      Speech    Nutrition Therapy:  Current Nutrition Therapy:   - Oral Diet:  General    Routes of Feeding: Oral  Liquids: No Restrictions  Daily Fluid Restriction: no  Last Modified Barium Swallow with Video (Video Swallowing Test): not done    Treatments at the Time of Hospital Discharge:   Respiratory Treatments:   Ventilator:    - Ventilator Settings:    Vt Ordered: 450 mL  Rate Set: 22 bmp  FiO2 : 35 %    PEEP/CPAP: 5       Rehab Therapies: Physical Therapy and Occupational Therapy  Weight Bearing Status/Restrictions: No weight bearing restirctions  Other Medical Equipment (for information only, NOT a DME order):  hospital bed  Other Treatments:     Patient's personal belongings (please select all that are sent with patient):  purse, phone, travel bag    RN SIGNATURE:  Electronically signed by Pineda Quinn RN on 5/15/20 at 1:31 PM EDT    CASE MANAGEMENT/SOCIAL WORK SECTION    Inpatient Status Date: 05/10/2020    Readmission Risk Assessment Score:  Readmission Risk              Risk of Unplanned Readmission:        29           Discharging to Facility/ Agency   · Name: Ubaldo Ornelas  Address:  [facility] [address]         Phone:

## 2020-05-15 NOTE — PROGRESS NOTES
DATE: 5/15/2020    NAME: Norma Douglas  MRN: [id number]   : 1970    Patient not seen this date for Physical Therapy due to:  [] Blood transfusion in progress  [] Hemodialysis  [x] Patient Declined     Pt refused toget up to sit at EOB. Pt agreed to perform There Ex with the Thera Band provided by PT. Pt also reported that she is leaving today.   [] Spine Precautions   [] Strict Bedrest  [] Surgery/ Procedure  [] Testing      [] Other        [] PT being discontinued at this time. Patient independent. No further needs. [] PT being discontinued at this time as the patient has been transferred to palliative care. No further needs.     Mikayla Devlin, PTA

## 2020-05-15 NOTE — CARE COORDINATION
Covid negative x1, retesting today. Intubated, FiO2 35%. Spoke with [facility], notified of the above, will need second neg swab, she is following    [id number] with KHOI Aguirre at [facility] to update to the above. Their criteria for patient to return would be 2 negative swabs, off full-time vent, she will also reach out to her medical director for any other additional criteria    1326 2nd covid swab negative, notified CEFERINO Bowden, ok for DC. Updated Joie with [place], they can take tonight at 7:30 pm, will arrange transportation. Spoke with [place] at [facility], they can accommodate 7:30 pm transport time. Notified Joie at [facility] of confirmed pickup time. Updated REJI Carrasco and Donn Flores RN, they will do lis, also gave Donn Flores number to call report. Updated CEFERINO Bowden R/T pickup time.   Updated patient over the phone, she is agreeable to plan, states she will update family  Updated [facility] in admissions at [facility]  Clinical Case Management Department  Written by: Santana Meraz RN    Patient Name: Yolanda Leroy  Attending Provider: Andrei Aden MD  Admit Date: 5/10/2020 10:16 PM  MRN: [id number]  Account: [de-identified]                     : 1970  Discharge Date:  5/15/2020        Disposition: [facility]    Santana Meraz RN

## 2020-05-15 NOTE — PROGRESS NOTES
Fito Baeza (MRN [id number]) as of 5/15/2020 10:18   Ref. Range 5/14/2020 09:35   SARS-CoV-2 Latest Ref Range: Not Detected  Not Detected   COVID-19 Unknown Rpt       Results for Nilda Camilo (MRN [id number]) as of 5/12/2020 13:49   Ref. Range 3/25/2020 21:04 3/26/2020 08:21 3/26/2020 08:25   Chlamydia pneumoniae By PCR Latest Ref Range: Not Detected    Not Detected   Rhino/Enterovirus PCR Latest Ref Range: Not Detected    Not Detected   Specimen Description Unknown   . NASOPHARYNGEAL SWAB   Human Metapneumovirus PCR Latest Ref Range: Not Detected    Not Detected   Influenza A H1 PCR Latest Ref Range: Not Detected    NOT REPORTED   Adenovirus PCR Latest Ref Range: Not Detected    Not Detected   B Pertussis by PCR Latest Ref Range: Not Detected    Not Detected   Coronavirus 229E PCR Latest Ref Range: Not Detected    Not Detected   Coronavirus HKU1 PCR Latest Ref Range: Not Detected    Not Detected   Coronavirus NL63 PCR Latest Ref Range: Not Detected    Not Detected   Coronavirus OC Latest Ref Range: Not Detected    Not Detected   Influenza A by PCR Latest Ref Range: Not Detected    Not Detected   Influenza A H1 (2009) PCR Latest Ref Range: Not Detected    NOT REPORTED   Influenza A H3 PCR Latest Ref Range: Not Detected    NOT REPORTED   Influenza B by PCR Latest Ref Range: Not Detected    Not Detected   Parainfluenza 1 PCR Latest Ref Range: Not Detected    Not Detected   Parainfluenza 2 PCR Latest Ref Range: Not Detected    Not Detected   Parainfluenza 3 PCR Latest Ref Range: Not Detected    Not Detected   Parainfluenza 4 PCR Latest Ref Range: Not Detected    Not Detected   Resp Syncytial Virus PCR Latest Ref Range: Not Detected    Not Detected   Mycoplasma pneumo by PCR Latest Ref Range: Not Detected    Not Detected   SARS-CoV-2, [place] Latest Ref Range: Not Detected  Not Detected       Results for Nilda Camilo (MRN [id number]) as of 5/12/2020 13:49   Ref.  Range 5/10/2020 06:56 5/10/2020 18:20   Chlamydia pneumoniae By PCR call with questions.     Cecilia Gupta MD  Pager: [phone] - Office: [phone]

## 2020-05-16 NOTE — PROGRESS NOTES
2050 Patient discharged to [facility] via contracted bariatric transport. All belongings in tote et verified by patient. Called [facility], per pt's request, to request dinner upon arrival. See flowsheet. Updated Jade Hernandez, pt's father, r/t disharge from hospital to [facility].

## 2020-05-17 LAB
CULTURE: NORMAL
Lab: NORMAL
SPECIMEN DESCRIPTION: NORMAL

## 2020-06-19 ENCOUNTER — APPOINTMENT (OUTPATIENT)
Dept: GENERAL RADIOLOGY | Age: 50
DRG: 130 | End: 2020-06-19
Payer: MEDICAID

## 2020-06-19 ENCOUNTER — HOSPITAL ENCOUNTER (INPATIENT)
Age: 50
LOS: 10 days | Discharge: NURSING, REHAB OR LONG-TERM CARE | DRG: 130 | End: 2020-06-29
Attending: EMERGENCY MEDICINE | Admitting: INTERNAL MEDICINE
Payer: MEDICAID

## 2020-06-19 ENCOUNTER — APPOINTMENT (OUTPATIENT)
Dept: INTERVENTIONAL RADIOLOGY/VASCULAR | Age: 50
DRG: 130 | End: 2020-06-19
Payer: MEDICAID

## 2020-06-19 PROBLEM — R06.02 SOB (SHORTNESS OF BREATH): Status: ACTIVE | Noted: 2020-06-19

## 2020-06-19 LAB
ABSOLUTE EOS #: 0.1 K/UL (ref 0–0.4)
ABSOLUTE IMMATURE GRANULOCYTE: ABNORMAL K/UL (ref 0–0.3)
ABSOLUTE LYMPH #: 1.1 K/UL (ref 1–4.8)
ABSOLUTE MONO #: 0.3 K/UL (ref 0.1–1.3)
ALBUMIN SERPL-MCNC: 3.7 G/DL (ref 3.5–5.2)
ALBUMIN/GLOBULIN RATIO: ABNORMAL (ref 1–2.5)
ALLEN TEST: ABNORMAL
ALP BLD-CCNC: 66 U/L (ref 35–104)
ALT SERPL-CCNC: 29 U/L (ref 5–33)
ANION GAP SERPL CALCULATED.3IONS-SCNC: 12 MMOL/L (ref 9–17)
AST SERPL-CCNC: 31 U/L
BASOPHILS # BLD: 1 % (ref 0–2)
BASOPHILS ABSOLUTE: 0 K/UL (ref 0–0.2)
BILIRUB SERPL-MCNC: 0.2 MG/DL (ref 0.3–1.2)
BNP INTERPRETATION: ABNORMAL
BUN BLDV-MCNC: 19 MG/DL (ref 6–20)
BUN/CREAT BLD: ABNORMAL (ref 9–20)
CALCIUM SERPL-MCNC: 9.5 MG/DL (ref 8.6–10.4)
CARBOXYHEMOGLOBIN: 2.2 % (ref 0–5)
CHLORIDE BLD-SCNC: 97 MMOL/L (ref 98–107)
CO2: 32 MMOL/L (ref 20–31)
CREAT SERPL-MCNC: 1.09 MG/DL (ref 0.5–0.9)
DIFFERENTIAL TYPE: ABNORMAL
EOSINOPHILS RELATIVE PERCENT: 2 % (ref 0–4)
FIO2: 60
GFR AFRICAN AMERICAN: >60 ML/MIN
GFR NON-AFRICAN AMERICAN: 54 ML/MIN
GFR SERPL CREATININE-BSD FRML MDRD: ABNORMAL ML/MIN/{1.73_M2}
GFR SERPL CREATININE-BSD FRML MDRD: ABNORMAL ML/MIN/{1.73_M2}
GLUCOSE BLD-MCNC: 132 MG/DL (ref 70–99)
HCO3 VENOUS: 35.1 MMOL/L (ref 24–30)
HCT VFR BLD CALC: 32.8 % (ref 36–46)
HEMOGLOBIN: 10.5 G/DL (ref 12–16)
IMMATURE GRANULOCYTES: ABNORMAL %
LACTIC ACID: 1 MMOL/L (ref 0.5–2.2)
LYMPHOCYTES # BLD: 23 % (ref 24–44)
MCH RBC QN AUTO: 30.5 PG (ref 26–34)
MCHC RBC AUTO-ENTMCNC: 31.9 G/DL (ref 31–37)
MCV RBC AUTO: 95.7 FL (ref 80–100)
METHEMOGLOBIN: 0 % (ref 0–1.9)
MODE: ABNORMAL
MONOCYTES # BLD: 7 % (ref 1–7)
NEGATIVE BASE EXCESS, VEN: ABNORMAL MMOL/L (ref 0–2)
NOTIFICATION TIME: ABNORMAL
NOTIFICATION: ABNORMAL
NRBC AUTOMATED: ABNORMAL PER 100 WBC
O2 DEVICE/FLOW/%: ABNORMAL
O2 SAT, VEN: 83.5 % (ref 60–85)
OXYHEMOGLOBIN: ABNORMAL % (ref 95–98)
PATIENT TEMP: ABNORMAL
PCO2, VEN, TEMP ADJ: ABNORMAL MMHG (ref 39–55)
PCO2, VEN: 58.9 (ref 39–55)
PDW BLD-RTO: 15.2 % (ref 11.5–14.9)
PEEP/CPAP: 8
PH VENOUS: 7.38 (ref 7.32–7.42)
PH, VEN, TEMP ADJ: ABNORMAL (ref 7.32–7.42)
PLATELET # BLD: 182 K/UL (ref 150–450)
PLATELET ESTIMATE: ABNORMAL
PMV BLD AUTO: 8.1 FL (ref 6–12)
PO2, VEN, TEMP ADJ: ABNORMAL MMHG (ref 30–50)
PO2, VEN: 48.3 (ref 30–50)
POSITIVE BASE EXCESS, VEN: 10.1 MMOL/L (ref 0–2)
POTASSIUM SERPL-SCNC: 4.8 MMOL/L (ref 3.7–5.3)
PRO-BNP: 414 PG/ML
PROCALCITONIN: 0.16 NG/ML
PSV: ABNORMAL
PT. POSITION: ABNORMAL
RBC # BLD: 3.43 M/UL (ref 4–5.2)
RBC # BLD: ABNORMAL 10*6/UL
RESPIRATORY RATE: 27
SAMPLE SITE: ABNORMAL
SARS-COV-2, PCR: NORMAL
SARS-COV-2, RAPID: NOT DETECTED
SARS-COV-2: NORMAL
SEG NEUTROPHILS: 67 % (ref 36–66)
SEGMENTED NEUTROPHILS ABSOLUTE COUNT: 3.2 K/UL (ref 1.3–9.1)
SET RATE: 19
SODIUM BLD-SCNC: 141 MMOL/L (ref 135–144)
SOURCE: NORMAL
TEXT FOR RESPIRATORY: ABNORMAL
TOTAL HB: ABNORMAL G/DL (ref 12–16)
TOTAL PROTEIN: 8.3 G/DL (ref 6.4–8.3)
TOTAL RATE: 27
TROPONIN INTERP: ABNORMAL
TROPONIN T: ABNORMAL NG/ML
TROPONIN, HIGH SENSITIVITY: 21 NG/L (ref 0–14)
VT: 500
WBC # BLD: 4.8 K/UL (ref 3.5–11)
WBC # BLD: ABNORMAL 10*3/UL

## 2020-06-19 PROCEDURE — 6370000000 HC RX 637 (ALT 250 FOR IP): Performed by: EMERGENCY MEDICINE

## 2020-06-19 PROCEDURE — 6370000000 HC RX 637 (ALT 250 FOR IP): Performed by: STUDENT IN AN ORGANIZED HEALTH CARE EDUCATION/TRAINING PROGRAM

## 2020-06-19 PROCEDURE — 87070 CULTURE OTHR SPECIMN AEROBIC: CPT

## 2020-06-19 PROCEDURE — 6360000002 HC RX W HCPCS: Performed by: EMERGENCY MEDICINE

## 2020-06-19 PROCEDURE — 94002 VENT MGMT INPAT INIT DAY: CPT

## 2020-06-19 PROCEDURE — 71045 X-RAY EXAM CHEST 1 VIEW: CPT

## 2020-06-19 PROCEDURE — 99285 EMERGENCY DEPT VISIT HI MDM: CPT

## 2020-06-19 PROCEDURE — 93005 ELECTROCARDIOGRAM TRACING: CPT | Performed by: EMERGENCY MEDICINE

## 2020-06-19 PROCEDURE — 2500000003 HC RX 250 WO HCPCS: Performed by: EMERGENCY MEDICINE

## 2020-06-19 PROCEDURE — 36415 COLL VENOUS BLD VENIPUNCTURE: CPT

## 2020-06-19 PROCEDURE — 89220 SPUTUM SPECIMEN COLLECTION: CPT

## 2020-06-19 PROCEDURE — 84484 ASSAY OF TROPONIN QUANT: CPT

## 2020-06-19 PROCEDURE — 85025 COMPLETE CBC W/AUTO DIFF WBC: CPT

## 2020-06-19 PROCEDURE — 99223 1ST HOSP IP/OBS HIGH 75: CPT | Performed by: INTERNAL MEDICINE

## 2020-06-19 PROCEDURE — C1769 GUIDE WIRE: HCPCS

## 2020-06-19 PROCEDURE — U0002 COVID-19 LAB TEST NON-CDC: HCPCS

## 2020-06-19 PROCEDURE — 83880 ASSAY OF NATRIURETIC PEPTIDE: CPT

## 2020-06-19 PROCEDURE — 36573 INSJ PICC RS&I 5 YR+: CPT | Performed by: RADIOLOGY

## 2020-06-19 PROCEDURE — 83605 ASSAY OF LACTIC ACID: CPT

## 2020-06-19 PROCEDURE — 5A1955Z RESPIRATORY VENTILATION, GREATER THAN 96 CONSECUTIVE HOURS: ICD-10-PCS | Performed by: INTERNAL MEDICINE

## 2020-06-19 PROCEDURE — 94640 AIRWAY INHALATION TREATMENT: CPT

## 2020-06-19 PROCEDURE — 87641 MR-STAPH DNA AMP PROBE: CPT

## 2020-06-19 PROCEDURE — 6360000002 HC RX W HCPCS: Performed by: STUDENT IN AN ORGANIZED HEALTH CARE EDUCATION/TRAINING PROGRAM

## 2020-06-19 PROCEDURE — 80053 COMPREHEN METABOLIC PANEL: CPT

## 2020-06-19 PROCEDURE — 82800 BLOOD PH: CPT

## 2020-06-19 PROCEDURE — 82805 BLOOD GASES W/O2 SATURATION: CPT

## 2020-06-19 PROCEDURE — 87205 SMEAR GRAM STAIN: CPT

## 2020-06-19 PROCEDURE — 94770 HC ETCO2 MONITOR DAILY: CPT

## 2020-06-19 PROCEDURE — 84145 PROCALCITONIN (PCT): CPT

## 2020-06-19 PROCEDURE — 96365 THER/PROPH/DIAG IV INF INIT: CPT

## 2020-06-19 PROCEDURE — 2580000003 HC RX 258: Performed by: STUDENT IN AN ORGANIZED HEALTH CARE EDUCATION/TRAINING PROGRAM

## 2020-06-19 PROCEDURE — 2580000003 HC RX 258: Performed by: EMERGENCY MEDICINE

## 2020-06-19 PROCEDURE — 2000000000 HC ICU R&B

## 2020-06-19 PROCEDURE — 02HV33Z INSERTION OF INFUSION DEVICE INTO SUPERIOR VENA CAVA, PERCUTANEOUS APPROACH: ICD-10-PCS | Performed by: RADIOLOGY

## 2020-06-19 PROCEDURE — 96375 TX/PRO/DX INJ NEW DRUG ADDON: CPT

## 2020-06-19 PROCEDURE — 94761 N-INVAS EAR/PLS OXIMETRY MLT: CPT

## 2020-06-19 RX ORDER — ONDANSETRON 2 MG/ML
4 INJECTION INTRAMUSCULAR; INTRAVENOUS ONCE
Status: COMPLETED | OUTPATIENT
Start: 2020-06-19 | End: 2020-06-19

## 2020-06-19 RX ORDER — IPRATROPIUM BROMIDE AND ALBUTEROL SULFATE 2.5; .5 MG/3ML; MG/3ML
1 SOLUTION RESPIRATORY (INHALATION) 3 TIMES DAILY
Status: DISCONTINUED | OUTPATIENT
Start: 2020-06-19 | End: 2020-06-20

## 2020-06-19 RX ORDER — LINEZOLID 2 MG/ML
600 INJECTION, SOLUTION INTRAVENOUS EVERY 12 HOURS
Status: DISCONTINUED | OUTPATIENT
Start: 2020-06-19 | End: 2020-06-19

## 2020-06-19 RX ORDER — SODIUM CHLORIDE 0.9 % (FLUSH) 0.9 %
10 SYRINGE (ML) INJECTION PRN
Status: DISCONTINUED | OUTPATIENT
Start: 2020-06-19 | End: 2020-06-29 | Stop reason: HOSPADM

## 2020-06-19 RX ORDER — PROMETHAZINE HYDROCHLORIDE 25 MG/1
12.5 TABLET ORAL EVERY 6 HOURS PRN
Status: DISCONTINUED | OUTPATIENT
Start: 2020-06-19 | End: 2020-06-29 | Stop reason: HOSPADM

## 2020-06-19 RX ORDER — LANOLIN ALCOHOL/MO/W.PET/CERES
CREAM (GRAM) TOPICAL 2 TIMES DAILY
Status: DISCONTINUED | OUTPATIENT
Start: 2020-06-19 | End: 2020-06-19 | Stop reason: SDUPTHER

## 2020-06-19 RX ORDER — ACETAMINOPHEN 325 MG/1
650 TABLET ORAL EVERY 6 HOURS PRN
Status: DISCONTINUED | OUTPATIENT
Start: 2020-06-19 | End: 2020-06-19

## 2020-06-19 RX ORDER — LANOLIN ALCOHOL/MO/W.PET/CERES
CREAM (GRAM) TOPICAL 2 TIMES DAILY
Status: DISCONTINUED | OUTPATIENT
Start: 2020-06-19 | End: 2020-06-29 | Stop reason: HOSPADM

## 2020-06-19 RX ORDER — LEVOFLOXACIN 750 MG/1
750 TABLET ORAL DAILY
Status: DISCONTINUED | OUTPATIENT
Start: 2020-06-19 | End: 2020-06-26

## 2020-06-19 RX ORDER — SODIUM CHLORIDE 0.9 % (FLUSH) 0.9 %
10 SYRINGE (ML) INJECTION EVERY 12 HOURS SCHEDULED
Status: DISCONTINUED | OUTPATIENT
Start: 2020-06-19 | End: 2020-06-29 | Stop reason: HOSPADM

## 2020-06-19 RX ORDER — ONDANSETRON 2 MG/ML
4 INJECTION INTRAMUSCULAR; INTRAVENOUS EVERY 6 HOURS PRN
Status: DISCONTINUED | OUTPATIENT
Start: 2020-06-19 | End: 2020-06-29 | Stop reason: HOSPADM

## 2020-06-19 RX ORDER — M-VIT,TX,IRON,MINS/CALC/FOLIC 27MG-0.4MG
1 TABLET ORAL
Status: DISCONTINUED | OUTPATIENT
Start: 2020-06-20 | End: 2020-06-29 | Stop reason: HOSPADM

## 2020-06-19 RX ORDER — FERROUS SULFATE 325(65) MG
325 TABLET ORAL
Status: DISCONTINUED | OUTPATIENT
Start: 2020-06-19 | End: 2020-06-29 | Stop reason: HOSPADM

## 2020-06-19 RX ORDER — AZITHROMYCIN 500 MG/1
500 TABLET, FILM COATED ORAL ONCE
Status: COMPLETED | OUTPATIENT
Start: 2020-06-19 | End: 2020-06-19

## 2020-06-19 RX ORDER — BENZONATATE 100 MG/1
100 CAPSULE ORAL 2 TIMES DAILY PRN
Status: DISCONTINUED | OUTPATIENT
Start: 2020-06-19 | End: 2020-06-29 | Stop reason: HOSPADM

## 2020-06-19 RX ORDER — LEVOFLOXACIN 5 MG/ML
750 INJECTION, SOLUTION INTRAVENOUS EVERY 24 HOURS
Status: DISCONTINUED | OUTPATIENT
Start: 2020-06-20 | End: 2020-06-19

## 2020-06-19 RX ORDER — LEVOTHYROXINE SODIUM 0.1 MG/1
200 TABLET ORAL DAILY
Status: DISCONTINUED | OUTPATIENT
Start: 2020-06-19 | End: 2020-06-21

## 2020-06-19 RX ORDER — ACETAMINOPHEN 650 MG/1
650 SUPPOSITORY RECTAL EVERY 6 HOURS PRN
Status: DISCONTINUED | OUTPATIENT
Start: 2020-06-19 | End: 2020-06-19

## 2020-06-19 RX ORDER — POLYETHYLENE GLYCOL 3350 17 G/17G
17 POWDER, FOR SOLUTION ORAL DAILY PRN
Status: DISCONTINUED | OUTPATIENT
Start: 2020-06-19 | End: 2020-06-29 | Stop reason: HOSPADM

## 2020-06-19 RX ORDER — DOCUSATE SODIUM 100 MG/1
100 CAPSULE, LIQUID FILLED ORAL DAILY PRN
Status: DISCONTINUED | OUTPATIENT
Start: 2020-06-19 | End: 2020-06-29 | Stop reason: HOSPADM

## 2020-06-19 RX ORDER — ACETAMINOPHEN 500 MG
1000 TABLET ORAL EVERY 6 HOURS PRN
Status: DISCONTINUED | OUTPATIENT
Start: 2020-06-19 | End: 2020-06-29 | Stop reason: HOSPADM

## 2020-06-19 RX ORDER — SODIUM CHLORIDE 9 MG/ML
INJECTION, SOLUTION INTRAVENOUS CONTINUOUS
Status: DISCONTINUED | OUTPATIENT
Start: 2020-06-19 | End: 2020-06-22

## 2020-06-19 RX ORDER — BUMETANIDE 1 MG/1
1 TABLET ORAL 2 TIMES DAILY
Status: DISCONTINUED | OUTPATIENT
Start: 2020-06-19 | End: 2020-06-23

## 2020-06-19 RX ORDER — ESCITALOPRAM OXALATE 20 MG/1
20 TABLET ORAL DAILY
Status: DISCONTINUED | OUTPATIENT
Start: 2020-06-19 | End: 2020-06-28

## 2020-06-19 RX ORDER — CARBAMAZEPINE 200 MG/1
200 TABLET ORAL 2 TIMES DAILY
Status: DISCONTINUED | OUTPATIENT
Start: 2020-06-19 | End: 2020-06-29 | Stop reason: HOSPADM

## 2020-06-19 RX ORDER — LINEZOLID 600 MG/1
600 TABLET, FILM COATED ORAL EVERY 12 HOURS SCHEDULED
Status: DISCONTINUED | OUTPATIENT
Start: 2020-06-19 | End: 2020-06-25

## 2020-06-19 RX ORDER — IBUPROFEN 600 MG/1
600 TABLET ORAL EVERY 6 HOURS PRN
Status: DISCONTINUED | OUTPATIENT
Start: 2020-06-19 | End: 2020-06-26

## 2020-06-19 RX ORDER — ACETAMINOPHEN 650 MG/1
650 SUPPOSITORY RECTAL EVERY 6 HOURS PRN
Status: DISCONTINUED | OUTPATIENT
Start: 2020-06-19 | End: 2020-06-29 | Stop reason: HOSPADM

## 2020-06-19 RX ORDER — LEVOFLOXACIN 5 MG/ML
750 INJECTION, SOLUTION INTRAVENOUS EVERY 24 HOURS
Status: CANCELLED | OUTPATIENT
Start: 2020-06-19

## 2020-06-19 RX ORDER — FAMOTIDINE 20 MG/1
20 TABLET, FILM COATED ORAL 2 TIMES DAILY
Status: DISCONTINUED | OUTPATIENT
Start: 2020-06-19 | End: 2020-06-29 | Stop reason: HOSPADM

## 2020-06-19 RX ADMIN — Medication: at 20:41

## 2020-06-19 RX ADMIN — Medication 10 ML: at 20:40

## 2020-06-19 RX ADMIN — FERROUS SULFATE TAB 325 MG (65 MG ELEMENTAL FE) 325 MG: 325 (65 FE) TAB at 20:41

## 2020-06-19 RX ADMIN — CEFEPIME 2 G: 2 INJECTION, POWDER, FOR SOLUTION INTRAVENOUS at 13:53

## 2020-06-19 RX ADMIN — IPRATROPIUM BROMIDE AND ALBUTEROL SULFATE 3 ML: 2.5; .5 SOLUTION RESPIRATORY (INHALATION) at 19:07

## 2020-06-19 RX ADMIN — ONDANSETRON 4 MG: 2 INJECTION INTRAMUSCULAR; INTRAVENOUS at 14:57

## 2020-06-19 RX ADMIN — BUMETANIDE 1 MG: 1 TABLET ORAL at 20:41

## 2020-06-19 RX ADMIN — CEFEPIME HYDROCHLORIDE 2 G: 2 INJECTION, POWDER, FOR SOLUTION INTRAVENOUS at 22:28

## 2020-06-19 RX ADMIN — DOXYCYCLINE 100 MG: 100 INJECTION, POWDER, LYOPHILIZED, FOR SOLUTION INTRAVENOUS at 17:00

## 2020-06-19 RX ADMIN — AZITHROMYCIN MONOHYDRATE 500 MG: 500 TABLET ORAL at 20:58

## 2020-06-19 RX ADMIN — LEVOFLOXACIN 750 MG: 750 TABLET, FILM COATED ORAL at 20:40

## 2020-06-19 RX ADMIN — ESCITALOPRAM OXALATE 20 MG: 20 TABLET, FILM COATED ORAL at 20:42

## 2020-06-19 RX ADMIN — CARBAMAZEPINE 200 MG: 200 TABLET ORAL at 20:41

## 2020-06-19 RX ADMIN — SODIUM CHLORIDE: 9 INJECTION, SOLUTION INTRAVENOUS at 20:00

## 2020-06-19 RX ADMIN — LINEZOLID 600 MG: 600 TABLET, FILM COATED ORAL at 20:41

## 2020-06-19 ASSESSMENT — PAIN SCALES - GENERAL: PAINLEVEL_OUTOF10: 8

## 2020-06-19 ASSESSMENT — PULMONARY FUNCTION TESTS
PIF_VALUE: 33
PIF_VALUE: 35
PIF_VALUE: 32
PIF_VALUE: 27
PIF_VALUE: 35
PIF_VALUE: 32
PIF_VALUE: 36
PIF_VALUE: 28

## 2020-06-19 ASSESSMENT — ENCOUNTER SYMPTOMS
ABDOMINAL PAIN: 0
COUGH: 1
VOMITING: 0
RHINORRHEA: 0
NAUSEA: 0
DIARRHEA: 0
SHORTNESS OF BREATH: 1
BACK PAIN: 0

## 2020-06-19 ASSESSMENT — PAIN DESCRIPTION - ONSET: ONSET: ON-GOING

## 2020-06-19 ASSESSMENT — PAIN DESCRIPTION - DESCRIPTORS: DESCRIPTORS: ACHING

## 2020-06-19 ASSESSMENT — PAIN DESCRIPTION - LOCATION: LOCATION: SHOULDER

## 2020-06-19 ASSESSMENT — PAIN DESCRIPTION - ORIENTATION: ORIENTATION: RIGHT

## 2020-06-19 ASSESSMENT — PAIN DESCRIPTION - FREQUENCY: FREQUENCY: CONTINUOUS

## 2020-06-19 NOTE — ED NOTES
This RN notified patient father Matthew Perdomo) about patient's status in the ED and that she would be admitted.       Yumiko Lawson RN  06/19/20 9619

## 2020-06-19 NOTE — PROGRESS NOTES
Pt came in from [facility]. Pt is a chronic trach pt. Pt was being manually ventilated. Placed pt on her home settings of  PEEP 8 RR 19 FIO2 60%. PEEP was increased to 8 from 5 to help improve oxygenation. Pt did allow me to suction which I was able to get a small amount of thick yellow secretions. Inner cannula was changed using pt own cannula.

## 2020-06-19 NOTE — ED NOTES
Respiratory notified of sputum culture order and that patient is requesting to be suctioned.       Karli Dotson RN  06/19/20 0722

## 2020-06-19 NOTE — PROGRESS NOTES
Pt to IR for PICC line. Consent form signed. The patient was counseled at length about the risks of yue Covid-19 during their perioperative period and any recovery window from their procedure. The patient was made aware that yue Covid-19  may worsen their prognosis for recovering from their procedure  and lend to a higher morbidity and/or mortality risk. All material risks, benefits, and reasonable alternatives including postponing the procedure were discussed. The patient does wish to proceed with the procedure at this time.

## 2020-06-19 NOTE — H&P
[facility]      [facility]     HISTORY AND PHYSICAL EXAMINATION            Date:   6/19/2020  Patient name:  Lyn Crane  Date of admission:  6/19/2020 11:07 AM  MRN:   [id number]  Account:  [de-identified]  YOB: 1970  PCP:    Herbert Kitchen DO  Room:   E/E  Code Status:    Prior    Chief Complaint:     Chief Complaint   Patient presents with    Shortness of Breath       History Obtained From:     patient, electronic medical record    History of Present Illness: The patient is a 54 y.o. Non-/non  female who presents withShortness of Breath   and she is admitted to the hospital for the management of SOB. 54year old female with PMHx morbid obesity, chronically trach'ed on vent 2/2 chronic hypercapnic hypoxic respiratory failure, RU presented [facility] with 1 month history of increasing SOB, increased secretions, and fatigue. Patient has been at [facility] and has been more dependent on vent recently. Previously patient was vent-dependent at nighttime only and now patient is requiring 24 hour vent support. Her current place is not able to keep her with 24 hour vent support. In the ER, patient was afebrile, VSS. Labs largely unremarkable. CXR showed nodular parenchymal densities bilaterally possibly multifocal pneumonia.     Past Medical History:     Past Medical History:   Diagnosis Date    Anemia     Disease of blood and blood forming organ     History of breast cancer     History of chemotherapy     Hypothyroidism     Lymphedema     Chronic    Morbid obesity ([address]     Respiratory failure ([address]     Tracheostomy present [facility])         Past SurgicalHistory:     Past Surgical History:   Procedure Laterality Date    BRONCHOSCOPY N/A 1/3/2020    BRONCHOSCOPY ATTEMPTED performed by Oscar Patiño MD at [facility]   benzonatate (TESSALON) 100 MG capsule Take 100 mg by mouth 2 times daily as needed for Cough    Historical Provider, MD   acetaminophen (TYLENOL) 325 MG tablet Take 650 mg by mouth every 6 hours as needed for Pain    Historical Provider, MD   ipratropium-albuterol (DUONEB) 0.5-2.5 (3) MG/3ML SOLN nebulizer solution Inhale 1 vial into the lungs every 4 hours as needed for Shortness of Breath    Historical Provider, MD   ferrous sulfate 325 (65 Fe) MG tablet Take 325 mg by mouth 3 times daily (with meals)    Historical Provider, MD   Multiple Vitamins-Minerals (THERAPEUTIC MULTIVITAMIN-MINERALS) tablet Take 1 tablet by mouth daily (with breakfast)    Historical Provider, MD        Allergies:     Zosyn [piperacillin-tazobactam in dex] and Vancomycin    Social History:     Tobacco:    reports that she has never smoked. She has never used smokeless tobacco.  Alcohol:      reports no history of alcohol use. Drug Use:  reports no history of drug use. Family History:     Family History   Problem Relation Age of Onset    Breast Cancer Paternal Aunt     Breast Cancer Paternal Cousin 43    Breast Cancer Paternal Cousin 37    Breast Cancer Paternal Cousin 46    Diabetes Mother     Thyroid Disease Father        Review of Systems:     Positive and Negative as described in HPI. Review of Systems   Constitutional: Positive for fatigue. Negative for chills and fever. Respiratory: Positive for shortness of breath. Increased secretions   Cardiovascular: Negative for chest pain. Gastrointestinal: Negative for abdominal pain, diarrhea, nausea and vomiting. Physical Exam:   /73   Pulse 87   Temp 98.3 °F (36.8 °C) (Axillary)   Resp 21   Wt (!) 607 lb (275.3 kg)   SpO2 100%   .01 kg/m²   Temp (24hrs), Av.5 °F (36.9 °C), Min:98.3 °F (36.8 °C), Max:98.6 °F (37 °C)    No results for input(s): POCGLU in the last 72 hours.     Intake/Output Summary (Last 24 hours) at 2020 1225  Last 11:56 AM   Result Value Ref Range    pH, Arthur 7.384 7.320 - 7.420    pCO2, Arthur 58.9 (H) 39.0 - 55.0    pO2, Arthur 48.3 30.0 - 50.0    HCO3, Venous 35.1 (H) 24.0 - 30.0 mmol/L    Positive Base Excess, Arthur 10.1 (H) 0.0 - 2.0 mmol/L    Negative Base Excess, Arthur NOT REPORTED 0.0 - 2.0 mmol/L    O2 Sat, Arthur 83.5 60.0 - 85.0 %    Total Hb NOT REPORTED 12.0 - 16.0 g/dl    Oxyhemoglobin NOT REPORTED 95.0 - 98.0 %    Carboxyhemoglobin 2.2 0 - 5 %    Methemoglobin 0.0 0.0 - 1.9 %    Pt Temp NOT REPORTED     pH, Arthur, Temp Adj NOT REPORTED 7.320 - 7.420    pCO2, Arthur, Temp Adj NOT REPORTED 39.0 - 55.0 mmHg    pO2, Arthur, Temp Adj NOT REPORTED 30.0 - 50.0 mmHg    O2 Device/Flow/% VENTILATOR     Respiratory Rate 27     Demetrio Test NOT REPORTED     Sample Site NOT REPORTED     Pt.  Position NOT REPORTED     Mode PRVC     Set Rate 19     Total Rate 27          FIO2 60     Peep/Cpap 8     PSV NOT REPORTED     Text for Respiratory RESULTS GIVEN TO RN      NOTIFICATION NOT REPORTED     NOTIFICATION TIME NOT REPORTED    Comprehensive Metabolic Panel    Collection Time: 06/19/20 11:56 AM   Result Value Ref Range    Glucose 132 (H) 70 - 99 mg/dL    BUN 19 6 - 20 mg/dL    CREATININE 1.09 (H) 0.50 - 0.90 mg/dL    Bun/Cre Ratio NOT REPORTED 9 - 20    Calcium 9.5 8.6 - 10.4 mg/dL    Sodium 141 135 - 144 mmol/L    Potassium 4.8 3.7 - 5.3 mmol/L    Chloride 97 (L) 98 - 107 mmol/L    CO2 32 (H) 20 - 31 mmol/L    Anion Gap 12 9 - 17 mmol/L    Alkaline Phosphatase 66 35 - 104 U/L    ALT 29 5 - 33 U/L    AST 31 <32 U/L    Total Bilirubin 0.20 (L) 0.3 - 1.2 mg/dL    Total Protein 8.3 6.4 - 8.3 g/dL    Alb 3.7 3.5 - 5.2 g/dL    Albumin/Globulin Ratio NOT REPORTED 1.0 - 2.5    GFR Non- 54 (L) >60 mL/min    GFR African American >60 >60 mL/min    GFR Comment          GFR Staging NOT REPORTED    Brain Natriuretic Peptide    Collection Time: 06/19/20 11:56 AM   Result Value Ref Range    Pro- (H) <300 pg/mL    BNP Interpretation Pro-BNP Reference Range:    Troponin    Collection Time: 06/19/20 11:56 AM   Result Value Ref Range    Troponin, High Sensitivity 21 (H) 0 - 14 ng/L    Troponin T NOT REPORTED <0.03 ng/mL    Troponin Interp NOT REPORTED    EKG 12 Lead    Collection Time: 06/19/20 11:59 AM   Result Value Ref Range    Ventricular Rate 90 BPM    Atrial Rate 90 BPM    P-R Interval 174 ms    QRS Duration 92 ms    Q-T Interval 370 ms    QTc Calculation (Bazett) 452 ms    P Axis 40 degrees    R Axis 103 degrees    T Axis 65 degrees   COVID-19    Collection Time: 06/19/20  2:13 PM   Result Value Ref Range    SARS-CoV-2          SARS-CoV-2, Rapid Not Detected Not Detected    Source . NASOPHARYNGEAL SWAB     SARS-CoV-2, PCR             Imaging/Diagnostics:  Xr Chest Portable    Result Date: 6/19/2020  EXAMINATION: ONE XRAY VIEW OF THE CHEST 6/19/2020 12:29 pm COMPARISON: AP chest from 05/11/2020 HISTORY: ORDERING SYSTEM PROVIDED HISTORY: sob TECHNOLOGIST PROVIDED HISTORY: sob Reason for Exam: PT CO SOB and fatigue, PT on permanent trach. Acuity: Chronic Type of Exam: Ongoing History of breast cancer, morbid obesity, respiratory failure, and CHF. FINDINGS: Tracheostomy tube in unchanged position. Right subclavian chemo port with unchanged catheter tip position near the cavoatrial junction. Overlying necklace. Clips right axilla. Cardiomediastinal shadow stable. Low lung volumes. Patchy bilateral dense airspace opacities, unchanged or perhaps slightly smaller. No new pulmonary abnormality or large pleural effusion. No pneumothorax. Bones unchanged. Stable or slightly improved findings; bilateral multifocal airspace opacities, most likely either infection, or possibly metastatic disease or pulmonary edema.        Assessment :      Primary Problem  SOB (shortness of breath)    Active Hospital Problems    Diagnosis Date Noted    SOB (shortness of breath) [R06.02] 06/19/2020    Morbid obesity with BMI of 70 and over, adult ([facility] [E66.01, Z68.45] 03/30/2020    Tracheostomy dependent ([facility] [Z93.0] 11/20/2018    Chronic respiratory failure requiring continuous mechanical ventilation through tracheostomy ([facility] [J96.10, Z93.0, Z99.11] 11/20/2018       Plan:     Patient status Admit as inpatient in the  Medical ICU    Chronic respiratory failure with hypercapnia and hypoxemia, chronically trach'ed on vent, possibly underlying pneumonia causing worsening of her respiratory status  - CXR suggestive of multifocal pneumonia  - afebrile, VSS  - no leukocytosis  - COVID negative  - f/u pneumonia work, strep pneumo, legionella, sputum culture  - f/u procal  - treat for VCAP, cefepime, zyvox and levaquin  - pulmonology consulted, follow up recs  - continue mucinex, tessalon  - RT eval and treat    DVT ppx: lovenox 30 mg sq BID  GI ppx: pepcid 20 mg PO BID  Dispo: patient cannot stay at [facility] because patient is requiring 24 hour vent support. Patient will need placement at a facility that will be able to provide 24 hour vent support. Consultations:   IP CONSULT TO PULMONOLOGY  IP CONSULT TO PULMONOLOGY  IP CONSULT TO SOCIAL WORK    Patient is admitted as inpatient status because of co-morbiditieslisted above, severity of signs and symptoms as outlined, requirement for current medical therapies and most importantly because of direct risk to patient if care not provided in a hospital setting. Alexei Catalan MD  6/19/2020  5:15 PM    Copy sent to Dr. Alexander Donaldson DO  Attending Physician Statement  I have discussed the care of [facility] and I have examined the patient myselft and taken ros and hpi , including pertinent history and exam findings,  with the resident. I have reviewed the key elements of all parts of the encounter with the resident. I agree with the assessment, plan and orders as documented by the resident.   Late note entry      Electronically signed by Chelita Curtis MD

## 2020-06-19 NOTE — ED PROVIDER NOTES
[address] [facility] ICU  EMERGENCY DEPARTMENT ENCOUNTER      Pt Name: Blake Flaherty  MRN: [id number]  [place] 1970  Date of evaluation: 6/19/20      CHIEF COMPLAINT     Shortness of breath    HISTORY OF PRESENT ILLNESS   HPI 54 y.o. female is sent from her nursing home for evaluation of shortness of breath. The patient has chronic respiratory failure. She has multiple chronic medical comorbidities including tracheostomy dependence because of chronic hypoxic and hypercapnic respiratory failure from RU, obesity hypoventilation syndrome, morbid obesity. She has a tracheostomy and she is on the ventilator in the evenings. Per the nursing home report, she is now requiring the ventilator around-the-clock and they are unable to provide the service and she needs to be transferred to a different long term care facility. In speaking with the patient she says that over the last few days she has had worsening tracheal secretions and more shortness of breath. She denies any chest pain. The patient has had multiple admissions for pneumonia over the last few months. In May she was admitted for COVID-19. She tested positive on 10 May, but then tested negative on the 14th and again on 15 May discharge back to her facility.     REVIEW OF SYSTEMS     Review of Systems   Constitutional: Negative for fever. HENT: Negative for congestion and rhinorrhea. Eyes: Negative for visual disturbance. Respiratory: Positive for cough and shortness of breath. Cardiovascular: Negative for chest pain. Gastrointestinal: Negative for abdominal pain, diarrhea and vomiting. Genitourinary: Negative for dysuria. Musculoskeletal: Negative for back pain. Skin: Negative for rash. Neurological: Negative for headaches. Psychiatric/Behavioral: Negative for confusion.          PAST MEDICAL HISTORY     Past Medical History:   Diagnosis Date    Anemia     Disease of blood and blood forming organ     History of breast cancer     History of metabolic acidosis. We'll obtain a cbc, cmp, ekg,  bnp, and chest xray. Pt treated with . The patient is put on a cardiac, O2 monitor. We will monitor closely and reassess. Emergency Department course:  12:11 PM EDT  VBG pH 7.38.  CO2 58.9  Pt refusing covid testing. Stable on PRVC 500, 60%, 19, 8.     1 PM  Stable chest x-ray bilateral multifocal airspace opacities. 3:12 PM EDT  D/w Dr. Vishnu Villanueva. He would like a MRSA swab, a PICC line and a respiratory culture. These have been ordered. COVID negative    DIAGNOSTIC RESULTS     EKG: All EKG's are interpreted by the Emergency Department Physician who either signs or Co-signs this chart in the absence of a cardiologist.    EKG shows a sinus rhythm. HR is 90, , QRS 92, , no DILIA, No STD, No TWI, the axis is rightwards      RADIOLOGY:All plain film, CT, MRI, and formal ultrasound images (except ED bedside ultrasound) are read by the radiologist and the images and interpretations are directly viewed by the emergency physician. XR CHEST (SINGLE VIEW FRONTAL)   Final Result   The tracheostomy tube is in satisfactory position. A new left PICC line was noted with the distal tip in the region of the right   atrium. Right Sppvwi-I-Ubbj with the distal tip in the region of the superior vena   cava. No pneumothorax was identified. No cardiomegaly or interstitial edema. Nodular parenchymal densities were noted bilaterally which could represent   multifocal pneumonia or tumor. This is unchanged from the earlier exam.         IR FLUORO GUIDED CVA DEVICE PLMT/REPLACE/REMOVAL   Final Result   Successful ultrasound guided PICC placement         XR CHEST PORTABLE   Final Result   Stable or slightly improved findings; bilateral multifocal airspace   opacities, most likely either infection, or possibly metastatic disease or   pulmonary edema.              LABS: All lab results were reviewed by myself, and all abnormals are listed

## 2020-06-19 NOTE — ED NOTES
Per Dr. Seng Irizarry pt does not need droplet plus isolation at this time.      Leonel Christian RN  06/19/20 0444

## 2020-06-19 NOTE — ED NOTES
Patient didn't receive either zithromax or doxycycline ATB infusions while in ED. Caitlyn Schmitt RN from ICU notified.       Ariella Jones RN  06/19/20 0192

## 2020-06-20 ENCOUNTER — APPOINTMENT (OUTPATIENT)
Dept: GENERAL RADIOLOGY | Age: 50
DRG: 130 | End: 2020-06-20
Payer: MEDICAID

## 2020-06-20 LAB
ABSOLUTE EOS #: 0.1 K/UL (ref 0–0.4)
ABSOLUTE IMMATURE GRANULOCYTE: ABNORMAL K/UL (ref 0–0.3)
ABSOLUTE LYMPH #: 1 K/UL (ref 1–4.8)
ABSOLUTE MONO #: 0.5 K/UL (ref 0.1–1.3)
ALLEN TEST: ABNORMAL
ANION GAP SERPL CALCULATED.3IONS-SCNC: 9 MMOL/L (ref 9–17)
BASOPHILS # BLD: 0 % (ref 0–2)
BASOPHILS ABSOLUTE: 0 K/UL (ref 0–0.2)
BUN BLDV-MCNC: 18 MG/DL (ref 6–20)
BUN/CREAT BLD: ABNORMAL (ref 9–20)
CALCIUM SERPL-MCNC: 9.3 MG/DL (ref 8.6–10.4)
CARBOXYHEMOGLOBIN: 4 % (ref 0–5)
CHLORIDE BLD-SCNC: 97 MMOL/L (ref 98–107)
CO2: 33 MMOL/L (ref 20–31)
CREAT SERPL-MCNC: 1.19 MG/DL (ref 0.5–0.9)
DIFFERENTIAL TYPE: ABNORMAL
EKG ATRIAL RATE: 90 BPM
EKG P AXIS: 40 DEGREES
EKG P-R INTERVAL: 174 MS
EKG Q-T INTERVAL: 370 MS
EKG QRS DURATION: 92 MS
EKG QTC CALCULATION (BAZETT): 452 MS
EKG R AXIS: 103 DEGREES
EKG T AXIS: 65 DEGREES
EKG VENTRICULAR RATE: 90 BPM
EOSINOPHILS RELATIVE PERCENT: 2 % (ref 0–4)
FIO2: ABNORMAL
GFR AFRICAN AMERICAN: 59 ML/MIN
GFR NON-AFRICAN AMERICAN: 49 ML/MIN
GFR SERPL CREATININE-BSD FRML MDRD: ABNORMAL ML/MIN/{1.73_M2}
GFR SERPL CREATININE-BSD FRML MDRD: ABNORMAL ML/MIN/{1.73_M2}
GLUCOSE BLD-MCNC: 110 MG/DL (ref 70–99)
HCO3 VENOUS: 35.1 MMOL/L (ref 24–30)
HCT VFR BLD CALC: 30.3 % (ref 36–46)
HEMOGLOBIN: 9.5 G/DL (ref 12–16)
IMMATURE GRANULOCYTES: ABNORMAL %
LEGIONELLA PNEUMOPHILIA AG, URINE: NEGATIVE
LYMPHOCYTES # BLD: 22 % (ref 24–44)
MCH RBC QN AUTO: 29.4 PG (ref 26–34)
MCHC RBC AUTO-ENTMCNC: 31.5 G/DL (ref 31–37)
MCV RBC AUTO: 93.5 FL (ref 80–100)
METHEMOGLOBIN: 0.9 % (ref 0–1.9)
MODE: ABNORMAL
MONOCYTES # BLD: 11 % (ref 1–7)
MRSA, DNA, NASAL: ABNORMAL
NEGATIVE BASE EXCESS, VEN: ABNORMAL MMOL/L (ref 0–2)
NOTIFICATION TIME: ABNORMAL
NOTIFICATION: ABNORMAL
NRBC AUTOMATED: ABNORMAL PER 100 WBC
O2 DEVICE/FLOW/%: ABNORMAL
O2 SAT, VEN: 92.6 % (ref 60–85)
OXYHEMOGLOBIN: ABNORMAL % (ref 95–98)
PATIENT TEMP: 37
PCO2, VEN, TEMP ADJ: ABNORMAL MMHG (ref 39–55)
PCO2, VEN: 49.1 (ref 39–55)
PDW BLD-RTO: 15 % (ref 11.5–14.9)
PEEP/CPAP: ABNORMAL
PH VENOUS: 7.46 (ref 7.32–7.42)
PH, VEN, TEMP ADJ: ABNORMAL (ref 7.32–7.42)
PLATELET # BLD: 172 K/UL (ref 150–450)
PLATELET ESTIMATE: ABNORMAL
PMV BLD AUTO: 8.1 FL (ref 6–12)
PO2, VEN, TEMP ADJ: ABNORMAL MMHG (ref 30–50)
PO2, VEN: 82.2 (ref 30–50)
POSITIVE BASE EXCESS, VEN: 11.3 MMOL/L (ref 0–2)
POTASSIUM SERPL-SCNC: 4.5 MMOL/L (ref 3.7–5.3)
PSV: ABNORMAL
PT. POSITION: ABNORMAL
RBC # BLD: 3.24 M/UL (ref 4–5.2)
RBC # BLD: ABNORMAL 10*6/UL
RESPIRATORY RATE: ABNORMAL
SAMPLE SITE: ABNORMAL
SEG NEUTROPHILS: 65 % (ref 36–66)
SEGMENTED NEUTROPHILS ABSOLUTE COUNT: 3.1 K/UL (ref 1.3–9.1)
SET RATE: ABNORMAL
SODIUM BLD-SCNC: 139 MMOL/L (ref 135–144)
SOURCE: NORMAL
SPECIMEN DESCRIPTION: ABNORMAL
STREP PNEUMONIAE ANTIGEN: NEGATIVE
TEXT FOR RESPIRATORY: ABNORMAL
TOTAL HB: ABNORMAL G/DL (ref 12–16)
TOTAL RATE: ABNORMAL
VT: ABNORMAL
WBC # BLD: 4.7 K/UL (ref 3.5–11)
WBC # BLD: ABNORMAL 10*3/UL

## 2020-06-20 PROCEDURE — 82805 BLOOD GASES W/O2 SATURATION: CPT

## 2020-06-20 PROCEDURE — 2000000000 HC ICU R&B

## 2020-06-20 PROCEDURE — 94640 AIRWAY INHALATION TREATMENT: CPT

## 2020-06-20 PROCEDURE — 94003 VENT MGMT INPAT SUBQ DAY: CPT

## 2020-06-20 PROCEDURE — 87449 NOS EACH ORGANISM AG IA: CPT

## 2020-06-20 PROCEDURE — 2580000003 HC RX 258: Performed by: STUDENT IN AN ORGANIZED HEALTH CARE EDUCATION/TRAINING PROGRAM

## 2020-06-20 PROCEDURE — 6370000000 HC RX 637 (ALT 250 FOR IP): Performed by: STUDENT IN AN ORGANIZED HEALTH CARE EDUCATION/TRAINING PROGRAM

## 2020-06-20 PROCEDURE — 87899 AGENT NOS ASSAY W/OPTIC: CPT

## 2020-06-20 PROCEDURE — 36415 COLL VENOUS BLD VENIPUNCTURE: CPT

## 2020-06-20 PROCEDURE — 71045 X-RAY EXAM CHEST 1 VIEW: CPT

## 2020-06-20 PROCEDURE — 6360000002 HC RX W HCPCS: Performed by: INTERNAL MEDICINE

## 2020-06-20 PROCEDURE — 6360000002 HC RX W HCPCS: Performed by: STUDENT IN AN ORGANIZED HEALTH CARE EDUCATION/TRAINING PROGRAM

## 2020-06-20 PROCEDURE — 2580000003 HC RX 258: Performed by: INTERNAL MEDICINE

## 2020-06-20 PROCEDURE — 94761 N-INVAS EAR/PLS OXIMETRY MLT: CPT

## 2020-06-20 PROCEDURE — 2700000000 HC OXYGEN THERAPY PER DAY

## 2020-06-20 PROCEDURE — 86738 MYCOPLASMA ANTIBODY: CPT

## 2020-06-20 PROCEDURE — 93010 ELECTROCARDIOGRAM REPORT: CPT | Performed by: INTERNAL MEDICINE

## 2020-06-20 PROCEDURE — 85025 COMPLETE CBC W/AUTO DIFF WBC: CPT

## 2020-06-20 PROCEDURE — 80048 BASIC METABOLIC PNL TOTAL CA: CPT

## 2020-06-20 PROCEDURE — 99233 SBSQ HOSP IP/OBS HIGH 50: CPT | Performed by: INTERNAL MEDICINE

## 2020-06-20 RX ORDER — ALBUTEROL SULFATE 2.5 MG/3ML
2.5 SOLUTION RESPIRATORY (INHALATION) EVERY 6 HOURS
Status: DISCONTINUED | OUTPATIENT
Start: 2020-06-20 | End: 2020-06-21

## 2020-06-20 RX ADMIN — BENZONATATE 100 MG: 100 CAPSULE ORAL at 19:50

## 2020-06-20 RX ADMIN — CARBAMAZEPINE 200 MG: 200 TABLET ORAL at 08:47

## 2020-06-20 RX ADMIN — CEFEPIME HYDROCHLORIDE 2 G: 2 INJECTION, POWDER, FOR SOLUTION INTRAVENOUS at 05:17

## 2020-06-20 RX ADMIN — BUMETANIDE 1 MG: 1 TABLET ORAL at 08:48

## 2020-06-20 RX ADMIN — CARBAMAZEPINE 200 MG: 200 TABLET ORAL at 19:55

## 2020-06-20 RX ADMIN — LEVOFLOXACIN 750 MG: 750 TABLET, FILM COATED ORAL at 08:48

## 2020-06-20 RX ADMIN — FAMOTIDINE 20 MG: 20 TABLET ORAL at 08:47

## 2020-06-20 RX ADMIN — LINEZOLID 600 MG: 600 TABLET, FILM COATED ORAL at 08:47

## 2020-06-20 RX ADMIN — ESCITALOPRAM OXALATE 20 MG: 20 TABLET, FILM COATED ORAL at 08:48

## 2020-06-20 RX ADMIN — FERROUS SULFATE TAB 325 MG (65 MG ELEMENTAL FE) 325 MG: 325 (65 FE) TAB at 08:48

## 2020-06-20 RX ADMIN — IPRATROPIUM BROMIDE AND ALBUTEROL SULFATE 3 ML: 2.5; .5 SOLUTION RESPIRATORY (INHALATION) at 07:02

## 2020-06-20 RX ADMIN — ALBUTEROL SULFATE 2.5 MG: 2.5 SOLUTION RESPIRATORY (INHALATION) at 13:08

## 2020-06-20 RX ADMIN — LEVOTHYROXINE SODIUM 200 MCG: 100 TABLET ORAL at 08:51

## 2020-06-20 RX ADMIN — CEFEPIME 2 G: 2 INJECTION, POWDER, FOR SOLUTION INTRAVENOUS at 16:56

## 2020-06-20 RX ADMIN — FAMOTIDINE 20 MG: 20 TABLET ORAL at 19:50

## 2020-06-20 RX ADMIN — ALBUTEROL SULFATE 2.5 MG: 2.5 SOLUTION RESPIRATORY (INHALATION) at 18:38

## 2020-06-20 RX ADMIN — FERROUS SULFATE TAB 325 MG (65 MG ELEMENTAL FE) 325 MG: 325 (65 FE) TAB at 16:56

## 2020-06-20 RX ADMIN — Medication: at 19:55

## 2020-06-20 RX ADMIN — MULTIPLE VITAMINS W/ MINERALS TAB 1 TABLET: TAB at 08:47

## 2020-06-20 RX ADMIN — LINEZOLID 600 MG: 600 TABLET, FILM COATED ORAL at 19:50

## 2020-06-20 RX ADMIN — BUMETANIDE 1 MG: 1 TABLET ORAL at 19:51

## 2020-06-20 ASSESSMENT — PAIN DESCRIPTION - ONSET: ONSET: ON-GOING

## 2020-06-20 ASSESSMENT — PAIN DESCRIPTION - LOCATION: LOCATION: SHOULDER

## 2020-06-20 ASSESSMENT — PAIN DESCRIPTION - ORIENTATION: ORIENTATION: RIGHT

## 2020-06-20 ASSESSMENT — PULMONARY FUNCTION TESTS
PIF_VALUE: 33
PIF_VALUE: 34
PIF_VALUE: 34
PIF_VALUE: 38
PIF_VALUE: 38
PIF_VALUE: 26
PIF_VALUE: 33
PIF_VALUE: 34
PIF_VALUE: 38
PIF_VALUE: 38
PIF_VALUE: 35
PIF_VALUE: 38
PIF_VALUE: 32
PIF_VALUE: 40
PIF_VALUE: 37
PIF_VALUE: 36
PIF_VALUE: 39
PIF_VALUE: 35
PIF_VALUE: 31
PIF_VALUE: 34
PIF_VALUE: 33
PIF_VALUE: 36
PIF_VALUE: 34
PIF_VALUE: 34
PIF_VALUE: 36
PIF_VALUE: 32

## 2020-06-20 ASSESSMENT — PAIN SCALES - GENERAL
PAINLEVEL_OUTOF10: 0
PAINLEVEL_OUTOF10: 5

## 2020-06-20 ASSESSMENT — ENCOUNTER SYMPTOMS
VOMITING: 0
NAUSEA: 0
ABDOMINAL PAIN: 0
SHORTNESS OF BREATH: 0

## 2020-06-20 ASSESSMENT — PAIN DESCRIPTION - FREQUENCY: FREQUENCY: CONTINUOUS

## 2020-06-20 ASSESSMENT — PAIN DESCRIPTION - DESCRIPTORS: DESCRIPTORS: ACHING

## 2020-06-20 NOTE — CARE COORDINATION
Social work: Dr. Danielle Graham referral to [facility] might be best at this time. Called and faxed referral.  Will need lis at discharge. Liaison will advise on Monday if they can accept pt.   Edgardo light

## 2020-06-20 NOTE — PROGRESS NOTES
Plan:        Chronic respiratory failure with hypercapnia and hypoxemia, chronically trach'ed on vent, possibly underlying pneumonia causing worsening of her respiratory status  - CXR suggestive of multifocal pneumonia  - afebrile, VSS  - no leukocytosis  - COVID negative  - f/u pneumonia work, strep pneumo, legionella, sputum culture  - procal 0.16  - treat for VCAP, cefepime, zyvox and levaquin  - pulmonology consulted, follow up recs  - continue mucinex, tessalon  - RT eval and treat     DVT ppx: lovenox 30 mg sq BID  GI ppx: pepcid 20 mg PO BID    Dispo: patient cannot stay at her nursing home because patient is requiring 24 hour vent support. Patient will need placement at a facility that will be able to provide 24 hour vent support. Landy Acosta MD  6/20/2020  9:42 AM   Attending Physician Statement  I have discussed the care of Barbi Nice and I have examined the patient myselft and taken ros and hpi , including pertinent history and exam findings,  with the resident. I have reviewed the key elements of all parts of the encounter with the resident. I agree with the assessment, plan and orders as documented by the resident.   Acute on chronic respiratory failure now vent dependent with a tracheostomy morbid obesity obesity hypoventilation syndrome possible sleep apnea possible MRSA pneumonia possible gram-negative pneumonia MRSA positive in sputum treated with Zyvox pulmonary input patient seen in ICU    Electronically signed by Wali Rivera MD

## 2020-06-20 NOTE — PLAN OF CARE
Problem: Pain:  Goal: Pain level will decrease  Outcome: Met This Shift  Goal: Control of acute pain  Outcome: Met This Shift  Goal: Control of chronic pain  Outcome: Met This Shift     Problem: Falls - Risk of:  Goal: Will remain free from falls  Outcome: Met This Shift  Goal: Absence of physical injury  Outcome: Met This Shift     Problem: Breathing Pattern - Ineffective:  Goal: Ability to achieve and maintain a regular respiratory rate will improve  Outcome: Met This Shift     Problem: Respiratory:  Goal: Ability to maintain normal respiratory secretions will improve  Outcome: Met This Shift     Problem: Skin Integrity:  Goal: Will show no infection signs and symptoms  Outcome: Met This Shift  Goal: Absence of new skin breakdown  Outcome: Met This Shift

## 2020-06-20 NOTE — FLOWSHEET NOTE
Patient refuses to reposition, educated on importance of repositioning for skin, prevention of bedsores, improvement of lungs. Patient will not reposition.

## 2020-06-20 NOTE — DISCHARGE INSTR - COC
Continuity of Care Form    Patient Name: Julio Gray   :  1970  MRN:  [id number]    Admit date:  2020  Discharge date:  20    Code Status Order: Full Code   Advance Directives:   [facility] Documentation     Date/Time Healthcare Directive Type of Healthcare Directive Copy in [address] [facility] [address] Agent's Name Healthcare Agent's Phone Number    20 0320  No, patient does not have an advance directive for healthcare treatment -- -- -- -- --          Admitting Physician:  Amaris Ball MD  PCP: Jemima Bowens DO    Discharging Nurse: [facility] Unit/Room#:   Discharging Unit Phone Number: [phone]    Emergency Contact:   Extended Emergency Contact Information  Primary Emergency Contact: Dominick Mahoney  Address: [address] [place] [address].  [facility] [address] Phone: [phone]  Mobile Phone: [phone]  Relation: Parent  Secondary Emergency Contact: Zaria Atkinson   [address] Phone: [phone]  Mobile Phone: [phone]  Relation: Other  Hearing or visual needs: None  Other needs: None  Preferred language: English   needed? No    Past Surgical History:  Past Surgical History:   Procedure Laterality Date    BRONCHOSCOPY N/A 1/3/2020    BRONCHOSCOPY ATTEMPTED performed by Puma Castellano MD at [place] [address], MODIFIED RADICAL Right 2013    TRACHEOSTOMY         Immunization History: There is no immunization history for the selected administration types on file for this patient.     Active Problems:  Patient Active Problem List   Diagnosis Code    Dyspnea R06.00    Hypothyroidism E03.9    Acute respiratory failure with hypoxia and hypercapnia (HCC) J96.01, J96.02    Class 3 obesity due to excess calories with serious comorbidity and body mass index (BMI) greater than or equal to 70 in adult [id number]    Acute congestive heart failure (HCC) I50.9    and alert    IV Access:  - PICC - site  L Upper Arm, insertion date: 6/19/2020    Nursing Mobility/ADLs:  Walking   Dependent  Transfer  Dependent  Bathing  Dependent  Dressing  Dependent  Toileting  Dependent  Feeding  Assisted  Med Admin  Independent  Med Delivery   whole    Wound Care Documentation and Therapy:        Elimination:  Continence:   · Bowel: No  · Bladder: No  Urinary Catheter: Insertion Date: 6/26/2020   Colostomy/Ileostomy/Ileal Conduit: No       Date of Last BM: 6/28/2020    Intake/Output Summary (Last 24 hours) at 6/20/2020 1644  Last data filed at 6/20/2020 0517  Gross per 24 hour   Intake 981.25 ml   Output 150 ml   Net 831.25 ml     I/O last 3 completed shifts: In: 981.3 [P.O.:240;  I.V.:691.3; IV Piggyback:50]  Out: 150 [Urine:150]    Safety Concerns:     None    Impairments/Disabilities:      Speech d/t trach    Nutrition Therapy:  Current Nutrition Therapy:   - Oral Diet:  General    Routes of Feeding: Oral  Liquids: No Restrictions  Daily Fluid Restriction: no  Last Modified Barium Swallow with Video (Video Swallowing Test): not done    Treatments at the Time of Hospital Discharge:   Respiratory Treatments: Yes  Oxygen Therapy:  trach to vent  Ventilator:    - Ventilator Settings:    Vt Ordered: 500 mL  Rate Set: 22 bmp  FiO2 : 50 %    PEEP/CPAP: 10       Rehab Therapies: Physical Therapy and Occupational Therapy  Weight Bearing Status/Restrictions: No weight bearing restirctions  Other Medical Equipment (for information only, NOT a DME order):  N/A  Other Treatments: NO    Patient's personal belongings (please select all that are sent with patient):  Alem MENDOZA SIGNATURE:  Electronically signed by Iglesia Scott RN on 6/29/20 at 10:35 AM EDT    CASE MANAGEMENT/SOCIAL WORK SECTION    Inpatient Status Date: ***    Readmission Risk Assessment Score:  Readmission Risk              Risk of Unplanned Readmission:        24           Discharging to Facility/ Agency   · Name:preeti

## 2020-06-20 NOTE — FLOWSHEET NOTE
06/19/20 2140   Provider Notification   Reason for Communication Evaluate;New orders   Provider Name Dr. Tayler yL   Provider Notification Physician   Method of Communication Call   Response See orders   Notification Time 2141     Updated Dr. Lilian Guzman on pt condition and admission to ICU. Orders received.

## 2020-06-20 NOTE — CONSULTS
[address]                 [address] [facility], [address]                                  CONSULTATION    PATIENT NAME: Danny Mac                   :        1970  MED REC NO:   [id number]                              ROOM:       2003  ACCOUNT NO:   [de-identified]                           ADMIT DATE: 2020  PROVIDER:     Jose Ramirez    CONSULT DATE:  2020    HISTORY OF PRESENT ILLNESS:  The patient is a 79-year-old morbidly obese  black female with history of obstructive sleep apnea, obesity  hypoventilation syndrome, and chronic hypoxic and hypercapnic  respiratory failure. She is well-known to our service, having been  admitted multiple times. Most recently this past year, she was admitted  in January with a mycoplasma pneumoniae infection, then in March she was  thought to have COVID-19, but that was negative. At that time, she was  at [facility]. She was transferred back to her ECF at [facility]. In May, she was readmitted, came to [facility], was positive initially  for COVID on 05/10, and then subsequently tested twice negative on   and 05/15. She was then sent back to [facility]. However, in the  past, she was just using nocturnal ventilation and now since that  admission in May, she has been on ventilator support 24 hours a day. Over the last 3-4 days, she reports increasing shortness of breath and  increased amount of secretions. There is no documented fevers or  chills. She denies any chest pain. She denies any change in her sense  of taste or smell. She denies any nausea, vomiting or diarrhea. ALLERGIES:  ZOSYN and reportedly VANCOMYCIN. PAST MEDICAL HISTORY:  As above. Morbid obesity, history of breast  cancer, chronic lymphedema, chronic tracheostomy, modified right  mastectomy in  and tracheostomy placed in . She is originally  from [place], [place]. SOCIAL HISTORY:  Nonsmoker. significantly  elevated. Rapid COVID by nucleic amplification was negative. I do not believe she  has COVID this time. She does not have lymphopenia. She does not have  any significant symptoms other than shortness of breath that are  associated with COVID. Additionally, it has been over six weeks since  her last COVID was positive. Even if she tested positive, the risk of  transmission is negligible since the virus is not infectious after 21  days. She does not need to be in droplet plus isolation, but does have to be  in MRSA isolation. Further recommendations to follow. We will change  her ventilator settings _____ her work of breathing and increased rate  of 22. We will check a venous blood gas today.         Jane Frank    D: 06/20/2020 8:22:32       T: 06/20/2020 8:33:57     [id number]  Job#: [id number]     Doc#: [id number]    CC:

## 2020-06-21 LAB
ABSOLUTE EOS #: 0.1 K/UL (ref 0–0.4)
ABSOLUTE IMMATURE GRANULOCYTE: ABNORMAL K/UL (ref 0–0.3)
ABSOLUTE LYMPH #: 1.1 K/UL (ref 1–4.8)
ABSOLUTE MONO #: 0.4 K/UL (ref 0.1–1.3)
ANION GAP SERPL CALCULATED.3IONS-SCNC: 10 MMOL/L (ref 9–17)
BASOPHILS # BLD: 1 % (ref 0–2)
BASOPHILS ABSOLUTE: 0 K/UL (ref 0–0.2)
BUN BLDV-MCNC: 17 MG/DL (ref 6–20)
BUN/CREAT BLD: ABNORMAL (ref 9–20)
CALCIUM SERPL-MCNC: 9 MG/DL (ref 8.6–10.4)
CHLORIDE BLD-SCNC: 98 MMOL/L (ref 98–107)
CO2: 31 MMOL/L (ref 20–31)
CREAT SERPL-MCNC: 1.12 MG/DL (ref 0.5–0.9)
CULTURE: NORMAL
DIFFERENTIAL TYPE: ABNORMAL
DIRECT EXAM: NORMAL
EOSINOPHILS RELATIVE PERCENT: 3 % (ref 0–4)
GFR AFRICAN AMERICAN: >60 ML/MIN
GFR NON-AFRICAN AMERICAN: 52 ML/MIN
GFR SERPL CREATININE-BSD FRML MDRD: ABNORMAL ML/MIN/{1.73_M2}
GFR SERPL CREATININE-BSD FRML MDRD: ABNORMAL ML/MIN/{1.73_M2}
GLUCOSE BLD-MCNC: 101 MG/DL (ref 70–99)
HCT VFR BLD CALC: 27.1 % (ref 36–46)
HEMOGLOBIN: 8.8 G/DL (ref 12–16)
IMMATURE GRANULOCYTES: ABNORMAL %
LYMPHOCYTES # BLD: 26 % (ref 24–44)
Lab: NORMAL
MCH RBC QN AUTO: 30.7 PG (ref 26–34)
MCHC RBC AUTO-ENTMCNC: 32.4 G/DL (ref 31–37)
MCV RBC AUTO: 94.5 FL (ref 80–100)
MONOCYTES # BLD: 9 % (ref 1–7)
NRBC AUTOMATED: ABNORMAL PER 100 WBC
PDW BLD-RTO: 15.6 % (ref 11.5–14.9)
PLATELET # BLD: 151 K/UL (ref 150–450)
PLATELET ESTIMATE: ABNORMAL
PMV BLD AUTO: 8.2 FL (ref 6–12)
POTASSIUM SERPL-SCNC: 4.7 MMOL/L (ref 3.7–5.3)
RBC # BLD: 2.87 M/UL (ref 4–5.2)
RBC # BLD: ABNORMAL 10*6/UL
SEG NEUTROPHILS: 61 % (ref 36–66)
SEGMENTED NEUTROPHILS ABSOLUTE COUNT: 2.6 K/UL (ref 1.3–9.1)
SODIUM BLD-SCNC: 139 MMOL/L (ref 135–144)
SPECIMEN DESCRIPTION: NORMAL
THYROXINE, FREE: 0.65 NG/DL (ref 0.93–1.7)
TSH SERPL DL<=0.05 MIU/L-ACNC: 14.73 MIU/L (ref 0.3–5)
VITAMIN D 25-HYDROXY: 9.7 NG/ML (ref 30–100)
WBC # BLD: 4.1 K/UL (ref 3.5–11)
WBC # BLD: ABNORMAL 10*3/UL

## 2020-06-21 PROCEDURE — 85025 COMPLETE CBC W/AUTO DIFF WBC: CPT

## 2020-06-21 PROCEDURE — 80048 BASIC METABOLIC PNL TOTAL CA: CPT

## 2020-06-21 PROCEDURE — 6360000002 HC RX W HCPCS: Performed by: INTERNAL MEDICINE

## 2020-06-21 PROCEDURE — 36415 COLL VENOUS BLD VENIPUNCTURE: CPT

## 2020-06-21 PROCEDURE — 99233 SBSQ HOSP IP/OBS HIGH 50: CPT | Performed by: INTERNAL MEDICINE

## 2020-06-21 PROCEDURE — 94003 VENT MGMT INPAT SUBQ DAY: CPT

## 2020-06-21 PROCEDURE — 6370000000 HC RX 637 (ALT 250 FOR IP): Performed by: STUDENT IN AN ORGANIZED HEALTH CARE EDUCATION/TRAINING PROGRAM

## 2020-06-21 PROCEDURE — 94761 N-INVAS EAR/PLS OXIMETRY MLT: CPT

## 2020-06-21 PROCEDURE — 2580000003 HC RX 258: Performed by: INTERNAL MEDICINE

## 2020-06-21 PROCEDURE — 94640 AIRWAY INHALATION TREATMENT: CPT

## 2020-06-21 PROCEDURE — 84443 ASSAY THYROID STIM HORMONE: CPT

## 2020-06-21 PROCEDURE — 84439 ASSAY OF FREE THYROXINE: CPT

## 2020-06-21 PROCEDURE — 94770 HC ETCO2 MONITOR DAILY: CPT

## 2020-06-21 PROCEDURE — 2700000000 HC OXYGEN THERAPY PER DAY

## 2020-06-21 PROCEDURE — 82306 VITAMIN D 25 HYDROXY: CPT

## 2020-06-21 PROCEDURE — 2000000000 HC ICU R&B

## 2020-06-21 RX ORDER — METOPROLOL TARTRATE 5 MG/5ML
5 INJECTION INTRAVENOUS EVERY 6 HOURS PRN
Status: DISCONTINUED | OUTPATIENT
Start: 2020-06-21 | End: 2020-06-29 | Stop reason: HOSPADM

## 2020-06-21 RX ORDER — LORAZEPAM 2 MG/ML
1 INJECTION INTRAMUSCULAR ONCE
Status: COMPLETED | OUTPATIENT
Start: 2020-06-21 | End: 2020-06-22

## 2020-06-21 RX ORDER — LEVOTHYROXINE SODIUM 0.12 MG/1
250 TABLET ORAL DAILY
Status: DISCONTINUED | OUTPATIENT
Start: 2020-06-22 | End: 2020-06-29 | Stop reason: HOSPADM

## 2020-06-21 RX ORDER — ALBUTEROL SULFATE 2.5 MG/3ML
2.5 SOLUTION RESPIRATORY (INHALATION) EVERY 4 HOURS
Status: DISCONTINUED | OUTPATIENT
Start: 2020-06-21 | End: 2020-06-29 | Stop reason: HOSPADM

## 2020-06-21 RX ADMIN — CEFEPIME 2 G: 2 INJECTION, POWDER, FOR SOLUTION INTRAVENOUS at 18:08

## 2020-06-21 RX ADMIN — LINEZOLID 600 MG: 600 TABLET, FILM COATED ORAL at 09:08

## 2020-06-21 RX ADMIN — BUMETANIDE 1 MG: 1 TABLET ORAL at 09:08

## 2020-06-21 RX ADMIN — LINEZOLID 600 MG: 600 TABLET, FILM COATED ORAL at 20:19

## 2020-06-21 RX ADMIN — FERROUS SULFATE TAB 325 MG (65 MG ELEMENTAL FE) 325 MG: 325 (65 FE) TAB at 14:04

## 2020-06-21 RX ADMIN — FAMOTIDINE 20 MG: 20 TABLET ORAL at 09:08

## 2020-06-21 RX ADMIN — ACETAMINOPHEN 1000 MG: 500 TABLET, FILM COATED ORAL at 18:15

## 2020-06-21 RX ADMIN — BUMETANIDE 1 MG: 1 TABLET ORAL at 20:19

## 2020-06-21 RX ADMIN — CARBAMAZEPINE 200 MG: 200 TABLET ORAL at 09:10

## 2020-06-21 RX ADMIN — ALBUTEROL SULFATE 2.5 MG: 2.5 SOLUTION RESPIRATORY (INHALATION) at 18:40

## 2020-06-21 RX ADMIN — CEFEPIME 2 G: 2 INJECTION, POWDER, FOR SOLUTION INTRAVENOUS at 04:41

## 2020-06-21 RX ADMIN — ALBUTEROL SULFATE 2.5 MG: 2.5 SOLUTION RESPIRATORY (INHALATION) at 07:27

## 2020-06-21 RX ADMIN — CARBAMAZEPINE 200 MG: 200 TABLET ORAL at 20:19

## 2020-06-21 RX ADMIN — Medication: at 09:10

## 2020-06-21 RX ADMIN — FERROUS SULFATE TAB 325 MG (65 MG ELEMENTAL FE) 325 MG: 325 (65 FE) TAB at 18:08

## 2020-06-21 RX ADMIN — ALBUTEROL SULFATE 2.5 MG: 2.5 SOLUTION RESPIRATORY (INHALATION) at 02:42

## 2020-06-21 RX ADMIN — LEVOFLOXACIN 750 MG: 750 TABLET, FILM COATED ORAL at 09:09

## 2020-06-21 RX ADMIN — LEVOTHYROXINE SODIUM 200 MCG: 100 TABLET ORAL at 09:09

## 2020-06-21 RX ADMIN — ESCITALOPRAM OXALATE 20 MG: 20 TABLET, FILM COATED ORAL at 09:09

## 2020-06-21 RX ADMIN — Medication: at 20:25

## 2020-06-21 RX ADMIN — FAMOTIDINE 20 MG: 20 TABLET ORAL at 20:19

## 2020-06-21 RX ADMIN — MULTIPLE VITAMINS W/ MINERALS TAB 1 TABLET: TAB at 09:09

## 2020-06-21 RX ADMIN — ALBUTEROL SULFATE 2.5 MG: 2.5 SOLUTION RESPIRATORY (INHALATION) at 11:54

## 2020-06-21 RX ADMIN — BENZONATATE 100 MG: 100 CAPSULE ORAL at 22:45

## 2020-06-21 RX ADMIN — ALBUTEROL SULFATE 2.5 MG: 2.5 SOLUTION RESPIRATORY (INHALATION) at 23:47

## 2020-06-21 ASSESSMENT — PULMONARY FUNCTION TESTS
PIF_VALUE: 28
PIF_VALUE: 34
PIF_VALUE: 35
PIF_VALUE: 33
PIF_VALUE: 33
PIF_VALUE: 35
PIF_VALUE: 38
PIF_VALUE: 38
PIF_VALUE: 34
PIF_VALUE: 38
PIF_VALUE: 38
PIF_VALUE: 36
PIF_VALUE: 42
PIF_VALUE: 33
PIF_VALUE: 34
PIF_VALUE: 34
PIF_VALUE: 36
PIF_VALUE: 37
PIF_VALUE: 33

## 2020-06-21 ASSESSMENT — PAIN SCALES - GENERAL
PAINLEVEL_OUTOF10: 0
PAINLEVEL_OUTOF10: 0

## 2020-06-21 ASSESSMENT — ENCOUNTER SYMPTOMS
PHOTOPHOBIA: 0
VOMITING: 0
ABDOMINAL PAIN: 0
WHEEZING: 0
CONSTIPATION: 0
APNEA: 0
DIARRHEA: 0
COLOR CHANGE: 0
SHORTNESS OF BREATH: 0
NAUSEA: 0
COUGH: 0

## 2020-06-21 NOTE — PLAN OF CARE
Problem: Pain:  Goal: Pain level will decrease  Description: Pain level will decrease  6/21/2020 1701 by Babrara Singh RN  Outcome: Ongoing  6/21/2020 0621 by Aaliyah Holland RN  Outcome: Ongoing  Note: No complaints of pain. Goal: Control of acute pain  Description: Control of acute pain  Outcome: Ongoing  Goal: Control of chronic pain  Description: Control of chronic pain  Outcome: Ongoing     Problem: Falls - Risk of:  Goal: Will remain free from falls  Description: Will remain free from falls  6/21/2020 1701 by Barbara Singh RN  Outcome: Ongoing  6/21/2020 0621 by Aaliyah Holland RN  Outcome: Ongoing  Goal: Absence of physical injury  Description: Absence of physical injury  Outcome: Ongoing     Problem: Breathing Pattern - Ineffective:  Goal: Ability to achieve and maintain a regular respiratory rate will improve  Description: Ability to achieve and maintain a regular respiratory rate will improve  6/21/2020 1701 by Barbara Singh RN  Outcome: Ongoing  6/21/2020 0621 by Aaliyah Holland RN  Outcome: Ongoing  Note: Patient remains trach to vent. Oxygen levels remain WDL. Suctioning provided PRN. Intermittent SOB noted.      Problem: Respiratory:  Goal: Ability to maintain normal respiratory secretions will improve  Description: Ability to maintain normal respiratory secretions will improve  6/21/2020 1701 by Barbara Singh RN  Outcome: Ongoing  6/21/2020 0621 by Aaliyah Holland RN  Outcome: Ongoing     Problem: Skin Integrity:  Goal: Will show no infection signs and symptoms  Description: Will show no infection signs and symptoms  6/21/2020 1701 by Barbara Singh RN  Outcome: Ongoing  6/21/2020 0621 by Aaliyah Holland RN  Outcome: Ongoing  Goal: Absence of new skin breakdown  Description: Absence of new skin breakdown  Outcome: Ongoing

## 2020-06-21 NOTE — PROGRESS NOTES
opacities are minimally improved. Xr Chest Portable    Result Date: 6/19/2020  EXAMINATION: ONE XRAY VIEW OF THE CHEST 6/19/2020 12:29 pm COMPARISON: AP chest from 05/11/2020 HISTORY: ORDERING SYSTEM PROVIDED HISTORY: sob TECHNOLOGIST PROVIDED HISTORY: sob Reason for Exam: PT CO SOB and fatigue, PT on permanent trach. Acuity: Chronic Type of Exam: Ongoing History of breast cancer, morbid obesity, respiratory failure, and CHF. FINDINGS: Tracheostomy tube in unchanged position. Right subclavian chemo port with unchanged catheter tip position near the cavoatrial junction. Overlying necklace. Clips right axilla. Cardiomediastinal shadow stable. Low lung volumes. Patchy bilateral dense airspace opacities, unchanged or perhaps slightly smaller. No new pulmonary abnormality or large pleural effusion. No pneumothorax. Bones unchanged. Stable or slightly improved findings; bilateral multifocal airspace opacities, most likely either infection, or possibly metastatic disease or pulmonary edema. Physical Examination:        Physical Exam  Constitutional:       General: She is not in acute distress. Appearance: She is obese. She is not ill-appearing, toxic-appearing or diaphoretic. HENT:      Head:      Comments: On chronic trach ventilator     Nose: Nose normal. No congestion. Eyes:      General: No scleral icterus. Right eye: No discharge. Left eye: No discharge. Extraocular Movements: Extraocular movements intact. Conjunctiva/sclera: Conjunctivae normal.      Pupils: Pupils are equal, round, and reactive to light. Cardiovascular:      Rate and Rhythm: Normal rate and regular rhythm. Pulses: Normal pulses. Heart sounds: No murmur. Pulmonary:      Effort: Pulmonary effort is normal.      Breath sounds: Normal breath sounds. No wheezing. Abdominal:      Tenderness: There is no abdominal tenderness. There is no guarding.    Musculoskeletal:         General: No

## 2020-06-21 NOTE — PROGRESS NOTES
Physical Therapy  DATE: 2020    NAME: Victoria Trent  MRN: [id number]   : 1970    Patient not seen this date for Physical Therapy due to:  [] Blood transfusion in progress  [] Hemodialysis  []  Patient Declined  [] Spine Precautions   [] Strict Bedrest  [] Surgery/ Procedure  [] Testing      [x] Other Pt has to use bed pan. [] PT being discontinued at this time. Patient independent. No further needs. [] PT being discontinued at this time as the patient has been transferred to palliative care. No further needs.     Eric Purcell, PT

## 2020-06-21 NOTE — PROGRESS NOTES
Multiple attempts made by RN to reposition and bathe patient. Patient is refusing all personal care, stating that she will notify RN when she is ready. Education provided on importance of repositioning and hygiene. Patient continues to refuse.

## 2020-06-21 NOTE — PROGRESS NOTES
Patient refused repositioning and personal hygiene throughout the entirety of the day. Patient educated on need for repositioning including risk of skin breakdown and pneumonia. Patient acknowledged risks, but still refused any repositioning or hygiene.

## 2020-06-21 NOTE — FLOWSHEET NOTE
Patient is known to writer, and she is not herself. She is not interested in talking although smiles and indicated affection for writer. She shakes head no when writer asked about allowing nurses to reposition or help her.  When asked what writer could do for her, she also shakes head no.      06/21/20 0346   Encounter Summary   Services provided to: Patient   Referral/Consult From: Monty   Continue Visiting   (6-21-20)   Complexity of Encounter Moderate   Length of Encounter 15 minutes   Routine   Type Follow up   Spiritual/Mu-ism   Type Spiritual support   Assessment Approachable   Intervention Active listening;Explored feelings, thoughts, concerns;Sustaining presence/ Ministry of presence   Outcome Expressed gratitude;Engaged in conversation

## 2020-06-21 NOTE — PROGRESS NOTES
Inner cannula was changed twice today per pts request. Pt stated she was having a hard time breathing, pt is on 60% Spo2 95%.  Small amount of red tinged sputum on the end of cannula

## 2020-06-22 ENCOUNTER — APPOINTMENT (OUTPATIENT)
Dept: GENERAL RADIOLOGY | Age: 50
DRG: 130 | End: 2020-06-22
Payer: MEDICAID

## 2020-06-22 LAB
ABSOLUTE EOS #: 0.1 K/UL (ref 0–0.4)
ABSOLUTE IMMATURE GRANULOCYTE: ABNORMAL K/UL (ref 0–0.3)
ABSOLUTE LYMPH #: 0.9 K/UL (ref 1–4.8)
ABSOLUTE MONO #: 0.8 K/UL (ref 0.1–1.3)
ANION GAP SERPL CALCULATED.3IONS-SCNC: 9 MMOL/L (ref 9–17)
BASOPHILS # BLD: 0 % (ref 0–2)
BASOPHILS ABSOLUTE: 0 K/UL (ref 0–0.2)
BNP INTERPRETATION: ABNORMAL
BUN BLDV-MCNC: 17 MG/DL (ref 6–20)
BUN/CREAT BLD: ABNORMAL (ref 9–20)
CALCIUM SERPL-MCNC: 8.9 MG/DL (ref 8.6–10.4)
CHLORIDE BLD-SCNC: 99 MMOL/L (ref 98–107)
CO2: 31 MMOL/L (ref 20–31)
CREAT SERPL-MCNC: 1.13 MG/DL (ref 0.5–0.9)
DIFFERENTIAL TYPE: ABNORMAL
EOSINOPHILS RELATIVE PERCENT: 2 % (ref 0–4)
GFR AFRICAN AMERICAN: >60 ML/MIN
GFR NON-AFRICAN AMERICAN: 52 ML/MIN
GFR SERPL CREATININE-BSD FRML MDRD: ABNORMAL ML/MIN/{1.73_M2}
GFR SERPL CREATININE-BSD FRML MDRD: ABNORMAL ML/MIN/{1.73_M2}
GLUCOSE BLD-MCNC: 121 MG/DL (ref 70–99)
HCT VFR BLD CALC: 29.8 % (ref 36–46)
HEMOGLOBIN: 9.4 G/DL (ref 12–16)
IMMATURE GRANULOCYTES: ABNORMAL %
LYMPHOCYTES # BLD: 14 % (ref 24–44)
MCH RBC QN AUTO: 29.7 PG (ref 26–34)
MCHC RBC AUTO-ENTMCNC: 31.5 G/DL (ref 31–37)
MCV RBC AUTO: 94.3 FL (ref 80–100)
MONOCYTES # BLD: 12 % (ref 1–7)
MYCOPLASMA PNEUMONIAE IGG: 1.86
MYCOPLASMA PNEUMONIAE IGM: 1.62
NRBC AUTOMATED: ABNORMAL PER 100 WBC
PDW BLD-RTO: 15.4 % (ref 11.5–14.9)
PLATELET # BLD: 159 K/UL (ref 150–450)
PLATELET ESTIMATE: ABNORMAL
PMV BLD AUTO: 8.7 FL (ref 6–12)
POTASSIUM SERPL-SCNC: 5 MMOL/L (ref 3.7–5.3)
PRO-BNP: 1187 PG/ML
PROCALCITONIN: 0.28 NG/ML
RBC # BLD: 3.16 M/UL (ref 4–5.2)
RBC # BLD: ABNORMAL 10*6/UL
SEG NEUTROPHILS: 72 % (ref 36–66)
SEGMENTED NEUTROPHILS ABSOLUTE COUNT: 4.9 K/UL (ref 1.3–9.1)
SODIUM BLD-SCNC: 139 MMOL/L (ref 135–144)
WBC # BLD: 6.7 K/UL (ref 3.5–11)
WBC # BLD: ABNORMAL 10*3/UL

## 2020-06-22 PROCEDURE — 6370000000 HC RX 637 (ALT 250 FOR IP): Performed by: STUDENT IN AN ORGANIZED HEALTH CARE EDUCATION/TRAINING PROGRAM

## 2020-06-22 PROCEDURE — 71045 X-RAY EXAM CHEST 1 VIEW: CPT

## 2020-06-22 PROCEDURE — 6360000002 HC RX W HCPCS: Performed by: STUDENT IN AN ORGANIZED HEALTH CARE EDUCATION/TRAINING PROGRAM

## 2020-06-22 PROCEDURE — 84145 PROCALCITONIN (PCT): CPT

## 2020-06-22 PROCEDURE — 2700000000 HC OXYGEN THERAPY PER DAY

## 2020-06-22 PROCEDURE — 94640 AIRWAY INHALATION TREATMENT: CPT

## 2020-06-22 PROCEDURE — 94770 HC ETCO2 MONITOR DAILY: CPT

## 2020-06-22 PROCEDURE — 85025 COMPLETE CBC W/AUTO DIFF WBC: CPT

## 2020-06-22 PROCEDURE — 6360000002 HC RX W HCPCS: Performed by: INTERNAL MEDICINE

## 2020-06-22 PROCEDURE — 87040 BLOOD CULTURE FOR BACTERIA: CPT

## 2020-06-22 PROCEDURE — 2580000003 HC RX 258: Performed by: INTERNAL MEDICINE

## 2020-06-22 PROCEDURE — 80048 BASIC METABOLIC PNL TOTAL CA: CPT

## 2020-06-22 PROCEDURE — 2000000000 HC ICU R&B

## 2020-06-22 PROCEDURE — 2500000003 HC RX 250 WO HCPCS: Performed by: INTERNAL MEDICINE

## 2020-06-22 PROCEDURE — 94761 N-INVAS EAR/PLS OXIMETRY MLT: CPT

## 2020-06-22 PROCEDURE — 83880 ASSAY OF NATRIURETIC PEPTIDE: CPT

## 2020-06-22 PROCEDURE — 94003 VENT MGMT INPAT SUBQ DAY: CPT

## 2020-06-22 PROCEDURE — 36415 COLL VENOUS BLD VENIPUNCTURE: CPT

## 2020-06-22 PROCEDURE — 99233 SBSQ HOSP IP/OBS HIGH 50: CPT | Performed by: INTERNAL MEDICINE

## 2020-06-22 PROCEDURE — 2580000003 HC RX 258: Performed by: STUDENT IN AN ORGANIZED HEALTH CARE EDUCATION/TRAINING PROGRAM

## 2020-06-22 RX ORDER — LORAZEPAM 2 MG/ML
1 INJECTION INTRAMUSCULAR ONCE
Status: COMPLETED | OUTPATIENT
Start: 2020-06-22 | End: 2020-06-22

## 2020-06-22 RX ORDER — FUROSEMIDE 10 MG/ML
40 INJECTION INTRAMUSCULAR; INTRAVENOUS ONCE
Status: COMPLETED | OUTPATIENT
Start: 2020-06-22 | End: 2020-06-22

## 2020-06-22 RX ORDER — MORPHINE SULFATE 4 MG/ML
4 INJECTION, SOLUTION INTRAMUSCULAR; INTRAVENOUS
Status: DISCONTINUED | OUTPATIENT
Start: 2020-06-22 | End: 2020-06-25

## 2020-06-22 RX ORDER — MORPHINE SULFATE 2 MG/ML
2 INJECTION, SOLUTION INTRAMUSCULAR; INTRAVENOUS
Status: DISCONTINUED | OUTPATIENT
Start: 2020-06-22 | End: 2020-06-25

## 2020-06-22 RX ORDER — GUAIFENESIN 600 MG/1
600 TABLET, EXTENDED RELEASE ORAL 2 TIMES DAILY
Status: DISCONTINUED | OUTPATIENT
Start: 2020-06-22 | End: 2020-06-29 | Stop reason: HOSPADM

## 2020-06-22 RX ADMIN — BUMETANIDE 1 MG: 1 TABLET ORAL at 19:52

## 2020-06-22 RX ADMIN — CEFEPIME 2 G: 2 INJECTION, POWDER, FOR SOLUTION INTRAVENOUS at 06:46

## 2020-06-22 RX ADMIN — Medication 10 ML: at 09:00

## 2020-06-22 RX ADMIN — LORAZEPAM 1 MG: 2 INJECTION INTRAMUSCULAR; INTRAVENOUS at 00:15

## 2020-06-22 RX ADMIN — LEVOFLOXACIN 750 MG: 750 TABLET, FILM COATED ORAL at 09:14

## 2020-06-22 RX ADMIN — DEXMEDETOMIDINE 0.2 MCG/KG/HR: 100 INJECTION, SOLUTION, CONCENTRATE INTRAVENOUS at 15:28

## 2020-06-22 RX ADMIN — CARBAMAZEPINE 200 MG: 200 TABLET ORAL at 20:06

## 2020-06-22 RX ADMIN — SODIUM CHLORIDE: 9 INJECTION, SOLUTION INTRAVENOUS at 06:31

## 2020-06-22 RX ADMIN — GUAIFENESIN 600 MG: 600 TABLET, EXTENDED RELEASE ORAL at 19:52

## 2020-06-22 RX ADMIN — CEFEPIME 2 G: 2 INJECTION, POWDER, FOR SOLUTION INTRAVENOUS at 17:21

## 2020-06-22 RX ADMIN — DEXMEDETOMIDINE 0.3 MCG/KG/HR: 100 INJECTION, SOLUTION, CONCENTRATE INTRAVENOUS at 23:40

## 2020-06-22 RX ADMIN — LORAZEPAM 1 MG: 2 INJECTION INTRAMUSCULAR; INTRAVENOUS at 12:05

## 2020-06-22 RX ADMIN — LINEZOLID 600 MG: 600 TABLET, FILM COATED ORAL at 09:14

## 2020-06-22 RX ADMIN — ALBUTEROL SULFATE 2.5 MG: 2.5 SOLUTION RESPIRATORY (INHALATION) at 11:44

## 2020-06-22 RX ADMIN — FERROUS SULFATE TAB 325 MG (65 MG ELEMENTAL FE) 325 MG: 325 (65 FE) TAB at 18:26

## 2020-06-22 RX ADMIN — ALBUTEROL SULFATE 2.5 MG: 2.5 SOLUTION RESPIRATORY (INHALATION) at 15:47

## 2020-06-22 RX ADMIN — LINEZOLID 600 MG: 600 TABLET, FILM COATED ORAL at 19:52

## 2020-06-22 RX ADMIN — ALBUTEROL SULFATE 2.5 MG: 2.5 SOLUTION RESPIRATORY (INHALATION) at 23:08

## 2020-06-22 RX ADMIN — ALBUTEROL SULFATE 2.5 MG: 2.5 SOLUTION RESPIRATORY (INHALATION) at 19:32

## 2020-06-22 RX ADMIN — LEVOTHYROXINE SODIUM 250 MCG: 125 TABLET ORAL at 06:46

## 2020-06-22 RX ADMIN — GUAIFENESIN 600 MG: 600 TABLET, EXTENDED RELEASE ORAL at 09:14

## 2020-06-22 RX ADMIN — ENOXAPARIN SODIUM 40 MG: 40 INJECTION SUBCUTANEOUS at 09:14

## 2020-06-22 RX ADMIN — BUMETANIDE 1 MG: 1 TABLET ORAL at 09:14

## 2020-06-22 RX ADMIN — MULTIPLE VITAMINS W/ MINERALS TAB 1 TABLET: TAB at 09:19

## 2020-06-22 RX ADMIN — FAMOTIDINE 20 MG: 20 TABLET ORAL at 09:14

## 2020-06-22 RX ADMIN — FERROUS SULFATE TAB 325 MG (65 MG ELEMENTAL FE) 325 MG: 325 (65 FE) TAB at 09:19

## 2020-06-22 RX ADMIN — ALBUTEROL SULFATE 2.5 MG: 2.5 SOLUTION RESPIRATORY (INHALATION) at 08:10

## 2020-06-22 RX ADMIN — FAMOTIDINE 20 MG: 20 TABLET ORAL at 19:52

## 2020-06-22 RX ADMIN — DEXMEDETOMIDINE 0.4 MCG/KG/HR: 100 INJECTION, SOLUTION, CONCENTRATE INTRAVENOUS at 19:49

## 2020-06-22 RX ADMIN — ESCITALOPRAM OXALATE 20 MG: 20 TABLET, FILM COATED ORAL at 09:14

## 2020-06-22 RX ADMIN — ACETAMINOPHEN 1000 MG: 500 TABLET, FILM COATED ORAL at 00:15

## 2020-06-22 RX ADMIN — FUROSEMIDE 40 MG: 10 INJECTION, SOLUTION INTRAMUSCULAR; INTRAVENOUS at 17:21

## 2020-06-22 RX ADMIN — ENOXAPARIN SODIUM 40 MG: 40 INJECTION SUBCUTANEOUS at 20:00

## 2020-06-22 RX ADMIN — ACETAMINOPHEN 1000 MG: 500 TABLET, FILM COATED ORAL at 09:19

## 2020-06-22 RX ADMIN — CARBAMAZEPINE 200 MG: 200 TABLET ORAL at 09:15

## 2020-06-22 ASSESSMENT — PULMONARY FUNCTION TESTS
PIF_VALUE: 35
PIF_VALUE: 41
PIF_VALUE: 44
PIF_VALUE: 53
PIF_VALUE: 40
PIF_VALUE: 41
PIF_VALUE: 41
PIF_VALUE: 33
PIF_VALUE: 38
PIF_VALUE: 43
PIF_VALUE: 33
PIF_VALUE: 42
PIF_VALUE: 43
PIF_VALUE: 34
PIF_VALUE: 39
PIF_VALUE: 27
PIF_VALUE: 40
PIF_VALUE: 36
PIF_VALUE: 36
PIF_VALUE: 37
PIF_VALUE: 42
PIF_VALUE: 42
PIF_VALUE: 31
PIF_VALUE: 38
PIF_VALUE: 40
PIF_VALUE: 42
PIF_VALUE: 37
PIF_VALUE: 39
PIF_VALUE: 37
PIF_VALUE: 38
PIF_VALUE: 42
PIF_VALUE: 34

## 2020-06-22 ASSESSMENT — PAIN SCALES - GENERAL
PAINLEVEL_OUTOF10: 0
PAINLEVEL_OUTOF10: 0
PAINLEVEL_OUTOF10: 3

## 2020-06-22 ASSESSMENT — ENCOUNTER SYMPTOMS
VOMITING: 0
NAUSEA: 0
ABDOMINAL PAIN: 0
DIARRHEA: 0
SHORTNESS OF BREATH: 1
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
## 2020-06-22 NOTE — PROGRESS NOTES
[address]    PROGRESS NOTE             6/22/2020    9:08 AM    Name:   Keith Landeros  MRN:     [id number]     [place]:      [de-identified]   Room:   2003/2003-01  IP Day:  3  Admit Date:  6/19/2020 11:07 AM    PCP:  Mayte Michael DO  Code Status:  Full Code    Subjective:     C/C:   Chief Complaint   Patient presents with    Shortness of Breath     Interval History Status: not changed. Patient was seen and examined at bedside. No acute events overnight. Patient has been refused to be repositioned and cleaned. Patient stated she has been tired and that's why she has been refusing it. Patient also stated her breathing has not improved. Review of Systems:     Review of Systems   Constitutional: Negative for chills. Respiratory: Positive for shortness of breath. Cardiovascular: Negative for chest pain. Gastrointestinal: Negative for abdominal pain, diarrhea, nausea and vomiting. Medications: Allergies:     Allergies   Allergen Reactions    Zosyn [Piperacillin-Tazobactam In Dex] Shortness Of Breath     tolerated cefepime 6/2018    Vancomycin Swelling     Lip swelling and redness and itching         Current Meds:   Scheduled Meds:    guaiFENesin  600 mg Oral BID    albuterol  2.5 mg Nebulization Q4H    levothyroxine  250 mcg Oral Daily    cefepime  2 g Intravenous Q12H    bumetanide  1 mg Oral BID    carBAMazepine  200 mg Oral BID    escitalopram  20 mg Oral Daily    ferrous sulfate  325 mg Oral TID WC    therapeutic multivitamin-minerals  1 tablet Oral Daily with breakfast    Hydrocerin   Topical BID    sodium chloride flush  10 mL Intravenous 2 times per day    enoxaparin  40 mg Subcutaneous BID    famotidine  20 mg Oral BID    linezolid  600 mg Oral 2 times per day    levoFLOXacin  750 mg Oral Daily     Continuous Infusions:   PRN Meds: metoprolol, benzonatate, bisacodyl, docusate sodium, sodium chloride 06/22/2020    CO2 31 06/22/2020    BUN 17 06/22/2020    LABALBU 3.7 06/19/2020    CREATININE 1.13 06/22/2020    CALCIUM 8.9 06/22/2020    GFRAA >60 06/22/2020    LABGLOM 52 06/22/2020    GLUCOSE 121 06/22/2020       Lab Results   Component Value Date/Time    SPECIAL NOT REPORTED 06/19/2020 03:31 PM     Lab Results   Component Value Date/Time    CULTURE NORMAL RESPIRATORY YAHAIRA LIGHT GROWTH 06/19/2020 03:31 PM         Radiology:    Xr Chest (single View Frontal)    Result Date: 6/19/2020  EXAMINATION: ONE XRAY VIEW OF THE CHEST 6/19/2020 3:51 pm COMPARISON: 06/19/2020, 1220 hours. HISTORY: ORDERING SYSTEM PROVIDED HISTORY: check picc placement, please show Dr. Amarilys Turcios when done TECHNOLOGIST PROVIDED HISTORY: check picc placement, please show Dr. Amarilys Turcios when done Reason for Exam: verify picc placement Acuity: Acute Type of Exam: Initial FINDINGS: The tracheostomy tube is in satisfactory position. A left PICC line was noted with the distal tip in the region of the right atrium. A right Hurdnd-W-Yuap was noted with the distal tip in the region of the superior vena cava. No pneumothorax was identified. Metallic surgical clips were noted in the right axilla. The heart was not enlarged. Nodular parenchymal densities were noted bilaterally which could represent multifocal pneumonia or tumor. The regional skeleton was unremarkable. The PICC line is the only change noted from [id number] hours. The tracheostomy tube is in satisfactory position. A new left PICC line was noted with the distal tip in the region of the right atrium. Right Olfnwj-F-Gmxv with the distal tip in the region of the superior vena cava. No pneumothorax was identified. No cardiomegaly or interstitial edema. Nodular parenchymal densities were noted bilaterally which could represent multifocal pneumonia or tumor.   This is unchanged from the earlier exam.     [facility] [place] Device Plmt/replace/removal    Result Date: 6/19/2020  PROCEDURE: ULTRASOUND GUIDED VASCULAR ACCESS. PICC PLACEMENT 6/19/2020. HISTORY: ORDERING SYSTEM PROVIDED HISTORY: poor iv access vented patient TECHNOLOGIST PROVIDED HISTORY: poor iv access vented patient Lumen?->Double Lumen Is the patient pregnant?->No Pneumonia needs IV access for antibiotics SEDATION: None FLUOROSCOPY DOSE AND TYPE OR TIME AND EXPOSURES: None TECHNIQUE AND FINDINGS: This procedure was performed by Dr. Tara Rajan. Informed consent was obtained after a detailed explanation of the procedure including risks, benefits, and alternatives. Universal protocol was observed. The left arm was prepped and draped in sterile fashion using maximum sterile barrier technique. Local anesthesia was achieved with lidocaine. A micropuncture needle was used to access the left brachial vein using ultrasound guidance. An ultrasound image demonstrating patency of the vein with needle tip located within it. An image was obtained and stored in PACs. A 0.018 guidewire was used to place a peel-a-way sheath and a 5 Romansh dual-lumen PICC was advanced centrally. Follow-up chest x-ray shows the catheter tip at the SVC/RA junction region. The catheter flushed easily and there was a good blood return. The catheter was secured to the skin. The patient tolerated the procedure well and there were no immediate complications. EBL: Less than 3 mL. Successful ultrasound guided PICC placement     Xr Chest Portable    Result Date: 6/22/2020  EXAMINATION: ONE XRAY VIEW OF THE CHEST 6/22/2020 6:50 am COMPARISON: June 20, 2020 HISTORY: ORDERING SYSTEM PROVIDED HISTORY: follow up TECHNOLOGIST PROVIDED HISTORY: follow up Reason for Exam: follow up Acuity: Unknown Type of Exam: Subsequent/Follow-up Additional signs and symptoms: follow up Relevant Medical/Surgical History: follow up FINDINGS: Tracheostomy tube. Right Port-A-Cath. Left PICC. Increased right lung consolidation. Scattered left lung opacities. Cardiomegaly.      Increased right lung consolidation. Xr Chest Portable    Result Date: 6/20/2020  EXAMINATION: ONE XRAY VIEW OF THE CHEST 6/20/2020 5:50 am COMPARISON: June 19, 2020 HISTORY: ORDERING SYSTEM PROVIDED HISTORY: follow up TECHNOLOGIST PROVIDED HISTORY: follow up Reason for Exam: Follow up Acuity: Unknown Type of Exam: Subsequent/Follow-up Additional signs and symptoms: Follow up Relevant Medical/Surgical History: Follow up FINDINGS: Tracheostomy tube. Right Port-A-Cath. Left PICC. Bilateral lung opacities are minimally improved. Cardiomegaly. Bilateral lung opacities are minimally improved. Xr Chest Portable    Result Date: 6/19/2020  EXAMINATION: ONE XRAY VIEW OF THE CHEST 6/19/2020 12:29 pm COMPARISON: AP chest from 05/11/2020 HISTORY: ORDERING SYSTEM PROVIDED HISTORY: sob TECHNOLOGIST PROVIDED HISTORY: sob Reason for Exam: PT CO SOB and fatigue, PT on permanent trach. Acuity: Chronic Type of Exam: Ongoing History of breast cancer, morbid obesity, respiratory failure, and CHF. FINDINGS: Tracheostomy tube in unchanged position. Right subclavian chemo port with unchanged catheter tip position near the cavoatrial junction. Overlying necklace. Clips right axilla. Cardiomediastinal shadow stable. Low lung volumes. Patchy bilateral dense airspace opacities, unchanged or perhaps slightly smaller. No new pulmonary abnormality or large pleural effusion. No pneumothorax. Bones unchanged. Stable or slightly improved findings; bilateral multifocal airspace opacities, most likely either infection, or possibly metastatic disease or pulmonary edema. Physical Examination:        Physical Exam  Vitals signs reviewed. Constitutional:       Appearance: She is obese. HENT:      Head: Normocephalic and atraumatic. Cardiovascular:      Rate and Rhythm: Normal rate and regular rhythm. Pulses: Normal pulses. Heart sounds: Normal heart sounds.    Pulmonary:      Effort: Pulmonary effort is normal. No respiratory distress. Breath sounds: No wheezing. Comments: On trach, vent  Abdominal:      Palpations: Abdomen is soft. Tenderness: There is no abdominal tenderness. Skin:     General: Skin is warm and dry. Neurological:      General: No focal deficit present. Mental Status: She is alert and oriented to person, place, and time. Assessment:        Primary Problem  SOB (shortness of breath)    Active Hospital Problems    Diagnosis Date Noted    SOB (shortness of breath) [R06.02] 06/19/2020    Morbid obesity with BMI of 70 and over, adult ([facility] [E66.01, Z68.45] 03/30/2020    Tracheostomy dependent ([facility] [Z93.0] 11/20/2018    Chronic respiratory failure requiring continuous mechanical ventilation through tracheostomy ([facility] [address] [J96.10, Z93.0, Z99.11] 11/20/2018       Plan:        Chronic respiratory failure with hypercapnia, secondary to RU with suspected underlying ventilator associated pneumonia  - On chronic trach ventilator  - Chest x-ray increased right lung consolidation  - No leukocytosis  - COVID negative  - Strep pneumo, Legionella, sputum cultures negative  - MRSA DNA swab positive  - Cefepime, Zyvox, Levaquin  - Respiratory eval and treat  - repeat procal  - increased O2 requirement    Hypothyroidism  - Synthroid 200 mcg      DVT prophylaxis: Lovenox 30 mg sq BID  GI prophylaxis: Pepcid 20 mg PO BID       Ariel SORIANO MD  6/22/2020  9:08 AM   Attending Physician Statement  I have discussed the care of Alberto Laguna and I have examined the patient myselft and taken ros and hpi , including pertinent history and exam findings,  with the resident. I have reviewed the key elements of all parts of the encounter with the resident. I agree with the assessment, plan and orders as documented by the resident.   Spitty failure acute on chronic not improving high FiO2 65% repeated suctioning required unable to move out of ICU also had obesity hypoventilation sleep apnea and chronic trach    Electronically signed by Katherine Estes MD

## 2020-06-22 NOTE — PROGRESS NOTES
06/22/2020    RBC 3.16 06/22/2020    HGB 9.4 06/22/2020    HCT 29.8 06/22/2020     06/22/2020    MCV 94.3 06/22/2020    MCH 29.7 06/22/2020    MCHC 31.5 06/22/2020    RDW 15.4 06/22/2020    METASPCT 3 09/10/2019    LYMPHOPCT 14 06/22/2020    MONOPCT 12 06/22/2020    MYELOPCT 1 11/25/2018    BASOPCT 0 06/22/2020    MONOSABS 0.80 06/22/2020    LYMPHSABS 0.90 06/22/2020    EOSABS 0.10 06/22/2020    BASOSABS 0.00 06/22/2020    DIFFTYPE NOT REPORTED 06/22/2020       BMP   Lab Results   Component Value Date     06/22/2020    K 5.0 06/22/2020    CL 99 06/22/2020    CO2 31 06/22/2020    BUN 17 06/22/2020    CREATININE 1.13 06/22/2020    GLUCOSE 121 06/22/2020    CALCIUM 8.9 06/22/2020       LFTS  Lab Results   Component Value Date    ALKPHOS 66 06/19/2020    ALT 29 06/19/2020    AST 31 06/19/2020    PROT 8.3 06/19/2020    BILITOT 0.20 06/19/2020    LABALBU 3.7 06/19/2020       INR  No results for input(s): PROTIME, INR in the last 72 hours. APTT  No results for input(s): APTT in the last 72 hours. Lactic Acid  Lab Results   Component Value Date    LACTA 1.0 06/19/2020    LACTA NOT REPORTED 03/30/2020    LACTA NOT REPORTED 03/29/2020        BNP   No results for input(s): BNP in the last 72 hours. Cultures     Mycoplasma IgM antibody test positive. Blood cultures pending  Radiology     Plain Films  Chest x-ray reveals worsening congestive versus infiltrative changes. See actual reports for details    SYSTEM ASSESSMENT  Acute upon chronic hypoxemic and hypercapnic respiratory failure  #2 multilobar pneumonia  3. RU OHS  #4. Mycoplasma pneumonia  #5. History of COVID-19 infection  #6. Morbid obesity with chronic vent dependence. Neuro       Respiratory   Adjust PEEP to 10 and follow  O2 sats closely  Hemodynamics     Hemodynamically stable. Checking proBNP to see if she will benefit from Lasix. We will have to watch blood pressure.   Gastrointestinal/Nutrition       Renal   Upon creatinine closely. Creatinine 1.13    Infectious Disease   Awaiting other studies but on Levaquin and cefepime. Treating mycoplasma. Hematology/Oncology       Endocrine       Social/Spiritual/DNR/Disposition/Other   May need LTAC if unable to get off ventilator.       Critical Care Time   0 min    Electronically signed by Rashmi Couch MD on 6/22/2020 at 2:10 PM

## 2020-06-22 NOTE — PROGRESS NOTES
Physical Therapy  DATE: 2020    NAME: Sue Hendrickson  MRN: [id number]   : 1970    Patient not seen this date for Physical Therapy due to:  [] Blood transfusion in progress  [] Hemodialysis  [x]  Patient Declined  [] Spine Precautions   [] Strict Bedrest  [] Surgery/ Procedure  [] Testing      [] Other        [] PT being discontinued at this time. Patient independent. No further needs. [] PT being discontinued at this time as the patient has been transferred to palliative care. No further needs.     Nancy Gold, PT

## 2020-06-22 NOTE — PLAN OF CARE
Problem: Pain:  Goal: Pain level will decrease  Description: Pain level will decrease  6/22/2020 0411 by Yadiel Matos RN  Outcome: Met This Shift  Note: Patient's pain has been well controlled throughout the entire shift, please see MAR. Problem: Falls - Risk of:  Goal: Will remain free from falls  Description: Will remain free from falls  6/22/2020 0411 by Yadiel Matos RN  Outcome: Met This Shift  Note: The patient remained free from falls this shift, call light within reach, bed in locked and lowest position. Side rails up x2. Continue to monitor closely. Problem: Breathing Pattern - Ineffective:  Goal: Ability to achieve and maintain a regular respiratory rate will improve  Description: Ability to achieve and maintain a regular respiratory rate will improve  6/22/2020 0411 by Yadiel Matos RN  Outcome: Ongoing  Note: Pt is chronic trach to vent, intermittent suctioning. Anxious about sats     Problem: Respiratory:  Goal: Ability to maintain normal respiratory secretions will improve  Description: Ability to maintain normal respiratory secretions will improve  6/22/2020 0411 by Yadiel Matos RN  Outcome: Ongoing  Note: Suctioning out tan secretions frequently.       Problem: Skin Integrity:  Goal: Absence of new skin breakdown  Description: Absence of new skin breakdown  6/22/2020 0411 by Yadiel Matos RN  Outcome: Ongoing  Note: Pt refusing to turn, educated multiple times on need to turn, pt refusing hygiene as well

## 2020-06-22 NOTE — PLAN OF CARE
Problem: Pain:  Goal: Pain level will decrease  Outcome: Met This Shift  Goal: Control of acute pain  Outcome: Met This Shift  Goal: Control of chronic pain  Outcome: Met This Shift     Problem: Falls - Risk of:  Goal: Will remain free from falls  Outcome: Met This Shift  Goal: Absence of physical injury  Outcome: Met This Shift     Problem: Breathing Pattern - Ineffective:  Goal: Ability to achieve and maintain a regular respiratory rate will improve  Outcome: Met This Shift     Problem: Respiratory:  Goal: Ability to maintain normal respiratory secretions will improve  Outcome: Met This Shift     Problem: Skin Integrity:  Goal: Will show no infection signs and symptoms  Outcome: Not Met This Shift  Goal: Absence of new skin breakdown  Outcome: Not Met This Shift  Patient refusing skin care, oral care, repositioning. Attempted to educate patient, non-compliant.

## 2020-06-22 NOTE — PROGRESS NOTES
Dr. Jer Holland and Residents in to evaluate  patient. Patient continues to be anxious, orders placed for 1 x Ativan order.

## 2020-06-23 ENCOUNTER — APPOINTMENT (OUTPATIENT)
Dept: GENERAL RADIOLOGY | Age: 50
DRG: 130 | End: 2020-06-23
Payer: MEDICAID

## 2020-06-23 PROBLEM — J96.21 ACUTE ON CHRONIC RESPIRATORY FAILURE WITH HYPOXIA AND HYPERCAPNIA (HCC): Status: ACTIVE | Noted: 2020-06-23

## 2020-06-23 PROBLEM — J96.22 ACUTE ON CHRONIC RESPIRATORY FAILURE WITH HYPOXIA AND HYPERCAPNIA (HCC): Status: ACTIVE | Noted: 2020-06-23

## 2020-06-23 LAB
ABSOLUTE EOS #: 0.1 K/UL (ref 0–0.4)
ABSOLUTE IMMATURE GRANULOCYTE: ABNORMAL K/UL (ref 0–0.3)
ABSOLUTE LYMPH #: 1.1 K/UL (ref 1–4.8)
ABSOLUTE MONO #: 0.6 K/UL (ref 0.1–1.3)
ANION GAP SERPL CALCULATED.3IONS-SCNC: 10 MMOL/L (ref 9–17)
BASOPHILS # BLD: 0 % (ref 0–2)
BASOPHILS ABSOLUTE: 0 K/UL (ref 0–0.2)
BUN BLDV-MCNC: 21 MG/DL (ref 6–20)
BUN/CREAT BLD: ABNORMAL (ref 9–20)
CALCIUM SERPL-MCNC: 8.7 MG/DL (ref 8.6–10.4)
CHLORIDE BLD-SCNC: 100 MMOL/L (ref 98–107)
CO2: 29 MMOL/L (ref 20–31)
CREAT SERPL-MCNC: 1.33 MG/DL (ref 0.5–0.9)
DIFFERENTIAL TYPE: ABNORMAL
EOSINOPHILS RELATIVE PERCENT: 2 % (ref 0–4)
GFR AFRICAN AMERICAN: 52 ML/MIN
GFR NON-AFRICAN AMERICAN: 43 ML/MIN
GFR SERPL CREATININE-BSD FRML MDRD: ABNORMAL ML/MIN/{1.73_M2}
GFR SERPL CREATININE-BSD FRML MDRD: ABNORMAL ML/MIN/{1.73_M2}
GLUCOSE BLD-MCNC: 101 MG/DL (ref 70–99)
HCT VFR BLD CALC: 28.4 % (ref 36–46)
HEMOGLOBIN: 8.8 G/DL (ref 12–16)
IMMATURE GRANULOCYTES: ABNORMAL %
LV EF: 53 %
LVEF MODALITY: NORMAL
LYMPHOCYTES # BLD: 17 % (ref 24–44)
MCH RBC QN AUTO: 29.3 PG (ref 26–34)
MCHC RBC AUTO-ENTMCNC: 30.9 G/DL (ref 31–37)
MCV RBC AUTO: 94.9 FL (ref 80–100)
MONOCYTES # BLD: 10 % (ref 1–7)
NRBC AUTOMATED: ABNORMAL PER 100 WBC
PDW BLD-RTO: 15.7 % (ref 11.5–14.9)
PLATELET # BLD: 147 K/UL (ref 150–450)
PLATELET ESTIMATE: ABNORMAL
PMV BLD AUTO: 8.8 FL (ref 6–12)
POTASSIUM SERPL-SCNC: 5 MMOL/L (ref 3.7–5.3)
RBC # BLD: 2.99 M/UL (ref 4–5.2)
RBC # BLD: ABNORMAL 10*6/UL
SEG NEUTROPHILS: 71 % (ref 36–66)
SEGMENTED NEUTROPHILS ABSOLUTE COUNT: 4.5 K/UL (ref 1.3–9.1)
SODIUM BLD-SCNC: 139 MMOL/L (ref 135–144)
WBC # BLD: 6.4 K/UL (ref 3.5–11)
WBC # BLD: ABNORMAL 10*3/UL

## 2020-06-23 PROCEDURE — 94640 AIRWAY INHALATION TREATMENT: CPT

## 2020-06-23 PROCEDURE — 84300 ASSAY OF URINE SODIUM: CPT

## 2020-06-23 PROCEDURE — 82570 ASSAY OF URINE CREATININE: CPT

## 2020-06-23 PROCEDURE — 94770 HC ETCO2 MONITOR DAILY: CPT

## 2020-06-23 PROCEDURE — 80048 BASIC METABOLIC PNL TOTAL CA: CPT

## 2020-06-23 PROCEDURE — 97162 PT EVAL MOD COMPLEX 30 MIN: CPT

## 2020-06-23 PROCEDURE — 2700000000 HC OXYGEN THERAPY PER DAY

## 2020-06-23 PROCEDURE — 2000000000 HC ICU R&B

## 2020-06-23 PROCEDURE — 6360000002 HC RX W HCPCS: Performed by: STUDENT IN AN ORGANIZED HEALTH CARE EDUCATION/TRAINING PROGRAM

## 2020-06-23 PROCEDURE — 6360000002 HC RX W HCPCS: Performed by: INTERNAL MEDICINE

## 2020-06-23 PROCEDURE — 6360000004 HC RX CONTRAST MEDICATION: Performed by: INTERNAL MEDICINE

## 2020-06-23 PROCEDURE — 89220 SPUTUM SPECIMEN COLLECTION: CPT

## 2020-06-23 PROCEDURE — 94003 VENT MGMT INPAT SUBQ DAY: CPT

## 2020-06-23 PROCEDURE — 2580000003 HC RX 258: Performed by: INTERNAL MEDICINE

## 2020-06-23 PROCEDURE — 71045 X-RAY EXAM CHEST 1 VIEW: CPT

## 2020-06-23 PROCEDURE — 85025 COMPLETE CBC W/AUTO DIFF WBC: CPT

## 2020-06-23 PROCEDURE — 97110 THERAPEUTIC EXERCISES: CPT

## 2020-06-23 PROCEDURE — 2580000003 HC RX 258: Performed by: STUDENT IN AN ORGANIZED HEALTH CARE EDUCATION/TRAINING PROGRAM

## 2020-06-23 PROCEDURE — 6370000000 HC RX 637 (ALT 250 FOR IP): Performed by: STUDENT IN AN ORGANIZED HEALTH CARE EDUCATION/TRAINING PROGRAM

## 2020-06-23 PROCEDURE — C8929 TTE W OR WO FOL WCON,DOPPLER: HCPCS

## 2020-06-23 PROCEDURE — 99291 CRITICAL CARE FIRST HOUR: CPT | Performed by: INTERNAL MEDICINE

## 2020-06-23 PROCEDURE — 36415 COLL VENOUS BLD VENIPUNCTURE: CPT

## 2020-06-23 PROCEDURE — 94761 N-INVAS EAR/PLS OXIMETRY MLT: CPT

## 2020-06-23 PROCEDURE — 2500000003 HC RX 250 WO HCPCS: Performed by: INTERNAL MEDICINE

## 2020-06-23 RX ORDER — FUROSEMIDE 10 MG/ML
40 INJECTION INTRAMUSCULAR; INTRAVENOUS DAILY
Status: DISCONTINUED | OUTPATIENT
Start: 2020-06-23 | End: 2020-06-23

## 2020-06-23 RX ORDER — FUROSEMIDE 10 MG/ML
40 INJECTION INTRAMUSCULAR; INTRAVENOUS 2 TIMES DAILY
Status: DISCONTINUED | OUTPATIENT
Start: 2020-06-23 | End: 2020-06-25

## 2020-06-23 RX ADMIN — GUAIFENESIN 600 MG: 600 TABLET, EXTENDED RELEASE ORAL at 10:15

## 2020-06-23 RX ADMIN — LINEZOLID 600 MG: 600 TABLET, FILM COATED ORAL at 10:14

## 2020-06-23 RX ADMIN — ENOXAPARIN SODIUM 40 MG: 40 INJECTION SUBCUTANEOUS at 21:07

## 2020-06-23 RX ADMIN — ENOXAPARIN SODIUM 40 MG: 40 INJECTION SUBCUTANEOUS at 08:03

## 2020-06-23 RX ADMIN — Medication 10 ML: at 10:15

## 2020-06-23 RX ADMIN — CARBAMAZEPINE 200 MG: 200 TABLET ORAL at 10:16

## 2020-06-23 RX ADMIN — ALBUTEROL SULFATE 2.5 MG: 2.5 SOLUTION RESPIRATORY (INHALATION) at 15:36

## 2020-06-23 RX ADMIN — FUROSEMIDE 40 MG: 10 INJECTION, SOLUTION INTRAMUSCULAR; INTRAVENOUS at 21:16

## 2020-06-23 RX ADMIN — FERROUS SULFATE TAB 325 MG (65 MG ELEMENTAL FE) 325 MG: 325 (65 FE) TAB at 18:25

## 2020-06-23 RX ADMIN — DEXMEDETOMIDINE 0.2 MCG/KG/HR: 100 INJECTION, SOLUTION, CONCENTRATE INTRAVENOUS at 20:21

## 2020-06-23 RX ADMIN — ALBUTEROL SULFATE 2.5 MG: 2.5 SOLUTION RESPIRATORY (INHALATION) at 03:04

## 2020-06-23 RX ADMIN — FAMOTIDINE 20 MG: 20 TABLET ORAL at 10:14

## 2020-06-23 RX ADMIN — ALBUTEROL SULFATE 2.5 MG: 2.5 SOLUTION RESPIRATORY (INHALATION) at 23:46

## 2020-06-23 RX ADMIN — CEFEPIME 2 G: 2 INJECTION, POWDER, FOR SOLUTION INTRAVENOUS at 05:11

## 2020-06-23 RX ADMIN — LEVOFLOXACIN 750 MG: 750 TABLET, FILM COATED ORAL at 10:14

## 2020-06-23 RX ADMIN — PERFLUTREN 2.2 MG: 6.52 INJECTION, SUSPENSION INTRAVENOUS at 10:55

## 2020-06-23 RX ADMIN — ALBUTEROL SULFATE 2.5 MG: 2.5 SOLUTION RESPIRATORY (INHALATION) at 11:45

## 2020-06-23 RX ADMIN — ALBUTEROL SULFATE 2.5 MG: 2.5 SOLUTION RESPIRATORY (INHALATION) at 19:41

## 2020-06-23 RX ADMIN — LEVOTHYROXINE SODIUM 250 MCG: 125 TABLET ORAL at 10:14

## 2020-06-23 RX ADMIN — CARBAMAZEPINE 200 MG: 200 TABLET ORAL at 21:07

## 2020-06-23 RX ADMIN — FAMOTIDINE 20 MG: 20 TABLET ORAL at 21:05

## 2020-06-23 RX ADMIN — DEXMEDETOMIDINE 0.1 MCG/KG/HR: 100 INJECTION, SOLUTION, CONCENTRATE INTRAVENOUS at 05:11

## 2020-06-23 RX ADMIN — DEXMEDETOMIDINE 0.1 MCG/KG/HR: 100 INJECTION, SOLUTION, CONCENTRATE INTRAVENOUS at 14:58

## 2020-06-23 RX ADMIN — ACETAMINOPHEN 1000 MG: 500 TABLET, FILM COATED ORAL at 18:30

## 2020-06-23 RX ADMIN — GUAIFENESIN 600 MG: 600 TABLET, EXTENDED RELEASE ORAL at 21:06

## 2020-06-23 RX ADMIN — ALBUTEROL SULFATE 2.5 MG: 2.5 SOLUTION RESPIRATORY (INHALATION) at 07:53

## 2020-06-23 RX ADMIN — ACETAMINOPHEN 1000 MG: 500 TABLET, FILM COATED ORAL at 10:45

## 2020-06-23 RX ADMIN — LINEZOLID 600 MG: 600 TABLET, FILM COATED ORAL at 21:06

## 2020-06-23 RX ADMIN — FUROSEMIDE 40 MG: 10 INJECTION, SOLUTION INTRAMUSCULAR; INTRAVENOUS at 12:09

## 2020-06-23 RX ADMIN — ESCITALOPRAM OXALATE 20 MG: 20 TABLET, FILM COATED ORAL at 10:15

## 2020-06-23 RX ADMIN — BUMETANIDE 1 MG: 1 TABLET ORAL at 10:14

## 2020-06-23 ASSESSMENT — PAIN DESCRIPTION - FREQUENCY: FREQUENCY: CONTINUOUS

## 2020-06-23 ASSESSMENT — PAIN SCALES - GENERAL
PAINLEVEL_OUTOF10: 0
PAINLEVEL_OUTOF10: 0
PAINLEVEL_OUTOF10: 5
PAINLEVEL_OUTOF10: 3
PAINLEVEL_OUTOF10: 5
PAINLEVEL_OUTOF10: 0

## 2020-06-23 ASSESSMENT — PULMONARY FUNCTION TESTS
PIF_VALUE: 36
PIF_VALUE: 36
PIF_VALUE: 42
PIF_VALUE: 34
PIF_VALUE: 39
PIF_VALUE: 36
PIF_VALUE: 41
PIF_VALUE: 36
PIF_VALUE: 41
PIF_VALUE: 40
PIF_VALUE: 42
PIF_VALUE: 41
PIF_VALUE: 36
PIF_VALUE: 33
PIF_VALUE: 40
PIF_VALUE: 42
PIF_VALUE: 42
PIF_VALUE: 34
PIF_VALUE: 43
PIF_VALUE: 36
PIF_VALUE: 45

## 2020-06-23 ASSESSMENT — PAIN DESCRIPTION - DESCRIPTORS: DESCRIPTORS: ACHING

## 2020-06-23 ASSESSMENT — PAIN DESCRIPTION - ORIENTATION: ORIENTATION: RIGHT

## 2020-06-23 ASSESSMENT — ENCOUNTER SYMPTOMS
ABDOMINAL PAIN: 0
NAUSEA: 0
VOMITING: 0
SHORTNESS OF BREATH: 1
DIARRHEA: 0

## 2020-06-23 ASSESSMENT — PAIN DESCRIPTION - ONSET: ONSET: ON-GOING

## 2020-06-23 ASSESSMENT — PAIN DESCRIPTION - LOCATION: LOCATION: SHOULDER

## 2020-06-23 NOTE — PROGRESS NOTES
[facility]    PROGRESS NOTE             6/23/2020    9:39 AM    Name:   Tessie Harper  MRN:     [id number]     [place]:      [de-identified]   Room:   2003/2003-01  IP Day:  4  Admit Date:  6/19/2020 11:07 AM    PCP:  Mine Ott DO  Code Status:  Full Code    Subjective:     C/C:   Chief Complaint   Patient presents with    Shortness of Breath     Interval History Status: not changed. Patient was seen and examined at bedside. No acute events overnight. Patient stated no change in her condition. Per RN refusing to change position in bed and getting cleaned up. Review of Systems:     Review of Systems   Constitutional: Negative for chills and fever. Respiratory: Positive for shortness of breath. Cardiovascular: Negative for chest pain. Gastrointestinal: Negative for abdominal pain, diarrhea, nausea and vomiting. Medications: Allergies:     Allergies   Allergen Reactions    Zosyn [Piperacillin-Tazobactam In Dex] Shortness Of Breath     tolerated cefepime 6/2018    Vancomycin Swelling     Lip swelling and redness and itching         Current Meds:   Scheduled Meds:    guaiFENesin  600 mg Oral BID    albuterol  2.5 mg Nebulization Q4H    levothyroxine  250 mcg Oral Daily    cefepime  2 g Intravenous Q12H    bumetanide  1 mg Oral BID    carBAMazepine  200 mg Oral BID    escitalopram  20 mg Oral Daily    ferrous sulfate  325 mg Oral TID WC    therapeutic multivitamin-minerals  1 tablet Oral Daily with breakfast    Hydrocerin   Topical BID    sodium chloride flush  10 mL Intravenous 2 times per day    enoxaparin  40 mg Subcutaneous BID    famotidine  20 mg Oral BID    linezolid  600 mg Oral 2 times per day    levoFLOXacin  750 mg Oral Daily     Continuous Infusions:    dexmedetomidine (PRECEDEX) IV infusion 0.1 mcg/kg/hr (06/23/20 0600)     PRN Meds: perflutren lipid microspheres, morphine **OR** morphine,

## 2020-06-23 NOTE — PROGRESS NOTES
\"Leave\"       Therapy Time   Individual Concurrent Group Co-treatment   Time In 8402         Time Out 1315         Minutes 30         Timed Code Treatment Minutes: [address], PT

## 2020-06-23 NOTE — FLOWSHEET NOTE
Patient was very lethargic. She nodded but did not respond to writer.       06/23/20 1302   Encounter Summary   Services provided to: Patient   Referral/Consult From: Rounding   Continue Visiting   (6-22-20)   Complexity of Encounter Low   Length of Encounter 15 minutes   Routine   Type Follow up   Assessment Approachable   Intervention [place];Sustaining presence/ Ministry of presence   Outcome Did not respond

## 2020-06-23 NOTE — PROGRESS NOTES
Residents updated on pt borderline urine output - 300 measured plus a small amt unmeasured on chucks. Pt due to receive another dose of lasix this evening. Will continue to monitor.

## 2020-06-23 NOTE — PROGRESS NOTES
Wean oxygen as tolerated. Keep O2 sat 90-92%    Hemodynamics       Gastrointestinal/Nutrition       Renal       Infectious Disease       Hematology/Oncology       Endocrine       Social/Spiritual/DNR/Disposition/Other   Currently on FiO2 of 60% with PEEP of 10, watching O2 sats closely. Patient's not hypotensive. On IV Lasix. Did discuss status with admitting team and x-rays were reviewed following kidney function closely with creatinine now 1.33. On IV Levaquin    Check chest x-ray in a.m. Patient voiced understanding  Explained things to her at length.     Critical Care Time   35 min    Electronically signed by Елена Powers MD on 6/23/2020 at 11:23 AM

## 2020-06-24 ENCOUNTER — APPOINTMENT (OUTPATIENT)
Dept: ULTRASOUND IMAGING | Age: 50
DRG: 130 | End: 2020-06-24
Payer: MEDICAID

## 2020-06-24 ENCOUNTER — APPOINTMENT (OUTPATIENT)
Dept: GENERAL RADIOLOGY | Age: 50
DRG: 130 | End: 2020-06-24
Payer: MEDICAID

## 2020-06-24 PROBLEM — N17.9 AKI (ACUTE KIDNEY INJURY) (HCC): Status: ACTIVE | Noted: 2020-06-24

## 2020-06-24 LAB
ABSOLUTE EOS #: 0.1 K/UL (ref 0–0.4)
ABSOLUTE IMMATURE GRANULOCYTE: ABNORMAL K/UL (ref 0–0.3)
ABSOLUTE LYMPH #: 1.1 K/UL (ref 1–4.8)
ABSOLUTE MONO #: 0.5 K/UL (ref 0.1–1.3)
ANION GAP SERPL CALCULATED.3IONS-SCNC: 13 MMOL/L (ref 9–17)
BASOPHILS # BLD: 0 % (ref 0–2)
BASOPHILS ABSOLUTE: 0 K/UL (ref 0–0.2)
BUN BLDV-MCNC: 27 MG/DL (ref 6–20)
BUN/CREAT BLD: ABNORMAL (ref 9–20)
CALCIUM SERPL-MCNC: 9.1 MG/DL (ref 8.6–10.4)
CHLORIDE BLD-SCNC: 97 MMOL/L (ref 98–107)
CO2: 28 MMOL/L (ref 20–31)
CREAT SERPL-MCNC: 1.62 MG/DL (ref 0.5–0.9)
CREATININE URINE: 200.7 MG/DL (ref 28–217)
DIFFERENTIAL TYPE: ABNORMAL
EOSINOPHILS RELATIVE PERCENT: 2 % (ref 0–4)
GFR AFRICAN AMERICAN: 41 ML/MIN
GFR NON-AFRICAN AMERICAN: 34 ML/MIN
GFR SERPL CREATININE-BSD FRML MDRD: ABNORMAL ML/MIN/{1.73_M2}
GFR SERPL CREATININE-BSD FRML MDRD: ABNORMAL ML/MIN/{1.73_M2}
GLUCOSE BLD-MCNC: 91 MG/DL (ref 70–99)
HCT VFR BLD CALC: 28.6 % (ref 36–46)
HEMOGLOBIN: 9.1 G/DL (ref 12–16)
IMMATURE GRANULOCYTES: ABNORMAL %
LYMPHOCYTES # BLD: 21 % (ref 24–44)
MCH RBC QN AUTO: 30 PG (ref 26–34)
MCHC RBC AUTO-ENTMCNC: 31.8 G/DL (ref 31–37)
MCV RBC AUTO: 94.4 FL (ref 80–100)
MONOCYTES # BLD: 9 % (ref 1–7)
NRBC AUTOMATED: ABNORMAL PER 100 WBC
PDW BLD-RTO: 15.5 % (ref 11.5–14.9)
PLATELET # BLD: 153 K/UL (ref 150–450)
PLATELET ESTIMATE: ABNORMAL
PMV BLD AUTO: 9.5 FL (ref 6–12)
POTASSIUM SERPL-SCNC: 4.8 MMOL/L (ref 3.7–5.3)
RBC # BLD: 3.03 M/UL (ref 4–5.2)
RBC # BLD: ABNORMAL 10*6/UL
SEG NEUTROPHILS: 68 % (ref 36–66)
SEGMENTED NEUTROPHILS ABSOLUTE COUNT: 3.7 K/UL (ref 1.3–9.1)
SODIUM BLD-SCNC: 138 MMOL/L (ref 135–144)
SODIUM,UR: 27 MMOL/L
WBC # BLD: 5.4 K/UL (ref 3.5–11)
WBC # BLD: ABNORMAL 10*3/UL

## 2020-06-24 PROCEDURE — 2580000003 HC RX 258: Performed by: INTERNAL MEDICINE

## 2020-06-24 PROCEDURE — 6360000002 HC RX W HCPCS: Performed by: STUDENT IN AN ORGANIZED HEALTH CARE EDUCATION/TRAINING PROGRAM

## 2020-06-24 PROCEDURE — 94640 AIRWAY INHALATION TREATMENT: CPT

## 2020-06-24 PROCEDURE — 36415 COLL VENOUS BLD VENIPUNCTURE: CPT

## 2020-06-24 PROCEDURE — 99291 CRITICAL CARE FIRST HOUR: CPT | Performed by: INTERNAL MEDICINE

## 2020-06-24 PROCEDURE — 2500000003 HC RX 250 WO HCPCS: Performed by: INTERNAL MEDICINE

## 2020-06-24 PROCEDURE — 6370000000 HC RX 637 (ALT 250 FOR IP): Performed by: STUDENT IN AN ORGANIZED HEALTH CARE EDUCATION/TRAINING PROGRAM

## 2020-06-24 PROCEDURE — 76775 US EXAM ABDO BACK WALL LIM: CPT

## 2020-06-24 PROCEDURE — 71045 X-RAY EXAM CHEST 1 VIEW: CPT

## 2020-06-24 PROCEDURE — 6360000002 HC RX W HCPCS: Performed by: INTERNAL MEDICINE

## 2020-06-24 PROCEDURE — 80048 BASIC METABOLIC PNL TOTAL CA: CPT

## 2020-06-24 PROCEDURE — 2580000003 HC RX 258: Performed by: STUDENT IN AN ORGANIZED HEALTH CARE EDUCATION/TRAINING PROGRAM

## 2020-06-24 PROCEDURE — 94770 HC ETCO2 MONITOR DAILY: CPT

## 2020-06-24 PROCEDURE — 85025 COMPLETE CBC W/AUTO DIFF WBC: CPT

## 2020-06-24 PROCEDURE — 94003 VENT MGMT INPAT SUBQ DAY: CPT

## 2020-06-24 PROCEDURE — 94761 N-INVAS EAR/PLS OXIMETRY MLT: CPT

## 2020-06-24 PROCEDURE — 2700000000 HC OXYGEN THERAPY PER DAY

## 2020-06-24 PROCEDURE — 2000000000 HC ICU R&B

## 2020-06-24 RX ORDER — LORAZEPAM 2 MG/ML
1 INJECTION INTRAMUSCULAR ONCE
Status: COMPLETED | OUTPATIENT
Start: 2020-06-24 | End: 2020-06-24

## 2020-06-24 RX ADMIN — FAMOTIDINE 20 MG: 20 TABLET ORAL at 20:02

## 2020-06-24 RX ADMIN — ENOXAPARIN SODIUM 40 MG: 40 INJECTION SUBCUTANEOUS at 20:02

## 2020-06-24 RX ADMIN — ACETAMINOPHEN 1000 MG: 500 TABLET, FILM COATED ORAL at 18:05

## 2020-06-24 RX ADMIN — FAMOTIDINE 20 MG: 20 TABLET ORAL at 08:44

## 2020-06-24 RX ADMIN — CARBAMAZEPINE 200 MG: 200 TABLET ORAL at 20:03

## 2020-06-24 RX ADMIN — DEXMEDETOMIDINE 0.2 MCG/KG/HR: 100 INJECTION, SOLUTION, CONCENTRATE INTRAVENOUS at 15:22

## 2020-06-24 RX ADMIN — Medication: at 08:45

## 2020-06-24 RX ADMIN — FERROUS SULFATE TAB 325 MG (65 MG ELEMENTAL FE) 325 MG: 325 (65 FE) TAB at 14:20

## 2020-06-24 RX ADMIN — FERROUS SULFATE TAB 325 MG (65 MG ELEMENTAL FE) 325 MG: 325 (65 FE) TAB at 08:44

## 2020-06-24 RX ADMIN — Medication 10 ML: at 20:03

## 2020-06-24 RX ADMIN — DEXMEDETOMIDINE 0.3 MCG/KG/HR: 100 INJECTION, SOLUTION, CONCENTRATE INTRAVENOUS at 05:25

## 2020-06-24 RX ADMIN — Medication: at 20:02

## 2020-06-24 RX ADMIN — CARBAMAZEPINE 200 MG: 200 TABLET ORAL at 08:44

## 2020-06-24 RX ADMIN — ONDANSETRON 4 MG: 2 INJECTION INTRAMUSCULAR; INTRAVENOUS at 15:22

## 2020-06-24 RX ADMIN — ALBUTEROL SULFATE 2.5 MG: 2.5 SOLUTION RESPIRATORY (INHALATION) at 03:31

## 2020-06-24 RX ADMIN — ENOXAPARIN SODIUM 40 MG: 40 INJECTION SUBCUTANEOUS at 08:43

## 2020-06-24 RX ADMIN — ALBUTEROL SULFATE 2.5 MG: 2.5 SOLUTION RESPIRATORY (INHALATION) at 19:13

## 2020-06-24 RX ADMIN — LINEZOLID 600 MG: 600 TABLET, FILM COATED ORAL at 08:44

## 2020-06-24 RX ADMIN — ESCITALOPRAM OXALATE 20 MG: 20 TABLET, FILM COATED ORAL at 08:44

## 2020-06-24 RX ADMIN — LORAZEPAM 1 MG: 2 INJECTION INTRAMUSCULAR; INTRAVENOUS at 16:59

## 2020-06-24 RX ADMIN — ALBUTEROL SULFATE 2.5 MG: 2.5 SOLUTION RESPIRATORY (INHALATION) at 23:12

## 2020-06-24 RX ADMIN — MULTIPLE VITAMINS W/ MINERALS TAB 1 TABLET: TAB at 08:43

## 2020-06-24 RX ADMIN — GUAIFENESIN 600 MG: 600 TABLET, EXTENDED RELEASE ORAL at 08:44

## 2020-06-24 RX ADMIN — ALBUTEROL SULFATE 2.5 MG: 2.5 SOLUTION RESPIRATORY (INHALATION) at 14:52

## 2020-06-24 RX ADMIN — MORPHINE SULFATE 2 MG: 2 INJECTION, SOLUTION INTRAMUSCULAR; INTRAVENOUS at 08:27

## 2020-06-24 RX ADMIN — GUAIFENESIN 600 MG: 600 TABLET, EXTENDED RELEASE ORAL at 20:02

## 2020-06-24 RX ADMIN — ALBUTEROL SULFATE 2.5 MG: 2.5 SOLUTION RESPIRATORY (INHALATION) at 07:56

## 2020-06-24 RX ADMIN — LEVOFLOXACIN 750 MG: 750 TABLET, FILM COATED ORAL at 08:44

## 2020-06-24 RX ADMIN — ALBUTEROL SULFATE 2.5 MG: 2.5 SOLUTION RESPIRATORY (INHALATION) at 11:38

## 2020-06-24 RX ADMIN — LINEZOLID 600 MG: 600 TABLET, FILM COATED ORAL at 20:02

## 2020-06-24 RX ADMIN — LEVOTHYROXINE SODIUM 250 MCG: 125 TABLET ORAL at 08:44

## 2020-06-24 ASSESSMENT — PULMONARY FUNCTION TESTS
PIF_VALUE: 36
PIF_VALUE: 51
PIF_VALUE: 37
PIF_VALUE: 36
PIF_VALUE: 41
PIF_VALUE: 36
PIF_VALUE: 46
PIF_VALUE: 46
PIF_VALUE: 42
PIF_VALUE: 37
PIF_VALUE: 38

## 2020-06-24 ASSESSMENT — PAIN DESCRIPTION - LOCATION: LOCATION: SHOULDER

## 2020-06-24 ASSESSMENT — PAIN SCALES - GENERAL
PAINLEVEL_OUTOF10: 4
PAINLEVEL_OUTOF10: 3
PAINLEVEL_OUTOF10: 0
PAINLEVEL_OUTOF10: 0
PAINLEVEL_OUTOF10: 4

## 2020-06-24 ASSESSMENT — PAIN DESCRIPTION - ORIENTATION: ORIENTATION: RIGHT

## 2020-06-24 ASSESSMENT — ENCOUNTER SYMPTOMS
NAUSEA: 0
VOMITING: 0
SHORTNESS OF BREATH: 1
ABDOMINAL PAIN: 0

## 2020-06-24 ASSESSMENT — PAIN DESCRIPTION - ONSET: ONSET: ON-GOING

## 2020-06-24 ASSESSMENT — PAIN DESCRIPTION - DESCRIPTORS: DESCRIPTORS: ACHING

## 2020-06-24 ASSESSMENT — PAIN DESCRIPTION - FREQUENCY: FREQUENCY: CONTINUOUS

## 2020-06-24 NOTE — PROGRESS NOTES
Date     06/24/2020    K 4.8 06/24/2020    CL 97 06/24/2020    CO2 28 06/24/2020    BUN 27 06/24/2020    LABALBU 3.7 06/19/2020    CREATININE 1.62 06/24/2020    CALCIUM 9.1 06/24/2020    GFRAA 41 06/24/2020    LABGLOM 34 06/24/2020    GLUCOSE 91 06/24/2020       Lab Results   Component Value Date/Time    SPECIAL LEFT HAND 06/22/2020 05:34 PM     Lab Results   Component Value Date/Time    CULTURE NO GROWTH 2 DAYS 06/22/2020 05:34 PM         Radiology:    Xr Chest (single View Frontal)    Result Date: 6/19/2020  EXAMINATION: ONE XRAY VIEW OF THE CHEST 6/19/2020 3:51 pm COMPARISON: 06/19/2020, 1220 hours. HISTORY: ORDERING SYSTEM PROVIDED HISTORY: check picc placement, please show Dr. Mosqueda Slider when done TECHNOLOGIST PROVIDED HISTORY: check picc placement, please show Dr. Mosqueda Slider when done Reason for Exam: verify picc placement Acuity: Acute Type of Exam: Initial FINDINGS: The tracheostomy tube is in satisfactory position. A left PICC line was noted with the distal tip in the region of the right atrium. A right Gnkkex-D-Hxwh was noted with the distal tip in the region of the superior vena cava. No pneumothorax was identified. Metallic surgical clips were noted in the right axilla. The heart was not enlarged. Nodular parenchymal densities were noted bilaterally which could represent multifocal pneumonia or tumor. The regional skeleton was unremarkable. The PICC line is the only change noted from [id number] hours. The tracheostomy tube is in satisfactory position. A new left PICC line was noted with the distal tip in the region of the right atrium. Right Qgqlkj-G-Batk with the distal tip in the region of the superior vena cava. No pneumothorax was identified. No cardiomegaly or interstitial edema. Nodular parenchymal densities were noted bilaterally which could represent multifocal pneumonia or tumor.   This is unchanged from the earlier exam.     Jose Angel Landin Cva Device Plmt/replace/removal    Result Date: 6/19/2020  PROCEDURE: ULTRASOUND GUIDED VASCULAR ACCESS. PICC PLACEMENT 6/19/2020. HISTORY: ORDERING SYSTEM PROVIDED HISTORY: poor iv access vented patient TECHNOLOGIST PROVIDED HISTORY: poor iv access vented patient Lumen?->Double Lumen Is the patient pregnant?->No Pneumonia needs IV access for antibiotics SEDATION: None FLUOROSCOPY DOSE AND TYPE OR TIME AND EXPOSURES: None TECHNIQUE AND FINDINGS: This procedure was performed by Dr. Nicole Juarez. Informed consent was obtained after a detailed explanation of the procedure including risks, benefits, and alternatives. Universal protocol was observed. The left arm was prepped and draped in sterile fashion using maximum sterile barrier technique. Local anesthesia was achieved with lidocaine. A micropuncture needle was used to access the left brachial vein using ultrasound guidance. An ultrasound image demonstrating patency of the vein with needle tip located within it. An image was obtained and stored in PACs. A 0.018 guidewire was used to place a peel-a-way sheath and a 5 Filipino dual-lumen PICC was advanced centrally. Follow-up chest x-ray shows the catheter tip at the SVC/RA junction region. The catheter flushed easily and there was a good blood return. The catheter was secured to the skin. The patient tolerated the procedure well and there were no immediate complications. EBL: Less than 3 mL. Successful ultrasound guided PICC placement     Xr Chest Portable    Result Date: 6/24/2020  EXAMINATION: ONE XRAY VIEW OF THE CHEST 6/24/2020 6:32 am COMPARISON: June 23, 2020 HISTORY: ORDERING SYSTEM PROVIDED HISTORY: follow up TECHNOLOGIST PROVIDED HISTORY: follow up Reason for Exam: Vent to trach. Acuity: Unknown Type of Exam: Unknown Additional signs and symptoms: Vent to trach. FINDINGS: Support lines and tubes are stable in position. Stable cardiomediastinal silhouette.   Bilateral superior parenchymal opacities with more dense opacification of the right base HISTORY: follow up TECHNOLOGIST PROVIDED HISTORY: follow up Reason for Exam: follow up Acuity: Unknown Type of Exam: Subsequent/Follow-up Additional signs and symptoms: follow up Relevant Medical/Surgical History: follow up FINDINGS: Tracheostomy tube. Right Port-A-Cath. Left PICC. Increased right lung consolidation. Scattered left lung opacities. Cardiomegaly. Increased right lung consolidation. Xr Chest Portable    Result Date: 6/20/2020  EXAMINATION: ONE XRAY VIEW OF THE CHEST 6/20/2020 5:50 am COMPARISON: June 19, 2020 HISTORY: ORDERING SYSTEM PROVIDED HISTORY: follow up TECHNOLOGIST PROVIDED HISTORY: follow up Reason for Exam: Follow up Acuity: Unknown Type of Exam: Subsequent/Follow-up Additional signs and symptoms: Follow up Relevant Medical/Surgical History: Follow up FINDINGS: Tracheostomy tube. Right Port-A-Cath. Left PICC. Bilateral lung opacities are minimally improved. Cardiomegaly. Bilateral lung opacities are minimally improved. Xr Chest Portable    Result Date: 6/19/2020  EXAMINATION: ONE XRAY VIEW OF THE CHEST 6/19/2020 12:29 pm COMPARISON: AP chest from 05/11/2020 HISTORY: ORDERING SYSTEM PROVIDED HISTORY: sob TECHNOLOGIST PROVIDED HISTORY: sob Reason for Exam: PT CO SOB and fatigue, PT on permanent trach. Acuity: Chronic Type of Exam: Ongoing History of breast cancer, morbid obesity, respiratory failure, and CHF. FINDINGS: Tracheostomy tube in unchanged position. Right subclavian chemo port with unchanged catheter tip position near the cavoatrial junction. Overlying necklace. Clips right axilla. Cardiomediastinal shadow stable. Low lung volumes. Patchy bilateral dense airspace opacities, unchanged or perhaps slightly smaller. No new pulmonary abnormality or large pleural effusion. No pneumothorax. Bones unchanged.      Stable or slightly improved findings; bilateral multifocal airspace opacities, most likely either infection, or possibly metastatic disease or pulmonary

## 2020-06-24 NOTE — PROGRESS NOTES
Patient allowed staff to turn her. It took four of us 45 minutes to turn patient due to her requiring multiple breaks because of shortness of breath. Patient required a complete linen change. Urine and stool were present under patient. 250 ml of urine was emptied for Purewick canister and bed pads were saturated in urine. Bowel movement was soft and about medium in size.

## 2020-06-24 NOTE — PROGRESS NOTES
Physical Therapy  DATE: 2020    NAME: Victoria Trent  MRN: [id number]   : 1970    Patient not seen this date for Physical Therapy due to:  [] Blood transfusion in progress  [] Hemodialysis  [x]  Patient Declined 1442 p.m, nursing staff notified. [] Spine Precautions   [] Strict Bedrest  [] Surgery/ Procedure  [] Testing      [] Other        [] PT being discontinued at this time. Patient independent. No further needs. [] PT being discontinued at this time as the patient has been transferred to palliative care. No further needs.     Abdifatah Brice PTA   Electronically signed by Abdifatah Brice PTA on 2020 at 3:55 PM

## 2020-06-24 NOTE — PROGRESS NOTES
Made 2 attempts to perform renal ultrasound (850am and 130pm). Pt is refusing test at this time.  We will try again 6/25/2020 AM.   [id number]

## 2020-06-24 NOTE — FLOWSHEET NOTE
06/24/20 1331   Encounter Summary   Services provided to: Patient   Referral/Consult From: Rounding   Complexity of Encounter Low   Length of Encounter 15 minutes   Spiritual/Anglican   Type Spiritual support   Assessment Sleeping   Intervention Prayer   Outcome Did not respond

## 2020-06-25 ENCOUNTER — APPOINTMENT (OUTPATIENT)
Dept: ULTRASOUND IMAGING | Age: 50
DRG: 130 | End: 2020-06-25
Payer: MEDICAID

## 2020-06-25 ENCOUNTER — APPOINTMENT (OUTPATIENT)
Dept: GENERAL RADIOLOGY | Age: 50
DRG: 130 | End: 2020-06-25
Payer: MEDICAID

## 2020-06-25 LAB
ABSOLUTE EOS #: 0.1 K/UL (ref 0–0.4)
ABSOLUTE IMMATURE GRANULOCYTE: ABNORMAL K/UL (ref 0–0.3)
ABSOLUTE LYMPH #: 0.9 K/UL (ref 1–4.8)
ABSOLUTE MONO #: 0.4 K/UL (ref 0.1–1.3)
ANION GAP SERPL CALCULATED.3IONS-SCNC: 15 MMOL/L (ref 9–17)
BASOPHILS # BLD: 0 % (ref 0–2)
BASOPHILS ABSOLUTE: 0 K/UL (ref 0–0.2)
BUN BLDV-MCNC: 31 MG/DL (ref 6–20)
BUN/CREAT BLD: ABNORMAL (ref 9–20)
CALCIUM SERPL-MCNC: 8.9 MG/DL (ref 8.6–10.4)
CHLORIDE BLD-SCNC: 96 MMOL/L (ref 98–107)
CO2: 25 MMOL/L (ref 20–31)
CREAT SERPL-MCNC: 2.05 MG/DL (ref 0.5–0.9)
DIFFERENTIAL TYPE: ABNORMAL
EOSINOPHILS RELATIVE PERCENT: 2 % (ref 0–4)
GFR AFRICAN AMERICAN: 31 ML/MIN
GFR NON-AFRICAN AMERICAN: 26 ML/MIN
GFR SERPL CREATININE-BSD FRML MDRD: ABNORMAL ML/MIN/{1.73_M2}
GFR SERPL CREATININE-BSD FRML MDRD: ABNORMAL ML/MIN/{1.73_M2}
GLUCOSE BLD-MCNC: 88 MG/DL (ref 70–99)
HCT VFR BLD CALC: 28.3 % (ref 36–46)
HEMOGLOBIN: 8.8 G/DL (ref 12–16)
IMMATURE GRANULOCYTES: ABNORMAL %
LYMPHOCYTES # BLD: 18 % (ref 24–44)
MCH RBC QN AUTO: 29.6 PG (ref 26–34)
MCHC RBC AUTO-ENTMCNC: 31.2 G/DL (ref 31–37)
MCV RBC AUTO: 94.9 FL (ref 80–100)
MONOCYTES # BLD: 8 % (ref 1–7)
NRBC AUTOMATED: ABNORMAL PER 100 WBC
PDW BLD-RTO: 15.7 % (ref 11.5–14.9)
PLATELET # BLD: 162 K/UL (ref 150–450)
PLATELET ESTIMATE: ABNORMAL
PMV BLD AUTO: 9.1 FL (ref 6–12)
POTASSIUM SERPL-SCNC: 4.9 MMOL/L (ref 3.7–5.3)
RBC # BLD: 2.98 M/UL (ref 4–5.2)
RBC # BLD: ABNORMAL 10*6/UL
SEG NEUTROPHILS: 72 % (ref 36–66)
SEGMENTED NEUTROPHILS ABSOLUTE COUNT: 3.6 K/UL (ref 1.3–9.1)
SODIUM BLD-SCNC: 136 MMOL/L (ref 135–144)
WBC # BLD: 4.9 K/UL (ref 3.5–11)
WBC # BLD: ABNORMAL 10*3/UL

## 2020-06-25 PROCEDURE — 2580000003 HC RX 258: Performed by: STUDENT IN AN ORGANIZED HEALTH CARE EDUCATION/TRAINING PROGRAM

## 2020-06-25 PROCEDURE — 36415 COLL VENOUS BLD VENIPUNCTURE: CPT

## 2020-06-25 PROCEDURE — 6360000002 HC RX W HCPCS: Performed by: STUDENT IN AN ORGANIZED HEALTH CARE EDUCATION/TRAINING PROGRAM

## 2020-06-25 PROCEDURE — 85025 COMPLETE CBC W/AUTO DIFF WBC: CPT

## 2020-06-25 PROCEDURE — 94003 VENT MGMT INPAT SUBQ DAY: CPT

## 2020-06-25 PROCEDURE — 6370000000 HC RX 637 (ALT 250 FOR IP): Performed by: STUDENT IN AN ORGANIZED HEALTH CARE EDUCATION/TRAINING PROGRAM

## 2020-06-25 PROCEDURE — 94761 N-INVAS EAR/PLS OXIMETRY MLT: CPT

## 2020-06-25 PROCEDURE — 76775 US EXAM ABDO BACK WALL LIM: CPT

## 2020-06-25 PROCEDURE — 94640 AIRWAY INHALATION TREATMENT: CPT

## 2020-06-25 PROCEDURE — 2700000000 HC OXYGEN THERAPY PER DAY

## 2020-06-25 PROCEDURE — 71045 X-RAY EXAM CHEST 1 VIEW: CPT

## 2020-06-25 PROCEDURE — 6360000002 HC RX W HCPCS: Performed by: INTERNAL MEDICINE

## 2020-06-25 PROCEDURE — 99253 IP/OBS CNSLTJ NEW/EST LOW 45: CPT | Performed by: NURSE PRACTITIONER

## 2020-06-25 PROCEDURE — 2000000000 HC ICU R&B

## 2020-06-25 PROCEDURE — 94770 HC ETCO2 MONITOR DAILY: CPT

## 2020-06-25 PROCEDURE — 99291 CRITICAL CARE FIRST HOUR: CPT | Performed by: INTERNAL MEDICINE

## 2020-06-25 PROCEDURE — 80048 BASIC METABOLIC PNL TOTAL CA: CPT

## 2020-06-25 RX ORDER — METHYLPREDNISOLONE SODIUM SUCCINATE 40 MG/ML
40 INJECTION, POWDER, LYOPHILIZED, FOR SOLUTION INTRAMUSCULAR; INTRAVENOUS EVERY 8 HOURS
Status: DISCONTINUED | OUTPATIENT
Start: 2020-06-25 | End: 2020-06-28

## 2020-06-25 RX ORDER — LORAZEPAM 1 MG/1
1 TABLET ORAL EVERY 6 HOURS PRN
Status: DISCONTINUED | OUTPATIENT
Start: 2020-06-25 | End: 2020-06-29 | Stop reason: HOSPADM

## 2020-06-25 RX ORDER — HEPARIN SODIUM 5000 [USP'U]/ML
5000 INJECTION, SOLUTION INTRAVENOUS; SUBCUTANEOUS EVERY 8 HOURS SCHEDULED
Status: DISCONTINUED | OUTPATIENT
Start: 2020-06-25 | End: 2020-06-29 | Stop reason: HOSPADM

## 2020-06-25 RX ADMIN — ALBUTEROL SULFATE 2.5 MG: 2.5 SOLUTION RESPIRATORY (INHALATION) at 03:23

## 2020-06-25 RX ADMIN — CARBAMAZEPINE 200 MG: 200 TABLET ORAL at 20:27

## 2020-06-25 RX ADMIN — ESCITALOPRAM OXALATE 20 MG: 20 TABLET, FILM COATED ORAL at 07:34

## 2020-06-25 RX ADMIN — ALBUTEROL SULFATE 2.5 MG: 2.5 SOLUTION RESPIRATORY (INHALATION) at 11:47

## 2020-06-25 RX ADMIN — GUAIFENESIN 600 MG: 600 TABLET, EXTENDED RELEASE ORAL at 20:27

## 2020-06-25 RX ADMIN — CARBAMAZEPINE 200 MG: 200 TABLET ORAL at 07:34

## 2020-06-25 RX ADMIN — FERROUS SULFATE TAB 325 MG (65 MG ELEMENTAL FE) 325 MG: 325 (65 FE) TAB at 16:51

## 2020-06-25 RX ADMIN — LORAZEPAM 1 MG: 1 TABLET ORAL at 15:15

## 2020-06-25 RX ADMIN — ALBUTEROL SULFATE 2.5 MG: 2.5 SOLUTION RESPIRATORY (INHALATION) at 15:48

## 2020-06-25 RX ADMIN — LORAZEPAM 1 MG: 1 TABLET ORAL at 09:16

## 2020-06-25 RX ADMIN — GUAIFENESIN 600 MG: 600 TABLET, EXTENDED RELEASE ORAL at 07:34

## 2020-06-25 RX ADMIN — MULTIPLE VITAMINS W/ MINERALS TAB 1 TABLET: TAB at 07:34

## 2020-06-25 RX ADMIN — Medication: at 07:34

## 2020-06-25 RX ADMIN — METHYLPREDNISOLONE SODIUM SUCCINATE 40 MG: 40 INJECTION, POWDER, FOR SOLUTION INTRAMUSCULAR; INTRAVENOUS at 09:41

## 2020-06-25 RX ADMIN — LINEZOLID 600 MG: 600 TABLET, FILM COATED ORAL at 07:33

## 2020-06-25 RX ADMIN — FERROUS SULFATE TAB 325 MG (65 MG ELEMENTAL FE) 325 MG: 325 (65 FE) TAB at 12:25

## 2020-06-25 RX ADMIN — FERROUS SULFATE TAB 325 MG (65 MG ELEMENTAL FE) 325 MG: 325 (65 FE) TAB at 07:33

## 2020-06-25 RX ADMIN — ALBUTEROL SULFATE 2.5 MG: 2.5 SOLUTION RESPIRATORY (INHALATION) at 19:07

## 2020-06-25 RX ADMIN — ENOXAPARIN SODIUM 40 MG: 40 INJECTION SUBCUTANEOUS at 07:34

## 2020-06-25 RX ADMIN — ALBUTEROL SULFATE 2.5 MG: 2.5 SOLUTION RESPIRATORY (INHALATION) at 07:24

## 2020-06-25 RX ADMIN — METHYLPREDNISOLONE SODIUM SUCCINATE 40 MG: 40 INJECTION, POWDER, FOR SOLUTION INTRAMUSCULAR; INTRAVENOUS at 16:51

## 2020-06-25 RX ADMIN — ACETAMINOPHEN 1000 MG: 500 TABLET, FILM COATED ORAL at 22:18

## 2020-06-25 RX ADMIN — HEPARIN SODIUM 5000 UNITS: 5000 INJECTION INTRAVENOUS; SUBCUTANEOUS at 22:15

## 2020-06-25 RX ADMIN — ONDANSETRON 4 MG: 2 INJECTION INTRAMUSCULAR; INTRAVENOUS at 08:19

## 2020-06-25 RX ADMIN — LEVOFLOXACIN 750 MG: 750 TABLET, FILM COATED ORAL at 07:33

## 2020-06-25 RX ADMIN — ONDANSETRON 4 MG: 2 INJECTION INTRAMUSCULAR; INTRAVENOUS at 02:16

## 2020-06-25 RX ADMIN — FAMOTIDINE 20 MG: 20 TABLET ORAL at 20:27

## 2020-06-25 RX ADMIN — LEVOTHYROXINE SODIUM 250 MCG: 125 TABLET ORAL at 07:33

## 2020-06-25 RX ADMIN — FAMOTIDINE 20 MG: 20 TABLET ORAL at 07:33

## 2020-06-25 RX ADMIN — Medication 10 ML: at 21:03

## 2020-06-25 ASSESSMENT — ENCOUNTER SYMPTOMS
COLOR CHANGE: 0
SINUS PAIN: 0
CHEST TIGHTNESS: 0
DIARRHEA: 0
COUGH: 0
NAUSEA: 0
WHEEZING: 0
ABDOMINAL PAIN: 0
SHORTNESS OF BREATH: 1
ABDOMINAL DISTENTION: 0
ANAL BLEEDING: 0
SINUS PRESSURE: 0
SORE THROAT: 0

## 2020-06-25 ASSESSMENT — PULMONARY FUNCTION TESTS
PIF_VALUE: 40
PIF_VALUE: 41
PIF_VALUE: 32
PIF_VALUE: 32
PIF_VALUE: 41
PIF_VALUE: 34
PIF_VALUE: 33
PIF_VALUE: 41
PIF_VALUE: 32
PIF_VALUE: 35
PIF_VALUE: 34
PIF_VALUE: 33
PIF_VALUE: 33
PIF_VALUE: 30

## 2020-06-25 ASSESSMENT — PAIN SCALES - GENERAL
PAINLEVEL_OUTOF10: 0
PAINLEVEL_OUTOF10: 3
PAINLEVEL_OUTOF10: 0

## 2020-06-25 NOTE — CONSULTS
Palliative Care Inpatient Consult    NAME:  Amee Rascon N RECORD NUMBER:  [id number]  AGE: 54 y.o. GENDER: female  : 1970  TODAY'S DATE:  2020    Reasons for Consultation:    Symptom and/or pain management  Provision of information regarding PC and/or hospice philosophies  Complex, time-intensive communication and interdisciplinary psychosocial support  Clarification of goals of care and/or assistance with difficult decision-making  Guidance in regards to resources and transition(s)    Members of PC team contributing to this consultation are :  Zi Echevarria CNP Palliative Care   History of Present Illness     The patient is a 54 y.o. Non-/non  female who presents with Shortness of Breath      Referred to Palliative Care by   [x] Physician Dr Juliane Caldera  [] Nursing  [] Family Request   [] Other:       She was admitted to the ICU service for SOB (shortness of breath) [R06.02]. Her hospital course has been associated with SOB (shortness of breath). The patient has a complicated medical history and has been hospitalized since 2020 11:07 AM. Roman Bauer was admitted on  due to increased Shortness of breath. She has medical history of Tracheostomy, HX breast cancer with chemotherapy, anemia, morbid obesity, Lymphedema, hypothyroidism. Patient is currently in ICU with Ventilator connected to tracheostomy with FIO2 50% and Peep of 10. She is getting Solumedrol for chest tightness and is on Levaquin. Her CR worsened from 1.62 to 2.05 and her urine output decreased. Nephrology was consulted, renal ultrasound ordered but patient refused, and mesa catheter ordered and periwick catheter applied. Palliative care consulted to discuss code status, Family support, and symptom management.     Active Hospital Problems    Diagnosis Date Noted    LILIAN (acute kidney injury) ([facility] [N17.9] 2020    Acute on chronic respiratory failure with hypoxia and hypercapnia (HCC) [J96.21, J96.22] 06/23/2020    SOB (shortness of breath) [R06.02] 06/19/2020    Morbid obesity with BMI of 70 and over, adult ([facility] [E66.01, Z68.45] 03/30/2020    Mycoplasma pneumonia [J15.7] 01/02/2020    Tracheostomy dependent ([facility] [Z93.0] 11/20/2018    Chronic respiratory failure requiring continuous mechanical ventilation through tracheostomy ([facility] [J96.10, Z93.0, Z99.11] 11/20/2018       PAST MEDICAL HISTORY      Diagnosis Date    Anemia     Disease of blood and blood forming organ     History of breast cancer     History of chemotherapy     Hypothyroidism     Lymphedema     Chronic    Morbid obesity ([facility]     Respiratory failure ([address]     Tracheostomy present ([facility] [facility]        PAST SURGICAL HISTORY  Past Surgical History:   Procedure Laterality Date    BRONCHOSCOPY N/A 1/3/2020    BRONCHOSCOPY ATTEMPTED performed by Joaquina Quiñonez MD at [place] [address], [address] [place] [address] 2013    TRACHEOSTOMY         SOCIAL HISTORY  Social History     Tobacco Use    Smoking status: Never Smoker    Smokeless tobacco: Never Used   Substance Use Topics    Alcohol use: No    Drug use: No       ALLERGIES  Allergies   Allergen Reactions    Zosyn [Piperacillin-Tazobactam In Dex] Shortness Of Breath     tolerated cefepime 6/2018    Vancomycin Swelling     Lip swelling and redness and itching           MEDICATIONS  Current Medications    heparin (porcine)  5,000 Units Subcutaneous 3 times per day    methylPREDNISolone  40 mg Intravenous Q8H    guaiFENesin  600 mg Oral BID    albuterol  2.5 mg Nebulization Q4H    levothyroxine  250 mcg Oral Daily    carBAMazepine  200 mg Oral BID    escitalopram  20 mg Oral Daily    ferrous sulfate  325 mg Oral TID WC    therapeutic multivitamin-minerals  1 tablet Oral Daily with breakfast    Hydrocerin   Topical BID    sodium chloride flush  10 mL Intravenous 2 times per day    famotidine  20 mg Oral BID    levoFLOXacin  750 mg Oral Daily     LORazepam, metoprolol, benzonatate, bisacodyl, docusate sodium, sodium chloride flush, polyethylene glycol, promethazine **OR** ondansetron, acetaminophen **OR** acetaminophen, ibuprofen  IV Drips/Infusions   dexmedetomidine (PRECEDEX) IV infusion Stopped (06/24/20 2004)     Home Medications  No current facility-administered medications on file prior to encounter. Current Outpatient Medications on File Prior to Encounter   Medication Sig Dispense Refill    levothyroxine (SYNTHROID) 200 MCG tablet Take 1 tablet by mouth daily 30 tablet 3    enoxaparin (LOVENOX) 30 MG/0.3ML injection Inject 0.3 mLs into the skin 2 times daily  0    albuterol sulfate HFA (VENTOLIN HFA) 108 (90 Base) MCG/ACT inhaler Inhale 2 puffs into the lungs every 4 hours as needed for Wheezing or Shortness of Breath      mineral oil-hydrophilic petrolatum (AQUAPHOR) ointment Apply topically 2 times daily      Skin Protectants, Misc.  (HYDROCERIN) CREA cream Apply topically 2 times daily Indications: to bilateral lower extremities      ipratropium-albuterol (DUONEB) 0.5-2.5 (3) MG/3ML SOLN nebulizer solution Inhale 1 vial into the lungs 3 times daily      escitalopram (LEXAPRO) 20 MG tablet Take 20 mg by mouth daily      carBAMazepine (TEGRETOL) 200 MG tablet Take 1 tablet by mouth 2 times daily 90 tablet 3    bumetanide (BUMEX) 1 MG tablet Take 1 tablet by mouth 2 times daily 60 tablet 3    bisacodyl (DULCOLAX) 5 MG EC tablet Take 5 mg by mouth 2 times daily as needed for Constipation      docusate sodium (COLACE) 100 MG capsule Take 100 mg by mouth daily as needed for Constipation      loratadine (CLARITIN) 10 MG tablet Take 10 mg by mouth daily      magnesium hydroxide (MILK OF MAGNESIA) 400 MG/5ML suspension Take 30 mLs by mouth daily as needed for Constipation      Camphor-Eucalyptus-Menthol (VICKS VAPORUB) 4.7-1.2-2.6 % OINT Apply topically every 6 hours as needed (congestion)      benzonatate (TESSALON) 100 MG capsule Take 100 mg by mouth 2 cooperative with exam     PHYSICAL ASSESSMENT:  Constitutional: Alert and oriented to person, place, and time. Cooperative with exam   Head: Normocephalic and atraumatic. Eyes: EOM are normal. Pupils are equal, round   Neck: Normal range of motion. Neck supple. No tracheal deviation present. Cardiovascular: HR at 94 per monitor    Pulmonary/Chest: Lungs diminished with rhonchi Ventilator to tracheostomy  Abdomen: Soft. No tenderness, not distended, no ascites, no organomegaly   Musculoskeletal: generalized weakness . Neurological: CN II-XII grossly intact, no focal neurological deficits   Skin: BLE with lymphedema    Palliative Performance Scale:  ___60%  Ambulation reduced; Significant disease; Can't do hobbies/housework; intake normal or reduced; occasional assist; LOC full/confusion  ___x50%  Mainly sit/lie; Extensive disease; Can't do any work; Considerable assist; intake normal or reduced; LOC full/confusion  ___40%  Mainly in bed; Extensive disease; Mainly assist; intake normal or reduced; LOC full/confusion   ___30%  Bed Bound; Extensive disease; Total care; intake reduced; LOCfull/confusion  ___20%  Bed Bound; Extensive disease; Total care; intake minimal; Drowsy/coma  ___10%  Bed Bound; Extensive disease; Total care; Mouth care only; Drowsy/coma  ___0       Death      Plan      Palliative Interaction:  Went to see Sal Reid in ICU and introduced myself to her and my palliative care role. We discussed patient condition and that she is currently on ventilator at FIO2 at 50% and peep 10. Patient is currently a full code status we discussed her wishes for code status and she stated that she wanted to be a full code. Patient will remain a full code. I also discussed her decision maker and she states she has 2 adult children but she wants her dad to be her decision maker. I explained importance of DPOA with patient. If she does not have DPOA then patient children will be decision makers and not her dad.  She you for allowing Palliative Care to participate in the care of [facility]. [facility] . The total time I spent in seeing the patient, discussing goals of care, advanced directives, code status and other major issues was more than 60 minutes      Electronically signed by   HOLA Duran NP  Palliative Care Team  on 6/25/2020 at 1:38 PM      Please call with any palliative questions or concerns. Palliative Care Team is available via perfect serve or via phone.

## 2020-06-25 NOTE — FLOWSHEET NOTE
Writer spoke to pt's father Dorann Lesch at pt's request to talk about being her HPOA. Writer explained what that meant, her code status and what that meant, and pt's situation in general. He feels badly he can't come see pt due to his health. He wants to find a way for pt to go to a facility closer to home. 06/25/20 1701   Encounter Summary   Services provided to: Family   Referral/Consult From: Patient   Continue Visiting   (6-25-20)   Complexity of Encounter Moderate   Length of Encounter 30 minutes   Spiritual Assessment Completed Yes   Routine   Type Follow up   Assessment Anxious   Intervention Active listening;Explored coping resources; [place];Sustaining presence/ Ministry of presence; Discussed illness/injury and it's impact   Outcome Expressed gratitude;Engaged in conversation;Expressed feelings/needs/concerns;Receptive; Expressed regrets

## 2020-06-25 NOTE — FLOWSHEET NOTE
Patient confirmed she wants her dad to be HPOA; writer asked if it was ok if writer spoke to her dad. Patient nodded yes, she would like that. Patient also confirmed she wants to be full code as she told Aguilar Brown in [facility]. Patient asked for prayer. Writer will call patient's dad and then follow up with HPOA paperwork. 06/25/20 1555   Encounter Summary   Services provided to: Patient   Referral/Consult From: [facility] Parent; Children   Continue Visiting   (6-25-20)   Complexity of Encounter Moderate   Length of Encounter 15 minutes   Spiritual Assessment Completed Yes   Advance Care Planning Yes   Spiritual/Yazidism   Type Spiritual support   Assessment Approachable   Intervention Active listening;Explored feelings, thoughts, concerns;Prayer;Nurtured hope;Sustaining presence/ Ministry of presence   Outcome Expressed gratitude;Engaged in conversation

## 2020-06-25 NOTE — PROGRESS NOTES
methylPREDNISolone  40 mg Intravenous Q8H    guaiFENesin  600 mg Oral BID    albuterol  2.5 mg Nebulization Q4H    levothyroxine  250 mcg Oral Daily    carBAMazepine  200 mg Oral BID    escitalopram  20 mg Oral Daily    ferrous sulfate  325 mg Oral TID WC    therapeutic multivitamin-minerals  1 tablet Oral Daily with breakfast    Hydrocerin   Topical BID    sodium chloride flush  10 mL Intravenous 2 times per day    famotidine  20 mg Oral BID    levoFLOXacin  750 mg Oral Daily     Continuous Infusions:    dexmedetomidine (PRECEDEX) IV infusion Stopped (20)     PRN Meds: LORazepam, metoprolol, benzonatate, bisacodyl, docusate sodium, sodium chloride flush, polyethylene glycol, promethazine **OR** ondansetron, acetaminophen **OR** acetaminophen, ibuprofen    Data:     Past Medical History:   has a past medical history of Anemia, Disease of blood and blood forming organ, History of breast cancer, History of chemotherapy, Hypothyroidism, Lymphedema, Morbid obesity ([address] Respiratory failure ([facility] and Tracheostomy present ([facility] Social History:   reports that she has never smoked. She has never used smokeless tobacco. She reports that she does not drink alcohol or use drugs. Family History:   Family History   Problem Relation Age of Onset    Breast Cancer Paternal Aunt     Breast Cancer Paternal Cousin 43    Breast Cancer Paternal Cousin 37    Breast Cancer Paternal Cousin 46    Diabetes Mother     Thyroid Disease Father        Vitals:  /75   Pulse 95   Temp 97.6 °F (36.4 °C) (Oral)   Resp 22   Ht 5' 5\" (1.651 m)   Wt (!) 607 lb (275.3 kg)   SpO2 91%   .01 kg/m²   Temp (24hrs), Av.3 °F (36.8 °C), Min:97.6 °F (36.4 °C), Max:98.9 °F (37.2 °C)    No results for input(s): POCGLU in the last 72 hours. I/O(24Hr):     Intake/Output Summary (Last 24 hours) at 2020 0963  Last data filed at 2020 0513  Gross per 24 hour   Intake --   Output 500 ml   Net -500 weighs 607 lbs and she was uncooperative and she refused to take off her necklace. Acuity: Unknown Type of Exam: Unknown FINDINGS: Tracheostomy tube, left upper extremity PICC line and right subclavian central venous catheter remain in place. The heart and mediastinal structures are stable. Bilateral airspace disease is present. Surgical clips in the right axillary region are evident. Persistent bilateral airspace disease. Xr Chest Portable    Result Date: 6/22/2020  EXAMINATION: ONE XRAY VIEW OF THE CHEST 6/22/2020 6:50 am COMPARISON: June 20, 2020 HISTORY: ORDERING SYSTEM PROVIDED HISTORY: follow up TECHNOLOGIST PROVIDED HISTORY: follow up Reason for Exam: follow up Acuity: Unknown Type of Exam: Subsequent/Follow-up Additional signs and symptoms: follow up Relevant Medical/Surgical History: follow up FINDINGS: Tracheostomy tube. Right Port-A-Cath. Left PICC. Increased right lung consolidation. Scattered left lung opacities. Cardiomegaly. Increased right lung consolidation. Xr Chest Portable    Result Date: 6/20/2020  EXAMINATION: ONE XRAY VIEW OF THE CHEST 6/20/2020 5:50 am COMPARISON: June 19, 2020 HISTORY: ORDERING SYSTEM PROVIDED HISTORY: follow up TECHNOLOGIST PROVIDED HISTORY: follow up Reason for Exam: Follow up Acuity: Unknown Type of Exam: Subsequent/Follow-up Additional signs and symptoms: Follow up Relevant Medical/Surgical History: Follow up FINDINGS: Tracheostomy tube. Right Port-A-Cath. Left PICC. Bilateral lung opacities are minimally improved. Cardiomegaly. Bilateral lung opacities are minimally improved. Xr Chest Portable    Result Date: 6/19/2020  EXAMINATION: ONE XRAY VIEW OF THE CHEST 6/19/2020 12:29 pm COMPARISON: AP chest from 05/11/2020 HISTORY: ORDERING SYSTEM PROVIDED HISTORY: sob TECHNOLOGIST PROVIDED HISTORY: sob Reason for Exam: PT CO SOB and fatigue, PT on permanent trach.  Acuity: Chronic Type of Exam: Ongoing History of breast cancer, morbid obesity, respiratory failure, and CHF. FINDINGS: Tracheostomy tube in unchanged position. Right subclavian chemo port with unchanged catheter tip position near the cavoatrial junction. Overlying necklace. Clips right axilla. Cardiomediastinal shadow stable. Low lung volumes. Patchy bilateral dense airspace opacities, unchanged or perhaps slightly smaller. No new pulmonary abnormality or large pleural effusion. No pneumothorax. Bones unchanged. Stable or slightly improved findings; bilateral multifocal airspace opacities, most likely either infection, or possibly metastatic disease or pulmonary edema. Physical Examination:        Physical Exam  Constitutional:       Appearance: Normal appearance. HENT:      Head: Atraumatic. Mouth/Throat:      Mouth: Mucous membranes are moist.      Pharynx: No oropharyngeal exudate or posterior oropharyngeal erythema. Eyes:      Extraocular Movements: Extraocular movements intact. Neck:      Musculoskeletal: Normal range of motion. Cardiovascular:      Rate and Rhythm: Normal rate and regular rhythm. Heart sounds: No murmur. No friction rub. No gallop. Pulmonary:      Effort: Pulmonary effort is normal.      Breath sounds: No wheezing, rhonchi or rales. Abdominal:      General: Abdomen is flat. Tenderness: There is no abdominal tenderness. There is no guarding. Hernia: No hernia is present. Musculoskeletal: Normal range of motion. General: No swelling or tenderness. Skin:     General: Skin is warm. Capillary Refill: Capillary refill takes less than 2 seconds. Coloration: Skin is not jaundiced. Findings: No bruising. Neurological:      Mental Status: She is alert and oriented to person, place, and time.            Assessment:        Primary Problem  SOB (shortness of breath)    Active Hospital Problems    Diagnosis Date Noted    [facility]acute [facility]) ([facility] [N17.9] 06/24/2020    Acute on chronic respiratory

## 2020-06-25 NOTE — CONSULTS
Social Needs    Financial resource strain: Not on file    Food insecurity     Worry: Not on file     Inability: Not on file    Transportation needs     Medical: Not on file     Non-medical: Not on file   Tobacco Use    Smoking status: Never Smoker    Smokeless tobacco: Never Used   Substance and Sexual Activity    Alcohol use: No    Drug use: No    Sexual activity: Not Currently   Lifestyle    Physical activity     Days per week: Not on file     Minutes per session: Not on file    Stress: Not on file   Relationships    Social connections     Talks on phone: Not on file     Gets together: Not on file     Attends Oriental orthodox service: Not on file     Active member of club or organization: Not on file     Attends meetings of clubs or organizations: Not on file     Relationship status: Not on file    Intimate partner violence     Fear of current or ex partner: Not on file     Emotionally abused: Not on file     Physically abused: Not on file     Forced sexual activity: Not on file   Other Topics Concern    Not on file   Social History Narrative    Lives at [facility] [address]        Family History:   Family History   Problem Relation Age of Onset    Breast Cancer Paternal Aunt     Breast Cancer Paternal Cousin 43    Breast Cancer Paternal Cousin 37    Breast Cancer Paternal Cousin 46    Diabetes Mother     Thyroid Disease Father        Review of Systems:    Constitutional: No fever, no chills, no night sweats,+ fatigue, generalized weakness, loss of appetite  HEENT:  No headache, otalgia, itchy eyes, epistaxis, nasal discharge or sore throat. Cardiac:  No chest pain, dyspnea, orthopnea or PND, palpitations  Chest:     No cough, hemoptysis, pleuritic chest pain, wheezing,SOB  Abdomen:  No abdominal pain, nausea, vomiting, diarrhea, melena, dysphagia hematemesis,constipation, abdominal bloating, flank pain  Neuro:  No CVA, TIA or seizure like activity. Skin:   No rashes, no itching.   :   No hematuria, no EF of 55%, proBNP of 1200         Plan:   Comprehensive urine testing including Urinalysis, Urine sodium, potassium, chloride, Urine protein and creatinine to quantify the proteinuria if present. Urine electrolytes. Renal US unremarkable,   Agree Holding Lasix today and monitor kidney function, But patient has signs of fluid retention, will need to start diuretics, will observe 1 day. No IVF at this time as patient has fluid retention, peripheral edema CHF. Avoid Hypotension  Avoid contrast. Avoid Nephrotoxic agents. Thank you for the consultation.       Electronically signed by Chantal Rahman MD on 6/25/2020 at 3:24 PM

## 2020-06-25 NOTE — PROGRESS NOTES
Respiratory  Assessments  Pulse: 95  Resp: 22  SpO2: 91 %  End Tidal CO2: 41 (%)  Position: Semi-Dash's  Humidification Source: HME  Oral Care Completed?: Yes  Oral Care: Other (Comment)(refused)       ABGs:   Lab Results   Component Value Date    PHART 7.285 03/25/2020    PO2ART 177.0 03/25/2020    TDM0FUN 65.8 03/25/2020       Lab Results   Component Value Date    MODE NOT REPORTED 06/20/2020         Medications   IV   dexmedetomidine (PRECEDEX) IV infusion Stopped (06/24/20 2004)      heparin (porcine)  5,000 Units Subcutaneous 3 times per day    methylPREDNISolone  40 mg Intravenous Q8H    guaiFENesin  600 mg Oral BID    albuterol  2.5 mg Nebulization Q4H    levothyroxine  250 mcg Oral Daily    carBAMazepine  200 mg Oral BID    escitalopram  20 mg Oral Daily    ferrous sulfate  325 mg Oral TID WC    therapeutic multivitamin-minerals  1 tablet Oral Daily with breakfast    Hydrocerin   Topical BID    sodium chloride flush  10 mL Intravenous 2 times per day    famotidine  20 mg Oral BID    levoFLOXacin  750 mg Oral Daily       Diet/Nutrition   DIET GENERAL;    Exam   VITALS    height is 5' 5\" (1.651 m) and weight is 607 lb (275.3 kg) (abnormal). Her oral temperature is 97.6 °F (36.4 °C). Her blood pressure is 122/75 and her pulse is 95. Her respiration is 22 and oxygen saturation is 91%. Ventilator Settings (Basic)  Vent Mode: PRVC Rate Set: 22 bmp/Vt Ordered: 500 mL/ /FiO2 : 55 %    Constitutional - Sedated  General Appearance  well developed, well nourished  HEENT - Life support devices in place (ET, OG),normocephalic, atraumatic. PERRLA  Lungs - Chest expands equally, no wheezes, rales or rhonchi. Cardiovascular - Heart sounds are normal.  normal rate and rhythm regular, no murmur, gallop or rub.   Abdomen - soft, nontender, nondistended, no masses or organomegaly  Extremities - no cyanosis, clubbing or edema    Lab Results   CBC     Lab Results   Component Value Date    WBC 4.9 06/25/2020 consulted. Infectious Disease       Hematology/Oncology       Endocrine       Social/Spiritual/DNR/Disposition/Other     Anxiety still plays a role. Repeat chest x-ray in the morning. Now down to FiO2 of 50% with sats in the mid to high 90s.     Critical Care Time   35 min    Electronically signed by Franko Bradshaw MD on 6/25/2020 at 10:40 AM

## 2020-06-26 ENCOUNTER — APPOINTMENT (OUTPATIENT)
Dept: GENERAL RADIOLOGY | Age: 50
DRG: 130 | End: 2020-06-26
Payer: MEDICAID

## 2020-06-26 LAB
ABSOLUTE BANDS #: 0.36 K/UL (ref 0–1)
ABSOLUTE EOS #: 0 K/UL (ref 0–0.4)
ABSOLUTE IMMATURE GRANULOCYTE: ABNORMAL K/UL (ref 0–0.3)
ABSOLUTE LYMPH #: 1.07 K/UL (ref 1–4.8)
ABSOLUTE MONO #: 0.31 K/UL (ref 0.1–1.3)
ANION GAP SERPL CALCULATED.3IONS-SCNC: 13 MMOL/L (ref 9–17)
BANDS: 7 % (ref 0–10)
BASOPHILS # BLD: 0 % (ref 0–2)
BASOPHILS ABSOLUTE: 0 K/UL (ref 0–0.2)
BUN BLDV-MCNC: 36 MG/DL (ref 6–20)
BUN/CREAT BLD: ABNORMAL (ref 9–20)
CALCIUM SERPL-MCNC: 9 MG/DL (ref 8.6–10.4)
CHLORIDE BLD-SCNC: 98 MMOL/L (ref 98–107)
CO2: 27 MMOL/L (ref 20–31)
CREAT SERPL-MCNC: 2.14 MG/DL (ref 0.5–0.9)
DIFFERENTIAL TYPE: ABNORMAL
EOSINOPHILS RELATIVE PERCENT: 0 % (ref 0–4)
GFR AFRICAN AMERICAN: 30 ML/MIN
GFR NON-AFRICAN AMERICAN: 25 ML/MIN
GFR SERPL CREATININE-BSD FRML MDRD: ABNORMAL ML/MIN/{1.73_M2}
GFR SERPL CREATININE-BSD FRML MDRD: ABNORMAL ML/MIN/{1.73_M2}
GLUCOSE BLD-MCNC: 90 MG/DL (ref 70–99)
HCT VFR BLD CALC: 28.5 % (ref 36–46)
HEMOGLOBIN: 8.7 G/DL (ref 12–16)
IMMATURE GRANULOCYTES: ABNORMAL %
LYMPHOCYTES # BLD: 21 % (ref 24–44)
MCH RBC QN AUTO: 29.2 PG (ref 26–34)
MCHC RBC AUTO-ENTMCNC: 30.6 G/DL (ref 31–37)
MCV RBC AUTO: 95.4 FL (ref 80–100)
MONOCYTES # BLD: 6 % (ref 1–7)
MORPHOLOGY: ABNORMAL
MORPHOLOGY: ABNORMAL
MYELOCYTES ABSOLUTE COUNT: 0.2 K/UL
MYELOCYTES: 4 %
NRBC AUTOMATED: ABNORMAL PER 100 WBC
PDW BLD-RTO: 15.5 % (ref 11.5–14.9)
PLATELET # BLD: 185 K/UL (ref 150–450)
PLATELET ESTIMATE: ABNORMAL
PMV BLD AUTO: 9 FL (ref 6–12)
POTASSIUM SERPL-SCNC: 5 MMOL/L (ref 3.7–5.3)
POTASSIUM SERPL-SCNC: 5.6 MMOL/L (ref 3.7–5.3)
RBC # BLD: 2.98 M/UL (ref 4–5.2)
RBC # BLD: ABNORMAL 10*6/UL
SEG NEUTROPHILS: 62 % (ref 36–66)
SEGMENTED NEUTROPHILS ABSOLUTE COUNT: 3.16 K/UL (ref 1.3–9.1)
SODIUM BLD-SCNC: 138 MMOL/L (ref 135–144)
WBC # BLD: 5.1 K/UL (ref 3.5–11)
WBC # BLD: ABNORMAL 10*3/UL

## 2020-06-26 PROCEDURE — 6370000000 HC RX 637 (ALT 250 FOR IP): Performed by: STUDENT IN AN ORGANIZED HEALTH CARE EDUCATION/TRAINING PROGRAM

## 2020-06-26 PROCEDURE — 84132 ASSAY OF SERUM POTASSIUM: CPT

## 2020-06-26 PROCEDURE — 99233 SBSQ HOSP IP/OBS HIGH 50: CPT | Performed by: INTERNAL MEDICINE

## 2020-06-26 PROCEDURE — 6360000002 HC RX W HCPCS: Performed by: INTERNAL MEDICINE

## 2020-06-26 PROCEDURE — 6370000000 HC RX 637 (ALT 250 FOR IP): Performed by: INTERNAL MEDICINE

## 2020-06-26 PROCEDURE — 2580000003 HC RX 258: Performed by: STUDENT IN AN ORGANIZED HEALTH CARE EDUCATION/TRAINING PROGRAM

## 2020-06-26 PROCEDURE — 2000000000 HC ICU R&B

## 2020-06-26 PROCEDURE — 80048 BASIC METABOLIC PNL TOTAL CA: CPT

## 2020-06-26 PROCEDURE — 94761 N-INVAS EAR/PLS OXIMETRY MLT: CPT

## 2020-06-26 PROCEDURE — 94003 VENT MGMT INPAT SUBQ DAY: CPT

## 2020-06-26 PROCEDURE — 85025 COMPLETE CBC W/AUTO DIFF WBC: CPT

## 2020-06-26 PROCEDURE — 36415 COLL VENOUS BLD VENIPUNCTURE: CPT

## 2020-06-26 PROCEDURE — 94640 AIRWAY INHALATION TREATMENT: CPT

## 2020-06-26 PROCEDURE — 71045 X-RAY EXAM CHEST 1 VIEW: CPT

## 2020-06-26 PROCEDURE — 2700000000 HC OXYGEN THERAPY PER DAY

## 2020-06-26 PROCEDURE — 94770 HC ETCO2 MONITOR DAILY: CPT

## 2020-06-26 PROCEDURE — 94664 DEMO&/EVAL PT USE INHALER: CPT

## 2020-06-26 RX ORDER — FUROSEMIDE 10 MG/ML
40 INJECTION INTRAMUSCULAR; INTRAVENOUS DAILY
Status: DISCONTINUED | OUTPATIENT
Start: 2020-06-26 | End: 2020-06-27

## 2020-06-26 RX ORDER — SODIUM POLYSTYRENE SULFONATE 4.1 MEQ/G
30 POWDER, FOR SUSPENSION ORAL; RECTAL ONCE
Status: COMPLETED | OUTPATIENT
Start: 2020-06-26 | End: 2020-06-26

## 2020-06-26 RX ADMIN — ESCITALOPRAM OXALATE 20 MG: 20 TABLET, FILM COATED ORAL at 09:50

## 2020-06-26 RX ADMIN — METHYLPREDNISOLONE SODIUM SUCCINATE 40 MG: 40 INJECTION, POWDER, FOR SOLUTION INTRAMUSCULAR; INTRAVENOUS at 16:22

## 2020-06-26 RX ADMIN — LEVOTHYROXINE SODIUM 250 MCG: 125 TABLET ORAL at 08:27

## 2020-06-26 RX ADMIN — CARBAMAZEPINE 200 MG: 200 TABLET ORAL at 09:50

## 2020-06-26 RX ADMIN — CARBAMAZEPINE 200 MG: 200 TABLET ORAL at 20:43

## 2020-06-26 RX ADMIN — FERROUS SULFATE TAB 325 MG (65 MG ELEMENTAL FE) 325 MG: 325 (65 FE) TAB at 12:10

## 2020-06-26 RX ADMIN — FUROSEMIDE 40 MG: 10 INJECTION, SOLUTION INTRAMUSCULAR; INTRAVENOUS at 09:51

## 2020-06-26 RX ADMIN — FERROUS SULFATE TAB 325 MG (65 MG ELEMENTAL FE) 325 MG: 325 (65 FE) TAB at 16:21

## 2020-06-26 RX ADMIN — ALBUTEROL SULFATE 2.5 MG: 2.5 SOLUTION RESPIRATORY (INHALATION) at 23:52

## 2020-06-26 RX ADMIN — METHYLPREDNISOLONE SODIUM SUCCINATE 40 MG: 40 INJECTION, POWDER, FOR SOLUTION INTRAMUSCULAR; INTRAVENOUS at 09:51

## 2020-06-26 RX ADMIN — LEVOFLOXACIN 750 MG: 750 TABLET, FILM COATED ORAL at 09:51

## 2020-06-26 RX ADMIN — MULTIPLE VITAMINS W/ MINERALS TAB 1 TABLET: TAB at 09:50

## 2020-06-26 RX ADMIN — Medication 10 ML: at 21:00

## 2020-06-26 RX ADMIN — Medication 10 ML: at 09:52

## 2020-06-26 RX ADMIN — METHYLPREDNISOLONE SODIUM SUCCINATE 40 MG: 40 INJECTION, POWDER, FOR SOLUTION INTRAMUSCULAR; INTRAVENOUS at 00:37

## 2020-06-26 RX ADMIN — BENZONATATE 100 MG: 100 CAPSULE ORAL at 23:53

## 2020-06-26 RX ADMIN — ALBUTEROL SULFATE 2.5 MG: 2.5 SOLUTION RESPIRATORY (INHALATION) at 11:15

## 2020-06-26 RX ADMIN — ALBUTEROL SULFATE 2.5 MG: 2.5 SOLUTION RESPIRATORY (INHALATION) at 08:48

## 2020-06-26 RX ADMIN — FAMOTIDINE 20 MG: 20 TABLET ORAL at 20:43

## 2020-06-26 RX ADMIN — Medication: at 09:52

## 2020-06-26 RX ADMIN — ALBUTEROL SULFATE 2.5 MG: 2.5 SOLUTION RESPIRATORY (INHALATION) at 20:59

## 2020-06-26 RX ADMIN — GUAIFENESIN 600 MG: 600 TABLET, EXTENDED RELEASE ORAL at 20:43

## 2020-06-26 RX ADMIN — ALBUTEROL SULFATE 2.5 MG: 2.5 SOLUTION RESPIRATORY (INHALATION) at 16:58

## 2020-06-26 RX ADMIN — LORAZEPAM 1 MG: 1 TABLET ORAL at 00:37

## 2020-06-26 RX ADMIN — GUAIFENESIN 600 MG: 600 TABLET, EXTENDED RELEASE ORAL at 09:50

## 2020-06-26 RX ADMIN — FAMOTIDINE 20 MG: 20 TABLET ORAL at 09:50

## 2020-06-26 RX ADMIN — ACETAMINOPHEN 1000 MG: 500 TABLET, FILM COATED ORAL at 18:39

## 2020-06-26 RX ADMIN — SODIUM POLYSTYRENE SULFONATE 30 G: 1 POWDER ORAL; RECTAL at 06:39

## 2020-06-26 RX ADMIN — FERROUS SULFATE TAB 325 MG (65 MG ELEMENTAL FE) 325 MG: 325 (65 FE) TAB at 09:50

## 2020-06-26 ASSESSMENT — PULMONARY FUNCTION TESTS
PIF_VALUE: 35
PIF_VALUE: 36
PIF_VALUE: 34
PIF_VALUE: 36
PIF_VALUE: 31
PIF_VALUE: 32
PIF_VALUE: 37
PIF_VALUE: 31
PIF_VALUE: 31
PIF_VALUE: 34
PIF_VALUE: 35
PIF_VALUE: 35
PIF_VALUE: 33
PIF_VALUE: 37
PIF_VALUE: 26
PIF_VALUE: 40
PIF_VALUE: 34
PIF_VALUE: 35
PIF_VALUE: 36
PIF_VALUE: 29
PIF_VALUE: 29
PIF_VALUE: 32
PIF_VALUE: 36
PIF_VALUE: 36
PIF_VALUE: 33
PIF_VALUE: 43
PIF_VALUE: 34

## 2020-06-26 ASSESSMENT — ENCOUNTER SYMPTOMS
WHEEZING: 0
CHEST TIGHTNESS: 0
SHORTNESS OF BREATH: 1
DIARRHEA: 0
ABDOMINAL PAIN: 0
SINUS PAIN: 0
SINUS PRESSURE: 0
SORE THROAT: 0
ANAL BLEEDING: 0
NAUSEA: 0
COUGH: 0
COLOR CHANGE: 0
ABDOMINAL DISTENTION: 0

## 2020-06-26 ASSESSMENT — PAIN SCALES - GENERAL
PAINLEVEL_OUTOF10: 0
PAINLEVEL_OUTOF10: 3
PAINLEVEL_OUTOF10: 0

## 2020-06-26 NOTE — PLAN OF CARE
improve  Description: Ability to maintain normal respiratory secretions will improve  6/26/2020 0315 by Jailene Ludwig RN  Outcome: Ongoing  Note: Patient needing frequent suctioning and O2 breaths throughout shift. 6/25/2020 1443 by Shanda Allen RN  Outcome: Ongoing     Problem: Skin Integrity:  Goal: Will show no infection signs and symptoms  Description: Will show no infection signs and symptoms  6/26/2020 0315 by Jailene Ludwig RN  Outcome: Ongoing  6/25/2020 1443 by Shanda Allen RN  Outcome: Ongoing  Note: Patient turned and repositioned every 2 hours and as needed for comfort. Skin remains dry and intact. No new skin breakdown noted.    Goal: Absence of new skin breakdown  Description: Absence of new skin breakdown  6/26/2020 0315 by Jailene Ludwig RN  Outcome: Ongoing  6/25/2020 1443 by Shanda Allen RN  Outcome: Ongoing

## 2020-06-26 NOTE — PROGRESS NOTES
NEPHROLOGY PROGRESS NOTE    Patient :  Jj Perez; 54 y.o. MRN# [id number]  Location:  2003/2003-01  Attending:  Alissa Barron MD  Admit Date:  6/19/2020   Hospital Day: 7      Reason for Consult: Acute kidney injury      Chief Complaint: Shortness of breath  History Obtained From: Patient, EMR, nursing staff    History of Present Illness: This is a 54 y.o. female with past medical history of morbid morbid obesity, chronic respiratory failure status post trach on ventilator, obstructive sleep apnea, history of chronic lymphedema, chronic kidney disease with baseline creatinine of 1.1 to 1.2 mg/dL  Patient presented to the hospital on 6/19/2020 from [facility] with complaints of shortness of breath, increased secretions and fatigue  Patient was noted to be hypotensive on admission  Afebrile, no chills nausea vomiting  History of COVID-19 and she was positive on 5/19 repeat test on 6/19/2020 was negative on admission  Patient serum creatinine on admission was 1.0 mg/dL, it has slowly increased to 2.0 mg/dL today and therefore nephrology consultation has been requested  Pt denies any history of  prolonged NSAID use. Patient denies dysuria, gross hematuria, flank pain, nocturia, urgency, passing frothy urine or urinary incontinence. There has been no recent exposure to IV contrast.   There is no history  of paraprotein disease. Pt denies any history of recurrent UTI or kidney stones. Medication review shows use of ACE-inhibitor/diuretics. Chest x-ray showed bilateral multifocal pneumonia, tracheostomy was noted.   Mycoplasma pneumoniae antibody  is positive, he is diagnosed with mycoplasma pneumonia on IV Levaquin      Subjective/interval history  Seen and examined complains of shortness of breath  Serum creatinine remains 2.1 mg/dL potassium was 5.6 in the morning patient was given Kayexalate repeat potassium improved to 5.0  Urine output is not accurately measured as patient has incontinence  Blood proBNP of 1200         Plan:  Kayexalate given this morning  Continue IV Lasix 40 mg daily for fluid retention, peripheral edema CHF. Avoid Hypotension  Avoid contrast. Avoid Nephrotoxic agents. Place a Richards catheter for strict I's and O's patient agreeing Richards catheter for few days    Thank you for the consultation.       Electronically signed by Renee Bailey MD on 6/26/2020 at 3:05 PM

## 2020-06-26 NOTE — PROGRESS NOTES
RN and Writer spoke to patient at length about the need to reposition. Patient stated she was too tired to turn. After some convincing, a time was set at 1:30 to come back and get patient repositioned and cleaned up.  Electronically signed by Doy Lesch, RN on 6/26/2020 at 12:17 PM

## 2020-06-26 NOTE — PROGRESS NOTES
Use?: No  Additional Respiratory  Assessments  Pulse: 77  Resp: 22  SpO2: 97 %  End Tidal CO2: 44 (%)  Position: Semi-Dash's  Humidification Source: E  Oral Care Completed?: Yes  Oral Care: Other (Comment)(refused)       ABGs:   Lab Results   Component Value Date    PHART 7.285 03/25/2020    PO2ART 177.0 03/25/2020    ZXQ3UMV 65.8 03/25/2020       Lab Results   Component Value Date    MODE NOT REPORTED 06/20/2020         Medications   IV   dexmedetomidine (PRECEDEX) IV infusion Stopped (06/24/20 2004)      furosemide  40 mg Intravenous Daily    heparin (porcine)  5,000 Units Subcutaneous 3 times per day    methylPREDNISolone  40 mg Intravenous Q8H    guaiFENesin  600 mg Oral BID    albuterol  2.5 mg Nebulization Q4H    levothyroxine  250 mcg Oral Daily    carBAMazepine  200 mg Oral BID    escitalopram  20 mg Oral Daily    ferrous sulfate  325 mg Oral TID WC    therapeutic multivitamin-minerals  1 tablet Oral Daily with breakfast    Hydrocerin   Topical BID    sodium chloride flush  10 mL Intravenous 2 times per day    famotidine  20 mg Oral BID    levoFLOXacin  750 mg Oral Daily       Diet/Nutrition   DIET GENERAL;    Exam   VITALS    height is 5' 5\" (1.651 m) and weight is 607 lb (275.3 kg) (abnormal). Her oral temperature is 97.5 °F (36.4 °C). Her blood pressure is 122/68 and her pulse is 77. Her respiration is 22 and oxygen saturation is 97%. Ventilator Settings (Basic)  Vent Mode: PRVC Rate Set: 22 bmp/Vt Ordered: 500 mL/ /FiO2 : 60 %    Constitutional - Sedated  General Appearance  well developed, well nourished  HEENT - Life support devices in place (ET, ),normocephalic, atraumatic. PERRLA  Lungs - Chest expands equally, no wheezes, rales or rhonchi. Cardiovascular - Heart sounds are normal.  normal rate and rhythm regular, no murmur, gallop or rub.   Abdomen - soft, nontender, nondistended,   Extremities - no cyanosis, clubbing or edema    Lab Results   CBC     Lab Results   Component Value Date    WBC 5.1 06/26/2020    RBC 2.98 06/26/2020    HGB 8.7 06/26/2020    HCT 28.5 06/26/2020     06/26/2020    MCV 95.4 06/26/2020    MCH 29.2 06/26/2020    MCHC 30.6 06/26/2020    RDW 15.5 06/26/2020    METASPCT 3 09/10/2019    LYMPHOPCT 21 06/26/2020    MONOPCT 6 06/26/2020    MYELOPCT 4 06/26/2020    BASOPCT 0 06/26/2020    MONOSABS 0.31 06/26/2020    LYMPHSABS 1.07 06/26/2020    EOSABS 0.00 06/26/2020    BASOSABS 0.00 06/26/2020    DIFFTYPE NOT REPORTED 06/26/2020       BMP   Lab Results   Component Value Date     06/26/2020    K 5.6 06/26/2020    CL 98 06/26/2020    CO2 27 06/26/2020    BUN 36 06/26/2020    CREATININE 2.14 06/26/2020    GLUCOSE 90 06/26/2020    CALCIUM 9.0 06/26/2020       LFTS  Lab Results   Component Value Date    ALKPHOS 66 06/19/2020    ALT 29 06/19/2020    AST 31 06/19/2020    PROT 8.3 06/19/2020    BILITOT 0.20 06/19/2020    LABALBU 3.7 06/19/2020       INR  No results for input(s): PROTIME, INR in the last 72 hours. APTT  No results for input(s): APTT in the last 72 hours. Lactic Acid  Lab Results   Component Value Date    LACTA 1.0 06/19/2020    LACTA NOT REPORTED 03/30/2020    LACTA NOT REPORTED 03/29/2020        BNP   No results for input(s): BNP in the last 72 hours. Cultures   Blood cultures x2 June 2022, negative to date. Radiology     Plain Films         X-ray reveals persistent infiltrative changes. I do not see any appreciable change from yesterday. SYSTEM ASSESSMENT  #1. Acute respiratory failure with hypoxemia  #2. Mycoplasma multilobar pneumonia. #3. History of COVID-19 infection. #4.  History of morbid obesity with chronic vent dependence  #5. RU/OHS  #6. Renal insufficiency    Neuro       Respiratory   On FiO2 currently on vent support. Hemodynamics     Not in shock. Gastrointestinal/Nutrition       Renal     Creatinine at 2.14. Nephrology following. Infectious Disease     P.o.  Levaquin  Hematology/Oncology     DVT

## 2020-06-26 NOTE — PROGRESS NOTES
[address] [facility].    PROGRESS NOTE             6/26/2020    9:49 AM    Name:   Valencia Ro  MRN:     [id number]     [place]:      [de-identified]   Room:   2003/2003-01  IP Day:  7  Admit Date:  6/19/2020 11:07 AM    PCP:  Fatoumata Cuellar DO  Code Status:  Full Code    Subjective:     C/C:   Chief Complaint   Patient presents with    Shortness of Breath     Interval History Status: not changed. Patient seen and examined at bedside. No acute events overnight. Renal ultrasound was completed yesterday which was unremarkable. Potassium levels today elevated at 5.6. Orders in place for 30 g of Kayexalate. Patient restarted on IV Lasix 20 mg daily. Day 8 of Levaquin. Brief History:     See H&P    Review of Systems:     Review of Systems   Constitutional: Negative for fatigue and fever. HENT: Negative for sinus pressure, sinus pain, sore throat and tinnitus. Eyes: Negative for visual disturbance. Respiratory: Positive for shortness of breath. Negative for cough, chest tightness and wheezing. Cardiovascular: Negative for chest pain, palpitations and leg swelling. Gastrointestinal: Negative for abdominal distention, abdominal pain, anal bleeding, diarrhea and nausea. Genitourinary: Negative for enuresis, frequency and urgency. Musculoskeletal: Negative for arthralgias, gait problem and neck stiffness. Skin: Negative for color change and rash. Neurological: Negative for dizziness and headaches. Psychiatric/Behavioral: Negative for agitation and confusion. Medications: Allergies:     Allergies   Allergen Reactions    Zosyn [Piperacillin-Tazobactam In Dex] Shortness Of Breath     tolerated cefepime 6/2018    Vancomycin Swelling     Lip swelling and redness and itching         Current Meds:   Scheduled Meds:    furosemide  40 mg Intravenous Daily    heparin (porcine)  5,000 Units Subcutaneous 3 times per day    Less than 3 mL. Successful ultrasound guided PICC placement     Us Renal Limited    Result Date: 6/25/2020  EXAMINATION: ULTRASOUND OF THE KIDNEYS 6/25/2020 1:52 pm COMPARISON: None. HISTORY: ORDERING SYSTEM PROVIDED HISTORY: elevated CR TECHNOLOGIST PROVIDED HISTORY: elevated CR FINDINGS: Very limited exam due to patient body habitus. In particular, the left kidney is poorly visualized. The right kidney measures 10.7 cm in length and the left kidney measures 10.5 cm in length. Kidneys demonstrate normal cortical echogenicity. No hydronephrosis or intrarenal stones. No focal lesions. Limited exam due to patient's size. Grossly unremarkable renal ultrasound. Xr Chest Portable    Result Date: 6/26/2020  EXAMINATION: ONE XRAY VIEW OF THE CHEST 6/26/2020 5:57 am COMPARISON: AP chest from 06/25/2020 HISTORY: ORDERING SYSTEM PROVIDED HISTORY: follow up TECHNOLOGIST PROVIDED HISTORY: follow up Reason for Exam: follow up Type of Exam: Subsequent/Follow-up Additional signs and symptoms: CHF, mycoplasma pneumonia Relevant Medical/Surgical History: CHF, mycoplasma pneumonia FINDINGS: Overlying ECG monitor leads, gown snaps and necklace; right IJ chemo port with unchanged tip position. Clips right axilla. Unchanged tracheostomy tube. Cardiomediastinal shadow stable. Similar bilateral dense patchy airspace opacities again seen with elevation right hemidiaphragm. No pneumothorax or large pleural effusion. Bones unchanged. Stable findings. Xr Chest Portable    Result Date: 6/25/2020  EXAMINATION: ONE XRAY VIEW OF THE CHEST 6/25/2020 9:20 am COMPARISON: 24 June 2020 HISTORY: ORDERING SYSTEM PROVIDED HISTORY: hypoxia TECHNOLOGIST PROVIDED HISTORY: hypoxia Reason for Exam: hypoxia Acuity: Acute Type of Exam: Subsequent/Follow-up FINDINGS: AP portable view of the chest time stamped at 928 hours demonstrates overlying cardiac monitoring electrodes. Tracheal airway terminates 5.2 cm above the susan. takes less than 2 seconds. Coloration: Skin is not jaundiced. Findings: No bruising. Neurological:      Mental Status: She is alert and oriented to person, place, and time.            Assessment:        Primary Problem  SOB (shortness of breath)    Active Hospital Problems    Diagnosis Date Noted    LILIAN (acute kidney injury) ([facility] [N17.9] 06/24/2020    Acute on chronic respiratory failure with hypoxia and hypercapnia (HCC) [L97.98, J96.22] 06/23/2020    SOB (shortness of breath) [R06.02] 06/19/2020    Morbid obesity with BMI of 70 and over, adult ([facility] [E66.01, Z68.45] 03/30/2020    Mycoplasma pneumonia [J15.7] 01/02/2020    [facility] dependent [facility] [Z93.0] 11/20/2018    Chronic respiratory failure requiring continuous mechanical ventilation through tracheostomy ([facility] [J96.10, Z93.0, Z99.11] 11/20/2018       Plan:        Acute on Chronic respiratory failure with hypercapnia and hypoxia, secondary to RU with underlying mycoplasma pneumonia: stable  - On chronic trach ventilator  - CXR 6/24/2020: Stable exam demonstrating severe bilateral airspace disease with a component of small right pleural effusion  - afebrile, no leukocytosis  - COVID negative  - Strep pneumo, Legionella, sputum cultures negative  - Mycoplasma positive  - MRSA DNA swab positive  - Zyvox discontinued, Levaquin day 08  - Respiratory eval and treat  - FiO2 60, PEEP 10  - tessalon, mucinex     LILIAN: worsening  - Cr 1.33->>2.14  - likely prerenal, FeNa 0.2%  - lasix 40mg IV daily restarted  - continue monitoring  - renal US: unremarkable     Anxiety/agitation  - on home med lexapro 20 mg Po daily     Acute on chronic CHF with preserved ejection fraction  - ECHO 9/2019: LVEF 55%, RVSP 44 mmHg  - ECHO 6/23/2020: LVEF 50-55%  - proBNP ~1200  - lasix 40mg IV daily restarted     Hypothyroidism  - Synthroid 250 mcg     DVT prophylaxis: Lovenox 40 mg sq BID  GI prophylaxis: Pepcid 20 mg PO BID  Dispo: SW consult, d/[facility] to [facility], [facility] preeti Richard MD  6/26/2020  9:49 AM   Attending Physician Statement  I have discussed the care of Carlos Camacho and I have examined the patient myselft and taken ros and hpi , including pertinent history and exam findings,  with the resident. I have reviewed the key elements of all parts of the encounter with the resident. I agree with the assessment, plan and orders as documented by the resident.   Finished abx for 8 day  Dc abx if ok with pulm    Respiratory failure requiring 50% FiO2 PEEP of 10  Given morbid obesity obesity hypoventilation and respiratory failure prolonged stay expected  ltac lucio need that level care      Electronically signed by Nicole Heath MD

## 2020-06-26 NOTE — PLAN OF CARE
Nutrition Problem: Overweight/Obese  Intervention: Food and/or Nutrient Delivery: Continue current diet, Start ONS  Nutritional Goals: PO intake to meet greater than 76% of estimated nutrition needs.

## 2020-06-27 ENCOUNTER — APPOINTMENT (OUTPATIENT)
Dept: GENERAL RADIOLOGY | Age: 50
DRG: 130 | End: 2020-06-27
Payer: MEDICAID

## 2020-06-27 LAB
ABSOLUTE BANDS #: 0.17 K/UL (ref 0–1)
ABSOLUTE EOS #: 0 K/UL (ref 0–0.4)
ABSOLUTE IMMATURE GRANULOCYTE: ABNORMAL K/UL (ref 0–0.3)
ABSOLUTE LYMPH #: 0.9 K/UL (ref 1–4.8)
ABSOLUTE MONO #: 0.3 K/UL (ref 0.1–1.3)
ANION GAP SERPL CALCULATED.3IONS-SCNC: 13 MMOL/L (ref 9–17)
BANDS: 4 % (ref 0–10)
BASOPHILS # BLD: 0 % (ref 0–2)
BASOPHILS ABSOLUTE: 0 K/UL (ref 0–0.2)
BUN BLDV-MCNC: 41 MG/DL (ref 6–20)
BUN/CREAT BLD: ABNORMAL (ref 9–20)
CALCIUM SERPL-MCNC: 9.1 MG/DL (ref 8.6–10.4)
CHLORIDE BLD-SCNC: 97 MMOL/L (ref 98–107)
CO2: 28 MMOL/L (ref 20–31)
CREAT SERPL-MCNC: 2.2 MG/DL (ref 0.5–0.9)
DIFFERENTIAL TYPE: ABNORMAL
EOSINOPHILS RELATIVE PERCENT: 0 % (ref 0–4)
GFR AFRICAN AMERICAN: 29 ML/MIN
GFR NON-AFRICAN AMERICAN: 24 ML/MIN
GFR SERPL CREATININE-BSD FRML MDRD: ABNORMAL ML/MIN/{1.73_M2}
GFR SERPL CREATININE-BSD FRML MDRD: ABNORMAL ML/MIN/{1.73_M2}
GLUCOSE BLD-MCNC: 106 MG/DL (ref 70–99)
HCT VFR BLD CALC: 26.5 % (ref 36–46)
HEMOGLOBIN: 8.4 G/DL (ref 12–16)
IMMATURE GRANULOCYTES: ABNORMAL %
LYMPHOCYTES # BLD: 21 % (ref 24–44)
MCH RBC QN AUTO: 29.6 PG (ref 26–34)
MCHC RBC AUTO-ENTMCNC: 31.5 G/DL (ref 31–37)
MCV RBC AUTO: 94 FL (ref 80–100)
METAMYELOCYTES ABSOLUTE COUNT: 0.04 K/UL
METAMYELOCYTES: 1 %
MONOCYTES # BLD: 7 % (ref 1–7)
MORPHOLOGY: ABNORMAL
NRBC AUTOMATED: ABNORMAL PER 100 WBC
NUCLEATED RED BLOOD CELLS: 3 PER 100 WBC
PDW BLD-RTO: 15.1 % (ref 11.5–14.9)
PLATELET # BLD: 162 K/UL (ref 150–450)
PLATELET ESTIMATE: ABNORMAL
PMV BLD AUTO: 8.6 FL (ref 6–12)
POTASSIUM SERPL-SCNC: 5.4 MMOL/L (ref 3.7–5.3)
RBC # BLD: 2.82 M/UL (ref 4–5.2)
RBC # BLD: ABNORMAL 10*6/UL
SEG NEUTROPHILS: 67 % (ref 36–66)
SEGMENTED NEUTROPHILS ABSOLUTE COUNT: 2.86 K/UL (ref 1.3–9.1)
SODIUM BLD-SCNC: 138 MMOL/L (ref 135–144)
WBC # BLD: 4.3 K/UL (ref 3.5–11)
WBC # BLD: ABNORMAL 10*3/UL

## 2020-06-27 PROCEDURE — 36415 COLL VENOUS BLD VENIPUNCTURE: CPT

## 2020-06-27 PROCEDURE — 6370000000 HC RX 637 (ALT 250 FOR IP): Performed by: STUDENT IN AN ORGANIZED HEALTH CARE EDUCATION/TRAINING PROGRAM

## 2020-06-27 PROCEDURE — 6360000002 HC RX W HCPCS: Performed by: INTERNAL MEDICINE

## 2020-06-27 PROCEDURE — 94003 VENT MGMT INPAT SUBQ DAY: CPT

## 2020-06-27 PROCEDURE — 80048 BASIC METABOLIC PNL TOTAL CA: CPT

## 2020-06-27 PROCEDURE — 2500000003 HC RX 250 WO HCPCS: Performed by: STUDENT IN AN ORGANIZED HEALTH CARE EDUCATION/TRAINING PROGRAM

## 2020-06-27 PROCEDURE — P9047 ALBUMIN (HUMAN), 25%, 50ML: HCPCS | Performed by: INTERNAL MEDICINE

## 2020-06-27 PROCEDURE — 94640 AIRWAY INHALATION TREATMENT: CPT

## 2020-06-27 PROCEDURE — 2000000000 HC ICU R&B

## 2020-06-27 PROCEDURE — 71045 X-RAY EXAM CHEST 1 VIEW: CPT

## 2020-06-27 PROCEDURE — 6360000002 HC RX W HCPCS: Performed by: STUDENT IN AN ORGANIZED HEALTH CARE EDUCATION/TRAINING PROGRAM

## 2020-06-27 PROCEDURE — 94770 HC ETCO2 MONITOR DAILY: CPT

## 2020-06-27 PROCEDURE — 85025 COMPLETE CBC W/AUTO DIFF WBC: CPT

## 2020-06-27 PROCEDURE — 94761 N-INVAS EAR/PLS OXIMETRY MLT: CPT

## 2020-06-27 PROCEDURE — 99233 SBSQ HOSP IP/OBS HIGH 50: CPT | Performed by: INTERNAL MEDICINE

## 2020-06-27 PROCEDURE — 6370000000 HC RX 637 (ALT 250 FOR IP): Performed by: INTERNAL MEDICINE

## 2020-06-27 PROCEDURE — 2700000000 HC OXYGEN THERAPY PER DAY

## 2020-06-27 PROCEDURE — 2580000003 HC RX 258: Performed by: STUDENT IN AN ORGANIZED HEALTH CARE EDUCATION/TRAINING PROGRAM

## 2020-06-27 RX ORDER — FUROSEMIDE 10 MG/ML
40 INJECTION INTRAMUSCULAR; INTRAVENOUS EVERY 12 HOURS
Status: DISCONTINUED | OUTPATIENT
Start: 2020-06-27 | End: 2020-06-29 | Stop reason: HOSPADM

## 2020-06-27 RX ORDER — SODIUM POLYSTYRENE SULFONATE 4.1 MEQ/G
15 POWDER, FOR SUSPENSION ORAL; RECTAL ONCE
Status: COMPLETED | OUTPATIENT
Start: 2020-06-27 | End: 2020-06-27

## 2020-06-27 RX ORDER — ALBUMIN (HUMAN) 12.5 G/50ML
25 SOLUTION INTRAVENOUS 2 TIMES DAILY
Status: DISCONTINUED | OUTPATIENT
Start: 2020-06-27 | End: 2020-06-29 | Stop reason: HOSPADM

## 2020-06-27 RX ADMIN — ALBUTEROL SULFATE 2.5 MG: 2.5 SOLUTION RESPIRATORY (INHALATION) at 23:43

## 2020-06-27 RX ADMIN — ALBUTEROL SULFATE 2.5 MG: 2.5 SOLUTION RESPIRATORY (INHALATION) at 15:56

## 2020-06-27 RX ADMIN — SODIUM POLYSTYRENE SULFONATE 15 G: 1 POWDER ORAL; RECTAL at 10:04

## 2020-06-27 RX ADMIN — FAMOTIDINE 20 MG: 20 TABLET ORAL at 09:58

## 2020-06-27 RX ADMIN — BENZONATATE 100 MG: 100 CAPSULE ORAL at 15:42

## 2020-06-27 RX ADMIN — ALBUTEROL SULFATE 2.5 MG: 2.5 SOLUTION RESPIRATORY (INHALATION) at 07:26

## 2020-06-27 RX ADMIN — CARBAMAZEPINE 200 MG: 200 TABLET ORAL at 21:33

## 2020-06-27 RX ADMIN — GUAIFENESIN 600 MG: 600 TABLET, EXTENDED RELEASE ORAL at 21:35

## 2020-06-27 RX ADMIN — ALBUTEROL SULFATE 2.5 MG: 2.5 SOLUTION RESPIRATORY (INHALATION) at 03:56

## 2020-06-27 RX ADMIN — FUROSEMIDE 40 MG: 10 INJECTION, SOLUTION INTRAMUSCULAR; INTRAVENOUS at 10:33

## 2020-06-27 RX ADMIN — CARBAMAZEPINE 200 MG: 200 TABLET ORAL at 10:00

## 2020-06-27 RX ADMIN — METHYLPREDNISOLONE SODIUM SUCCINATE 40 MG: 40 INJECTION, POWDER, FOR SOLUTION INTRAMUSCULAR; INTRAVENOUS at 10:34

## 2020-06-27 RX ADMIN — FERROUS SULFATE TAB 325 MG (65 MG ELEMENTAL FE) 325 MG: 325 (65 FE) TAB at 14:52

## 2020-06-27 RX ADMIN — METHYLPREDNISOLONE SODIUM SUCCINATE 40 MG: 40 INJECTION, POWDER, FOR SOLUTION INTRAMUSCULAR; INTRAVENOUS at 18:15

## 2020-06-27 RX ADMIN — Medication: at 21:35

## 2020-06-27 RX ADMIN — FAMOTIDINE 20 MG: 20 TABLET ORAL at 21:35

## 2020-06-27 RX ADMIN — ALBUTEROL SULFATE 2.5 MG: 2.5 SOLUTION RESPIRATORY (INHALATION) at 11:04

## 2020-06-27 RX ADMIN — ACETAMINOPHEN 1000 MG: 500 TABLET, FILM COATED ORAL at 19:38

## 2020-06-27 RX ADMIN — ALBUTEROL SULFATE 2.5 MG: 2.5 SOLUTION RESPIRATORY (INHALATION) at 20:05

## 2020-06-27 RX ADMIN — GUAIFENESIN 600 MG: 600 TABLET, EXTENDED RELEASE ORAL at 09:58

## 2020-06-27 RX ADMIN — Medication 10 ML: at 08:15

## 2020-06-27 RX ADMIN — FUROSEMIDE 40 MG: 10 INJECTION, SOLUTION INTRAMUSCULAR; INTRAVENOUS at 22:58

## 2020-06-27 RX ADMIN — ONDANSETRON 4 MG: 2 INJECTION INTRAMUSCULAR; INTRAVENOUS at 10:43

## 2020-06-27 RX ADMIN — ALBUMIN (HUMAN) 25 G: 0.25 INJECTION, SOLUTION INTRAVENOUS at 21:26

## 2020-06-27 RX ADMIN — Medication 10 ML: at 21:40

## 2020-06-27 RX ADMIN — ESCITALOPRAM OXALATE 20 MG: 20 TABLET, FILM COATED ORAL at 09:58

## 2020-06-27 RX ADMIN — LEVOTHYROXINE SODIUM 250 MCG: 125 TABLET ORAL at 09:58

## 2020-06-27 RX ADMIN — METOPROLOL TARTRATE 5 MG: 1 INJECTION, SOLUTION INTRAVENOUS at 15:42

## 2020-06-27 RX ADMIN — FERROUS SULFATE TAB 325 MG (65 MG ELEMENTAL FE) 325 MG: 325 (65 FE) TAB at 18:15

## 2020-06-27 RX ADMIN — ALBUMIN (HUMAN) 25 G: 0.25 INJECTION, SOLUTION INTRAVENOUS at 09:55

## 2020-06-27 RX ADMIN — FERROUS SULFATE TAB 325 MG (65 MG ELEMENTAL FE) 325 MG: 325 (65 FE) TAB at 08:15

## 2020-06-27 RX ADMIN — METHYLPREDNISOLONE SODIUM SUCCINATE 40 MG: 40 INJECTION, POWDER, FOR SOLUTION INTRAMUSCULAR; INTRAVENOUS at 01:34

## 2020-06-27 RX ADMIN — ACETAMINOPHEN 1000 MG: 500 TABLET, FILM COATED ORAL at 01:33

## 2020-06-27 ASSESSMENT — PULMONARY FUNCTION TESTS
PIF_VALUE: 34
PIF_VALUE: 31
PIF_VALUE: 32
PIF_VALUE: 27
PIF_VALUE: 29
PIF_VALUE: 31
PIF_VALUE: 33
PIF_VALUE: 28
PIF_VALUE: 35
PIF_VALUE: 33
PIF_VALUE: 36
PIF_VALUE: 35
PIF_VALUE: 36
PIF_VALUE: 32
PIF_VALUE: 28
PIF_VALUE: 35
PIF_VALUE: 28
PIF_VALUE: 43
PIF_VALUE: 36
PIF_VALUE: 44
PIF_VALUE: 32
PIF_VALUE: 34
PIF_VALUE: 42
PIF_VALUE: 32
PIF_VALUE: 33
PIF_VALUE: 35
PIF_VALUE: 33
PIF_VALUE: 26
PIF_VALUE: 37
PIF_VALUE: 34
PIF_VALUE: 36
PIF_VALUE: 29
PIF_VALUE: 29
PIF_VALUE: 26
PIF_VALUE: 34
PIF_VALUE: 34
PIF_VALUE: 36
PIF_VALUE: 37
PIF_VALUE: 41

## 2020-06-27 ASSESSMENT — ENCOUNTER SYMPTOMS
ABDOMINAL PAIN: 0
SHORTNESS OF BREATH: 0

## 2020-06-27 ASSESSMENT — PAIN SCALES - GENERAL
PAINLEVEL_OUTOF10: 8
PAINLEVEL_OUTOF10: 0
PAINLEVEL_OUTOF10: 10
PAINLEVEL_OUTOF10: 0

## 2020-06-27 ASSESSMENT — PAIN DESCRIPTION - LOCATION: LOCATION: MOUTH

## 2020-06-27 NOTE — PLAN OF CARE
of new skin breakdown  6/27/2020 1619 by Colleen Zurita RN  Outcome: Ongoing     Problem: Nutrition  Goal: Optimal nutrition therapy  6/27/2020 1619 by Colleen Zurita RN  Outcome: Ongoing  Pt only able to tolerate 1/3 breakfast. Pt able to eat lunch in addition to drinking 1/2 bottle of ensure. Assisted pt w/ordering dinner.

## 2020-06-27 NOTE — PROGRESS NOTES
TEMPERATURE:  Temp: 98 °F (36.7 °C)  MAXIMUM TEMPERATURE OVER 24HRS:  Temp (24hrs), Av.5 °F (36.9 °C), Min:97.8 °F (36.6 °C), Max:99.6 °F (37.6 °C)    CURRENT RESPIRATORY RATE:  Resp: 21  CURRENT PULSE:  Pulse: 68  CURRENT BLOOD PRESSURE:  BP: 111/65  24HR BLOOD PRESSURE RANGE:  Systolic (40MXH), OQA:434 , Min:111 , ZVM:273   ; Diastolic (39JOH), INR:91, Min:65, Max:120    24HR INTAKE/OUTPUT:      Intake/Output Summary (Last 24 hours) at 2020 1530  Last data filed at 2020 0400  Gross per 24 hour   Intake 120 ml   Output 315 ml   Net -195 ml     Patient Vitals for the past 96 hrs (Last 3 readings):   Weight   20 0600 (!) 607 lb (275.3 kg)   20 0600 (!) 607 lb (275.3 kg)       Physical Exam:  GENERAL APPEARANCE: Alert and cooperative, and appears to be in no acute distress. HEAD: normocephalic  EYES:  EOMI. NOSE:  No nasal discharge. THROAT: Tracheostomy noted on ventilator  CARDIAC:  Rhythm is regular. LUNGS: Tracheostomy on ventilator  MUSKULOSKELETAL: Adequately aligned spine. No joint erythema or tenderness. EXTREMITIES: 2-3+ edema bilaterally  NEURO: Nonfocal moving all 4 limbs      Labs:   CBC:  Recent Labs     20  0431 20  0411 20  0455   WBC 4.9 5.1 4.3   RBC 2.98* 2.98* 2.82*   HGB 8.8* 8.7* 8.4*   HCT 28.3* 28.5* 26.5*   MCV 94.9 95.4 94.0   MCH 29.6 29.2 29.6   MCHC 31.2 30.6* 31.5   RDW 15.7* 15.5* 15.1*    185 162   MPV 9.1 9.0 8.6      BMP:   Recent Labs     20  0431 20  0411 20  1201 20  0455    138  --  138   K 4.9 5.6* 5.0 5.4*   CL 96* 98  --  97*   CO2 25 27  --  28   BUN 31* 36*  --  41*   CREATININE 2.05* 2.14*  --  2.20*   GLUCOSE 88 90  --  106*   CALCIUM 8.9 9.0  --  9.1          Radiology:  Reviewed as available. Assessment:  1.   LILIAN on CKD, nonoliguric most likely secondary to prerenal azotemia/cardiorenal syndrome versus ATN due to hypotension use of diuretics third spacing, baseline serum creatinine

## 2020-06-27 NOTE — PROGRESS NOTES
Pt's father called at her request. Update given. Verbalized understanding. Per pt's father, pt's daughter to visit sometime next week if allowed time off at her job.

## 2020-06-27 NOTE — PROGRESS NOTES
flush, polyethylene glycol, promethazine **OR** ondansetron, acetaminophen **OR** acetaminophen    Data:     Past Medical History:   has a past medical history of Anemia, Disease of blood and blood forming organ, History of breast cancer, History of chemotherapy, Hypothyroidism, Lymphedema, Morbid obesity ([facility] [address] Respiratory failure ([facility] [address] and Tracheostomy present ([facility] [address] Social History:   reports that she has never smoked. She has never used smokeless tobacco. She reports that she does not drink alcohol or use drugs. Family History:   Family History   Problem Relation Age of Onset    Breast Cancer Paternal Aunt     Breast Cancer Paternal Cousin 43    Breast Cancer Paternal Cousin 37    Breast Cancer Paternal Cousin 46    Diabetes Mother     Thyroid Disease Father        Vitals:  BP (!) 142/92   Pulse 57   Temp 98.2 °F (36.8 °C) (Axillary)   Resp 22   Ht 5' 5\" (1.651 m)   Wt (!) 607 lb (275.3 kg)   SpO2 98%   .01 kg/m²   Temp (24hrs), Av.6 °F (37 °C), Min:97.9 °F (36.6 °C), Max:99.6 °F (37.6 °C)    No results for input(s): POCGLU in the last 72 hours. I/O(24Hr):     Intake/Output Summary (Last 24 hours) at 2020 0936  Last data filed at 2020 0400  Gross per 24 hour   Intake 360 ml   Output 765 ml   Net -405 ml     Labs:    CBC with Differential:    Lab Results   Component Value Date    WBC 4.3 2020    RBC 2.82 2020    HGB 8.4 2020    HCT 26.5 2020     2020    MCV 94.0 2020    MCH 29.6 2020    MCHC 31.5 2020    RDW 15.1 2020    NRBC 3 2020    METASPCT 1 2020    LYMPHOPCT 21 2020    MONOPCT 7 2020    MYELOPCT 4 2020    BASOPCT 0 2020    MONOSABS 0.30 2020    LYMPHSABS 0.90 2020    EOSABS 0.00 2020    BASOSABS 0.00 2020    DIFFTYPE NOT REPORTED 2020     BMP:    Lab Results   Component Value Date     2020    K 5.4 2020    CL 97 2020 CO2 28 06/27/2020    BUN 41 06/27/2020    LABALBU 3.7 06/19/2020    CREATININE 2.20 06/27/2020    CALCIUM 9.1 06/27/2020    GFRAA 29 06/27/2020    LABGLOM 24 06/27/2020    GLUCOSE 106 06/27/2020       Lab Results   Component Value Date/Time    SPECIAL LEFT HAND 06/22/2020 05:34 PM     Lab Results   Component Value Date/Time    CULTURE NO GROWTH 5 DAYS 06/22/2020 05:34 PM         Radiology:    Xr Chest (single View Frontal)    Result Date: 6/19/2020  EXAMINATION: ONE XRAY VIEW OF THE CHEST 6/19/2020 3:51 pm COMPARISON: 06/19/2020, 1220 hours. HISTORY: ORDERING SYSTEM PROVIDED HISTORY: check picc placement, please show Dr. Lulu Perez when done TECHNOLOGIST PROVIDED HISTORY: check picc placement, please show Dr. Lulu Perez when done Reason for Exam: verify picc placement Acuity: Acute Type of Exam: Initial FINDINGS: The tracheostomy tube is in satisfactory position. A left PICC line was noted with the distal tip in the region of the right atrium. A right Mjeued-Y-Nzhg was noted with the distal tip in the region of the superior vena cava. No pneumothorax was identified. Metallic surgical clips were noted in the right axilla. The heart was not enlarged. Nodular parenchymal densities were noted bilaterally which could represent multifocal pneumonia or tumor. The regional skeleton was unremarkable. The PICC line is the only change noted from [id number] hours. The tracheostomy tube is in satisfactory position. A new left PICC line was noted with the distal tip in the region of the right atrium. Right Yxqufd-H-Ppdy with the distal tip in the region of the superior vena cava. No pneumothorax was identified. No cardiomegaly or interstitial edema. Nodular parenchymal densities were noted bilaterally which could represent multifocal pneumonia or tumor. This is unchanged from the earlier exam.     Ir Zorita Opoka Cva Device Plmt/replace/removal    Result Date: 6/19/2020  PROCEDURE: ULTRASOUND GUIDED VASCULAR ACCESS.  PICC Unchanged tracheostomy tube. Cardiomediastinal shadow stable. Similar bilateral dense patchy airspace opacities again seen with elevation right hemidiaphragm. No pneumothorax or large pleural effusion. Bones unchanged. Stable findings. Xr Chest Portable    Result Date: 6/25/2020  EXAMINATION: ONE XRAY VIEW OF THE CHEST 6/25/2020 9:20 am COMPARISON: 24 June 2020 HISTORY: ORDERING SYSTEM PROVIDED HISTORY: hypoxia TECHNOLOGIST PROVIDED HISTORY: hypoxia Reason for Exam: hypoxia Acuity: Acute Type of Exam: Subsequent/Follow-up FINDINGS: AP portable view of the chest time stamped at 928 hours demonstrates overlying cardiac monitoring electrodes. Tracheal airway terminates 5.2 cm above the susan. Right-sided central catheter terminates in the right atrium. The patient has stable cardiomegaly. Extensive multifocal airspace disease is unchanged. Tiny amount of extrapleural air on the right is noted. No tracheal shift. Left-sided PICC line terminates in the superior vena cava. No change in multifocal airspace disease. Trace extrapleural air right side. Support tubes and lines as above. Xr Chest Portable    Result Date: 6/24/2020  EXAMINATION: ONE XRAY VIEW OF THE CHEST 6/24/2020 6:32 am COMPARISON: June 23, 2020 HISTORY: ORDERING SYSTEM PROVIDED HISTORY: follow up TECHNOLOGIST PROVIDED HISTORY: follow up Reason for Exam: Vent to trach. Acuity: Unknown Type of Exam: Unknown Additional signs and symptoms: Vent to trach. FINDINGS: Support lines and tubes are stable in position. Stable cardiomediastinal silhouette. Bilateral superior parenchymal opacities with more dense opacification of the right base similar in appearance. No pneumothorax. Right axillary lymph node dissection noted.      Stable exam demonstrating severe bilateral airspace disease with a component of small right pleural effusion     Xr Chest Portable    Result Date: 6/23/2020  EXAMINATION: ONE XRAY VIEW OF THE CHEST 6/23/2020 7:25 am SW consult, d/c to Shivani Lee MD  6/27/2020  9:36 AM     Attestation and add on       I have discussed the care of Dayana Weller , including pertinent history and exam findings,    6/27/20   with the resident. I have seen and examined the patient and the key elements of all parts of the encounter have been performed by me . I agree with the assessment, plan and orders as documented by the resident. Principal Problem:    SOB (shortness of breath)  Active Problems:    Chronic respiratory failure requiring continuous mechanical ventilation through tracheostomy ([facility] [address]    Tracheostomy dependent (HCC)    Mycoplasma pneumonia    Morbid obesity with BMI of 70 and over, adult ([facility] [address]    Acute on chronic respiratory failure with hypoxia and hypercapnia (HCC)    LILIAN (acute kidney injury) ([facility] [address]  Resolved Problems:    * No resolved hospital problems. *        --   CLINICAL COURSE ---          clinical course has fluctuated,    -         Condition    [x] ill ,     [x] high risk , [x] critical ,        [] improved but still labile                                        [] delirium ,      [] -----,                 [] I----     Unit  [x] ICU           [] PICU       [] MED_[facility]G             []  Other  Prognosis -              Medications: Allergies:     Allergies   Allergen Reactions    Zosyn [Piperacillin-Tazobactam In Dex] Shortness Of Breath     tolerated cefepime 6/2018    Vancomycin Swelling     Lip swelling and redness and itching         Current Meds:   Scheduled Meds:    albumin human  25 g Intravenous BID    furosemide  40 mg Intravenous Q12H    heparin (porcine)  5,000 Units Subcutaneous 3 times per day    methylPREDNISolone  40 mg Intravenous Q8H    guaiFENesin  600 mg Oral BID    albuterol  2.5 mg Nebulization Q4H    levothyroxine  250 mcg Oral Daily    carBAMazepine  200 mg Oral BID    escitalopram  20 mg Oral Daily    ferrous sulfate  325 mg Oral TID     therapeutic

## 2020-06-27 NOTE — CARE COORDINATION
DISCHARGE PLANNING NOTE:    Plan is for this patient to either return to [facility] vs [facility]. Per LSW note from 6/25, patient meets criteria. Patient remains on IV lasix 40 BID, IV steroids 40Q8. K - 5.4 today  Cr - 2.20      Will continue to follow along with LSW.      Electronically signed by Parvez Francisco RN on 6/27/2020 at 1:47 PM

## 2020-06-27 NOTE — PLAN OF CARE
signs and symptoms  6/27/2020 0718 by Jean Viera RN  Outcome: Ongoing  6/26/2020 1732 by Xochitl Brewster RN  Outcome: Ongoing  Note: Pt refusing turns.  Most of shift  Goal: Absence of new skin breakdown  Description: Absence of new skin breakdown  6/27/2020 0718 by Jean Viera RN  Outcome: Ongoing  6/26/2020 1732 by Xochitl Brewster RN  Outcome: Ongoing     Problem: Nutrition  Goal: Optimal nutrition therapy  6/27/2020 0718 by Jean Viera RN  Outcome: Ongoing  6/26/2020 1732 by Xochitl Brewster RN  Outcome: Ongoing  Note: Patient easing moderate amounts of food

## 2020-06-28 ENCOUNTER — APPOINTMENT (OUTPATIENT)
Dept: GENERAL RADIOLOGY | Age: 50
DRG: 130 | End: 2020-06-28
Payer: MEDICAID

## 2020-06-28 LAB
ABSOLUTE BANDS #: 0.27 K/UL (ref 0–1)
ABSOLUTE EOS #: 0 K/UL (ref 0–0.4)
ABSOLUTE IMMATURE GRANULOCYTE: ABNORMAL K/UL (ref 0–0.3)
ABSOLUTE LYMPH #: 1.1 K/UL (ref 1–4.8)
ABSOLUTE MONO #: 0.44 K/UL (ref 0.1–1.3)
ANION GAP SERPL CALCULATED.3IONS-SCNC: 13 MMOL/L (ref 9–17)
BANDS: 5 % (ref 0–10)
BASOPHILS # BLD: 0 % (ref 0–2)
BASOPHILS ABSOLUTE: 0 K/UL (ref 0–0.2)
BUN BLDV-MCNC: 47 MG/DL (ref 6–20)
BUN/CREAT BLD: ABNORMAL (ref 9–20)
CALCIUM SERPL-MCNC: 9.4 MG/DL (ref 8.6–10.4)
CHLORIDE BLD-SCNC: 98 MMOL/L (ref 98–107)
CO2: 29 MMOL/L (ref 20–31)
CREAT SERPL-MCNC: 2.3 MG/DL (ref 0.5–0.9)
CULTURE: NORMAL
CULTURE: NORMAL
DIFFERENTIAL TYPE: ABNORMAL
EOSINOPHILS RELATIVE PERCENT: 0 % (ref 0–4)
GFR AFRICAN AMERICAN: 28 ML/MIN
GFR NON-AFRICAN AMERICAN: 23 ML/MIN
GFR SERPL CREATININE-BSD FRML MDRD: ABNORMAL ML/MIN/{1.73_M2}
GFR SERPL CREATININE-BSD FRML MDRD: ABNORMAL ML/MIN/{1.73_M2}
GLUCOSE BLD-MCNC: 116 MG/DL (ref 70–99)
HCT VFR BLD CALC: 27.9 % (ref 36–46)
HEMOGLOBIN: 9 G/DL (ref 12–16)
IMMATURE GRANULOCYTES: ABNORMAL %
LYMPHOCYTES # BLD: 20 % (ref 24–44)
Lab: NORMAL
Lab: NORMAL
MCH RBC QN AUTO: 30.6 PG (ref 26–34)
MCHC RBC AUTO-ENTMCNC: 32.4 G/DL (ref 31–37)
MCV RBC AUTO: 94.6 FL (ref 80–100)
METAMYELOCYTES ABSOLUTE COUNT: 0.22 K/UL
METAMYELOCYTES: 4 %
MONOCYTES # BLD: 8 % (ref 1–7)
MORPHOLOGY: ABNORMAL
MORPHOLOGY: ABNORMAL
NRBC AUTOMATED: ABNORMAL PER 100 WBC
NUCLEATED RED BLOOD CELLS: 4 PER 100 WBC
PDW BLD-RTO: 15.8 % (ref 11.5–14.9)
PLATELET # BLD: 191 K/UL (ref 150–450)
PLATELET ESTIMATE: ABNORMAL
PMV BLD AUTO: 8.4 FL (ref 6–12)
POTASSIUM SERPL-SCNC: 4.8 MMOL/L (ref 3.7–5.3)
PROCALCITONIN: 0.34 NG/ML
RBC # BLD: 2.94 M/UL (ref 4–5.2)
RBC # BLD: ABNORMAL 10*6/UL
SEG NEUTROPHILS: 63 % (ref 36–66)
SEGMENTED NEUTROPHILS ABSOLUTE COUNT: 3.45 K/UL (ref 1.3–9.1)
SODIUM BLD-SCNC: 140 MMOL/L (ref 135–144)
SPECIMEN DESCRIPTION: NORMAL
SPECIMEN DESCRIPTION: NORMAL
WBC # BLD: 5.5 K/UL (ref 3.5–11)
WBC # BLD: ABNORMAL 10*3/UL

## 2020-06-28 PROCEDURE — 94761 N-INVAS EAR/PLS OXIMETRY MLT: CPT

## 2020-06-28 PROCEDURE — 94770 HC ETCO2 MONITOR DAILY: CPT

## 2020-06-28 PROCEDURE — 99233 SBSQ HOSP IP/OBS HIGH 50: CPT | Performed by: INTERNAL MEDICINE

## 2020-06-28 PROCEDURE — 6360000002 HC RX W HCPCS: Performed by: INTERNAL MEDICINE

## 2020-06-28 PROCEDURE — 6370000000 HC RX 637 (ALT 250 FOR IP): Performed by: STUDENT IN AN ORGANIZED HEALTH CARE EDUCATION/TRAINING PROGRAM

## 2020-06-28 PROCEDURE — 2700000000 HC OXYGEN THERAPY PER DAY

## 2020-06-28 PROCEDURE — 36415 COLL VENOUS BLD VENIPUNCTURE: CPT

## 2020-06-28 PROCEDURE — 84145 PROCALCITONIN (PCT): CPT

## 2020-06-28 PROCEDURE — 6360000002 HC RX W HCPCS: Performed by: STUDENT IN AN ORGANIZED HEALTH CARE EDUCATION/TRAINING PROGRAM

## 2020-06-28 PROCEDURE — 94640 AIRWAY INHALATION TREATMENT: CPT

## 2020-06-28 PROCEDURE — 2580000003 HC RX 258: Performed by: STUDENT IN AN ORGANIZED HEALTH CARE EDUCATION/TRAINING PROGRAM

## 2020-06-28 PROCEDURE — 94003 VENT MGMT INPAT SUBQ DAY: CPT

## 2020-06-28 PROCEDURE — P9047 ALBUMIN (HUMAN), 25%, 50ML: HCPCS | Performed by: INTERNAL MEDICINE

## 2020-06-28 PROCEDURE — 2000000000 HC ICU R&B

## 2020-06-28 PROCEDURE — 71045 X-RAY EXAM CHEST 1 VIEW: CPT

## 2020-06-28 PROCEDURE — 80048 BASIC METABOLIC PNL TOTAL CA: CPT

## 2020-06-28 PROCEDURE — 85025 COMPLETE CBC W/AUTO DIFF WBC: CPT

## 2020-06-28 RX ORDER — DULOXETIN HYDROCHLORIDE 20 MG/1
20 CAPSULE, DELAYED RELEASE ORAL DAILY
Status: DISCONTINUED | OUTPATIENT
Start: 2020-06-28 | End: 2020-06-28

## 2020-06-28 RX ORDER — METHYLPREDNISOLONE SODIUM SUCCINATE 40 MG/ML
30 INJECTION, POWDER, LYOPHILIZED, FOR SOLUTION INTRAMUSCULAR; INTRAVENOUS EVERY 8 HOURS
Status: DISCONTINUED | OUTPATIENT
Start: 2020-06-28 | End: 2020-06-29 | Stop reason: HOSPADM

## 2020-06-28 RX ORDER — DULOXETIN HYDROCHLORIDE 60 MG/1
60 CAPSULE, DELAYED RELEASE ORAL DAILY
Status: DISCONTINUED | OUTPATIENT
Start: 2020-06-29 | End: 2020-06-29 | Stop reason: HOSPADM

## 2020-06-28 RX ORDER — ESCITALOPRAM OXALATE 10 MG/1
10 TABLET ORAL DAILY
Status: DISCONTINUED | OUTPATIENT
Start: 2020-06-29 | End: 2020-06-28

## 2020-06-28 RX ADMIN — ALBUTEROL SULFATE 2.5 MG: 2.5 SOLUTION RESPIRATORY (INHALATION) at 11:07

## 2020-06-28 RX ADMIN — FAMOTIDINE 20 MG: 20 TABLET ORAL at 21:43

## 2020-06-28 RX ADMIN — Medication 10 ML: at 10:42

## 2020-06-28 RX ADMIN — METHYLPREDNISOLONE SODIUM SUCCINATE 40 MG: 40 INJECTION, POWDER, FOR SOLUTION INTRAMUSCULAR; INTRAVENOUS at 10:39

## 2020-06-28 RX ADMIN — ALBUTEROL SULFATE 2.5 MG: 2.5 SOLUTION RESPIRATORY (INHALATION) at 19:17

## 2020-06-28 RX ADMIN — ACETAMINOPHEN 1000 MG: 500 TABLET, FILM COATED ORAL at 21:43

## 2020-06-28 RX ADMIN — BENZONATATE 100 MG: 100 CAPSULE ORAL at 23:10

## 2020-06-28 RX ADMIN — ONDANSETRON 4 MG: 2 INJECTION INTRAMUSCULAR; INTRAVENOUS at 11:02

## 2020-06-28 RX ADMIN — CARBAMAZEPINE 200 MG: 200 TABLET ORAL at 21:44

## 2020-06-28 RX ADMIN — ALBUTEROL SULFATE 2.5 MG: 2.5 SOLUTION RESPIRATORY (INHALATION) at 15:20

## 2020-06-28 RX ADMIN — ALBUMIN (HUMAN) 25 G: 0.25 INJECTION, SOLUTION INTRAVENOUS at 10:34

## 2020-06-28 RX ADMIN — AZITHROMYCIN MONOHYDRATE 500 MG: 500 INJECTION, POWDER, LYOPHILIZED, FOR SOLUTION INTRAVENOUS at 11:18

## 2020-06-28 RX ADMIN — FERROUS SULFATE TAB 325 MG (65 MG ELEMENTAL FE) 325 MG: 325 (65 FE) TAB at 07:15

## 2020-06-28 RX ADMIN — BENZONATATE 100 MG: 100 CAPSULE ORAL at 10:45

## 2020-06-28 RX ADMIN — GUAIFENESIN 600 MG: 600 TABLET, EXTENDED RELEASE ORAL at 21:42

## 2020-06-28 RX ADMIN — METHYLPREDNISOLONE SODIUM SUCCINATE 30 MG: 40 INJECTION, POWDER, FOR SOLUTION INTRAMUSCULAR; INTRAVENOUS at 21:42

## 2020-06-28 RX ADMIN — FERROUS SULFATE TAB 325 MG (65 MG ELEMENTAL FE) 325 MG: 325 (65 FE) TAB at 10:44

## 2020-06-28 RX ADMIN — Medication 10 ML: at 21:00

## 2020-06-28 RX ADMIN — ACETAMINOPHEN 1000 MG: 500 TABLET, FILM COATED ORAL at 11:17

## 2020-06-28 RX ADMIN — METHYLPREDNISOLONE SODIUM SUCCINATE 40 MG: 40 INJECTION, POWDER, FOR SOLUTION INTRAMUSCULAR; INTRAVENOUS at 01:17

## 2020-06-28 RX ADMIN — Medication: at 21:44

## 2020-06-28 RX ADMIN — FUROSEMIDE 40 MG: 10 INJECTION, SOLUTION INTRAMUSCULAR; INTRAVENOUS at 10:55

## 2020-06-28 RX ADMIN — MULTIPLE VITAMINS W/ MINERALS TAB 1 TABLET: TAB at 10:38

## 2020-06-28 RX ADMIN — LEVOTHYROXINE SODIUM 250 MCG: 125 TABLET ORAL at 10:38

## 2020-06-28 RX ADMIN — FUROSEMIDE 40 MG: 10 INJECTION, SOLUTION INTRAMUSCULAR; INTRAVENOUS at 22:35

## 2020-06-28 RX ADMIN — CARBAMAZEPINE 200 MG: 200 TABLET ORAL at 10:42

## 2020-06-28 RX ADMIN — GUAIFENESIN 600 MG: 600 TABLET, EXTENDED RELEASE ORAL at 10:38

## 2020-06-28 RX ADMIN — ALBUTEROL SULFATE 2.5 MG: 2.5 SOLUTION RESPIRATORY (INHALATION) at 07:10

## 2020-06-28 RX ADMIN — FAMOTIDINE 20 MG: 20 TABLET ORAL at 10:39

## 2020-06-28 RX ADMIN — ALBUMIN (HUMAN) 25 G: 0.25 INJECTION, SOLUTION INTRAVENOUS at 21:33

## 2020-06-28 ASSESSMENT — PAIN SCALES - GENERAL
PAINLEVEL_OUTOF10: 5
PAINLEVEL_OUTOF10: 3
PAINLEVEL_OUTOF10: 2
PAINLEVEL_OUTOF10: 5
PAINLEVEL_OUTOF10: 0
PAINLEVEL_OUTOF10: 0
PAINLEVEL_OUTOF10: 5
PAINLEVEL_OUTOF10: 5
PAINLEVEL_OUTOF10: 0

## 2020-06-28 ASSESSMENT — PULMONARY FUNCTION TESTS
PIF_VALUE: 38
PIF_VALUE: 33
PIF_VALUE: 35
PIF_VALUE: 31
PIF_VALUE: 36
PIF_VALUE: 27
PIF_VALUE: 33
PIF_VALUE: 34
PIF_VALUE: 30
PIF_VALUE: 49
PIF_VALUE: 24
PIF_VALUE: 35
PIF_VALUE: 30
PIF_VALUE: 34
PIF_VALUE: 36
PIF_VALUE: 31
PIF_VALUE: 32
PIF_VALUE: 34
PIF_VALUE: 30
PIF_VALUE: 32
PIF_VALUE: 27
PIF_VALUE: 33
PIF_VALUE: 29
PIF_VALUE: 30
PIF_VALUE: 31
PIF_VALUE: 27
PIF_VALUE: 31
PIF_VALUE: 31
PIF_VALUE: 27
PIF_VALUE: 22
PIF_VALUE: 37
PIF_VALUE: 28

## 2020-06-28 ASSESSMENT — ENCOUNTER SYMPTOMS
SHORTNESS OF BREATH: 0
COLOR CHANGE: 0
DIARRHEA: 0
NAUSEA: 0
COUGH: 0
ABDOMINAL PAIN: 0
WHEEZING: 0
SINUS PAIN: 0
SORE THROAT: 0
CHEST TIGHTNESS: 0
SINUS PRESSURE: 0
ANAL BLEEDING: 0
ABDOMINAL DISTENTION: 0

## 2020-06-28 NOTE — CARE COORDINATION
DISCHARGE PLANNING NOTE:    Plan is for this patient to either go to [facility] vs [facility]. I spoke with Don at [facility] who is going to relay my message to liason on call. I inquired whether the patient still meets criteria and whether or not the patient could discharge there today or not. Awaiting a call back. If patient does not meet criteria, then patient would end up returning to [facility] once more medically stable. Patient remains trach to vent  On IV lasix 40 BID, IV zithromax and IV steroids 40Q8. Will continue to follow along.      Electronically signed by Venkatesh Bryant RN on 6/28/2020 at 12:46 PM

## 2020-06-28 NOTE — PROGRESS NOTES
atraumatic. Lungs - Chest expands equally, no wheezes, rales or rhonchi. Anteriorly and laterally  Cardiovascular - Heart sounds very distant no murmur, gallop or rub. Abdomen - soft, nontende obese  Extremities - no cyanosis, --- edema present    Lab Results   CBC     Lab Results   Component Value Date    WBC 5.5 06/28/2020    RBC 2.94 06/28/2020    HGB 9.0 06/28/2020    HCT 27.9 06/28/2020     06/28/2020    MCV 94.6 06/28/2020    MCH 30.6 06/28/2020    MCHC 32.4 06/28/2020    RDW 15.8 06/28/2020    NRBC 4 06/28/2020    METASPCT 4 06/28/2020    LYMPHOPCT 20 06/28/2020    MONOPCT 8 06/28/2020    MYELOPCT 4 06/26/2020    BASOPCT 0 06/28/2020    MONOSABS 0.44 06/28/2020    LYMPHSABS 1.10 06/28/2020    EOSABS 0.00 06/28/2020    BASOSABS 0.00 06/28/2020    DIFFTYPE NOT REPORTED 06/28/2020       BMP   Lab Results   Component Value Date     06/28/2020    K 4.8 06/28/2020    CL 98 06/28/2020    CO2 29 06/28/2020    BUN 47 06/28/2020    CREATININE 2.30 06/28/2020    GLUCOSE 116 06/28/2020    CALCIUM 9.4 06/28/2020       LFTS  Lab Results   Component Value Date    ALKPHOS 66 06/19/2020    ALT 29 06/19/2020    AST 31 06/19/2020    PROT 8.3 06/19/2020    BILITOT 0.20 06/19/2020    LABALBU 3.7 06/19/2020       INR  No results for input(s): PROTIME, INR in the last 72 hours. APTT  No results for input(s): APTT in the last 72 hours. Lactic Acid  Lab Results   Component Value Date    LACTA 1.0 06/19/2020    LACTA NOT REPORTED 03/30/2020    LACTA NOT REPORTED 03/29/2020        BNP   No results for input(s): BNP in the last 72 hours. Cultures       Radiology     Plain Films       This x-ray reveals persistent infiltrative changes.   Taking account for penetration/ technique, I do not see gross changes      See actual reports for details    SYSTEM ASSESSMENT    #1 acute respiratory failure with hypoxemia   #2 Mycoplasma multilobar pneumonia  #3 history of COVID-19 infection  #4 history of morbid obesity BMI

## 2020-06-28 NOTE — PLAN OF CARE
Problem: Pain:  Goal: Pain level will decrease  Description: Pain level will decrease  6/28/2020 1731 by Malick Polanco RN  Outcome: Ongoing  Pain maintained at tolerable level this shift. Goal: Control of acute pain  Description: Control of acute pain  6/28/2020 1731 by Malick Polanco RN  Outcome: Ongoing  Goal: Control of chronic pain  Description: Control of chronic pain  6/28/2020 1731 by Malick Polanco RN  Outcome: Ongoing     Problem: Falls - Risk of:  Goal: Will remain free from falls  Description: Will remain free from falls  6/28/2020 1731 by Malick Polanco RN  Outcome: Ongoing  Pt remains free of falls this shift. Side rails up. Bed alarm on. Bed in lowest position. Bed wheels locked. Call light and bedside table within reach. Goal: Absence of physical injury  Description: Absence of physical injury  6/28/2020 1731 by Malick Polanco RN  Outcome: Ongoing     Problem: Breathing Pattern - Ineffective:  Goal: Ability to achieve and maintain a regular respiratory rate will improve  Description: Ability to achieve and maintain a regular respiratory rate will improve  6/28/2020 1731 by Malikc Polanco RN  Outcome: Ongoing  Pt maintained on ventilator. Continuous pulse ox. Suctioned PRN. Breathing tx per RT. HOB elevated. Trach care done. Problem: Respiratory:  Goal: Ability to maintain normal respiratory secretions will improve  Description: Ability to maintain normal respiratory secretions will improve  6/28/2020 1731 by Malick Polanco RN  Outcome: Ongoing     Problem: Skin Integrity:  Goal: Will show no infection signs and symptoms  Description: Will show no infection signs and symptoms  6/28/2020 1731 by Malick Polanco RN  Outcome: Ongoing  No new skin breakdown noted this shift. Pt refusing turning and repositioning for most of this shift. Skin care done w/pt's bowel movements. Lotion applied. Linens changed.   Goal: Absence of new skin breakdown  Description: Absence of new skin breakdown  6/28/2020

## 2020-06-28 NOTE — PLAN OF CARE
Problem: Pain:  Goal: Pain level will decrease  Description: Pain level will decrease  6/28/2020 0443 by Juan Villanueva RN  Outcome: Ongoing  6/27/2020 1619 by Cole Yee RN  Outcome: Ongoing  Goal: Control of acute pain  Description: Control of acute pain  6/28/2020 0443 by Juan Villanueva RN  Outcome: Ongoing  6/27/2020 1619 by Cole Yee RN  Outcome: Ongoing  Goal: Control of chronic pain  Description: Control of chronic pain  6/28/2020 0443 by Juan Villanueva RN  Outcome: Ongoing  6/27/2020 1619 by Cole Yee RN  Outcome: Ongoing     Problem: Falls - Risk of:  Goal: Will remain free from falls  Description: Will remain free from falls  6/28/2020 0443 by Juan Villanueva RN  Outcome: Ongoing  6/27/2020 1619 by Cole Yee RN  Outcome: Ongoing  Goal: Absence of physical injury  Description: Absence of physical injury  6/28/2020 0443 by Juan Villanueva RN  Outcome: Ongoing  6/27/2020 1619 by Cole Yee RN  Outcome: Ongoing     Problem: Breathing Pattern - Ineffective:  Goal: Ability to achieve and maintain a regular respiratory rate will improve  Description: Ability to achieve and maintain a regular respiratory rate will improve  6/28/2020 0443 by Juan Villanueva RN  Outcome: Ongoing  6/27/2020 1619 by Cole Yee RN  Outcome: Ongoing     Problem: Respiratory:  Goal: Ability to maintain normal respiratory secretions will improve  Description: Ability to maintain normal respiratory secretions will improve  6/28/2020 0443 by Juan Villanueva RN  Outcome: Ongoing  6/27/2020 1619 by Cole Yee RN  Outcome: Ongoing     Problem: Skin Integrity:  Goal: Will show no infection signs and symptoms  Description: Will show no infection signs and symptoms  6/28/2020 0443 by Juan Villanueva RN  Outcome: Ongoing  6/27/2020 1619 by Cole Yee RN  Outcome: Ongoing  Goal: Absence of new skin breakdown  Description: Absence of new skin breakdown  6/28/2020 0443 by Brennan Mccray RN  Outcome: Ongoing  6/27/2020 1619 by Chele Gonzalez RN  Outcome: Ongoing     Problem: Nutrition  Goal: Optimal nutrition therapy  6/28/2020 0443 by Brennan Mccray RN  Outcome: Ongoing  6/27/2020 1619 by Chele Gonzalez RN  Outcome: Ongoing

## 2020-06-28 NOTE — PROGRESS NOTES
tolerated the procedure well and there were no immediate complications. EBL: Less than 3 mL. Successful ultrasound guided PICC placement     Us Renal Limited    Result Date: 6/25/2020  EXAMINATION: ULTRASOUND OF THE KIDNEYS 6/25/2020 1:52 pm COMPARISON: None. HISTORY: ORDERING SYSTEM PROVIDED HISTORY: elevated CR TECHNOLOGIST PROVIDED HISTORY: elevated CR FINDINGS: Very limited exam due to patient body habitus. In particular, the left kidney is poorly visualized. The right kidney measures 10.7 cm in length and the left kidney measures 10.5 cm in length. Kidneys demonstrate normal cortical echogenicity. No hydronephrosis or intrarenal stones. No focal lesions. Limited exam due to patient's size. Grossly unremarkable renal ultrasound. Xr Chest Portable    Result Date: 6/28/2020  EXAMINATION: ONE XRAY VIEW OF THE CHEST 6/28/2020 5:46 am COMPARISON: Chest radiograph performed 06/27/2020. HISTORY: ORDERING SYSTEM PROVIDED HISTORY: follow up TECHNOLOGIST PROVIDED HISTORY: follow up Reason for Exam: Vent to trach. Acuity: Unknown Type of Exam: Unknown Additional signs and symptoms: Vent to trach. FINDINGS: There is bilateral pulmonary congestion with bilateral pulmonary opacities. There are bibasilar effusions. There is no definite pneumothorax. The heart is enlarged. The upper abdomen is unremarkable. The extrathoracic soft tissues are unremarkable. There is a tracheostomy tube. There is a left-sided PICC line and right subclavian port that are stable. Cardiomegaly and bilateral congestion with bilateral pulmonary opacities. Small bibasilar effusions.      Xr Chest Portable    Result Date: 6/27/2020  EXAMINATION: ONE XRAY VIEW OF THE CHEST 6/27/2020 5:52 am COMPARISON: 01/26/2020, 558 hours HISTORY: ORDERING SYSTEM PROVIDED HISTORY: Respiratory failure TECHNOLOGIST PROVIDED HISTORY: Respiratory failure Reason for Exam: Respiratory failure Acuity: Acute Type of Exam: Initial Additional signs and symptoms: Respiratory failure Relevant Medical/Surgical History: Respiratory failure 82-year-old female with acute respiratory failure FINDINGS: AP portable upright view of the chest. Right subclavian approach Port-A-Cath with distal tip overlying the high right atrium, stable. Left-sided PICC distal tip overlying the SVC, stable. Cardiac monitor leads overlie the chest. Stable mild cardiomegaly. Cardiac and mediastinal contours remain unchanged. Tracheostomy tube distal tip overlies the upper trachea approximately 4.7 cm above the level of the susan. Slight increase and bilateral dense patchy airspace disease throughout both lungs, right greater than left. Small bilateral pleural effusions. Visualized osseous structures remain unchanged. No pneumothorax. No free air. 1. Slight increase in bilateral airspace disease likely multifocal pneumonia. Small bilateral pleural effusions. Follow-up is recommended to document resolution. 2. Right-sided port, left-sided PICC and tracheostomy tube as detailed above. Xr Chest Portable    Result Date: 6/26/2020  EXAMINATION: ONE XRAY VIEW OF THE CHEST 6/26/2020 5:57 am COMPARISON: AP chest from 06/25/2020 HISTORY: ORDERING SYSTEM PROVIDED HISTORY: follow up TECHNOLOGIST PROVIDED HISTORY: follow up Reason for Exam: follow up Type of Exam: Subsequent/Follow-up Additional signs and symptoms: CHF, mycoplasma pneumonia Relevant Medical/Surgical History: CHF, mycoplasma pneumonia FINDINGS: Overlying ECG monitor leads, gown snaps and necklace; right IJ chemo port with unchanged tip position. Clips right axilla. Unchanged tracheostomy tube. Cardiomediastinal shadow stable. Similar bilateral dense patchy airspace opacities again seen with elevation right hemidiaphragm. No pneumothorax or large pleural effusion. Bones unchanged. Stable findings.      Xr Chest Portable    Result Date: 6/25/2020  EXAMINATION: ONE XRAY VIEW OF THE CHEST 6/25/2020 9:20 am COMPARISON: 24 June 2020 HISTORY: ORDERING SYSTEM PROVIDED HISTORY: hypoxia TECHNOLOGIST PROVIDED HISTORY: hypoxia Reason for Exam: hypoxia Acuity: Acute Type of Exam: Subsequent/Follow-up FINDINGS: AP portable view of the chest time stamped at 928 hours demonstrates overlying cardiac monitoring electrodes. Tracheal airway terminates 5.2 cm above the susan. Right-sided central catheter terminates in the right atrium. The patient has stable cardiomegaly. Extensive multifocal airspace disease is unchanged. Tiny amount of extrapleural air on the right is noted. No tracheal shift. Left-sided PICC line terminates in the superior vena cava. No change in multifocal airspace disease. Trace extrapleural air right side. Support tubes and lines as above. Xr Chest Portable    Result Date: 6/24/2020  EXAMINATION: ONE XRAY VIEW OF THE CHEST 6/24/2020 6:32 am COMPARISON: June 23, 2020 HISTORY: ORDERING SYSTEM PROVIDED HISTORY: follow up TECHNOLOGIST PROVIDED HISTORY: follow up Reason for Exam: Vent to trach. Acuity: Unknown Type of Exam: Unknown Additional signs and symptoms: Vent to trach. FINDINGS: Support lines and tubes are stable in position. Stable cardiomediastinal silhouette. Bilateral superior parenchymal opacities with more dense opacification of the right base similar in appearance. No pneumothorax. Right axillary lymph node dissection noted. Stable exam demonstrating severe bilateral airspace disease with a component of small right pleural effusion     Xr Chest Portable    Result Date: 6/23/2020  EXAMINATION: ONE XRAY VIEW OF THE CHEST 6/23/2020 7:25 am COMPARISON: AP chest from 06/22/2020 HISTORY: ORDERING SYSTEM PROVIDED HISTORY: persistent bilat airspace disease TECHNOLOGIST PROVIDED HISTORY: persistent bilat airspace disease Reason for Exam: diff breathing Acuity: Unknown Type of Exam: Unknown History of respiratory failure, breast cancer, and morbid obesity.  FINDINGS: Tracheostomy tube position appears unchanged. Right subclavian chemo port stable with its tip in the lower SVC. Overlying ECG monitor leads, gown snaps and respiratory apparatus. Clips right axilla. Cardiomediastinal shadow stable; extensive airspace disease bilaterally again seen and unchanged with confluent opacity right base and a few air bronchograms left lower lung. Bones unchanged. Unchanged bilateral airspace disease. Tubes and lines stable. Xr Chest Portable    Result Date: 6/22/2020  EXAMINATION: ONE XRAY VIEW OF THE CHEST 6/22/2020 11:56 am COMPARISON: 06/22/2020 HISTORY: ORDERING SYSTEM PROVIDED HISTORY: Dyspnea TECHNOLOGIST PROVIDED HISTORY: Dyspnea Reason for Exam: Dyspnea. The pt weighs 607 lbs and she was uncooperative and she refused to take off her necklace. Acuity: Unknown Type of Exam: Unknown FINDINGS: Tracheostomy tube, left upper extremity PICC line and right subclavian central venous catheter remain in place. The heart and mediastinal structures are stable. Bilateral airspace disease is present. Surgical clips in the right axillary region are evident. Persistent bilateral airspace disease. Xr Chest Portable    Result Date: 6/22/2020  EXAMINATION: ONE XRAY VIEW OF THE CHEST 6/22/2020 6:50 am COMPARISON: June 20, 2020 HISTORY: ORDERING SYSTEM PROVIDED HISTORY: follow up TECHNOLOGIST PROVIDED HISTORY: follow up Reason for Exam: follow up Acuity: Unknown Type of Exam: Subsequent/Follow-up Additional signs and symptoms: follow up Relevant Medical/Surgical History: follow up FINDINGS: Tracheostomy tube. Right Port-A-Cath. Left PICC. Increased right lung consolidation. Scattered left lung opacities. Cardiomegaly. Increased right lung consolidation.      Xr Chest Portable    Result Date: 6/20/2020  EXAMINATION: ONE XRAY VIEW OF THE CHEST 6/20/2020 5:50 am COMPARISON: June 19, 2020 HISTORY: ORDERING SYSTEM PROVIDED HISTORY: follow up TECHNOLOGIST PROVIDED HISTORY: follow up Reason for Exam: Follow up breakfast    Hydrocerin   Topical BID    sodium chloride flush  10 mL Intravenous 2 times per day    famotidine  20 mg Oral BID     Continuous Infusions:    dexmedetomidine (PRECEDEX) IV infusion Stopped (06/24/20 2004)     PRN Meds: LORazepam, metoprolol, benzonatate, bisacodyl, docusate sodium, sodium chloride flush, polyethylene glycol, promethazine **OR** ondansetron, acetaminophen **OR** acetaminophen        [facility] [address]    Phone [phone]   Fax: [phone]  Answering Service: [phone]

## 2020-06-29 ENCOUNTER — HOSPITAL ENCOUNTER (OUTPATIENT)
Dept: ICU | Age: 50
End: 2020-07-17
Attending: INTERNAL MEDICINE | Admitting: INTERNAL MEDICINE

## 2020-06-29 ENCOUNTER — APPOINTMENT (OUTPATIENT)
Dept: GENERAL RADIOLOGY | Age: 50
DRG: 130 | End: 2020-06-29
Payer: MEDICAID

## 2020-06-29 VITALS
TEMPERATURE: 98.4 F | HEIGHT: 65 IN | RESPIRATION RATE: 21 BRPM | DIASTOLIC BLOOD PRESSURE: 99 MMHG | HEART RATE: 88 BPM | WEIGHT: 293 LBS | SYSTOLIC BLOOD PRESSURE: 154 MMHG | OXYGEN SATURATION: 99 % | BODY MASS INDEX: 48.82 KG/M2

## 2020-06-29 LAB
ABSOLUTE EOS #: 0 K/UL (ref 0–0.4)
ABSOLUTE IMMATURE GRANULOCYTE: ABNORMAL K/UL (ref 0–0.3)
ABSOLUTE LYMPH #: 0.99 K/UL (ref 1–4.8)
ABSOLUTE MONO #: 0.2 K/UL (ref 0.1–1.3)
ALLEN TEST: ABNORMAL
ANION GAP SERPL CALCULATED.3IONS-SCNC: 11 MMOL/L (ref 9–17)
BASOPHILS # BLD: 0 % (ref 0–2)
BASOPHILS ABSOLUTE: 0 K/UL (ref 0–0.2)
BUN BLDV-MCNC: 50 MG/DL (ref 6–20)
BUN/CREAT BLD: ABNORMAL (ref 9–20)
CALCIUM SERPL-MCNC: 9.5 MG/DL (ref 8.6–10.4)
CARBOXYHEMOGLOBIN: 1.3 % (ref 0–5)
CHLORIDE BLD-SCNC: 98 MMOL/L (ref 98–107)
CO2: 30 MMOL/L (ref 20–31)
CREAT SERPL-MCNC: 2.25 MG/DL (ref 0.5–0.9)
DIFFERENTIAL TYPE: ABNORMAL
EOSINOPHILS RELATIVE PERCENT: 0 % (ref 0–4)
FIO2: 50
GFR AFRICAN AMERICAN: 28 ML/MIN
GFR NON-AFRICAN AMERICAN: 23 ML/MIN
GFR SERPL CREATININE-BSD FRML MDRD: ABNORMAL ML/MIN/{1.73_M2}
GFR SERPL CREATININE-BSD FRML MDRD: ABNORMAL ML/MIN/{1.73_M2}
GLUCOSE BLD-MCNC: 122 MG/DL (ref 70–99)
HCO3 ARTERIAL: 31 MMOL/L (ref 22–26)
HCT VFR BLD CALC: 26.2 % (ref 36–46)
HEMOGLOBIN: 8.5 G/DL (ref 12–16)
IMMATURE GRANULOCYTES: ABNORMAL %
LYMPHOCYTES # BLD: 20 % (ref 24–44)
MCH RBC QN AUTO: 30.8 PG (ref 26–34)
MCHC RBC AUTO-ENTMCNC: 32.6 G/DL (ref 31–37)
MCV RBC AUTO: 94.5 FL (ref 80–100)
METAMYELOCYTES ABSOLUTE COUNT: 0.15 K/UL
METAMYELOCYTES: 3 %
METHEMOGLOBIN: 0.7 % (ref 0–1.9)
MODE: ABNORMAL
MONOCYTES # BLD: 4 % (ref 1–7)
MORPHOLOGY: ABNORMAL
MORPHOLOGY: ABNORMAL
MYELOCYTES ABSOLUTE COUNT: 0.05 K/UL
MYELOCYTES: 1 %
NEGATIVE BASE EXCESS, ART: ABNORMAL MMOL/L (ref 0–2)
NOTIFICATION TIME: ABNORMAL
NOTIFICATION: ABNORMAL
NRBC AUTOMATED: ABNORMAL PER 100 WBC
NUCLEATED RED BLOOD CELLS: 3 PER 100 WBC
O2 DEVICE/FLOW/%: ABNORMAL
O2 SAT, ARTERIAL: 91.2 % (ref 95–98)
OXYHEMOGLOBIN: ABNORMAL % (ref 95–98)
PATIENT TEMP: ABNORMAL
PCO2 ARTERIAL: 53.4 MMHG (ref 35–45)
PCO2, ART, TEMP ADJ: ABNORMAL (ref 35–45)
PDW BLD-RTO: 15.6 % (ref 11.5–14.9)
PEEP/CPAP: 10
PH ARTERIAL: 7.37 (ref 7.35–7.45)
PH, ART, TEMP ADJ: ABNORMAL (ref 7.35–7.45)
PLATELET # BLD: 178 K/UL (ref 150–450)
PLATELET ESTIMATE: ABNORMAL
PMV BLD AUTO: 8.1 FL (ref 6–12)
PO2 ARTERIAL: 66.1 MMHG (ref 80–100)
PO2, ART, TEMP ADJ: ABNORMAL MMHG (ref 80–100)
POSITIVE BASE EXCESS, ART: 5.8 MMOL/L (ref 0–2)
POTASSIUM SERPL-SCNC: 5 MMOL/L (ref 3.7–5.3)
PSV: ABNORMAL
PT. POSITION: ABNORMAL
RBC # BLD: 2.77 M/UL (ref 4–5.2)
RBC # BLD: ABNORMAL 10*6/UL
RESPIRATORY RATE: 22
SAMPLE SITE: ABNORMAL
SEG NEUTROPHILS: 72 % (ref 36–66)
SEGMENTED NEUTROPHILS ABSOLUTE COUNT: 3.56 K/UL (ref 1.3–9.1)
SET RATE: 22
SODIUM BLD-SCNC: 139 MMOL/L (ref 135–144)
TEXT FOR RESPIRATORY: ABNORMAL
TOTAL HB: ABNORMAL G/DL (ref 12–16)
TOTAL RATE: 24
VT: 500
WBC # BLD: 5 K/UL (ref 3.5–11)
WBC # BLD: ABNORMAL 10*3/UL

## 2020-06-29 PROCEDURE — 6370000000 HC RX 637 (ALT 250 FOR IP): Performed by: STUDENT IN AN ORGANIZED HEALTH CARE EDUCATION/TRAINING PROGRAM

## 2020-06-29 PROCEDURE — 6360000002 HC RX W HCPCS: Performed by: INTERNAL MEDICINE

## 2020-06-29 PROCEDURE — 6370000000 HC RX 637 (ALT 250 FOR IP): Performed by: INTERNAL MEDICINE

## 2020-06-29 PROCEDURE — 94640 AIRWAY INHALATION TREATMENT: CPT

## 2020-06-29 PROCEDURE — 85025 COMPLETE CBC W/AUTO DIFF WBC: CPT

## 2020-06-29 PROCEDURE — 2700000000 HC OXYGEN THERAPY PER DAY

## 2020-06-29 PROCEDURE — 99254 IP/OBS CNSLTJ NEW/EST MOD 60: CPT | Performed by: INTERNAL MEDICINE

## 2020-06-29 PROCEDURE — 82805 BLOOD GASES W/O2 SATURATION: CPT

## 2020-06-29 PROCEDURE — 36415 COLL VENOUS BLD VENIPUNCTURE: CPT

## 2020-06-29 PROCEDURE — P9047 ALBUMIN (HUMAN), 25%, 50ML: HCPCS | Performed by: INTERNAL MEDICINE

## 2020-06-29 PROCEDURE — 36600 WITHDRAWAL OF ARTERIAL BLOOD: CPT

## 2020-06-29 PROCEDURE — 94003 VENT MGMT INPAT SUBQ DAY: CPT

## 2020-06-29 PROCEDURE — 99239 HOSP IP/OBS DSCHRG MGMT >30: CPT | Performed by: INTERNAL MEDICINE

## 2020-06-29 PROCEDURE — 2580000003 HC RX 258: Performed by: STUDENT IN AN ORGANIZED HEALTH CARE EDUCATION/TRAINING PROGRAM

## 2020-06-29 PROCEDURE — 6360000002 HC RX W HCPCS: Performed by: STUDENT IN AN ORGANIZED HEALTH CARE EDUCATION/TRAINING PROGRAM

## 2020-06-29 PROCEDURE — 94770 HC ETCO2 MONITOR DAILY: CPT

## 2020-06-29 PROCEDURE — 97110 THERAPEUTIC EXERCISES: CPT

## 2020-06-29 PROCEDURE — 71045 X-RAY EXAM CHEST 1 VIEW: CPT

## 2020-06-29 PROCEDURE — 80048 BASIC METABOLIC PNL TOTAL CA: CPT

## 2020-06-29 PROCEDURE — 94761 N-INVAS EAR/PLS OXIMETRY MLT: CPT

## 2020-06-29 RX ORDER — DOXYCYCLINE 100 MG/1
100 CAPSULE ORAL EVERY 12 HOURS SCHEDULED
Status: DISCONTINUED | OUTPATIENT
Start: 2020-06-29 | End: 2020-06-29 | Stop reason: HOSPADM

## 2020-06-29 RX ORDER — METOLAZONE 2.5 MG/1
2.5 TABLET ORAL DAILY
Status: DISCONTINUED | OUTPATIENT
Start: 2020-06-29 | End: 2020-06-29 | Stop reason: HOSPADM

## 2020-06-29 RX ORDER — LEVOTHYROXINE SODIUM 0.12 MG/1
250 TABLET ORAL DAILY
Qty: 30 TABLET | Refills: 3 | Status: SHIPPED | OUTPATIENT
Start: 2020-06-30

## 2020-06-29 RX ORDER — DOXYCYCLINE 100 MG/1
100 CAPSULE ORAL EVERY 12 HOURS SCHEDULED
Status: DISCONTINUED | OUTPATIENT
Start: 2020-06-29 | End: 2020-06-29

## 2020-06-29 RX ORDER — LORAZEPAM 0.5 MG/1
0.5 TABLET ORAL ONCE
Status: COMPLETED | OUTPATIENT
Start: 2020-06-29 | End: 2020-06-29

## 2020-06-29 RX ORDER — DULOXETIN HYDROCHLORIDE 60 MG/1
60 CAPSULE, DELAYED RELEASE ORAL DAILY
Qty: 30 CAPSULE | Refills: 3 | Status: SHIPPED | OUTPATIENT
Start: 2020-06-30

## 2020-06-29 RX ADMIN — ALBUMIN (HUMAN) 25 G: 0.25 INJECTION, SOLUTION INTRAVENOUS at 09:37

## 2020-06-29 RX ADMIN — DULOXETINE HYDROCHLORIDE 60 MG: 60 CAPSULE, DELAYED RELEASE ORAL at 09:37

## 2020-06-29 RX ADMIN — Medication 10 ML: at 09:37

## 2020-06-29 RX ADMIN — FAMOTIDINE 20 MG: 20 TABLET ORAL at 09:37

## 2020-06-29 RX ADMIN — MULTIPLE VITAMINS W/ MINERALS TAB 1 TABLET: TAB at 09:42

## 2020-06-29 RX ADMIN — METOLAZONE 2.5 MG: 2.5 TABLET ORAL at 09:47

## 2020-06-29 RX ADMIN — ALBUTEROL SULFATE 2.5 MG: 2.5 SOLUTION RESPIRATORY (INHALATION) at 08:02

## 2020-06-29 RX ADMIN — LORAZEPAM 0.5 MG: 0.5 TABLET ORAL at 11:28

## 2020-06-29 RX ADMIN — LEVOTHYROXINE SODIUM 250 MCG: 125 TABLET ORAL at 09:43

## 2020-06-29 RX ADMIN — METHYLPREDNISOLONE SODIUM SUCCINATE 30 MG: 40 INJECTION, POWDER, FOR SOLUTION INTRAMUSCULAR; INTRAVENOUS at 11:27

## 2020-06-29 RX ADMIN — FERROUS SULFATE TAB 325 MG (65 MG ELEMENTAL FE) 325 MG: 325 (65 FE) TAB at 11:28

## 2020-06-29 RX ADMIN — AZITHROMYCIN MONOHYDRATE 500 MG: 500 INJECTION, POWDER, LYOPHILIZED, FOR SOLUTION INTRAVENOUS at 11:28

## 2020-06-29 RX ADMIN — METHYLPREDNISOLONE SODIUM SUCCINATE 30 MG: 40 INJECTION, POWDER, FOR SOLUTION INTRAMUSCULAR; INTRAVENOUS at 04:28

## 2020-06-29 RX ADMIN — ALBUTEROL SULFATE 2.5 MG: 2.5 SOLUTION RESPIRATORY (INHALATION) at 00:05

## 2020-06-29 RX ADMIN — FUROSEMIDE 40 MG: 10 INJECTION, SOLUTION INTRAMUSCULAR; INTRAVENOUS at 09:37

## 2020-06-29 RX ADMIN — GUAIFENESIN 600 MG: 600 TABLET, EXTENDED RELEASE ORAL at 09:37

## 2020-06-29 RX ADMIN — CARBAMAZEPINE 200 MG: 200 TABLET ORAL at 09:37

## 2020-06-29 RX ADMIN — ONDANSETRON 4 MG: 2 INJECTION INTRAMUSCULAR; INTRAVENOUS at 11:50

## 2020-06-29 RX ADMIN — ALBUTEROL SULFATE 2.5 MG: 2.5 SOLUTION RESPIRATORY (INHALATION) at 10:39

## 2020-06-29 ASSESSMENT — PULMONARY FUNCTION TESTS
PIF_VALUE: 32
PIF_VALUE: 27
PIF_VALUE: 33
PIF_VALUE: 29
PIF_VALUE: 36
PIF_VALUE: 28
PIF_VALUE: 35
PIF_VALUE: 27
PIF_VALUE: 27
PIF_VALUE: 35
PIF_VALUE: 31
PIF_VALUE: 25
PIF_VALUE: 32
PIF_VALUE: 33
PIF_VALUE: 33
PIF_VALUE: 31

## 2020-06-29 ASSESSMENT — ENCOUNTER SYMPTOMS
SINUS PAIN: 0
SORE THROAT: 0
ABDOMINAL DISTENTION: 0
SHORTNESS OF BREATH: 0
NAUSEA: 0
CHEST TIGHTNESS: 0
SINUS PRESSURE: 0
ANAL BLEEDING: 0
COLOR CHANGE: 0
COUGH: 0
ABDOMINAL PAIN: 0
WHEEZING: 0
DIARRHEA: 0

## 2020-06-29 ASSESSMENT — PAIN SCALES - GENERAL: PAINLEVEL_OUTOF10: 0

## 2020-06-29 NOTE — PROGRESS NOTES
Learning: trach  REQUIRES PT FOLLOW UP: Yes  Discharge Recommendations: LTACH    Goals  Short term goals  Time Frame for Short term goals: 10 visits  Short term goal 1: Pt will roll side to side with mod A x 2 for hygeine  Short term goal 2: Pt will tolerate 30 min ther ex without SOB       06/29/20 0911   PT Individual Minutes   Time In 0852   Time Out 0903   Minutes 11       Electronically signed by CECILIA Prieto on 6/29/20 at 9:17 AM EDT

## 2020-06-29 NOTE — CONSULTS
vomiting, no diarrhea. FiO2 increased to 90%  Chest x-ray showed no significant change of multifocal airspace opacities. I have personally reviewed the past medical history, past surgical history, medications, social history, and family history, and I haveupdated the database accordingly.   Past Medical History:     Past Medical History:   Diagnosis Date    Anemia     Disease of blood and blood forming organ     History of breast cancer     History of chemotherapy     Hypothyroidism     Lymphedema     Chronic    Morbid obesity ([facility] [address]     Respiratory failure (HCC)     Tracheostomy present (HCC)        Past Surgical  History:     Past Surgical History:   Procedure Laterality Date    BRONCHOSCOPY N/A 1/3/2020    BRONCHOSCOPY ATTEMPTED performed by Mainor Denney MD at [place] [address], MODIFIED RADICAL Right 2013    TRACHEOSTOMY         Medications:      metOLazone  2.5 mg Oral Daily    azithromycin  500 mg Intravenous Q24H    DULoxetine  60 mg Oral Daily    methylPREDNISolone  30 mg Intravenous Q8H    albumin human  25 g Intravenous BID    furosemide  40 mg Intravenous Q12H    heparin (porcine)  5,000 Units Subcutaneous 3 times per day    guaiFENesin  600 mg Oral BID    albuterol  2.5 mg Nebulization Q4H    levothyroxine  250 mcg Oral Daily    carBAMazepine  200 mg Oral BID    ferrous sulfate  325 mg Oral TID WC    therapeutic multivitamin-minerals  1 tablet Oral Daily with breakfast    Hydrocerin   Topical BID    sodium chloride flush  10 mL Intravenous 2 times per day    famotidine  20 mg Oral BID       Social History:     Social History     Socioeconomic History    Marital status: Single     Spouse name: Not on file    Number of children: Not on file    Years of education: Not on file    Highest education level: Not on file   Occupational History    Not on file   Social Needs    Financial resource strain: Not on file    Food insecurity     Worry: Not on file and sound a like substitutions which may escape proof reading. It such instances, actual meaningcan be extrapolated by contextual diversion.     Kim Edwards MD  Office: [phone]  Perfect serve / office [phone]

## 2020-06-29 NOTE — PROGRESS NOTES
Patient daughter Sheryle Chamberlain called for update. Michelle not on HIPAA list, but ok to talk with and give information to per patient. Michelle updated on patient condition on transfer to [facility] in afternoon.

## 2020-06-29 NOTE — PROGRESS NOTES
ICU Progress Note (Vent)   [facility]    Patient - Rito Shearer,  Age - 54 y.o.    - 1970      Room Number -    MRN -  [id number]   Acct # - [de-identified]  Date of Admission -  2020 11:07 AM    Events of Past 24 Hours   Alert and conversant, according to nurses wants her FiO2 increased due to saturations are above 90% consistently    Vitals    height is 5' 5\" (1.651 m) and weight is 607 lb (275.3 kg) (abnormal). Her axillary temperature is 98.1 °F (36.7 °C). Her blood pressure is 160/103 (abnormal) and her pulse is 93. Her respiration is 22 and oxygen saturation is 95%. Temperature Range: Temp: 98.1 °F (36.7 °C) Temp  Av.6 °F (37 °C)  Min: 98.1 °F (36.7 °C)  Max: 99.3 °F (37.4 °C)  BP Range:  Systolic (92UKK), ZWX:734 , Min:116 , NGE:959     Diastolic (54AWT), WXP:31, Min:61, Max:103    Pulse Range: Pulse  Av.7  Min: 70  Max: 108  Respiration Range: Resp  Av.6  Min: 3  Max: 34  Current Pulse Ox[de-identified]  SpO2: 95 %  24HR Pulse Ox Range:  SpO2  Av.5 %  Min: 84 %  Max: 99 %  Oxygen Amount and Delivery: Wt Readings from Last 3 Encounters:   20 (!) 607 lb (275.3 kg)   20 (!) 607 lb 9.4 oz (275.6 kg)   05/10/20 (!) 622 lb (282.1 kg)     I/O       Intake/Output Summary (Last 24 hours) at 2020 0844  Last data filed at 2020 0400  Gross per 24 hour   Intake 420 ml   Output 1275 ml   Net -855 ml     I/O last 3 completed shifts:   In: 5 [I.V.:70; IV Piggyback:350]  Out: 7528 [Urine:1275]     DRAIN/TUBE OUTPUT:     Invasive Lines   Chronic ventilator  Lines - midline day 10    ICP PRESSURE RANGE:  No data recorded  CVP PRESSURE RANGE:  No data recorded  Mechanical Ventilation Data   SETTINGS (Comprehensive)  Vent Information  $Ventilation: $Subsequent Day  Skin Assessment: Clean, dry, & intact  Suction Catheter Diameter: 14  Equipment ID: [id number]  Equipment Changed: Suction catheter  Vent Type: Servo i  Vent Mode: PRVC  Vt Ordered: 500 mL  Rate Set: 22 bmp  FiO2 : 50 %  SpO2: 95 %  SpO2/FiO2 ratio: 190  Sensitivity: 5  PEEP/CPAP: 10  I Time/ I Time %: 0.95 s  Humidification Source: HME  Nitric Oxide/Epoprostenol In Use?: No  Additional Respiratory  Assessments  Pulse: 93  Resp: 22  SpO2: 95 %  End Tidal CO2: 44 (%)  Position: Semi-Dash's  Humidification Source: HME  Oral Care Completed?: Yes  Oral Care: Teeth brushed       ABGs:   Lab Results   Component Value Date    PHART 7.373 06/29/2020    PO2ART 66.1 06/29/2020    TUQ2NBI 53.4 06/29/2020       Lab Results   Component Value Date    MODE PRVC 06/29/2020         Medications   IV   dexmedetomidine (PRECEDEX) IV infusion Stopped (06/24/20 2004)      azithromycin  500 mg Intravenous Q24H    DULoxetine  60 mg Oral Daily    methylPREDNISolone  30 mg Intravenous Q8H    albumin human  25 g Intravenous BID    furosemide  40 mg Intravenous Q12H    heparin (porcine)  5,000 Units Subcutaneous 3 times per day    guaiFENesin  600 mg Oral BID    albuterol  2.5 mg Nebulization Q4H    levothyroxine  250 mcg Oral Daily    carBAMazepine  200 mg Oral BID    ferrous sulfate  325 mg Oral TID WC    therapeutic multivitamin-minerals  1 tablet Oral Daily with breakfast    Hydrocerin   Topical BID    sodium chloride flush  10 mL Intravenous 2 times per day    famotidine  20 mg Oral BID       Diet/Nutrition   DIET GENERAL;  Dietary Nutrition Supplements: Standard High Calorie Oral Supplement    Exam   VITALS    height is 5' 5\" (1.651 m) and weight is 607 lb (275.3 kg) (abnormal). Her axillary temperature is 98.1 °F (36.7 °C). Her blood pressure is 160/103 (abnormal) and her pulse is 93. Her respiration is 22 and oxygen saturation is 95%.    Ventilator Settings (Basic)  Vent Mode: PRVC Rate Set: 22 bmp/Vt Ordered: 500 mL/ /FiO2 : 50 %    Constitutional -alert and oriented   General Appearance  well developed, well nourished, morbidly obese  HEENT -tracheostomy in respiratory failure  RU/OHS  Vent dependence  History of COVID-19 infection  History of mycoplasma pneumonia that seems to be recurrent  Worsening renal failure  Bilateral atelectasis/pleural effusion    Neuro   I think she has a component of anxiety and depression    Respiratory   Wean oxygen as tolerated.  Keep O2 sat > 88%  Continue ventilator settings  No need for daily chest x-rays     Hemodynamics   Stable not on any pressors    Gastrointestinal/Nutrition   Eating without any problem    Renal   Generally volume overloaded but BUN and creatinine are increasing    Infectious Disease   Strongly suggest infectious disease opinion about antibiotics    Hematology/Oncology   DVT prophylaxis    Endocrine   Blood sugars are okay    Social/Spiritual/DNR/Disposition/Other     Suggest plans for LTAC, in the end, goal should be sending her back to [place] she is agreeable to    Critical Care Time   0 min    Electronically signed by Harish Guallpa MD on 6/29/2020 at 8:44 AM

## 2020-06-29 NOTE — PROGRESS NOTES
NEPHROLOGY PROGRESS NOTE    Patient :  Jj Perez; 54 y.o. MRN# [id number]  Location:    Attending:  Alissa Barron MD  Admit Date:  2020   Hospital Day: 10      Reason for Consult: Acute kidney injury    Interval history: Patient was seen and examined today in the intensive care unit. She is awake and alert and can induce a complaint of difficulty breathing. Chest x-ray today shows multifocal infiltrates consistent with known history of pneumonia. Urine output overnight was approximately 500 mL on IV furosemide 40 mg twice daily. History of Present Illness: This is a 54 y.o. female with past medical history of morbid morbid obesity, chronic respiratory failure [status post trach on ventilator], obstructive sleep apnea, chronic lower extremity lymphedema and chronic kidney disease stage III [baseline serum creatinine 1.1 to 1.2 mg/dL], who presented to the hospital on 2020 from [facility] with complaints of worsening shortness of breath, increased tracheal secretions and generalized fatigue. At presentation she was noted to be hypotensive but was afebrile. History of COVID-19 and she was positive on  repeat test on 2020 was negative on admission  Patient serum creatinine on admission was 1.0 mg/dL, but slowly increased to 2.0 mg/dL today and hence nephrology consultation. Medication review shows use of ACE-inhibitor/diuretics. Chest x-ray obtained at presentation showed bilateral multifocal pneumonia, tracheostomy was noted. Mycoplasma pneumoniae antibody is positive and she was started on IV Levaquin for treatment of mycoplasma pneumonia.     Objective:  CURRENT TEMPERATURE:  Temp: 98.1 °F (36.7 °C)  MAXIMUM TEMPERATURE OVER 24HRS:  Temp (24hrs), Av.6 °F (37 °C), Min:98.1 °F (36.7 °C), Max:99.3 °F (37.4 °C)    CURRENT RESPIRATORY RATE:  Resp: 22  CURRENT PULSE:  Pulse: 93  CURRENT BLOOD PRESSURE:  BP: 121/66  24HR BLOOD PRESSURE RANGE:  Systolic (73WSA), Av , Min:116 , FFT:880   ; Diastolic (09DKV), UVI:11, Min:61, Max:94    24HR INTAKE/OUTPUT:      Intake/Output Summary (Last 24 hours) at 2020 0830  Last data filed at 2020 0400  Gross per 24 hour   Intake 420 ml   Output 1275 ml   Net -855 ml     Physical Exam:  GENERAL APPEARANCE: Awake and alert and on ventilator support via tracheostomy. THROAT: Tracheostomy noted on ventilator  CARDIAC: S1, S2 with regular rate and rhythm. LUNGS: Tracheostomy on ventilator  ABDOMEN: Generally soft with normal bowel sounds. EXTREMITIES: 2-3+ edema bilaterally  NEURO: No acute focal neurologic deficits. Labs:   CBC:  Recent Labs     205 20  0403   WBC 4.3 5.5 5.0   RBC 2.82* 2.94* 2.77*   HGB 8.4* 9.0* 8.5*   HCT 26.5* 27.9* 26.2*   MCV 94.0 94.6 94.5   MCH 29.6 30.6 30.8   MCHC 31.5 32.4 32.6   RDW 15.1* 15.8* 15.6*    191 178   MPV 8.6 8.4 8.1      BMP:   Recent Labs     205 20  0403    140 139   K 5.4* 4.8 5.0   CL 97* 98 98   CO2 28 29 30   BUN 41* 47* 50*   CREATININE 2.20* 2.30* 2.25*   GLUCOSE 106* 116* 122*   CALCIUM 9.1 9.4 9.5      Assessment/plan:    1. Acute kidney injury superimposed on chronic kidney disease stage III - most consistent with prerenal azotemia from increased diuretic requirements. Ongoing dyspnea likely related to pneumonia and not pulmonary edema. Plan: Continue IV furosemide 40 mg every 12 hours. Start metolazone 2.5 mg p.o. daily. Monitor urine output closely. Avoid nephrotoxic agents. Basic metabolic profile daily. 2.  Mycoplasma pneumonia - patient is on appropriate antibiotic coverage [day #2/3 of IV azithromycin 500 mg every 24 hours]. Will defer to pulmonologist.    3.  Recent history of COVID-19 infection [tested positive on 2020 but repeat test on 2020 was negative. 4.  Heart failure with preserved ejection fraction [LVEF 55%] - continue diuretics.     5.  Systemic

## 2020-06-29 NOTE — PROGRESS NOTES
CLINICAL PHARMACY NOTE: MEDS TO [address] Select Patient?: No  Total # of Prescriptions Filled: 0   The following medications were delivered to the patient:  Total # of Interventions Completed: 0  Time Spent (min): 30    Additional Documentation:patient is going SNF

## 2020-06-29 NOTE — DISCHARGE SUMMARY
[address]    Discharge Summary     Patient ID: Rito Shearer  :  1970   MRN: [id number]     ACCOUNT:  [de-identified]   Patient's PCP: Aleksandar Barahona DO  Admit Date: 2020   Discharge Date: 2020     Length of Stay: 10  Code Status:  Prior  Admitting Physician: Jatinder John MD  Discharge Physician: Mendez Paz MD     Active Discharge Diagnoses:       Primary Problem  SOB (shortness of breath)      [facility] Problems    Diagnosis Date Noted    LILIAN (acute kidney injury) ([facility] [address] [N17.9] 2020    Acute on chronic respiratory failure with hypoxia and hypercapnia (HCC) [R44.84, J96.22] 2020    SOB (shortness of breath) [R06.02] 2020    Morbid obesity with BMI of 70 and over, adult ([facility] [address] [E66.01, Z68.45] 2020    Mycoplasma pneumonia [J15.7] 2020    Tracheostomy dependent ([facility] [address] [Z93.0] 2018    Chronic respiratory failure requiring continuous mechanical ventilation through tracheostomy ([facility] [address] [J96.10, Z93.0, Z99.11] 2018       Admission Condition:  poor     Discharged Condition: good    Hospital Stay:       Hospital Course:  Rito Shearer is a 54 y.o. female who was admitted for the management of   SOB (shortness of breath) , presented to ER with Shortness of Breath    Pt was admitted to the hospital on  from [facility] with 1 month history of increasing SOB and fatigue. She was also needing continuous vent support that was not supported at [facility]. In the ER, pt was stable however CXR showed nodular densities b/l likely from multifocal pneumonia. Repeat CXRs also showed vascular congestion with b/l pleural effusions. PNA workup was positive for Mycoplasma infection. Pt was treated with cefepime for 4 days, Zithromax for 2 days, Levaquin for a total of 7 days.  She was diuresed with lasix for for 5 days total.     Pt also had worsening cava.  No pneumothorax was identified. Metallic surgical clips were noted in the right axilla. The heart was not enlarged. Nodular parenchymal densities were noted bilaterally which could represent multifocal pneumonia or tumor. The regional skeleton was unremarkable. The PICC line is the only change noted from [id number] hours. The tracheostomy tube is in satisfactory position. A new left PICC line was noted with the distal tip in the region of the right atrium. Right [place] with the distal tip in the region of the superior vena cava. No pneumothorax was identified. No cardiomegaly or interstitial edema. Nodular parenchymal densities were noted bilaterally which could represent multifocal pneumonia or tumor. This is unchanged from the earlier exam.     Jose Angel Ponce Cva Device Plmt/replace/removal    Result Date: 6/19/2020  PROCEDURE: ULTRASOUND GUIDED VASCULAR ACCESS. PICC PLACEMENT 6/19/2020. HISTORY: ORDERING SYSTEM PROVIDED HISTORY: poor iv access vented patient TECHNOLOGIST PROVIDED HISTORY: poor iv access vented patient Lumen?->Double Lumen Is the patient pregnant?->No Pneumonia needs IV access for antibiotics SEDATION: None FLUOROSCOPY DOSE AND TYPE OR TIME AND EXPOSURES: None TECHNIQUE AND FINDINGS: This procedure was performed by Dr. Magui Arora. Informed consent was obtained after a detailed explanation of the procedure including risks, benefits, and alternatives. Universal protocol was observed. The left arm was prepped and draped in sterile fashion using maximum sterile barrier technique. Local anesthesia was achieved with lidocaine. A micropuncture needle was used to access the left brachial vein using ultrasound guidance. An ultrasound image demonstrating patency of the vein with needle tip located within it. An image was obtained and stored in PACs. A 0.018 guidewire was used to place a peel-a-way sheath and a 5 [place] dual-lumen PICC was advanced centrally.   Follow-up chest x-ray Ranulfo Aquino MD  6/30/2020  12:29 PM      Thank you Dr. Tasha Escobar DO for the opportunity to be involved in this patient's care.

## 2020-06-29 NOTE — PROGRESS NOTES
Nicole De Paz for patient to go to [facility] per Dr. Ariella Salazar.

## 2020-06-29 NOTE — PLAN OF CARE
of new skin breakdown  6/29/2020 0350 by Ekaterina Silva RN  Outcome: Ongoing  6/28/2020 1731 by Colleen Zurita RN  Outcome: Ongoing     Problem: Nutrition  Goal: Optimal nutrition therapy  6/29/2020 0350 by Ekaterina Silva RN  Outcome: Ongoing  6/28/2020 1731 by Colleen Zurita RN  Outcome: Ongoing

## 2020-06-29 NOTE — PROGRESS NOTES
[facility]    PROGRESS NOTE             6/29/2020    9:10 AM    Name:   Haley Allen  MRN:     [id number]     Acct:      [de-identified]   Room:   2003/2003-01  IP Day:  10  Admit Date:  6/19/2020 11:07 AM    PCP:  Lex Patel DO  Code Status:  Full Code    Subjective:     C/C:   Chief Complaint   Patient presents with    Shortness of Breath     Interval History Status: improved. Pt seen and examined at bedside. No acute events overnight. Creatinine improved slightly today. Continues to be on diuretics. No other complaints at this time. UOP improving, 1275ml out over the last 24 hours. ID consulted for ABx recs. Brief History:     See H&P    Review of Systems:     Review of Systems   Constitutional: Positive for fatigue. Negative for fever. HENT: Negative for sinus pressure, sinus pain, sore throat and tinnitus. Eyes: Negative for visual disturbance. Respiratory: Negative for cough, chest tightness, shortness of breath and wheezing. Cardiovascular: Negative for chest pain, palpitations and leg swelling. Gastrointestinal: Negative for abdominal distention, abdominal pain, anal bleeding, diarrhea and nausea. Genitourinary: Negative for enuresis, frequency and urgency. Musculoskeletal: Negative for arthralgias, gait problem and neck stiffness. Skin: Negative for color change and rash. Neurological: Negative for dizziness and headaches. Psychiatric/Behavioral: Negative for agitation and confusion. Medications: Allergies:     Allergies   Allergen Reactions    Zosyn [Piperacillin-Tazobactam In Dex] Shortness Of Breath     tolerated cefepime 6/2018    Vancomycin Swelling     Lip swelling and redness and itching         Current Meds:   Scheduled Meds:    azithromycin  500 mg Intravenous Q24H    DULoxetine  60 mg Oral Daily    methylPREDNISolone  30 mg Intravenous Q8H    albumin human  25 g distal tip in the region of the superior vena cava. No pneumothorax was identified. No cardiomegaly or interstitial edema. Nodular parenchymal densities were noted bilaterally which could represent multifocal pneumonia or tumor. This is unchanged from the earlier exam.     Jose Angel Rodriguez Cva Device Plmt/replace/removal    Result Date: 6/19/2020  PROCEDURE: ULTRASOUND GUIDED VASCULAR ACCESS. PICC PLACEMENT 6/19/2020. HISTORY: ORDERING SYSTEM PROVIDED HISTORY: poor iv access vented patient TECHNOLOGIST PROVIDED HISTORY: poor iv access vented patient Lumen?->Double Lumen Is the patient pregnant?->No Pneumonia needs IV access for antibiotics SEDATION: None FLUOROSCOPY DOSE AND TYPE OR TIME AND EXPOSURES: None TECHNIQUE AND FINDINGS: This procedure was performed by Dr. Claus Norman. Informed consent was obtained after a detailed explanation of the procedure including risks, benefits, and alternatives. Universal protocol was observed. The left arm was prepped and draped in sterile fashion using maximum sterile barrier technique. Local anesthesia was achieved with lidocaine. A micropuncture needle was used to access the left brachial vein using ultrasound guidance. An ultrasound image demonstrating patency of the vein with needle tip located within it. An image was obtained and stored in PACs. A 0.018 guidewire was used to place a peel-a-way sheath and a 5 [place] dual-lumen PICC was advanced centrally. Follow-up chest x-ray shows the catheter tip at the SVC/RA junction region. The catheter flushed easily and there was a good blood return. The catheter was secured to the skin. The patient tolerated the procedure well and there were no immediate complications. EBL: Less than 3 mL. Successful ultrasound guided PICC placement     Us Renal Limited    Result Date: 6/25/2020  EXAMINATION: ULTRASOUND OF THE KIDNEYS 6/25/2020 1:52 pm COMPARISON: None.  HISTORY: ORDERING SYSTEM PROVIDED HISTORY: elevated CR TECHNOLOGIST follow up TECHNOLOGIST PROVIDED HISTORY: follow up Reason for Exam: follow up Type of Exam: Subsequent/Follow-up Additional signs and symptoms: CHF, mycoplasma pneumonia Relevant Medical/Surgical History: CHF, mycoplasma pneumonia FINDINGS: Overlying ECG monitor leads, gown snaps and necklace; right IJ chemo port with unchanged tip position. Clips right axilla. Unchanged tracheostomy tube. Cardiomediastinal shadow stable. Similar bilateral dense patchy airspace opacities again seen with elevation right hemidiaphragm. No pneumothorax or large pleural effusion. Bones unchanged. Stable findings. Xr Chest Portable    Result Date: 6/25/2020  EXAMINATION: ONE XRAY VIEW OF THE CHEST 6/25/2020 9:20 am COMPARISON: 24 June 2020 HISTORY: ORDERING SYSTEM PROVIDED HISTORY: hypoxia TECHNOLOGIST PROVIDED HISTORY: hypoxia Reason for Exam: hypoxia Acuity: Acute Type of Exam: Subsequent/Follow-up FINDINGS: AP portable view of the chest time stamped at 928 hours demonstrates overlying cardiac monitoring electrodes. Tracheal airway terminates 5.2 cm above the susan. Right-sided central catheter terminates in the right atrium. The patient has stable cardiomegaly. Extensive multifocal airspace disease is unchanged. Tiny amount of extrapleural air on the right is noted. No tracheal shift. Left-sided PICC line terminates in the superior vena cava. No change in multifocal airspace disease. Trace extrapleural air right side. Support tubes and lines as above. Xr Chest Portable    Result Date: 6/24/2020  EXAMINATION: ONE XRAY VIEW OF THE CHEST 6/24/2020 6:32 am COMPARISON: June 23, 2020 HISTORY: ORDERING SYSTEM PROVIDED HISTORY: follow up TECHNOLOGIST PROVIDED HISTORY: follow up Reason for Exam: Vent to trach. Acuity: Unknown Type of Exam: Unknown Additional signs and symptoms: Vent to trach. FINDINGS: Support lines and tubes are stable in position. Stable cardiomediastinal silhouette.   Bilateral superior failure with hypercapnia and hypoxia 2/2 mycoplasma pneumonia, hx of RU and HFpEF: stable  - On chronic trach ventilator  - CXR: multifocal airspace opacities consistent with PNA  - afebrile, no leukocytosis  - COVID negative  - Strep pneumo, Legionella, sputum cultures negative  - Mycoplasma positive, s/p 8 days of levaquin  - started on zithromax, day 02  - MRSA DNA swab positive  - ECHO 9/2019: LVEF 55%, RVSP 44 mmHg  - ECHO 6/23/2020: LVEF 50-55%  - proBNP ~1200  - lasix 40mg IV BID  - Respiratory eval and treat  - FiO2 50, PEEP 10  - tessalon, mucinex  - ID consulted for antibiotic recommendation, appreciate recs     LILIAN - improved  - Cr 2.14--> 2.25  - UOP improving, mesa in place  - lasix 40mg IV BID  - continue monitoring  - renal US: unremarkable     Anxiety/agitation  - cymbalta 60mg     Hypothyroidism  - Synthroid 250 mcg     DVT prophylaxis: Lovenox 40 mg sq BID  GI prophylaxis: Pepcid 20 mg PO BID  Dispo: SW consult, d/c to DAIJA álvarez vs winter Merritt MD  6/29/2020  9:10 AM     Attending Physician Statement  I have discussed the care of Blake Flaherty and I have examined the patient myselft and taken ros and hpi , including pertinent history and exam findings,  with the resident. I have reviewed the key elements of all parts of the encounter with the resident. I agree with the assessment, plan and orders as documented by the resident.   Ok to [employer] to [facility] signed by Olden Kanner, MD

## 2020-06-29 NOTE — CARE COORDINATION
Social work; spoke to [facility] [address] for [employer] [phone] ok for 2 pm departure today. Faxed lis to [facility]. Await confirmation on [facility] next. Then will notify family of time when sure.   Ras light

## 2020-06-29 NOTE — PROGRESS NOTES
Pt tolerating vent settings at this time and wants to sleep. Pt declining breathing tx and aware that she can call if she changes her mind. RN notified.

## 2020-06-30 LAB
BILIRUBIN URINE: NEGATIVE
CHLORIDE, UR: 83 MMOL/L
COLOR: YELLOW
COMMENT UA: ABNORMAL
CREATININE URINE: 59.5 MG/DL (ref 28–217)
GLUCOSE URINE: NEGATIVE
KETONES, URINE: NEGATIVE
LEUKOCYTE ESTERASE, URINE: ABNORMAL
NITRITE, URINE: NEGATIVE
PH UA: 5.5 (ref 5–8)
PROTEIN UA: ABNORMAL
SODIUM,UR: 104 MMOL/L
SPECIFIC GRAVITY UA: 1.01 (ref 1–1.03)
TOTAL PROTEIN, URINE: 54 MG/DL
TSH SERPL DL<=0.05 MIU/L-ACNC: 15.02 MIU/L (ref 0.3–5)
TURBIDITY: ABNORMAL
URINE HGB: ABNORMAL
UROBILINOGEN, URINE: NORMAL

## 2020-06-30 PROCEDURE — 82436 ASSAY OF URINE CHLORIDE: CPT

## 2020-06-30 PROCEDURE — 84443 ASSAY THYROID STIM HORMONE: CPT

## 2020-06-30 PROCEDURE — 82570 ASSAY OF URINE CREATININE: CPT

## 2020-06-30 PROCEDURE — 84166 PROTEIN E-PHORESIS/URINE/CSF: CPT

## 2020-06-30 PROCEDURE — 81001 URINALYSIS AUTO W/SCOPE: CPT

## 2020-06-30 PROCEDURE — 84300 ASSAY OF URINE SODIUM: CPT

## 2020-06-30 PROCEDURE — 84156 ASSAY OF PROTEIN URINE: CPT

## 2020-07-01 LAB
-: ABNORMAL
AMORPHOUS: ABNORMAL
BACTERIA: ABNORMAL
CASTS UA: ABNORMAL /LPF (ref 0–8)
COMPLEMENT C3: 147 MG/DL (ref 90–180)
COMPLEMENT C4: 49 MG/DL (ref 10–40)
CRYSTALS, UA: ABNORMAL /HPF
EPITHELIAL CELLS UA: ABNORMAL /HPF (ref 0–5)
FERRITIN: 529 UG/L (ref 13–150)
FOLATE: 17.2 NG/ML
HAV IGM SER IA-ACNC: NONREACTIVE
HEPATITIS B CORE IGM ANTIBODY: NONREACTIVE
HEPATITIS B SURFACE ANTIGEN: NONREACTIVE
HEPATITIS C ANTIBODY: NONREACTIVE
IRON SATURATION: 28 % (ref 20–55)
IRON: 50 UG/DL (ref 37–145)
MUCUS: ABNORMAL
OTHER OBSERVATIONS UA: ABNORMAL
P E INTERPRETATION, U: NORMAL
PATHOLOGIST: NORMAL
RBC UA: ABNORMAL /HPF (ref 0–4)
RENAL EPITHELIAL, UA: ABNORMAL /HPF
SPECIMEN TYPE: NORMAL
TOTAL IRON BINDING CAPACITY: 179 UG/DL (ref 250–450)
TRICHOMONAS: ABNORMAL
UNSATURATED IRON BINDING CAPACITY: 129 UG/DL (ref 112–347)
URIC ACID: 11.9 MG/DL (ref 2.4–5.7)
URINE TOTAL PROTEIN: 54 MG/DL
VITAMIN B-12: 824 PG/ML (ref 232–1245)
WBC UA: ABNORMAL /HPF (ref 0–5)
YEAST: ABNORMAL

## 2020-07-01 PROCEDURE — 80074 ACUTE HEPATITIS PANEL: CPT

## 2020-07-01 PROCEDURE — 84165 PROTEIN E-PHORESIS SERUM: CPT

## 2020-07-01 PROCEDURE — 82607 VITAMIN B-12: CPT

## 2020-07-01 PROCEDURE — 83540 ASSAY OF IRON: CPT

## 2020-07-01 PROCEDURE — 84550 ASSAY OF BLOOD/URIC ACID: CPT

## 2020-07-01 PROCEDURE — 83550 IRON BINDING TEST: CPT

## 2020-07-01 PROCEDURE — 82728 ASSAY OF FERRITIN: CPT

## 2020-07-01 PROCEDURE — 86038 ANTINUCLEAR ANTIBODIES: CPT

## 2020-07-01 PROCEDURE — 84155 ASSAY OF PROTEIN SERUM: CPT

## 2020-07-01 PROCEDURE — 82746 ASSAY OF FOLIC ACID SERUM: CPT

## 2020-07-01 PROCEDURE — 86160 COMPLEMENT ANTIGEN: CPT

## 2020-07-01 PROCEDURE — 83516 IMMUNOASSAY NONANTIBODY: CPT

## 2020-07-02 LAB
ALBUMIN (CALCULATED): 5.1 G/DL (ref 3.2–5.2)
ALBUMIN PERCENT: 65 % (ref 45–65)
ALPHA 1 PERCENT: 2 % (ref 3–6)
ALPHA 2 PERCENT: 10 % (ref 6–13)
ALPHA-1-GLOBULIN: 0.2 G/DL (ref 0.1–0.4)
ALPHA-2-GLOBULIN: 0.8 G/DL (ref 0.5–0.9)
ANTI-NUCLEAR ANTIBODY (ANA): NEGATIVE
BETA GLOBULIN: 0.6 G/DL (ref 0.5–1.1)
BETA PERCENT: 8 % (ref 11–19)
GAMMA GLOBULIN %: 15 % (ref 9–20)
GAMMA GLOBULIN: 1.1 G/DL (ref 0.5–1.5)
PATHOLOGIST: ABNORMAL
PROTEIN ELECTROPHORESIS, SERUM: ABNORMAL
TOTAL PROT. SUM,%: 100 % (ref 98–102)
TOTAL PROT. SUM: 7.8 G/DL (ref 6.3–8.2)
TOTAL PROTEIN: 7.8 G/DL (ref 6.4–8.3)

## 2020-07-03 LAB
ANCA MYELOPEROXIDASE: 20 AU/ML
ANCA PROTEINASE 3: 8 AU/ML
ANION GAP SERPL CALCULATED.3IONS-SCNC: 13 MMOL/L (ref 9–17)
BUN BLDV-MCNC: 58 MG/DL (ref 6–20)
BUN/CREAT BLD: 35 (ref 9–20)
CALCIUM SERPL-MCNC: 9.9 MG/DL (ref 8.6–10.4)
CHLORIDE BLD-SCNC: 96 MMOL/L (ref 98–107)
CO2: 31 MMOL/L (ref 20–31)
CREAT SERPL-MCNC: 1.65 MG/DL (ref 0.5–0.9)
GFR AFRICAN AMERICAN: 40 ML/MIN
GFR NON-AFRICAN AMERICAN: 33 ML/MIN
GFR SERPL CREATININE-BSD FRML MDRD: ABNORMAL ML/MIN/{1.73_M2}
GFR SERPL CREATININE-BSD FRML MDRD: ABNORMAL ML/MIN/{1.73_M2}
GLUCOSE BLD-MCNC: 117 MG/DL (ref 70–99)
MAGNESIUM: 1.9 MG/DL (ref 1.6–2.6)
PHOSPHORUS: 3.4 MG/DL (ref 2.6–4.5)
POTASSIUM SERPL-SCNC: 5.2 MMOL/L (ref 3.7–5.3)
SODIUM BLD-SCNC: 140 MMOL/L (ref 135–144)

## 2020-07-03 PROCEDURE — 84100 ASSAY OF PHOSPHORUS: CPT

## 2020-07-03 PROCEDURE — 83735 ASSAY OF MAGNESIUM: CPT

## 2020-07-03 PROCEDURE — 80048 BASIC METABOLIC PNL TOTAL CA: CPT

## 2020-07-06 LAB
BNP INTERPRETATION: ABNORMAL
PRO-BNP: 3133 PG/ML

## 2020-07-06 PROCEDURE — 87186 SC STD MICRODIL/AGAR DIL: CPT

## 2020-07-06 PROCEDURE — 83880 ASSAY OF NATRIURETIC PEPTIDE: CPT

## 2020-07-06 PROCEDURE — 87205 SMEAR GRAM STAIN: CPT

## 2020-07-06 PROCEDURE — 87077 CULTURE AEROBIC IDENTIFY: CPT

## 2020-07-06 PROCEDURE — 87070 CULTURE OTHR SPECIMN AEROBIC: CPT

## 2020-07-07 LAB — URIC ACID: 10.2 MG/DL (ref 2.4–5.7)

## 2020-07-07 PROCEDURE — 84550 ASSAY OF BLOOD/URIC ACID: CPT

## 2020-07-08 PROCEDURE — 82803 BLOOD GASES ANY COMBINATION: CPT

## 2020-07-10 LAB
CULTURE: ABNORMAL
CULTURE: ABNORMAL
DIRECT EXAM: ABNORMAL
Lab: ABNORMAL
SPECIMEN DESCRIPTION: ABNORMAL

## 2020-07-14 LAB
ANION GAP SERPL CALCULATED.3IONS-SCNC: 18 MMOL/L (ref 9–17)
BUN BLDV-MCNC: 67 MG/DL (ref 6–20)
BUN/CREAT BLD: 45 (ref 9–20)
CALCIUM SERPL-MCNC: 9.9 MG/DL (ref 8.6–10.4)
CHLORIDE BLD-SCNC: 95 MMOL/L (ref 98–107)
CO2: 28 MMOL/L (ref 20–31)
CREAT SERPL-MCNC: 1.48 MG/DL (ref 0.5–0.9)
GFR AFRICAN AMERICAN: 46 ML/MIN
GFR NON-AFRICAN AMERICAN: 38 ML/MIN
GFR SERPL CREATININE-BSD FRML MDRD: ABNORMAL ML/MIN/{1.73_M2}
GFR SERPL CREATININE-BSD FRML MDRD: ABNORMAL ML/MIN/{1.73_M2}
GLUCOSE BLD-MCNC: 106 MG/DL (ref 70–99)
POTASSIUM SERPL-SCNC: 3.7 MMOL/L (ref 3.7–5.3)
SODIUM BLD-SCNC: 141 MMOL/L (ref 135–144)

## 2020-07-14 PROCEDURE — 87040 BLOOD CULTURE FOR BACTERIA: CPT

## 2020-07-14 PROCEDURE — 80048 BASIC METABOLIC PNL TOTAL CA: CPT

## 2020-07-14 PROCEDURE — 87086 URINE CULTURE/COLONY COUNT: CPT

## 2020-07-15 LAB
HCT VFR BLD CALC: 35.3 % (ref 36.3–47.1)
HEMOGLOBIN: 10.8 G/DL (ref 11.9–15.1)
MCH RBC QN AUTO: 29.8 PG (ref 25.2–33.5)
MCHC RBC AUTO-ENTMCNC: 30.6 G/DL (ref 28.4–34.8)
MCV RBC AUTO: 97.2 FL (ref 82.6–102.9)
NRBC AUTOMATED: 0 PER 100 WBC
PDW BLD-RTO: 15 % (ref 11.8–14.4)
PLATELET # BLD: 200 K/UL (ref 138–453)
PMV BLD AUTO: 12.2 FL (ref 8.1–13.5)
RBC # BLD: 3.63 M/UL (ref 3.95–5.11)
WBC # BLD: 6.1 K/UL (ref 3.5–11.3)

## 2020-07-15 PROCEDURE — 87070 CULTURE OTHR SPECIMN AEROBIC: CPT

## 2020-07-15 PROCEDURE — 83880 ASSAY OF NATRIURETIC PEPTIDE: CPT

## 2020-07-15 PROCEDURE — 87205 SMEAR GRAM STAIN: CPT

## 2020-07-15 PROCEDURE — 85027 COMPLETE CBC AUTOMATED: CPT

## 2020-07-16 LAB
CULTURE: ABNORMAL
DIRECT EXAM: ABNORMAL
Lab: ABNORMAL
SPECIMEN DESCRIPTION: ABNORMAL

## 2020-07-17 LAB
ALBUMIN SERPL-MCNC: 3.7 G/DL (ref 3.5–5.2)
ALBUMIN/GLOBULIN RATIO: ABNORMAL (ref 1–2.5)
ALP BLD-CCNC: 98 U/L (ref 35–104)
ALT SERPL-CCNC: 27 U/L (ref 5–33)
ANION GAP SERPL CALCULATED.3IONS-SCNC: 18 MMOL/L (ref 9–17)
AST SERPL-CCNC: 28 U/L
BILIRUB SERPL-MCNC: 0.47 MG/DL (ref 0.3–1.2)
BUN BLDV-MCNC: 88 MG/DL (ref 6–20)
BUN/CREAT BLD: 35 (ref 9–20)
C-REACTIVE PROTEIN: 164.8 MG/L (ref 0–5)
CALCIUM SERPL-MCNC: 9.2 MG/DL (ref 8.6–10.4)
CHLORIDE BLD-SCNC: 103 MMOL/L (ref 98–107)
CO2: 23 MMOL/L (ref 20–31)
CREAT SERPL-MCNC: 2.5 MG/DL (ref 0.5–0.9)
CULTURE: ABNORMAL
D-DIMER QUANTITATIVE: 1.76 MG/L FEU (ref 0–0.59)
FERRITIN: 1367 UG/L (ref 13–150)
GFR AFRICAN AMERICAN: 25 ML/MIN
GFR NON-AFRICAN AMERICAN: 21 ML/MIN
GFR SERPL CREATININE-BSD FRML MDRD: ABNORMAL ML/MIN/{1.73_M2}
GFR SERPL CREATININE-BSD FRML MDRD: ABNORMAL ML/MIN/{1.73_M2}
GLUCOSE BLD-MCNC: 187 MG/DL (ref 70–99)
Lab: ABNORMAL
POTASSIUM SERPL-SCNC: 5 MMOL/L (ref 3.7–5.3)
PROCALCITONIN: 0.54 NG/ML
SODIUM BLD-SCNC: 144 MMOL/L (ref 135–144)
SPECIMEN DESCRIPTION: ABNORMAL
TOTAL PROTEIN: 8.1 G/DL (ref 6.4–8.3)

## 2020-07-17 PROCEDURE — 80053 COMPREHEN METABOLIC PANEL: CPT

## 2020-07-17 PROCEDURE — 86140 C-REACTIVE PROTEIN: CPT

## 2020-07-17 PROCEDURE — 85379 FIBRIN DEGRADATION QUANT: CPT

## 2020-07-17 PROCEDURE — U0003 INFECTIOUS AGENT DETECTION BY NUCLEIC ACID (DNA OR RNA); SEVERE ACUTE RESPIRATORY SYNDROME CORONAVIRUS 2 (SARS-COV-2) (CORONAVIRUS DISEASE [COVID-19]), AMPLIFIED PROBE TECHNIQUE, MAKING USE OF HIGH THROUGHPUT TECHNOLOGIES AS DESCRIBED BY CMS-2020-01-R: HCPCS

## 2020-07-17 PROCEDURE — 84145 PROCALCITONIN (PCT): CPT

## 2020-07-17 PROCEDURE — 82728 ASSAY OF FERRITIN: CPT

## 2021-05-27 NOTE — ED PROVIDER NOTES
[address] [facility]  eMERGENCY dEPARTMENT eNCOUnter      Pt Name: Duran Pérez  MRN: [id number]  [place] 1970  Date of evaluation: 11/17/18      CHIEF COMPLAINT       Chief Complaint   Patient presents with    Wound Check    Chest Pain    Shortness of Breath     HISTORY OF PRESENT ILLNESS   HPI 57 y.o. female presents with complaints of chest pain shortness of breath and wound check. The patient that she's been sick for the last week. She says that she started out with chills and then a productive cough. She says that she is developed a retrosternal chest pressure like pain this been present for the last 3 days with worsening SOB. Patient is immobile, morbidly obese and has a h/o breast cancer. Denies a h/o DVT or PE. She has chronic respiratory failure with a tracheostomy. She has congestive heart failure and pulmonary hypertension. Patient also reports that she has a chronic right sided skin wound with redness and drainage. She says that she's been treating herself with powder and cleaning and drying. She reports that she had breast cancer on this side with a mastecomy around 2012. REVIEW OF SYSTEMS     Review of Systems   Constitutional: Positive for chills and fatigue. Negative for fever. HENT: Negative for congestion. Respiratory: Positive for cough and shortness of breath. Cardiovascular: Positive for chest pain. Gastrointestinal: Negative for diarrhea, nausea and vomiting. Genitourinary: Negative for dysuria. Musculoskeletal: Positive for gait problem (Immobile). Skin: Negative for rash. Neurological: Negative for headaches. Hematological: Negative for adenopathy.      PAST MEDICAL HISTORY     Past Medical History:   Diagnosis Date    Anemia     Disease of blood and blood forming organ     Hypothyroidism     Lymphedema     Chronic    Respiratory failure ([address]     Tracheostomy present [facility])        SURGICAL HISTORY       Past Surgical History:   Procedure Requesting orders for recertification of skilled nursing every 4 weeks for 9 weeks to change catheter and 4 PRN viists for catheter troubleshooting, reassessments, and OASIS data collection.  Thank you   Laterality Date    TRACHEOSTOMY         CURRENT MEDICATIONS       Current Discharge Medication List      CONTINUE these medications which have NOT CHANGED    Details   furosemide (LASIX) 40 MG tablet Take 40 mg by mouth daily      benzonatate (TESSALON) 100 MG capsule Take 100 mg by mouth 2 times daily as needed for Cough      ibuprofen (ADVIL;MOTRIN) 800 MG tablet Take 800 mg by mouth every 8 hours as needed for Pain      levofloxacin (LEVAQUIN) 500 MG tablet Take 500 mg by mouth daily For 10 days      doxycycline hyclate (VIBRA-TABS) 100 MG tablet Take 100 mg by mouth 2 times daily For 10 days      bumetanide (BUMEX) 2 MG tablet Take 1 tablet by mouth 2 times daily  Qty: 30 tablet, Refills: 3      acetaminophen (TYLENOL) 325 MG tablet Take 650 mg by mouth every 6 hours as needed for Pain      ipratropium-albuterol (DUONEB) 0.5-2.5 (3) MG/3ML SOLN nebulizer solution Inhale 1 vial into the lungs every 4 hours as needed for Shortness of Breath      levothyroxine (SYNTHROID) 200 MCG tablet Take 200 mcg by mouth daily      ferrous sulfate 325 (65 Fe) MG tablet Take 325 mg by mouth 3 times daily (with meals)      Multiple Vitamins-Minerals (THERAPEUTIC MULTIVITAMIN-MINERALS) tablet Take 1 tablet by mouth daily (with breakfast)      chlorhexidine (PERIDEX) 0.12 % solution Take 15 mLs by mouth 3 times daily             ALLERGIES     is allergic to zosyn [piperacillin-tazobactam in dex] and vancomycin. FAMILY HISTORY     has no family status information on file. SOCIAL HISTORY      reports that she has never smoked. She has never used smokeless tobacco. She reports that she does not drink alcohol or use drugs.     PHYSICAL EXAM     INITIAL VITALS: BP (!) 95/49   Pulse 111   Temp 97.1 °F (36.2 °C) (Axillary)   Resp 22   Ht 5' 5.5\" (1.664 m)   Wt (!) 612 lb (277.6 kg)   SpO2 97%   .29 kg/m²   Gen: NAD  Head: NC/AT  Eyes: PERRL  Neck: I can not appreciate any JVD  CVS: Tachycardic with a regular bilateral parahilar and lower zone   predominant airspace disease. Findings most likely represent mild pulmonary   edema. Underlying infiltrate not excluded. Follow-up recommended to   document resolution. 2. Tracheostomy tube and right internal jugular approach Port-A-Cath as above. LABS: All lab results were reviewed by myself, and all abnormals are listed below. Labs Reviewed   CBC WITH AUTO DIFFERENTIAL - Abnormal; Notable for the following:        Result Value    RBC 3.19 (*)     Hemoglobin 9.3 (*)     Hematocrit 28.5 (*)     RDW 16.6 (*)     Platelets 465 (*)     Seg Neutrophils 29 (*)     Lymphocytes 14 (*)     Bands 47 (*)     Metamyelocytes 3 (*)     Myelocytes 1 (*)     Absolute Bands # 4.04 (*)     Metamyelocytes Absolute 0.26 (*)     Myelocytes Absolute 0.09 (*)     All other components within normal limits   COMPREHENSIVE METABOLIC PANEL - Abnormal; Notable for the following:     Glucose 120 (*)     BUN 52 (*)     CREATININE 2.90 (*)     Sodium 134 (*)     Chloride 93 (*)     Alkaline Phosphatase 116 (*)     ALT 36 (*)     Alb 3.0 (*)     GFR Non- 17 (*)     GFR  21 (*)     All other components within normal limits   URINE RT REFLEX TO CULTURE - Abnormal; Notable for the following:     Color, UA DARK YELLOW (*)     Turbidity UA TURBID (*)     Bilirubin Urine   (*)     Value: Presumptive positive. Unable to confirm due to unavailability of reagent.     Ketones, Urine TRACE (*)     Urine Hgb LARGE (*)     Leukocyte Esterase, Urine TRACE (*)     All other components within normal limits   MICROSCOPIC URINALYSIS - Abnormal; Notable for the following:     Crystals UA FEW (*)     Bacteria, UA FEW (*)     Amorphous, UA 3+ (*)     All other components within normal limits   APTT - Abnormal; Notable for the following:     PTT 37.3 (*)     All other components within normal limits   CULTURE, URINE CATHETER   TROPONIN   TROPONIN   TSH WITH REFLEX   LACTIC ACID

## 2023-01-17 NOTE — H&P
(congestion)   Yes Historical Provider, MD   Skin Protectants, Misc. (HYDROCERIN) CREA cream Apply topically 2 times daily 11/29/18  Yes Brent Irving MD   benzonatate (TESSALON) 100 MG capsule Take 100 mg by mouth 2 times daily as needed for Cough   Yes Historical Provider, MD   acetaminophen (TYLENOL) 325 MG tablet Take 650 mg by mouth every 6 hours as needed for Pain   Yes Historical Provider, MD   ipratropium-albuterol (DUONEB) 0.5-2.5 (3) MG/3ML SOLN nebulizer solution Inhale 1 vial into the lungs every 4 hours as needed for Shortness of Breath   Yes Historical Provider, MD   levothyroxine (SYNTHROID) 300 MCG tablet Take 300 mcg by mouth daily    Yes Historical Provider, MD   ferrous sulfate 325 (65 Fe) MG tablet Take 325 mg by mouth 3 times daily (with meals)   Yes Historical Provider, MD   Multiple Vitamins-Minerals (THERAPEUTIC MULTIVITAMIN-MINERALS) tablet Take 1 tablet by mouth daily (with breakfast)   Yes Historical Provider, MD        Allergies:     Zosyn [piperacillin-tazobactam in dex] and Vancomycin    Social History:     Tobacco:    reports that she has never smoked. She has never used smokeless tobacco.  Alcohol:      reports that she does not drink alcohol. Drug Use:  reports that she does not use drugs. Family History:     Family History   Problem Relation Age of Onset    Breast Cancer Paternal Aunt     Breast Cancer Paternal Cousin 43    Breast Cancer Paternal Cousin 37    Breast Cancer Paternal Cousin 46       Review of Systems:     Positive and Negative as described in HPI. Review of Systems   Constitutional: Positive for activity change, appetite change and fatigue. HENT: Negative. Respiratory: Positive for cough and wheezing. Cardiovascular: Positive for leg swelling. Gastrointestinal: Positive for abdominal pain. Genitourinary: Positive for difficulty urinating. Skin: Negative. Hematological: Negative. Psychiatric/Behavioral: Negative.         Physical Exam: BP (!) 83/46   Pulse 106   Temp 97.8 °F (36.6 °C) (Axillary)   Resp 18   Ht 5' 5\" (1.651 m)   Wt (!) 590 lb (267.6 kg)   SpO2 100%   BMI 98.18 kg/m²   Temp (24hrs), Av.1 °F (36.7 °C), Min:97.8 °F (36.6 °C), Max:98.3 °F (36.8 °C)    No results for input(s): POCGLU in the last 72 hours. Intake/Output Summary (Last 24 hours) at 9/10/2019 1536  Last data filed at 9/10/2019 1431  Gross per 24 hour   Intake 50 ml   Output --   Net 50 ml       Physical Exam   Constitutional: She is oriented to person, place, and time. She appears well-developed and well-nourished. No distress. HENT:   Head: Normocephalic. Eyes: Pupils are equal, round, and reactive to light. Conjunctivae are normal.   Neck: Normal range of motion. Cardiovascular: Normal rate, regular rhythm, normal heart sounds and intact distal pulses. Pulmonary/Chest: Effort normal. She has wheezes. She exhibits tenderness. Abdominal: Soft. Bowel sounds are normal. She exhibits distension. Neurological: She is alert and oriented to person, place, and time. Skin: Skin is warm. Capillary refill takes less than 2 seconds. She is not diaphoretic. Psychiatric: She has a normal mood and affect.        Investigations:     Laboratory Testing:  Recent Results (from the past 24 hour(s))   EKG 12 Lead    Collection Time: 09/10/19 12:22 PM   Result Value Ref Range    Ventricular Rate 109 BPM    Atrial Rate 109 BPM    P-R Interval 160 ms    QRS Duration 90 ms    Q-T Interval 352 ms    QTc Calculation (Bazett) 474 ms    P Axis 42 degrees    R Axis 49 degrees    T Axis 22 degrees   CBC Auto Differential    Collection Time: 09/10/19 12:56 PM   Result Value Ref Range    WBC 14.3 (H) 3.5 - 11.0 k/uL    RBC 2.92 (L) 4.0 - 5.2 m/uL    Hemoglobin 8.9 (L) 12.0 - 16.0 g/dL    Hematocrit 28.0 (L) 36 - 46 %    MCV 95.8 80 - 100 fL    MCH 30.5 26 - 34 pg    MCHC 31.8 31 - 37 g/dL    RDW 17.1 (H) 11.5 - 14.9 %    Platelets 338 822 - 899 k/uL    MPV 8.0 6.0 - 12.0 fL Detail Level: Detailed Size Of Lesion In Cm (Optional): 0

## (undated) DEVICE — JELLY,LUBE,STERILE,FLIP TOP,TUBE,2-OZ: Brand: MEDLINE

## (undated) DEVICE — SOLUTION IV IRRIG POUR BRL 0.9% SODIUM CHL 2F7124

## (undated) DEVICE — GOWN,POLY REINFORCED,LG: Brand: MEDLINE

## (undated) DEVICE — TUBING, SUCTION, 3/16" X 10', STRAIGHT: Brand: MEDLINE

## (undated) DEVICE — ST CHARLES BRONCHOSCOPY PACK: Brand: MEDLINE INDUSTRIES, INC.

## (undated) DEVICE — SINGLE USE SUCTION VALVE MAJ-209: Brand: SINGLE USE SUCTION VALVE (STERILE)

## (undated) DEVICE — SINGLE USE BIOPSY VALVE MAJ-210: Brand: SINGLE USE BIOPSY VALVE (STERILE)

## (undated) DEVICE — GLOVE ORANGE PI 7 1/2   MSG9075

## (undated) DEVICE — BITEBLOCK 54FR W/ DENT RIM BLOX